# Patient Record
Sex: MALE | Race: ASIAN | NOT HISPANIC OR LATINO | URBAN - METROPOLITAN AREA
[De-identification: names, ages, dates, MRNs, and addresses within clinical notes are randomized per-mention and may not be internally consistent; named-entity substitution may affect disease eponyms.]

---

## 2017-10-20 ENCOUNTER — EMERGENCY (EMERGENCY)
Facility: HOSPITAL | Age: 62
LOS: 1 days | Discharge: HOME | End: 2017-10-20
Attending: EMERGENCY MEDICINE | Admitting: EMERGENCY MEDICINE
Payer: COMMERCIAL

## 2017-10-20 VITALS
TEMPERATURE: 98 F | SYSTOLIC BLOOD PRESSURE: 184 MMHG | HEART RATE: 83 BPM | OXYGEN SATURATION: 98 % | RESPIRATION RATE: 16 BRPM | DIASTOLIC BLOOD PRESSURE: 101 MMHG

## 2017-10-20 LAB
ALBUMIN SERPL ELPH-MCNC: 4 G/DL — SIGNIFICANT CHANGE UP (ref 3.3–5)
ALP SERPL-CCNC: 81 U/L — SIGNIFICANT CHANGE UP (ref 40–120)
ALT FLD-CCNC: 17 U/L — SIGNIFICANT CHANGE UP (ref 4–41)
AST SERPL-CCNC: 17 U/L — SIGNIFICANT CHANGE UP (ref 4–40)
BASOPHILS # BLD AUTO: 0.04 K/UL — SIGNIFICANT CHANGE UP (ref 0–0.2)
BASOPHILS NFR BLD AUTO: 0.4 % — SIGNIFICANT CHANGE UP (ref 0–2)
BILIRUB SERPL-MCNC: 0.3 MG/DL — SIGNIFICANT CHANGE UP (ref 0.2–1.2)
BUN SERPL-MCNC: 10 MG/DL — SIGNIFICANT CHANGE UP (ref 7–23)
CALCIUM SERPL-MCNC: 9.7 MG/DL — SIGNIFICANT CHANGE UP (ref 8.4–10.5)
CHLORIDE SERPL-SCNC: 100 MMOL/L — SIGNIFICANT CHANGE UP (ref 98–107)
CO2 SERPL-SCNC: 25 MMOL/L — SIGNIFICANT CHANGE UP (ref 22–31)
CREAT SERPL-MCNC: 0.82 MG/DL — SIGNIFICANT CHANGE UP (ref 0.5–1.3)
EOSINOPHIL # BLD AUTO: 0.45 K/UL — SIGNIFICANT CHANGE UP (ref 0–0.5)
EOSINOPHIL NFR BLD AUTO: 4.7 % — SIGNIFICANT CHANGE UP (ref 0–6)
GLUCOSE SERPL-MCNC: 251 MG/DL — HIGH (ref 70–99)
HCT VFR BLD CALC: 42.4 % — SIGNIFICANT CHANGE UP (ref 39–50)
HGB BLD-MCNC: 13.7 G/DL — SIGNIFICANT CHANGE UP (ref 13–17)
IMM GRANULOCYTES # BLD AUTO: 0.01 # — SIGNIFICANT CHANGE UP
IMM GRANULOCYTES NFR BLD AUTO: 0.1 % — SIGNIFICANT CHANGE UP (ref 0–1.5)
LYMPHOCYTES # BLD AUTO: 3.25 K/UL — SIGNIFICANT CHANGE UP (ref 1–3.3)
LYMPHOCYTES # BLD AUTO: 34.1 % — SIGNIFICANT CHANGE UP (ref 13–44)
MCHC RBC-ENTMCNC: 26.3 PG — LOW (ref 27–34)
MCHC RBC-ENTMCNC: 32.3 % — SIGNIFICANT CHANGE UP (ref 32–36)
MCV RBC AUTO: 81.4 FL — SIGNIFICANT CHANGE UP (ref 80–100)
MONOCYTES # BLD AUTO: 0.61 K/UL — SIGNIFICANT CHANGE UP (ref 0–0.9)
MONOCYTES NFR BLD AUTO: 6.4 % — SIGNIFICANT CHANGE UP (ref 2–14)
NEUTROPHILS # BLD AUTO: 5.17 K/UL — SIGNIFICANT CHANGE UP (ref 1.8–7.4)
NEUTROPHILS NFR BLD AUTO: 54.3 % — SIGNIFICANT CHANGE UP (ref 43–77)
NRBC # FLD: 0 — SIGNIFICANT CHANGE UP
OB PNL STL: NEGATIVE — SIGNIFICANT CHANGE UP
PLATELET # BLD AUTO: 251 K/UL — SIGNIFICANT CHANGE UP (ref 150–400)
PMV BLD: 11.3 FL — SIGNIFICANT CHANGE UP (ref 7–13)
POTASSIUM SERPL-MCNC: 4.7 MMOL/L — SIGNIFICANT CHANGE UP (ref 3.5–5.3)
POTASSIUM SERPL-SCNC: 4.7 MMOL/L — SIGNIFICANT CHANGE UP (ref 3.5–5.3)
PROT SERPL-MCNC: 7.4 G/DL — SIGNIFICANT CHANGE UP (ref 6–8.3)
RBC # BLD: 5.21 M/UL — SIGNIFICANT CHANGE UP (ref 4.2–5.8)
RBC # FLD: 13 % — SIGNIFICANT CHANGE UP (ref 10.3–14.5)
SODIUM SERPL-SCNC: 138 MMOL/L — SIGNIFICANT CHANGE UP (ref 135–145)
TSH SERPL-MCNC: 4.09 UIU/ML — SIGNIFICANT CHANGE UP (ref 0.27–4.2)
WBC # BLD: 9.53 K/UL — SIGNIFICANT CHANGE UP (ref 3.8–10.5)
WBC # FLD AUTO: 9.53 K/UL — SIGNIFICANT CHANGE UP (ref 3.8–10.5)

## 2017-10-20 PROCEDURE — 99284 EMERGENCY DEPT VISIT MOD MDM: CPT

## 2017-10-20 RX ORDER — DOCUSATE SODIUM 100 MG
1 CAPSULE ORAL
Qty: 14 | Refills: 0
Start: 2017-10-20 | End: 2017-10-27

## 2017-10-20 RX ORDER — POLYETHYLENE GLYCOL 3350 17 G/17G
17 POWDER, FOR SOLUTION ORAL
Qty: 80 | Refills: 0
Start: 2017-10-20 | End: 2017-10-25

## 2017-10-20 RX ORDER — POLYETHYLENE GLYCOL 3350 17 G/17G
17 POWDER, FOR SOLUTION ORAL ONCE
Qty: 0 | Refills: 0 | Status: DISCONTINUED | OUTPATIENT
Start: 2017-10-20 | End: 2017-10-24

## 2017-10-20 NOTE — ED PROVIDER NOTE - MEDICAL DECISION MAKING DETAILS
61 yo M c PMH of HTN, DM, HLD presenting with 1 month hx of diarrhea while in [place] that evolved to constipation on 10/7 when [place] to the [place], pt also reports associated rectal pain with BM. Pt states he passed flatus yesterday and had a BM today. Pt denies n/v and abdominal pain. On exam, pt is afebrile non-toxic appearing, and has no abdominal TTP. Pt has TTP rectally on rectal exam, guiac is negative. Will give pt maalox and d/c with f/u with PCP and GI.

## 2017-10-20 NOTE — ED PROVIDER NOTE - NONTENDER LOCATION
right upper quadrant/right lower quadrant/left lower quadrant/periumbilical/suprapubic/left upper quadrant

## 2017-10-20 NOTE — ED ADULT NURSE NOTE - OBJECTIVE STATEMENT
A&Ox3, respirations even and unlabored, skin warm and dry good for color, ambulatory. Patient reports LLQ cramping and diarrhea, rectal pain when BM and passing gas x 3-4 days, felt non-radiating chest pain yesterday for 2 hours, pain resolved. Recent travel from [place] 10/07/2017. Reports dark stool. Reports lower extremity swelling when arrived, swelling resolved. Denies SOB, LOC, nausea, vomiting. Hx HTN, DM high cholesterol. A&Ox3, respirations even and unlabored, skin warm and dry good for color, ambulatory. Patient reports LLQ cramping and diarrhea 2 weeks ago followed by constipation, rectal pain when BM and passing gas x 3-4 days, felt non-radiating chest pain yesterday for 2 hours, pain resolved. Recent travel from [place] 10/07/2017. Reports dark stool, denies blood in stool. Reports lower extremity swelling when arrived, swelling resolved. Denies SOB, LOC, nausea, vomiting. Hx HTN, DM high cholesterol.

## 2017-10-20 NOTE — ED PROVIDER NOTE - ATTENDING CONTRIBUTION TO CARE
I performed a face-to-face evaluation of the patient and performed a history and physical examination. I agree with the history and physical examination.    62 M, h/o diarrhea in [place] 1 month ago. P/w constipation (straining) and rectal pain w/ BMs. AFVSS. Appears well. NAD. Afebrile. Vital signs unremarkable. Stool guiac neg. Labs unremarkable. Plan: Miralax, Colace.

## 2017-10-20 NOTE — ED PROVIDER NOTE - PLAN OF CARE
1. You were seen for constipation and given miralax.  2.  a prescription for miralax and colace at your pharmacy and take as prescribed.  3. Follow up with your primary care doctor and a gastrointestinal doctor within 48 hours.  4. Return immediately to the emergency department if you have worsening or persistent abdominal pain, nausea, vomiting, fever, bloody stool, dizziness, pain, fainting, chest pain, or shortness of breath.

## 2017-10-20 NOTE — ED PROVIDER NOTE - OBJECTIVE STATEMENT
63 yo M c PMH of DM, HTN, and HLD presenting with hx of recent diarrhea, constipation, and rectal pain. Pt reports he was in [place] on 9/15/17 when he developed diarrhea for 3-4 days that resolved for one week after he took an unknown abx, then recurred for 2 days after eating spicy food and again resolved spontaneously. [place] and started to have constipation as well as rectal pain with bowel movements. 63 yo M c PMH of DM, HTN, and HLD presenting with hx of recent diarrhea, constipation, and rectal pain. Pt reports he was in [place] on 9/15/17 when he developed diarrhea for 3-4 days that resolved for one week after he took an unknown abx, then recurred for 2 days after eating spicy food and again resolved spontaneously. [place] and started to have constipation as well as rectal pain with bowel movements. Pt has had a normal endoscopy in the past. pt passed flatus yesterday and had a small BM this morning.

## 2017-10-20 NOTE — ED PROVIDER NOTE - CARE PLAN
Principal Discharge DX:	Constipation Principal Discharge DX:	Constipation  Instructions for follow-up, activity and diet:	1. You were seen for constipation and given miralax.  2.  a prescription for miralax and colace at your pharmacy and take as prescribed.  3. Follow up with your primary care doctor and a gastrointestinal doctor within 48 hours.  4. Return immediately to the emergency department if you have worsening or persistent abdominal pain, nausea, vomiting, fever, bloody stool, dizziness, pain, fainting, chest pain, or shortness of breath.

## 2017-10-20 NOTE — ED ADULT TRIAGE NOTE - CHIEF COMPLAINT QUOTE
pt amb to triage c/o diarrhea onset while in [place], returned 10/7 received medication (unknown) while in [place] w/ relief for a few days, states BM associated w/ LLQ pain described as cramping, denies recent ABX use

## 2017-10-20 NOTE — ED PROVIDER NOTE - PROGRESS NOTE DETAILS
Pt states he feels improved. Labs are normal, guiac negative. Will d/c pt to home with PCP and GI f/u.

## 2019-03-24 ENCOUNTER — EMERGENCY (EMERGENCY)
Facility: HOSPITAL | Age: 64
LOS: 1 days | Discharge: HOME | End: 2019-03-24
Admitting: EMERGENCY MEDICINE
Payer: COMMERCIAL

## 2019-03-24 VITALS
HEART RATE: 80 BPM | RESPIRATION RATE: 16 BRPM | SYSTOLIC BLOOD PRESSURE: 164 MMHG | TEMPERATURE: 98 F | DIASTOLIC BLOOD PRESSURE: 88 MMHG | OXYGEN SATURATION: 92 %

## 2019-03-24 PROCEDURE — 99283 EMERGENCY DEPT VISIT LOW MDM: CPT

## 2019-03-24 PROCEDURE — 73030 X-RAY EXAM OF SHOULDER: CPT | Mod: 26,LT

## 2019-03-24 RX ORDER — IBUPROFEN 200 MG
600 TABLET ORAL ONCE
Qty: 0 | Refills: 0 | Status: COMPLETED | OUTPATIENT
Start: 2019-03-24 | End: 2019-03-24

## 2019-03-24 RX ADMIN — Medication 600 MILLIGRAM(S): at 14:20

## 2019-03-24 NOTE — ED PROVIDER NOTE - CHPI ED SYMPTOMS NEG
no back pain no disorientation/no laceration/no bruising/no loss of consciousness/no back pain/no neck tenderness/no difficulty bearing weight
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
## 2019-03-24 NOTE — ED PROVIDER NOTE - CARDIAC, MLM
Normal rate, regular rhythm.  Heart sounds S1, S2.  No murmurs, rubs or gallops. Normal rate, regular rhythm.  Heart sounds S1, S2.  No murmurs, rubs or gallops. No chest wall tenderness

## 2019-03-24 NOTE — ED PROVIDER NOTE - NSFOLLOWUPINSTRUCTIONS_ED_ALL_ED_FT
Follow up with your PMD within 48-72 hrs. Take all of your medications as previously prescribed. Take Tylenol 650mg every 4-6 hours as needed for pain. Worsening, continued or ANY new concerning symptoms return to the emergency department.

## 2019-07-26 ENCOUNTER — EMERGENCY (EMERGENCY)
Facility: HOSPITAL | Age: 64
LOS: 1 days | Discharge: HOME | End: 2019-07-26
Attending: EMERGENCY MEDICINE | Admitting: EMERGENCY MEDICINE
Payer: COMMERCIAL

## 2019-07-26 VITALS
TEMPERATURE: 98 F | DIASTOLIC BLOOD PRESSURE: 93 MMHG | SYSTOLIC BLOOD PRESSURE: 147 MMHG | RESPIRATION RATE: 15 BRPM | OXYGEN SATURATION: 100 % | HEART RATE: 71 BPM

## 2019-07-26 VITALS
TEMPERATURE: 98 F | RESPIRATION RATE: 15 BRPM | OXYGEN SATURATION: 98 % | SYSTOLIC BLOOD PRESSURE: 140 MMHG | HEART RATE: 59 BPM | DIASTOLIC BLOOD PRESSURE: 70 MMHG

## 2019-07-26 LAB
ALBUMIN SERPL ELPH-MCNC: 4.2 G/DL — SIGNIFICANT CHANGE UP (ref 3.3–5)
ALP SERPL-CCNC: 74 U/L — SIGNIFICANT CHANGE UP (ref 40–120)
ALT FLD-CCNC: 23 U/L — SIGNIFICANT CHANGE UP (ref 4–41)
ANION GAP SERPL CALC-SCNC: 9 MMO/L — SIGNIFICANT CHANGE UP (ref 7–14)
AST SERPL-CCNC: 25 U/L — SIGNIFICANT CHANGE UP (ref 4–40)
BASOPHILS # BLD AUTO: 0.05 K/UL — SIGNIFICANT CHANGE UP (ref 0–0.2)
BASOPHILS NFR BLD AUTO: 0.6 % — SIGNIFICANT CHANGE UP (ref 0–2)
BILIRUB SERPL-MCNC: 0.4 MG/DL — SIGNIFICANT CHANGE UP (ref 0.2–1.2)
BUN SERPL-MCNC: 16 MG/DL — SIGNIFICANT CHANGE UP (ref 7–23)
CALCIUM SERPL-MCNC: 10 MG/DL — SIGNIFICANT CHANGE UP (ref 8.4–10.5)
CHLORIDE SERPL-SCNC: 100 MMOL/L — SIGNIFICANT CHANGE UP (ref 98–107)
CO2 SERPL-SCNC: 30 MMOL/L — SIGNIFICANT CHANGE UP (ref 22–31)
CREAT SERPL-MCNC: 0.78 MG/DL — SIGNIFICANT CHANGE UP (ref 0.5–1.3)
EOSINOPHIL # BLD AUTO: 0.51 K/UL — HIGH (ref 0–0.5)
EOSINOPHIL NFR BLD AUTO: 5.9 % — SIGNIFICANT CHANGE UP (ref 0–6)
GLUCOSE SERPL-MCNC: 159 MG/DL — HIGH (ref 70–99)
HBA1C BLD-MCNC: 11.7 % — HIGH (ref 4–5.6)
HCT VFR BLD CALC: 45.1 % — SIGNIFICANT CHANGE UP (ref 39–50)
HGB BLD-MCNC: 14.7 G/DL — SIGNIFICANT CHANGE UP (ref 13–17)
IMM GRANULOCYTES NFR BLD AUTO: 0.2 % — SIGNIFICANT CHANGE UP (ref 0–1.5)
LYMPHOCYTES # BLD AUTO: 3.87 K/UL — HIGH (ref 1–3.3)
LYMPHOCYTES # BLD AUTO: 44.7 % — HIGH (ref 13–44)
MCHC RBC-ENTMCNC: 26.4 PG — LOW (ref 27–34)
MCHC RBC-ENTMCNC: 32.6 % — SIGNIFICANT CHANGE UP (ref 32–36)
MCV RBC AUTO: 81.1 FL — SIGNIFICANT CHANGE UP (ref 80–100)
MONOCYTES # BLD AUTO: 0.69 K/UL — SIGNIFICANT CHANGE UP (ref 0–0.9)
MONOCYTES NFR BLD AUTO: 8 % — SIGNIFICANT CHANGE UP (ref 2–14)
NEUTROPHILS # BLD AUTO: 3.52 K/UL — SIGNIFICANT CHANGE UP (ref 1.8–7.4)
NEUTROPHILS NFR BLD AUTO: 40.6 % — LOW (ref 43–77)
NRBC # FLD: 0.02 K/UL — SIGNIFICANT CHANGE UP (ref 0–0)
PLATELET # BLD AUTO: 217 K/UL — SIGNIFICANT CHANGE UP (ref 150–400)
PMV BLD: 11.6 FL — SIGNIFICANT CHANGE UP (ref 7–13)
POTASSIUM SERPL-MCNC: 4.3 MMOL/L — SIGNIFICANT CHANGE UP (ref 3.5–5.3)
POTASSIUM SERPL-SCNC: 4.3 MMOL/L — SIGNIFICANT CHANGE UP (ref 3.5–5.3)
PROT SERPL-MCNC: 7.5 G/DL — SIGNIFICANT CHANGE UP (ref 6–8.3)
RBC # BLD: 5.56 M/UL — SIGNIFICANT CHANGE UP (ref 4.2–5.8)
RBC # FLD: 13.2 % — SIGNIFICANT CHANGE UP (ref 10.3–14.5)
SODIUM SERPL-SCNC: 139 MMOL/L — SIGNIFICANT CHANGE UP (ref 135–145)
WBC # BLD: 8.66 K/UL — SIGNIFICANT CHANGE UP (ref 3.8–10.5)
WBC # FLD AUTO: 8.66 K/UL — SIGNIFICANT CHANGE UP (ref 3.8–10.5)

## 2019-07-26 PROCEDURE — 99284 EMERGENCY DEPT VISIT MOD MDM: CPT

## 2019-07-26 PROCEDURE — 70450 CT HEAD/BRAIN W/O DYE: CPT | Mod: 26

## 2019-07-26 RX ORDER — SODIUM CHLORIDE 9 MG/ML
1000 INJECTION INTRAMUSCULAR; INTRAVENOUS; SUBCUTANEOUS ONCE
Refills: 0 | Status: COMPLETED | OUTPATIENT
Start: 2019-07-26 | End: 2019-07-26

## 2019-07-26 RX ORDER — ACETAMINOPHEN 500 MG
650 TABLET ORAL ONCE
Refills: 0 | Status: COMPLETED | OUTPATIENT
Start: 2019-07-26 | End: 2019-07-26

## 2019-07-26 RX ADMIN — Medication 650 MILLIGRAM(S): at 09:30

## 2019-07-26 RX ADMIN — SODIUM CHLORIDE 1000 MILLILITER(S): 9 INJECTION INTRAMUSCULAR; INTRAVENOUS; SUBCUTANEOUS at 09:29

## 2019-07-26 RX ADMIN — SODIUM CHLORIDE 1000 MILLILITER(S): 9 INJECTION INTRAMUSCULAR; INTRAVENOUS; SUBCUTANEOUS at 09:30

## 2019-07-26 NOTE — ED PROVIDER NOTE - PROGRESS NOTE DETAILS
Feels better, offered CDU for endocrinology but patient prefers to f/u in office.  Copies of results given to patient.

## 2019-07-26 NOTE — ED PROVIDER NOTE - NSFOLLOWUPINSTRUCTIONS_ED_ALL_ED_FT
Headache    A headache is pain or discomfort felt around the head or neck area. The specific cause of a headache may not be found as there are many types including tension headaches, migraine headaches, and cluster headaches. Watch your condition for any changes. Things you can do to manage your pain include taking over the counter and prescription medications as instructed by your health care provider, lying down in a dark quiet room, limiting stress, getting regular sleep, and refraining from alcohol and tobacco products.    SEEK IMMEDIATE MEDICAL CARE IF YOU HAVE ANY OF THE FOLLOWING SYMPTOMS: fever, vomiting, stiff neck, loss of vision, problems with speech, muscle weakness, loss of balance, trouble walking, passing out, or confusion.    -- Please use 650-1000mg Tylenol (also called acetaminophen) every 6 hours and/or 400-600mg Motrin (also called Advil or ibuprofen) every 6 hours as needed for pain/discomfort/swelling. You can get these without a prescription. Don't use more than 3000mg of Tylenol in any 24-hour period. Make sure your other prescription/over-the-counter medications don't contain any Tylenol so you don't take too much. If you have any stomach discomfort while taking Motrin, you can use TUMS or Pepcid or Zantac (these can also be bought without a prescription).    FOLLOW UP WITH AN ENDOCRINOLOGIST.

## 2019-07-26 NOTE — ED PROVIDER NOTE - OBJECTIVE STATEMENT
63 M with hx of DM on lantus only complaining of frontal HA for 1 week.  Patient denies fever/chills.  No abdominal pain, no nausea/vomiting.  No weakness, no other complaints.  Patient denies dizziness/lightheadness.

## 2019-07-26 NOTE — ED ADULT TRIAGE NOTE - CHIEF COMPLAINT QUOTE
pt comes to ED for HA x 1 week on and off. pt states he took advil yesterday. pt did not go to PCP. pt has hx of HTN and DM FS in triage 149 . pt VSS NAD

## 2019-07-26 NOTE — ED ADULT NURSE NOTE - NSIMPLEMENTINTERV_GEN_ALL_ED
Implemented All Universal Safety Interventions:  [place] to call system. Call bell, personal items and telephone within reach. Instruct patient to call for assistance. Room bathroom lighting operational. Non-slip footwear when patient is off stretcher. Physically safe environment: no spills, clutter or unnecessary equipment. Stretcher in lowest position, wheels locked, appropriate side rails in place.

## 2019-10-14 NOTE — ED ADULT TRIAGE NOTE - PAIN RATING/NUMBER SCALE (0-10): ACTIVITY
Price (Use Numbers Only, No Special Characters Or $): 815 Price (Use Numbers Only, No Special Characters Or $): 936 3

## 2019-10-24 ENCOUNTER — EMERGENCY (EMERGENCY)
Facility: HOSPITAL | Age: 64
LOS: 1 days | Discharge: HOME | End: 2019-10-24
Attending: STUDENT IN AN ORGANIZED HEALTH CARE EDUCATION/TRAINING PROGRAM | Admitting: EMERGENCY MEDICINE
Payer: COMMERCIAL

## 2019-10-24 VITALS
HEART RATE: 94 BPM | OXYGEN SATURATION: 100 % | SYSTOLIC BLOOD PRESSURE: 185 MMHG | TEMPERATURE: 98 F | RESPIRATION RATE: 16 BRPM | DIASTOLIC BLOOD PRESSURE: 82 MMHG

## 2019-10-24 DIAGNOSIS — E78.3 HYPERCHYLOMICRONEMIA: ICD-10-CM

## 2019-10-24 DIAGNOSIS — I10 ESSENTIAL (PRIMARY) HYPERTENSION: ICD-10-CM

## 2019-10-24 DIAGNOSIS — E11.65 TYPE 2 DIABETES MELLITUS WITH HYPERGLYCEMIA: ICD-10-CM

## 2019-10-24 LAB
ALBUMIN SERPL ELPH-MCNC: 4.3 G/DL — SIGNIFICANT CHANGE UP (ref 3.3–5)
ALP SERPL-CCNC: 71 U/L — SIGNIFICANT CHANGE UP (ref 40–120)
ALT FLD-CCNC: 20 U/L — SIGNIFICANT CHANGE UP (ref 4–41)
ANION GAP SERPL CALC-SCNC: 11 MMO/L — SIGNIFICANT CHANGE UP (ref 7–14)
APPEARANCE UR: CLEAR — SIGNIFICANT CHANGE UP
AST SERPL-CCNC: 37 U/L — SIGNIFICANT CHANGE UP (ref 4–40)
BASE EXCESS BLDV CALC-SCNC: 2 MMOL/L — SIGNIFICANT CHANGE UP
BASOPHILS # BLD AUTO: 0.04 K/UL — SIGNIFICANT CHANGE UP (ref 0–0.2)
BASOPHILS NFR BLD AUTO: 0.4 % — SIGNIFICANT CHANGE UP (ref 0–2)
BILIRUB SERPL-MCNC: 0.5 MG/DL — SIGNIFICANT CHANGE UP (ref 0.2–1.2)
BILIRUB UR-MCNC: NEGATIVE — SIGNIFICANT CHANGE UP
BLOOD GAS VENOUS - CREATININE: 0.69 MG/DL — SIGNIFICANT CHANGE UP (ref 0.5–1.3)
BLOOD GAS VENOUS - FIO2: 21 — SIGNIFICANT CHANGE UP
BLOOD UR QL VISUAL: NEGATIVE — SIGNIFICANT CHANGE UP
BUN SERPL-MCNC: 15 MG/DL — SIGNIFICANT CHANGE UP (ref 7–23)
CALCIUM SERPL-MCNC: 10.2 MG/DL — SIGNIFICANT CHANGE UP (ref 8.4–10.5)
CHLORIDE BLDV-SCNC: 104 MMOL/L — SIGNIFICANT CHANGE UP (ref 96–108)
CHLORIDE SERPL-SCNC: 101 MMOL/L — SIGNIFICANT CHANGE UP (ref 98–107)
CO2 SERPL-SCNC: 25 MMOL/L — SIGNIFICANT CHANGE UP (ref 22–31)
COLOR SPEC: COLORLESS — SIGNIFICANT CHANGE UP
CREAT SERPL-MCNC: 0.76 MG/DL — SIGNIFICANT CHANGE UP (ref 0.5–1.3)
EOSINOPHIL # BLD AUTO: 0.21 K/UL — SIGNIFICANT CHANGE UP (ref 0–0.5)
EOSINOPHIL NFR BLD AUTO: 2.2 % — SIGNIFICANT CHANGE UP (ref 0–6)
GAS PNL BLDV: 139 MMOL/L — SIGNIFICANT CHANGE UP (ref 136–146)
GLUCOSE BLDV-MCNC: 209 MG/DL — HIGH (ref 70–99)
GLUCOSE SERPL-MCNC: 208 MG/DL — HIGH (ref 70–99)
GLUCOSE UR-MCNC: NEGATIVE — SIGNIFICANT CHANGE UP
HCO3 BLDV-SCNC: 25 MMOL/L — SIGNIFICANT CHANGE UP (ref 20–27)
HCT VFR BLD CALC: 44.2 % — SIGNIFICANT CHANGE UP (ref 39–50)
HCT VFR BLDV CALC: 46.1 % — SIGNIFICANT CHANGE UP (ref 39–51)
HGB BLD-MCNC: 14.4 G/DL — SIGNIFICANT CHANGE UP (ref 13–17)
HGB BLDV-MCNC: 15 G/DL — SIGNIFICANT CHANGE UP (ref 13–17)
IMM GRANULOCYTES NFR BLD AUTO: 0.4 % — SIGNIFICANT CHANGE UP (ref 0–1.5)
KETONES UR-MCNC: NEGATIVE — SIGNIFICANT CHANGE UP
LACTATE BLDV-MCNC: 2 MMOL/L — SIGNIFICANT CHANGE UP (ref 0.5–2)
LEUKOCYTE ESTERASE UR-ACNC: NEGATIVE — SIGNIFICANT CHANGE UP
LYMPHOCYTES # BLD AUTO: 3.34 K/UL — HIGH (ref 1–3.3)
LYMPHOCYTES # BLD AUTO: 35.4 % — SIGNIFICANT CHANGE UP (ref 13–44)
MCHC RBC-ENTMCNC: 26.5 PG — LOW (ref 27–34)
MCHC RBC-ENTMCNC: 32.6 % — SIGNIFICANT CHANGE UP (ref 32–36)
MCV RBC AUTO: 81.3 FL — SIGNIFICANT CHANGE UP (ref 80–100)
MONOCYTES # BLD AUTO: 0.64 K/UL — SIGNIFICANT CHANGE UP (ref 0–0.9)
MONOCYTES NFR BLD AUTO: 6.8 % — SIGNIFICANT CHANGE UP (ref 2–14)
NEUTROPHILS # BLD AUTO: 5.17 K/UL — SIGNIFICANT CHANGE UP (ref 1.8–7.4)
NEUTROPHILS NFR BLD AUTO: 54.8 % — SIGNIFICANT CHANGE UP (ref 43–77)
NITRITE UR-MCNC: NEGATIVE — SIGNIFICANT CHANGE UP
NRBC # FLD: 0 K/UL — SIGNIFICANT CHANGE UP (ref 0–0)
PCO2 BLDV: 51 MMHG — SIGNIFICANT CHANGE UP (ref 41–51)
PH BLDV: 7.35 PH — SIGNIFICANT CHANGE UP (ref 7.32–7.43)
PH UR: 6.5 — SIGNIFICANT CHANGE UP (ref 5–8)
PLATELET # BLD AUTO: 230 K/UL — SIGNIFICANT CHANGE UP (ref 150–400)
PMV BLD: 11.4 FL — SIGNIFICANT CHANGE UP (ref 7–13)
PO2 BLDV: 38 MMHG — SIGNIFICANT CHANGE UP (ref 35–40)
POTASSIUM BLDV-SCNC: 4.2 MMOL/L — SIGNIFICANT CHANGE UP (ref 3.4–4.5)
POTASSIUM SERPL-MCNC: 5.2 MMOL/L — SIGNIFICANT CHANGE UP (ref 3.5–5.3)
POTASSIUM SERPL-SCNC: 5.2 MMOL/L — SIGNIFICANT CHANGE UP (ref 3.5–5.3)
PROT SERPL-MCNC: 8 G/DL — SIGNIFICANT CHANGE UP (ref 6–8.3)
PROT UR-MCNC: NEGATIVE — SIGNIFICANT CHANGE UP
RBC # BLD: 5.44 M/UL — SIGNIFICANT CHANGE UP (ref 4.2–5.8)
RBC # FLD: 13.6 % — SIGNIFICANT CHANGE UP (ref 10.3–14.5)
SAO2 % BLDV: 64.1 % — SIGNIFICANT CHANGE UP (ref 60–85)
SODIUM SERPL-SCNC: 137 MMOL/L — SIGNIFICANT CHANGE UP (ref 135–145)
SP GR SPEC: 1 — SIGNIFICANT CHANGE UP (ref 1–1.04)
TROPONIN T, HIGH SENSITIVITY: 27 NG/L — SIGNIFICANT CHANGE UP (ref ?–14)
UROBILINOGEN FLD QL: NORMAL — SIGNIFICANT CHANGE UP
WBC # BLD: 9.44 K/UL — SIGNIFICANT CHANGE UP (ref 3.8–10.5)
WBC # FLD AUTO: 9.44 K/UL — SIGNIFICANT CHANGE UP (ref 3.8–10.5)

## 2019-10-24 PROCEDURE — 99220: CPT

## 2019-10-24 PROCEDURE — 99245 OFF/OP CONSLTJ NEW/EST HI 55: CPT

## 2019-10-24 PROCEDURE — 71046 X-RAY EXAM CHEST 2 VIEWS: CPT | Mod: 26

## 2019-10-24 RX ORDER — DEXTROSE 50 % IN WATER 50 %
12.5 SYRINGE (ML) INTRAVENOUS ONCE
Refills: 0 | Status: DISCONTINUED | OUTPATIENT
Start: 2019-10-24 | End: 2019-10-28

## 2019-10-24 RX ORDER — INSULIN LISPRO 100/ML
5 VIAL (ML) SUBCUTANEOUS
Refills: 0 | Status: DISCONTINUED | OUTPATIENT
Start: 2019-10-24 | End: 2019-10-24

## 2019-10-24 RX ORDER — INSULIN GLARGINE 100 [IU]/ML
50 INJECTION, SOLUTION SUBCUTANEOUS AT BEDTIME
Refills: 0 | Status: DISCONTINUED | OUTPATIENT
Start: 2019-10-24 | End: 2019-10-28

## 2019-10-24 RX ORDER — GLUCAGON INJECTION, SOLUTION 0.5 MG/.1ML
1 INJECTION, SOLUTION SUBCUTANEOUS ONCE
Refills: 0 | Status: DISCONTINUED | OUTPATIENT
Start: 2019-10-24 | End: 2019-10-28

## 2019-10-24 RX ORDER — DEXTROSE 50 % IN WATER 50 %
25 SYRINGE (ML) INTRAVENOUS ONCE
Refills: 0 | Status: DISCONTINUED | OUTPATIENT
Start: 2019-10-24 | End: 2019-10-28

## 2019-10-24 RX ORDER — SODIUM CHLORIDE 9 MG/ML
1000 INJECTION, SOLUTION INTRAVENOUS
Refills: 0 | Status: DISCONTINUED | OUTPATIENT
Start: 2019-10-24 | End: 2019-10-28

## 2019-10-24 RX ORDER — INSULIN LISPRO 100/ML
VIAL (ML) SUBCUTANEOUS AT BEDTIME
Refills: 0 | Status: DISCONTINUED | OUTPATIENT
Start: 2019-10-24 | End: 2019-10-24

## 2019-10-24 RX ORDER — DEXTROSE 50 % IN WATER 50 %
15 SYRINGE (ML) INTRAVENOUS ONCE
Refills: 0 | Status: DISCONTINUED | OUTPATIENT
Start: 2019-10-24 | End: 2019-10-28

## 2019-10-24 RX ORDER — ASPIRIN/CALCIUM CARB/MAGNESIUM 324 MG
81 TABLET ORAL DAILY
Refills: 0 | Status: DISCONTINUED | OUTPATIENT
Start: 2019-10-24 | End: 2019-10-28

## 2019-10-24 RX ORDER — AMLODIPINE BESYLATE 2.5 MG/1
5 TABLET ORAL DAILY
Refills: 0 | Status: DISCONTINUED | OUTPATIENT
Start: 2019-10-24 | End: 2019-10-24

## 2019-10-24 RX ORDER — AMLODIPINE BESYLATE 2.5 MG/1
10 TABLET ORAL DAILY
Refills: 0 | Status: DISCONTINUED | OUTPATIENT
Start: 2019-10-24 | End: 2019-10-28

## 2019-10-24 RX ORDER — INSULIN LISPRO 100/ML
10 VIAL (ML) SUBCUTANEOUS
Refills: 0 | Status: DISCONTINUED | OUTPATIENT
Start: 2019-10-24 | End: 2019-10-28

## 2019-10-24 RX ORDER — INSULIN LISPRO 100/ML
VIAL (ML) SUBCUTANEOUS
Refills: 0 | Status: DISCONTINUED | OUTPATIENT
Start: 2019-10-24 | End: 2019-10-28

## 2019-10-24 RX ORDER — INSULIN LISPRO 100/ML
VIAL (ML) SUBCUTANEOUS AT BEDTIME
Refills: 0 | Status: DISCONTINUED | OUTPATIENT
Start: 2019-10-24 | End: 2019-10-28

## 2019-10-24 RX ADMIN — Medication 5 UNIT(S): at 13:17

## 2019-10-24 RX ADMIN — Medication 6: at 13:17

## 2019-10-24 RX ADMIN — INSULIN GLARGINE 50 UNIT(S): 100 INJECTION, SOLUTION SUBCUTANEOUS at 22:28

## 2019-10-24 RX ADMIN — Medication 81 MILLIGRAM(S): at 11:48

## 2019-10-24 RX ADMIN — Medication 2: at 18:04

## 2019-10-24 RX ADMIN — AMLODIPINE BESYLATE 10 MILLIGRAM(S): 2.5 TABLET ORAL at 11:46

## 2019-10-24 RX ADMIN — Medication 5 UNIT(S): at 18:04

## 2019-10-24 NOTE — ED PROVIDER NOTE - CLINICAL SUMMARY MEDICAL DECISION MAKING FREE TEXT BOX
Jim, PGY1 - 64M PMH IDDM, HTN, HLD p/w hyperglycemia x 10 days, and 2 episodes of CP. VSS and well-appearing on exam.  - Hyperglycemia, r/o DKA. No n/v, abdominal pain, polyuria, polydipsia. . Will obtain CBC, CMP, VBG, UA. Likely dc home with PCP and endocrine f/u.  - Pt has no h/o ACS, PE or DVT, no FH of early ACS. Pulses equal in all extremities, no LE swelling, no calf tenderness. Will r/o ACS with CXR, EKG, labs with troponin. Low suspicion for PE or aortic dissection.
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
## 2019-10-24 NOTE — ED PROVIDER NOTE - NSFOLLOWUPINSTRUCTIONS_ED_ALL_ED_FT
You were seen in the ER for hyperglycemia (high blood sugars) and chest pain.    1. Follow up with your primary care doctor and endocrinologist (diabetes doctor) within 48 hours.    2. Return to the ER for any new or worsening symptoms or concerns, such as fever, chills, nausea, vomiting, chest pain with shortness of breath, etc.    3. Further information on hyperglycemia and chest pain is included below.      Hyperglycemia  Hyperglycemia is when the sugar (glucose) level in your blood is too high. It may not cause symptoms. If you do have symptoms, they may include warning signs, such as:  Feeling more thirsty than normal.Hunger.Feeling tired.Needing to pee (urinate) more than normal.Blurry eyesight (vision).You may get other symptoms as it gets worse, such as:  Dry mouth.Not being hungry (loss of appetite).Fruity-smelling breath.Weakness.Weight gain or loss that is not planned. Weight loss may be fast.A tingling or numb feeling in your hands or feet.Headache.Skin that does not bounce back quickly when it is lightly pinched and released (poor skin turgor).Pain in your belly (abdomen).Cuts or bruises that heal slowly.High blood sugar can happen to people who do or do not have diabetes. High blood sugar can happen slowly or quickly, and it can be an emergency.  Follow these instructions at home:  General instructions     Take over-the-counter and prescription medicines only as told by your doctor.Do not use products that contain nicotine or tobacco, such as cigarettes and e-cigarettes. If you need help quitting, ask your doctor.Limit alcohol intake to no more than 1 drink per day for nonpregnant women and 2 drinks per day for men. One drink equals 12 oz of beer, 5 oz of wine, or 1½ oz of hard liquor.Manage stress. If you need help with this, ask your doctor.Keep all follow-up visits as told by your doctor. This is important.Eating and drinking     Image   Stay at a healthy weight.Exercise regularly, as told by your doctor.Drink enough fluid, especially when you:  Exercise.Get sick.Are in hot temperatures.Eat healthy foods, such as:  Low-fat (lean) proteins.Complex carbs (complex carbohydrates), such as whole wheat bread or brown rice.Fresh fruits and vegetables.Low-fat dairy products.Healthy fats.Drink enough fluid to keep your pee (urine) clear or pale yellow.If you have diabetes:     Image   Make sure you know the symptoms of hyperglycemia.Follow your diabetes management plan, as told by your doctor. Make sure you:  Take insulin and medicines as told.Follow your exercise plan.Follow your meal plan. Eat on time. Do not skip meals.Check your blood sugar as often as told. Make sure to check before and after exercise. If you exercise longer or in a different way than you normally do, check your blood sugar more often.Follow your sick day plan whenever you cannot eat or drink normally. Make this plan ahead of time with your doctor.Share your diabetes management plan with people in your workplace, school, and household.Check your urine for ketones when you are ill and as told by your doctor.Carry a card or wear jewelry that says that you have diabetes.Contact a doctor if:  Your blood sugar level is higher than 240 mg/dL (13.3 mmol/L) for 2 days in a row.You have problems keeping your blood sugar in your target range.High blood sugar happens often for you.Get help right away if:  You have trouble breathing.You have a change in how you think, feel, or act (mental status).You feel sick to your stomach (nauseous), and that feeling does not go away.You cannot stop throwing up (vomiting).These symptoms may be an emergency. Do not wait to see if the symptoms will go away. Get medical help right away. Call your local emergency services (911 in the [place] Do not drive yourself to the hospital.   Summary  Hyperglycemia is when the sugar (glucose) level in your blood is too high.High blood sugar can happen to people who do or do not have diabetes.Make sure you drink enough fluids, eat healthy foods, and exercise regularly.Contact your doctor if you have problems keeping your blood sugar in your target range.      Chest Pain    WHAT YOU NEED TO KNOW:    Chest pain can be caused by a range of conditions, from not serious to life-threatening. Chest pain can be a symptom of a digestive problem, such as acid reflux or a stomach ulcer. An anxiety attack or a strong emotion, such as anger, can also cause chest pain. Infection, inflammation, or a fracture in the bones or cartilage in your chest can cause pain or discomfort. Sometimes chest pain or pressure is caused by poor blood flow to your heart (angina). Chest pain may also be caused by life-threatening conditions such as a heart attack or blood clot in your lungs.     DISCHARGE INSTRUCTIONS:    Call 911 if:     You have any of the following signs of a heart attack:   Squeezing, pressure, or pain in your chest      You may also have any of the following:   Discomfort or pain in your back, neck, jaw, stomach, or arm      Shortness of breath      Nausea or vomiting      Lightheadedness or a sudden cold sweat        Return to the emergency department if:     You have chest discomfort that gets worse, even with medicine.      You cough or vomit blood.       Your bowel movements are black or bloody.       You cannot stop vomiting, or it hurts to swallow.     Contact your healthcare provider if:     You have questions or concerns about your condition or care.        Medicines:     Medicines may be given to treat the cause of your chest pain. Examples include pain medicine, anxiety medicine, or medicines to increase blood flow to your heart.       Do not take certain medicines without asking your healthcare provider first. These include NSAIDs, herbal or vitamin supplements, or hormones (estrogen or progestin).       Take your medicine as directed. Contact your healthcare provider if you think your medicine is not helping or if you have side effects. Tell him or her if you are allergic to any medicine. Keep a list of the medicines, vitamins, and herbs you take. Include the amounts, and when and why you take them. Bring the list or the pill bottles to follow-up visits. Carry your medicine list with you in case of an emergency.    Follow up with your healthcare provider within 72 hours, or as directed: You may need to return for more tests to find the cause of your chest pain. You may be referred to a specialist, such as a cardiologist or gastroenterologist. Write down your questions so you remember to ask them during your visits.     Healthy living tips: The following are general healthy guidelines. If your chest pain is caused by a heart problem, your healthcare provider will give you specific guidelines to follow.    Do not smoke. Nicotine and other chemicals in cigarettes and cigars can cause lung and heart damage. Ask your healthcare provider for information if you currently smoke and need help to quit. E-cigarettes or smokeless tobacco still contain nicotine. Talk to your healthcare provider before you use these products.       Eat a variety of healthy, low-fat, low-salt foods. Healthy foods include fruits, vegetables, whole-grain breads, low-fat dairy products, beans, lean meats, and fish. Ask for more information about a heart healthy diet.      Drink plenty of water every day. Your body is made of mostly water. Water helps your body to control your temperature and blood pressure. Ask your healthcare provider how much water you should drink every day.      Ask about activity. Your healthcare provider will tell you which activities to limit or avoid. Ask when you can drive, return to work, and have sex. Ask about the best exercise plan for you.      Maintain a healthy weight. Ask your healthcare provider how much you should weigh. Ask him or her to help you create a weight loss plan if you are overweight.       Get the flu and pneumonia vaccines. All adults should get the influenza (flu) vaccine. Get it every year as soon as it becomes available. The pneumococcal vaccine is given to adults aged 65 years or older. The vaccine is given every 5 years to prevent pneumococcal disease, such as pneumonia.
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
## 2019-10-24 NOTE — ED PROVIDER NOTE - OBJECTIVE STATEMENT
64M PMH IDDM, HTN, HLD p/w high blood sugars and chest pain. Pt reports hyperglycemia to the 400s x 10 days. No recent change in diet or insulin regimen - 50u Lantus nightly, and 10u or 20u short-acting insulin during the day. Last saw endocrinologist 2 months ago. Associated with mild head "heaviness." No n/v, abdominal pain, SOB, polyuria, polydipsia. Also reports 2 episodes of left-sided CP in past 3 days. Last night, CP was associated with sweats and BL shoulder "heaviness." Pain improved after sitting down & taking aspirin. Not exertional. No associated SOB, N/V. No h/o previous CP or SOB with exertion. No FH of MIs at 40s, 50s. Never had a stress test. No ripping or tearing sensation. Not pleuritic. No unilateral leg swelling, hemoptysis, recent surgery/immobilization in past 4 weeks, history of DVT/PE. 64M PMH IDDM, HTN, HLD p/w high blood sugars and chest pain. Pt reports hyperglycemia to the 400s x 10 days. No recent change in diet or insulin regimen - 50u Lantus nightly, and 10u or 20u short-acting insulin during the day. Last saw PCP 2 months ago. Associated with mild head "heaviness." No n/v, abdominal pain, SOB, polyuria, polydipsia. Also reports 2 episodes of left-sided CP in past 3 days. Last night, CP was associated with sweats and BL shoulder "heaviness." Pain improved after sitting down & taking aspirin. Not exertional. No associated SOB, N/V. No h/o previous CP or SOB with exertion. No FH of MIs at 40s, 50s. Never had a stress test. No ripping or tearing sensation. Not pleuritic. No unilateral leg swelling, hemoptysis, recent surgery/immobilization in past 4 weeks, history of DVT/PE.

## 2019-10-24 NOTE — ED CDU PROVIDER INITIAL DAY NOTE - MEDICAL DECISION MAKING DETAILS
65 yo male hx IDDM, HTN, HLD presented to ED c/o elevated sugars for the past 10 days as well as episode of chest pain yesterday.  Pt's HbA1C 10.7.  Endocrine consult, tele monitor, stress test

## 2019-10-24 NOTE — CONSULT NOTE ADULT - ASSESSMENT
KAILASH LAI is a 64y old male with uncontrolled Type 2 DM, A1C 10.2% during this ED visit, HTN, HLD, presenting with chest pain,  Endocrine consulted for uncontrolled Type 2 DM.      If patient wishes to follow up with [facility] Endocrinology     [facility]  [address] [place] [address], [facility] [address], [place], [place] [id number] [phone]   Please call to schedule appointment with CDE/Nutritionist and MD appointment next available   --------------------------------------------------------------------------------------  [facility]  [address] [place] [address] [phone]  -------------------------------------------------------------------------------------  [facility] ([facility] [place])  [address] [facility]  [phone]

## 2019-10-24 NOTE — ED PROVIDER NOTE - PHYSICAL EXAMINATION
I have reviewed the triage vital signs.  Const: AAOx3, laying comfortably in NAD  Eyes: PERRL, no conjunctival injection  HENT: NCAT, Neck supple without meningismus, oral mucosa moist  CV: RRR, no murmurs, rubs, or gallops  Resp: CTAB, no respiratory distress, no wheezes, rales, or rhonchi  GI: Abdomen soft, NTND, no guarding, no CVA tenderness  Extremities: No peripheral edema,  2+ radial and DP pulses  Skin: Warm, well perfused, no rash  MSK: No gross deformities appreciated  Neuro: No focal sensory or motor deficits, CN 2-12 grossly intact  Psych: Appropriate mood and affect

## 2019-10-24 NOTE — ED CDU PROVIDER INITIAL DAY NOTE - OBJECTIVE STATEMENT
64M PMH IDDM, HTN, HLD p/w high blood sugars and chest pain. Pt reports hyperglycemia to the 400s x 10 days. No recent change in diet or insulin regimen - 50u Lantus nightly, and 10u or 20u short-acting insulin during the day. Last saw PCP 2 months ago. Associated with mild head "heaviness." No n/v, abdominal pain, SOB, polyuria, polydipsia. Also reports 2 episodes of left-sided CP in past 3 days. Last night, CP was associated with sweats and BL shoulder "heaviness." Pain improved after sitting down & taking aspirin. Not exertional. No associated SOB, N/V. No h/o previous CP or SOB with exertion. No FH of MIs at 40s, 50s. Never had a stress test. No ripping or tearing sensation. Not pleuritic. No unilateral leg swelling, hemoptysis, recent surgery/immobilization in past 4 weeks, history of DVT/PE.    CDU PA: Agree with above. Pt is a 65 yo male hx IDDM, HTN, HLD presented to ED c/o elevated sugars for the past 10 days.  Pt endorses feeling generalized weakness and noted had an episode of substernal chest pain yesterday that was associated with diaphoresis.  Pt does not see an endocrinologist and states takes lantus 50 units at bedtime and 10 units of humalog once a day at 11-12pm.  Pt currently has no chest pain.  Sent to CDU for tele monitoring, stress test and endcorine consult.

## 2019-10-24 NOTE — ED PROVIDER NOTE - NS ED ROS FT
General: Denies fevers or chills  Eyes: Denies vision changes  ENMT: Denies trouble swallowing or speaking, or sore throat  CV: +Chest pain  Resp: Denies cough or SOB  GI: Denies abdominal pain, nausea, vomiting, diarrhea, or constipation   : +Dysuria  Skin: Denies new rashes  Neuro: +Head "heaviness." Denies headache, numbness, or weakness  MSK: +Shoulder "heaviness." Denies recent trauma  Endocrine: Denies unexpected weight loss  Heme: Denies bleeding disorders

## 2019-10-24 NOTE — CONSULT NOTE ADULT - PROBLEM SELECTOR RECOMMENDATION 9
Patient was prescribed basal bolus regimen but poor adherence to dietary recommendations as well as only using meal time insulin intermittently.  Educated patient on the pharmacology of basal and bolus insulin regimen and that his overall poor uncontrol likely due to post-prandial hyperglycemia.   He is a , I do worry about hypoglycemia episode while he is at work, discussed hypoglycemia protocol with patient in detail and discussed the importance of always having simple carb (such as juice, glucose tab in his car should he experience symptoms of hypoglycemia).   He needs nutritional consultation as he has poor understanding of Carb consistent diet.   Recommend to continue with Lantus 50 units at bedtime  Recommend to start with Humalog 10 units tid with meals plus MDSS qac/hs to further titrate his home insulin regimen.   Discussed with patient the importance of using meal time insulin every time he eats a regular meal.  There is NO renal contraindication for patient to re-start Metformin 500mg BID and up-titrate to 1000mg BID outpatient.   Check FSG qac/hs, FSG goal 100-180mg/dl while in patient

## 2019-10-24 NOTE — ED CDU PROVIDER INITIAL DAY NOTE - ATTENDING CONTRIBUTION TO CARE
I performed a history and physical exam of the patient and discussed their management with the PA. I reviewed the PA's note and agree with the documented findings and plan of care except as noted below. My medical decision making and observations are as follows:     64M PMH IDDM, HTN, HLD p/w high blood sugars and chest pain. Pt reports 2 episodes of left-sided CP in past 3 days. Last night, CP was associated with sweats and BL shoulder "heaviness." Pain improved after sitting down & taking aspirin. Non exertional. No associated SOB, N/V.  Pt has had hyperglycemia to the 400s x 10 days. No recent change in diet or insulin regimen - 50u Lantus nightly, and 10u or 20u short-acting insulin during the day. Last saw PCP 2 months ago. Associated with mild head "heaviness." No nausea/vomiting, abdominal pain, SOB, polyuria, polydipsia. Also I performed a history and physical exam of the patient and discussed their management with the PA. I reviewed the PA's note and agree with the documented findings and plan of care except as noted below. My medical decision making and observations are as follows:     64M PMH IDDM, HTN, HLD p/w high blood sugars and chest pain. Pt reports 2 episodes of left-sided CP in past 3 days. Last night, CP was associated with sweats and BL shoulder "heaviness." Pain improved after sitting down & taking aspirin. Non exertional. No associated SOB, N/V.  Pt has had hyperglycemia to the 400s x 10 days. No recent change in diet or insulin regimen - 50u Lantus nightly, and 10u or 20u short-acting insulin during the day. Last saw PCP 2 months ago. Associated with mild head "heaviness." No nausea/vomiting, abdominal pain, SOB, polyuria, polydipsia.  Pt has never seen an endocrinologist.    Pt in NAD, A&O x 3, heart rrr, lungs cta, abd soft ntnd, no peripheral edema.  Pt sent to CDU for endocrine consult due to uncontrolled DM, stress test, tele monitoring, frequent evals.

## 2019-10-24 NOTE — CONSULT NOTE ADULT - PROBLEM SELECTOR RECOMMENDATION 3
Patient is on welchol 625 mg tablet 2 tablets twice daily.  Unclear why patient is not taking a statin.    check fasting lipid panel  He will benefit from once daily statin therapy.

## 2019-10-24 NOTE — ED PROVIDER NOTE - ATTENDING CONTRIBUTION TO CARE
64M PMH IDDM, HTN, HLD p/w hyperglycemia x 10 days, and 2 episodes of CP. VSS and well-appearing on exam.  - Hyperglycemia, r/o DKA. No n/v, abdominal pain, polyuria, polydipsia. . Will obtain CBC, CMP, VBG, UA. Likely dc home with PCP and endocrine f/u.  - Pt has no h/o ACS, PE or DVT, no FH of early ACS. Pulses equal in all extremities, no LE swelling, no calf tenderness. Will r/o ACS with CXR, EKG, labs with troponin. Low suspicion for PE or aortic dissection.    MIRI Mueller: I have personally performed a face to face bedside history and physical examination of this patient. I have discussed the history, examination, review of systems, assessment and plan of management with the resident. I have reviewed the electronic medical record and amended it to reflect my history, review of systems, physical exam, assessment and plan.

## 2019-10-24 NOTE — ED ADULT NURSE NOTE - OBJECTIVE STATEMENT
Pt a&ox3, calm and cooperative. Pt c/o of hyperglycemia x 10 days, hx of insulin dependent (Lantus 50mg). HTN. Pt states to have elevated blood glucose over 400. Pt states o endorse chest tightness and took aspirin last night. Pt denies polyuria, polydipsias, headache, lightheadedness, abdominal discomfort, n/v/d. Respirations even and unlabored. NAD noted at this time. 20 g iv to left ac, labs drawn and sent. provider at bedside. will continue to monitor

## 2019-10-24 NOTE — ED PROVIDER NOTE - PROGRESS NOTE DETAILS
Jim, PGY1 - pH 7.35, . Initial Trop 26, last episode of CP last night, will draw 1hr repeat. Likely dc home with PMD, endo f/u. Jim, PGY1 - Trop 26->27. CXR clear. Discussed results with pt. Pt comfortable with CDU stay for stress test and endo consult.

## 2019-10-25 VITALS
SYSTOLIC BLOOD PRESSURE: 150 MMHG | TEMPERATURE: 97 F | OXYGEN SATURATION: 98 % | RESPIRATION RATE: 16 BRPM | DIASTOLIC BLOOD PRESSURE: 80 MMHG | HEART RATE: 77 BPM

## 2019-10-25 LAB
ANION GAP SERPL CALC-SCNC: 10 MMO/L — SIGNIFICANT CHANGE UP (ref 7–14)
BUN SERPL-MCNC: 13 MG/DL — SIGNIFICANT CHANGE UP (ref 7–23)
CALCIUM SERPL-MCNC: 9.3 MG/DL — SIGNIFICANT CHANGE UP (ref 8.4–10.5)
CHLORIDE SERPL-SCNC: 102 MMOL/L — SIGNIFICANT CHANGE UP (ref 98–107)
CHOLEST SERPL-MCNC: 192 MG/DL — SIGNIFICANT CHANGE UP (ref 120–199)
CO2 SERPL-SCNC: 26 MMOL/L — SIGNIFICANT CHANGE UP (ref 22–31)
CREAT SERPL-MCNC: 0.71 MG/DL — SIGNIFICANT CHANGE UP (ref 0.5–1.3)
GLUCOSE SERPL-MCNC: 155 MG/DL — HIGH (ref 70–99)
HDLC SERPL-MCNC: 40 MG/DL — SIGNIFICANT CHANGE UP (ref 35–55)
LIPID PNL WITH DIRECT LDL SERPL: 141 MG/DL — SIGNIFICANT CHANGE UP
POTASSIUM SERPL-MCNC: 4.4 MMOL/L — SIGNIFICANT CHANGE UP (ref 3.5–5.3)
POTASSIUM SERPL-SCNC: 4.4 MMOL/L — SIGNIFICANT CHANGE UP (ref 3.5–5.3)
SODIUM SERPL-SCNC: 138 MMOL/L — SIGNIFICANT CHANGE UP (ref 135–145)
TRIGL SERPL-MCNC: 131 MG/DL — SIGNIFICANT CHANGE UP (ref 10–149)

## 2019-10-25 PROCEDURE — 93018 CV STRESS TEST I&R ONLY: CPT | Mod: GC

## 2019-10-25 PROCEDURE — 93016 CV STRESS TEST SUPVJ ONLY: CPT | Mod: GC

## 2019-10-25 PROCEDURE — 99214 OFFICE O/P EST MOD 30 MIN: CPT

## 2019-10-25 PROCEDURE — 99217: CPT

## 2019-10-25 PROCEDURE — 78452 HT MUSCLE IMAGE SPECT MULT: CPT | Mod: 26

## 2019-10-25 PROCEDURE — 93306 TTE W/DOPPLER COMPLETE: CPT | Mod: 26

## 2019-10-25 RX ORDER — ASPIRIN/CALCIUM CARB/MAGNESIUM 324 MG
1 TABLET ORAL
Qty: 30 | Refills: 0
Start: 2019-10-25 | End: 2019-11-23

## 2019-10-25 RX ORDER — COLESEVELAM HYDROCHLORIDE 625 MG/1
2 TABLET, FILM COATED ORAL
Qty: 120 | Refills: 0
Start: 2019-10-25 | End: 2019-11-23

## 2019-10-25 RX ORDER — AMLODIPINE BESYLATE 2.5 MG/1
1 TABLET ORAL
Qty: 30 | Refills: 0
Start: 2019-10-25 | End: 2019-11-23

## 2019-10-25 RX ORDER — METFORMIN HYDROCHLORIDE 850 MG/1
500 TABLET ORAL
Refills: 0 | Status: DISCONTINUED | OUTPATIENT
Start: 2019-10-25 | End: 2019-10-28

## 2019-10-25 RX ORDER — METFORMIN HYDROCHLORIDE 850 MG/1
1 TABLET ORAL
Qty: 60 | Refills: 1
Start: 2019-10-25 | End: 2019-12-23

## 2019-10-25 RX ADMIN — Medication 81 MILLIGRAM(S): at 13:49

## 2019-10-25 RX ADMIN — AMLODIPINE BESYLATE 10 MILLIGRAM(S): 2.5 TABLET ORAL at 06:10

## 2019-10-25 NOTE — PROGRESS NOTE ADULT - ATTENDING COMMENTS
Jennifer Greenberg MD   Pager # [phone]  On evenings and weekends, please call the office at [phone] or page endocrine fellow on call. Please note that this patient may be followed by different provider tomorrow. If no answer, contact the office.

## 2019-10-25 NOTE — ED CDU PROVIDER DISPOSITION NOTE - CLINICAL COURSE
65 y/o male h/o dm, htn, high chol initially arrived to ED with c/o elevated FS and intermittent cp.  Sent to cdu for endo eval, stress and echo.  Neg stress/echo.  Pt's dm regiment optimized by endo.  Discharged with o/p specialty f/u.

## 2019-10-25 NOTE — ED CDU PROVIDER DISPOSITION NOTE - CARE PROVIDER_API CALL
Dewayne Hand (DO)  Cardiovascular Disease; Internal Medicine; Nuclear Cardiology  [address] [place] [address], [place] [address]  Phone: [phone]  Fax: [phone]  Follow Up Time: 1-3 Days

## 2019-10-25 NOTE — ED CDU PROVIDER SUBSEQUENT DAY NOTE - MUSCULOSKELETAL, MLM
Dr. Carly Benavides see message below. Patient last seen 6/19/17. Please advise.
Ok for 20 of the 4mg ODT
Would like rx for zofran-going on a cruise & would like this for motion sickness/nausea-[address]
Zofran refill has been sent to pharmacy   Patient is aware   Task is done.
Range of motion is not limited, gait stable.

## 2019-10-25 NOTE — PROGRESS NOTE ADULT - SUBJECTIVE AND OBJECTIVE BOX
Patient is a 64y old  Male who presents with a chief complaint of Chest pain (24 Oct 2019 22:15)      INTERVAL HISTORY: feels ok       MEDICATIONS:  amLODIPine   Tablet 10 milliGRAM(s) Oral daily        PHYSICAL EXAM:  T(C): 36.2 (10-25-19 @ 05:30), Max: 36.8 (10-24-19 @ 21:50)  HR: 77 (10-25-19 @ 05:30) (70 - 77)  BP: 150/80 (10-25-19 @ 05:30) (128/68 - 150/80)  RR: 16 (10-25-19 @ 05:30) (16 - 17)  SpO2: 98% (10-25-19 @ 05:30) (98% - 98%)  Wt(kg): --  I&O's Summary        Appearance: In no distress	  HEENT:    PERRL, EOMI	  Cardiovascular:  S1 S2, No JVD  Respiratory: Lungs clear to auscultation	  Gastrointestinal:  Soft, Non-tender, + BS	  Extremities:  No edema of LE                                14.4   9.44  )-----------( 230      ( 24 Oct 2019 07:31 )             44.2     10-25    138  |  102  |  13  ----------------------------<  155<H>  4.4   |  26  |  0.71    Ca    9.3      25 Oct 2019 06:50    TPro  8.0  /  Alb  4.3  /  TBili  0.5  /  DBili  x   /  AST  37  /  ALT  20  /  AlkPhos  71  10-24        Labs personally reviewed      Assessment /Plan:   64M PMH IDDM, HTN, HLD p/w high blood sugars and chest pain and hyperglycemia  1. Chest pain - given poorly controlled diabetic with family hx of preature CAD he is at high risk for CAD  - NM stress test with no ischemia  - TTE unremarkabl  - Endocrine eval noted for DM  - would initiate statin for primary prevention given DM  - ASA   - c/w Norvasc for HTN  - outpt follow up in 2 weeks       Dewayne Hand DO [facility]  Cardiovascular Medicine  [phone]

## 2019-10-25 NOTE — ED CDU PROVIDER DISPOSITION NOTE - PATIENT PORTAL LINK FT
You can access the FollowMyHealth Patient Portal offered by [facility] by registering at the following website: [email or web address]. By joining [facility]’s FollowMyHealth portal, you will also be able to view your health information using other applications (apps) compatible with our system.

## 2019-10-25 NOTE — ED CDU PROVIDER SUBSEQUENT DAY NOTE - ATTENDING CONTRIBUTION TO CARE
CDU MD CORONEL:  I performed a face to face bedside interview with patient regarding history of present illness, review of symptoms and past medical history. I completed an independent physical exam.  I have discussed patient's plan of care with PA.   I agree with note as stated above, having amended the EMR as needed to reflect my findings. I have discussed the assessment and plan of care.  This includes during the time I functioned as the attending physician for this patient.

## 2019-10-25 NOTE — ED CDU PROVIDER SUBSEQUENT DAY NOTE - PROGRESS NOTE DETAILS
Patient signed out to me from ALIN Kuhn to follow up. Normal stress test, no acute finding on echo. Spoke with Dr. Hand regarding results, advised to f/u outpatient, continue Norvasc for HTN from note. Pt seen by Alberto, recommends Lantus 50U daily, Humalog 10U TID, Metformin 500mg BID x1 wk, if tolerate med, increase Metformin to 1000 mg BID and f/u with Endocrine clinic. Discussed results with patient, pt states he has insulin (Lantus and Humalog at home), requests refill for insulin needles, cholesterol, aspirin, and blood pressure med. Spoke with [facility] pharmacist, pt takes Welchol for cholesterol 625mg 2 tabs BID. Pt is medically stable for discharge and follow up with PMD and cardiology. The patient was given verbal and written discharge instructions. Specifically, instructions when to return to the ED and when to seek follow-up from their pcp was discussed. Any specialty follow-up was discussed, including how to make an appointment.  Instructions were discussed in simple, plain language and was understood by the patient. The patient understands that should their symptoms worsen or any new symptoms arise, they should return to the ED immediately for further evaluation. All pt's questions were answered. Patient verbalizes understanding. Pt has no complaints.  Denies any cp, sob.  CTABIL, s1s2 rrr no m/r/g.  Pending endo final recs, stress, echo.

## 2019-10-25 NOTE — ED CDU PROVIDER SUBSEQUENT DAY NOTE - HISTORY
65 yo male, PMH DM, HTN, hyperlipidemia, presented to the ED for hyperglycemia and chest pain.  In the ED, HbA1c 10.7; no signs of DKA; Endocrine consulted and team following with medication recs provided; Endocrine team following. Trop x 2:  26 ---> 27; EKG with no acute/ischemic findings; Cardiology consulted and advised stress test and echo (scheduled for 10/25 daytime); Cardiology following.  In the interim, pt objectively noted to be resting comfortably; no issues or c/o thus far.  AM BMP and fasting lipid profile ordered.

## 2019-10-25 NOTE — PROGRESS NOTE ADULT - SUBJECTIVE AND OBJECTIVE BOX
Chief Complaint: f/u DM2    History:  Patient seen earlier today. Went for stress test, which did not reveal ischemia. States that he has been taking Lantus 50 units daily at home. Did not know the name of his premeal insulin, but had the pen at the bedside, which he showed me - has humalog pen. Has been taking 10 units before one meal a day. States however that he eats 3-4 meals a day. Reports eating a high carb diet. He was concerned that taking basal bolus insulin + Metformin was too much medication for him. HbA1c here 10.2%.    MEDICATIONS  (STANDING):  amLODIPine   Tablet 10 milliGRAM(s) Oral daily  aspirin enteric coated 81 milliGRAM(s) Oral daily  dextrose 5%. 1000 milliLiter(s) (50 mL/Hr) IV Continuous <Continuous>  dextrose 50% Injectable 12.5 Gram(s) IV Push once  dextrose 50% Injectable 25 Gram(s) IV Push once  dextrose 50% Injectable 25 Gram(s) IV Push once  insulin glargine Injectable (LANTUS) 50 Unit(s) SubCutaneous at bedtime  insulin lispro (HumaLOG) corrective regimen sliding scale   SubCutaneous three times a day before meals  insulin lispro (HumaLOG) corrective regimen sliding scale   SubCutaneous at bedtime  insulin lispro Injectable (HumaLOG) 10 Unit(s) SubCutaneous three times a day before meals  metFORMIN 500 milliGRAM(s) Oral two times a day with meals    MEDICATIONS  (PRN):  dextrose 40% Gel 15 Gram(s) Oral once PRN Blood Glucose LESS THAN 70 milliGRAM(s)/deciliter  glucagon  Injectable 1 milliGRAM(s) IntraMuscular once PRN Glucose LESS THAN 70 milligrams/deciliter      Allergies  No Known Allergies    PHYSICAL EXAM:  VITALS: T(C): 36.2 (10-25-19 @ 05:30)  T(F): 97.1 (10-25-19 @ 05:30), Max: 98.2 (10-24-19 @ 21:50)  HR: 77 (10-25-19 @ 05:30) (70 - 77)  BP: 150/80 (10-25-19 @ 05:30) (128/68 - 150/80)  RR:  (16 - 17)  SpO2:  (98% - 98%)  Wt(kg): --  GENERAL: NAD, well-developed  RESPIRATORY: Clear to auscultation bilaterally; No rales, rhonchi, wheezing, or rubs  CARDIOVASCULAR: Regular rate and rhythm; No murmurs  GI: Soft, nontender, non distended  PSYCH: Alert and oriented x 3, reactive affect    POCT Blood Glucose.: 191 mg/dL (10-24-19 @ 22:21)  POCT Blood Glucose.: 154 mg/dL (10-24-19 @ 17:46)  POCT Blood Glucose.: 260 mg/dL (10-24-19 @ 12:55)  POCT Blood Glucose.: 173 mg/dL (10-24-19 @ 08:47)  POCT Blood Glucose.: 179 mg/dL (10-24-19 @ 06:53)      10-25    138  |  102  |  13  ----------------------------<  155<H>  4.4   |  26  |  0.71    EGFR if : 117  EGFR if non : 101    Ca    9.3      10-25    TPro  8.0  /  Alb  4.3  /  TBili  0.5  /  DBili  x   /  AST  37  /  ALT  20  /  AlkPhos  71  10-24        Hemoglobin A1C, Whole Blood: 10.2 % <H> [4.0 - 5.6] (10-24-19 @ 07:31)

## 2019-10-25 NOTE — ED CDU PROVIDER DISPOSITION NOTE - NSFOLLOWUPINSTRUCTIONS_ED_ALL_ED_FT
Rest, drink plenty of fluids.  Advance activity as tolerated.  Take Lantus 50 Units daily at bedtime, Take Humalog 10 units three times a day with meals. Take Metformin 500mg  twice a day for 1 week; if tolerate medication, go up to 1000 mg two times a day. Follow up with your primary care physician and endocrine, and cardiology in 48-72 hours- bring copies of your results.  Return to the ER for worsening or persistent symptoms, and/or ANY NEW OR CONCERNING SYMPTOMS. If you have issues obtaining follow up, please call: [phone] to obtain a doctor or specialist who takes your insurance in your area.     Endocrine clinic: call [phone] for appointment Rest, drink plenty of fluids.  Advance activity as tolerated.  Take Lantus 50 Units daily at bedtime, Take Humalog 10 units three times a day with meals. Take Metformin 500mg  twice a day for 1 week; if tolerate medication, go up to 1000 mg two times a day. Follow up with your primary care physician and endocrine, and cardiology Dr. Hand in 48-72 hours- bring copies of your results.  Return to the ER for worsening or persistent symptoms, and/or ANY NEW OR CONCERNING SYMPTOMS. If you have issues obtaining follow up, please call: [phone] to obtain a doctor or specialist who takes your insurance in your area.     Endocrine clinic: call [phone] for appointment Rest, drink plenty of fluids.  Advance activity as tolerated.  Take Lantus 50 Units daily at bedtime, Take Humalog 10 units three times a day with meals. Take Metformin 500mg  twice a day for 1 week; if tolerate medication, go up to 1000 mg two times a day. Follow up with your primary care physician and endocrine, and cardiology Dr. Hand in 48-72 hours- bring copies of your results.  Return to the ER for worsening or persistent symptoms, and/or ANY NEW OR CONCERNING SYMPTOMS. If you have issues obtaining follow up, please call: [phone] to obtain a doctor or specialist who takes your insurance in your area.     [facility]  [address] [place] [address], [address], [place] [address]  call [phone] to make an appointment. Rest, drink plenty of fluids.  Advance activity as tolerated.  Take Lantus 50 Units daily at bedtime, Take Humalog 10 units three times a day with meals. Take Metformin 500mg  twice a day for 1 week; if tolerate medication, go up to 1000 mg two times a day. Take baby aspirin 81mg daily. Continue Norvasc for high blood pressure.  Follow up with your primary care physician and endocrine, and cardiology Dr. Hand in 48-72 hours- bring copies of your results.  Return to the ER for worsening or persistent symptoms, and/or ANY NEW OR CONCERNING SYMPTOMS. If you have issues obtaining follow up, please call: [phone] to obtain a doctor or specialist who takes your insurance in your area.     [facility]  [address] [place] [address], [address], [place] [address]  call [phone] to make an appointment.

## 2019-10-25 NOTE — PROGRESS NOTE ADULT - PROBLEM SELECTOR PLAN 1
- uncontrolled, HbA1c 10.2%  - if patient remains inpatient, would monitor on Lantus 50 units qhs and Humalog 10 units TID (however, he has not received a dose of pre-meal Humalog as of yet) and moderate correction scale qac and qhs  - consistent carb diet  - check FS qac and qhs  - Discussed with patient importance of adherence to medications for improved glycemic control. He is worried about taking too much medications, but we discussed that DM is a chronic disease and requires long term treatment with medications. We discussed that with his elevated HbA1c, he is at high risk of CVA, MI, nephropathy, retinopathy etc and requires improved glycemic control  - If discharged from CDU today, would discharge on Lantus 50 units qhs and Humalog 10 units TID with Metformin 500 mg BID to be uptitrated to 1000 mg BID within one week if tolerating. He likely has high insulin requirements as outpatient due to eating a high carb diet.   - he can follow up in the office if he wishes - [phone] [facility] [phone]

## 2019-10-25 NOTE — ED CDU PROVIDER SUBSEQUENT DAY NOTE - MEDICAL DECISION MAKING DETAILS
1. Uncontrolled DM:  Hyperglycemia management (Endocrine consulting and provided medication recs), Diabetic education per usual process, AM BMP / lipid panel; Endocrine team following pt.  2. Chest pain:  Tele monitoring, stress test, echo; Cardiology team following pt.  General observation care / monitoring.

## 2019-10-25 NOTE — PROGRESS NOTE ADULT - ASSESSMENT
KAILASH LAI is a 64y old male with uncontrolled Type 2 DM, A1C 10.2% during this ED visit, HTN, HLD, presenting with chest pain.  Endocrine consulted for uncontrolled Type 2 DM.

## 2020-04-01 ENCOUNTER — TRANSCRIPTION ENCOUNTER (OUTPATIENT)
Age: 65
End: 2020-04-01

## 2020-07-21 NOTE — ED ADULT NURSE NOTE - CHIEF COMPLAINT QUOTE
Pt says his BP is blood sugar was more than 400 Yesterday. c/o head pressure for 10 days. since Yesterday he has chest pressure and left arm pain. PMh of HTN, DM, fs 179 in triage SBIRT referral

## 2021-06-08 NOTE — ED CDU PROVIDER INITIAL DAY NOTE - CARDIAC, MLM
EP catheter inserted at the coronary sinus.  Normal rate, regular rhythm.  Heart sounds S1, S2.  No murmurs, rubs or gallops.

## 2021-09-27 NOTE — ED CDU PROVIDER DISPOSITION NOTE - CONDITION AT DISCHARGE:
Improved Quality 111:Pneumonia Vaccination Status For Older Adults: Pneumococcal Vaccination Previously Received Quality 130: Documentation Of Current Medications In The Medical Record: Current Medications Documented Detail Level: Detailed Quality 110: Preventive Care And Screening: Influenza Immunization: Influenza Immunization Administered during Influenza season

## 2022-05-27 ENCOUNTER — EMERGENCY (EMERGENCY)
Facility: HOSPITAL | Age: 67
LOS: 1 days | Discharge: HOME | End: 2022-05-27
Admitting: EMERGENCY MEDICINE
Payer: COMMERCIAL

## 2022-05-27 VITALS
DIASTOLIC BLOOD PRESSURE: 65 MMHG | RESPIRATION RATE: 18 BRPM | SYSTOLIC BLOOD PRESSURE: 165 MMHG | HEIGHT: 70 IN | TEMPERATURE: 99 F | OXYGEN SATURATION: 96 % | HEART RATE: 63 BPM

## 2022-05-27 PROCEDURE — 99053 MED SERV 10PM-8AM 24 HR FAC: CPT

## 2022-05-27 PROCEDURE — 99284 EMERGENCY DEPT VISIT MOD MDM: CPT

## 2022-05-27 RX ORDER — IBUPROFEN 200 MG
600 TABLET ORAL ONCE
Refills: 0 | Status: COMPLETED | OUTPATIENT
Start: 2022-05-27 | End: 2022-05-27

## 2022-05-27 RX ADMIN — Medication 600 MILLIGRAM(S): at 06:34
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
## 2022-05-27 NOTE — ED PROVIDER NOTE - PATIENT PORTAL LINK FT
You can access the FollowMyHealth Patient Portal offered by [facility] by registering at the following website: [email or web address]. By joining [employer]’s FollowMyHealth portal, you will also be able to view your health information using other applications (apps) compatible with our system.

## 2022-05-27 NOTE — ED ADULT TRIAGE NOTE - NS ED NURSE BANDS TYPE
Name band; Implemented All Fall with Harm Risk Interventions:  [place] to call system. Call bell, personal items and telephone within reach. Instruct patient to call for assistance. Room bathroom lighting operational. Non-slip footwear when patient is off stretcher. Physically safe environment: no spills, clutter or unnecessary equipment. Stretcher in lowest position, wheels locked, appropriate side rails in place. Provide visual cue, wrist band, yellow gown, etc. Monitor gait and stability. Monitor for mental status changes and reorient to person, place, and time. Review medications for side effects contributing to fall risk. Reinforce activity limits and safety measures with patient and family. Provide visual clues: red socks.
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
## 2023-07-02 ENCOUNTER — EMERGENCY (EMERGENCY)
Facility: HOSPITAL | Age: 68
LOS: 1 days | Discharge: HOME | End: 2023-07-02
Attending: STUDENT IN AN ORGANIZED HEALTH CARE EDUCATION/TRAINING PROGRAM | Admitting: STUDENT IN AN ORGANIZED HEALTH CARE EDUCATION/TRAINING PROGRAM
Payer: MEDICARE

## 2023-07-02 VITALS
SYSTOLIC BLOOD PRESSURE: 152 MMHG | DIASTOLIC BLOOD PRESSURE: 76 MMHG | TEMPERATURE: 98 F | HEART RATE: 58 BPM | RESPIRATION RATE: 16 BRPM | OXYGEN SATURATION: 100 %

## 2023-07-02 VITALS
TEMPERATURE: 98 F | HEART RATE: 66 BPM | OXYGEN SATURATION: 100 % | SYSTOLIC BLOOD PRESSURE: 173 MMHG | RESPIRATION RATE: 16 BRPM | DIASTOLIC BLOOD PRESSURE: 82 MMHG

## 2023-07-02 LAB
A1C WITH ESTIMATED AVERAGE GLUCOSE RESULT: 9.6 % — HIGH (ref 4–5.6)
ALBUMIN SERPL ELPH-MCNC: 4 G/DL — SIGNIFICANT CHANGE UP (ref 3.3–5)
ALP SERPL-CCNC: 62 U/L — SIGNIFICANT CHANGE UP (ref 40–120)
ALT FLD-CCNC: 17 U/L — SIGNIFICANT CHANGE UP (ref 4–41)
ANION GAP SERPL CALC-SCNC: 13 MMOL/L — SIGNIFICANT CHANGE UP (ref 7–14)
AST SERPL-CCNC: 27 U/L — SIGNIFICANT CHANGE UP (ref 4–40)
BASE EXCESS BLDV CALC-SCNC: -0.3 MMOL/L — SIGNIFICANT CHANGE UP (ref -2–3)
BASOPHILS # BLD AUTO: 0.04 K/UL — SIGNIFICANT CHANGE UP (ref 0–0.2)
BASOPHILS NFR BLD AUTO: 0.5 % — SIGNIFICANT CHANGE UP (ref 0–2)
BILIRUB SERPL-MCNC: <0.2 MG/DL — SIGNIFICANT CHANGE UP (ref 0.2–1.2)
BLOOD GAS VENOUS COMPREHENSIVE RESULT: SIGNIFICANT CHANGE UP
BUN SERPL-MCNC: 19 MG/DL — SIGNIFICANT CHANGE UP (ref 7–23)
CALCIUM SERPL-MCNC: 9.5 MG/DL — SIGNIFICANT CHANGE UP (ref 8.4–10.5)
CHLORIDE BLDV-SCNC: 98 MMOL/L — SIGNIFICANT CHANGE UP (ref 96–108)
CHLORIDE SERPL-SCNC: 97 MMOL/L — LOW (ref 98–107)
CO2 BLDV-SCNC: 27.4 MMOL/L — HIGH (ref 22–26)
CO2 SERPL-SCNC: 22 MMOL/L — SIGNIFICANT CHANGE UP (ref 22–31)
CREAT SERPL-MCNC: 0.9 MG/DL — SIGNIFICANT CHANGE UP (ref 0.5–1.3)
EGFR: 95 ML/MIN/1.73M2 — SIGNIFICANT CHANGE UP
EOSINOPHIL # BLD AUTO: 0.4 K/UL — SIGNIFICANT CHANGE UP (ref 0–0.5)
EOSINOPHIL NFR BLD AUTO: 5.2 % — SIGNIFICANT CHANGE UP (ref 0–6)
ESTIMATED AVERAGE GLUCOSE: 229 — SIGNIFICANT CHANGE UP
GAS PNL BLDV: 129 MMOL/L — LOW (ref 136–145)
GAS PNL BLDV: SIGNIFICANT CHANGE UP
GLUCOSE BLDV-MCNC: 139 MG/DL — HIGH (ref 70–99)
GLUCOSE SERPL-MCNC: 143 MG/DL — HIGH (ref 70–99)
HCO3 BLDV-SCNC: 26 MMOL/L — SIGNIFICANT CHANGE UP (ref 22–29)
HCT VFR BLD CALC: 37.3 % — LOW (ref 39–50)
HCT VFR BLDA CALC: 36 % — LOW (ref 39–51)
HGB BLD CALC-MCNC: 12.1 G/DL — LOW (ref 12.6–17.4)
HGB BLD-MCNC: 12.4 G/DL — LOW (ref 13–17)
IANC: 3.61 K/UL — SIGNIFICANT CHANGE UP (ref 1.8–7.4)
IMM GRANULOCYTES NFR BLD AUTO: 0.3 % — SIGNIFICANT CHANGE UP (ref 0–0.9)
LACTATE BLDV-MCNC: 0.8 MMOL/L — SIGNIFICANT CHANGE UP (ref 0.5–2)
LYMPHOCYTES # BLD AUTO: 3.03 K/UL — SIGNIFICANT CHANGE UP (ref 1–3.3)
LYMPHOCYTES # BLD AUTO: 39.4 % — SIGNIFICANT CHANGE UP (ref 13–44)
MCHC RBC-ENTMCNC: 27.4 PG — SIGNIFICANT CHANGE UP (ref 27–34)
MCHC RBC-ENTMCNC: 33.2 GM/DL — SIGNIFICANT CHANGE UP (ref 32–36)
MCV RBC AUTO: 82.5 FL — SIGNIFICANT CHANGE UP (ref 80–100)
MONOCYTES # BLD AUTO: 0.6 K/UL — SIGNIFICANT CHANGE UP (ref 0–0.9)
MONOCYTES NFR BLD AUTO: 7.8 % — SIGNIFICANT CHANGE UP (ref 2–14)
NEUTROPHILS # BLD AUTO: 3.61 K/UL — SIGNIFICANT CHANGE UP (ref 1.8–7.4)
NEUTROPHILS NFR BLD AUTO: 46.8 % — SIGNIFICANT CHANGE UP (ref 43–77)
NRBC # BLD: 0 /100 WBCS — SIGNIFICANT CHANGE UP (ref 0–0)
NRBC # FLD: 0 K/UL — SIGNIFICANT CHANGE UP (ref 0–0)
PCO2 BLDV: 48 MMHG — SIGNIFICANT CHANGE UP (ref 42–55)
PH BLDV: 7.34 — SIGNIFICANT CHANGE UP (ref 7.32–7.43)
PLATELET # BLD AUTO: 225 K/UL — SIGNIFICANT CHANGE UP (ref 150–400)
PO2 BLDV: 37 MMHG — SIGNIFICANT CHANGE UP (ref 25–45)
POTASSIUM BLDV-SCNC: 4.1 MMOL/L — SIGNIFICANT CHANGE UP (ref 3.5–5.1)
POTASSIUM SERPL-MCNC: 4.6 MMOL/L — SIGNIFICANT CHANGE UP (ref 3.5–5.3)
POTASSIUM SERPL-SCNC: 4.6 MMOL/L — SIGNIFICANT CHANGE UP (ref 3.5–5.3)
PROT SERPL-MCNC: 6.9 G/DL — SIGNIFICANT CHANGE UP (ref 6–8.3)
RBC # BLD: 4.52 M/UL — SIGNIFICANT CHANGE UP (ref 4.2–5.8)
RBC # FLD: 12.9 % — SIGNIFICANT CHANGE UP (ref 10.3–14.5)
SAO2 % BLDV: 61.9 % — LOW (ref 67–88)
SODIUM SERPL-SCNC: 132 MMOL/L — LOW (ref 135–145)
TROPONIN T, HIGH SENSITIVITY RESULT: 26 NG/L — SIGNIFICANT CHANGE UP
TROPONIN T, HIGH SENSITIVITY RESULT: 28 NG/L — SIGNIFICANT CHANGE UP
WBC # BLD: 7.7 K/UL — SIGNIFICANT CHANGE UP (ref 3.8–10.5)
WBC # FLD AUTO: 7.7 K/UL — SIGNIFICANT CHANGE UP (ref 3.8–10.5)

## 2023-07-02 PROCEDURE — 99285 EMERGENCY DEPT VISIT HI MDM: CPT

## 2023-07-02 PROCEDURE — 93010 ELECTROCARDIOGRAM REPORT: CPT

## 2023-07-02 PROCEDURE — 71046 X-RAY EXAM CHEST 2 VIEWS: CPT | Mod: 26

## 2023-07-02 NOTE — ED PROVIDER NOTE - CLINICAL SUMMARY MEDICAL DECISION MAKING FREE TEXT BOX
67yoM PMH of HTN, HLD, IDDM on lantus 50 units nightly, and novolog 10 units TID, presenting w/ hyperglycemia x 2-3 weeks as well as chest pain.   L sided, burning chest pain, radiates to L shoulder  normal cardiac w/u 10/2019  FS 160s, do not suspect DKA, will r/o euglycemic DKA    pt VSS  do not suspect PE, no PE risk factors, atypical CP  will check labs, vbg, trop, cxr, reassess

## 2023-07-02 NOTE — ED PROVIDER NOTE - OBJECTIVE STATEMENT
67yoM PMH of HTN, HLD, IDDM on lantus 50 units nightly, and novolog 10 units TID, presenting w/ hyperglycemia x 2-3 weeks as well as chest pain. Pt states his sugars have been 200s-300s over the past few weeks. states he has been taking his novolog 15 units BID instead. Pt also admits to left sided chest burning, intermittently radiating to L shoulder over past 4 days, nonexertional, nonpleuritic. Pt also admits to transient intermittent lightheadedness as well. no associated SOB, nausea, diaphoresis. pt had cardiac workup including NST/ECHO 10/2019 at [facility] which was unremarkable. underwent cath 2010 with no intervention or significant disease. Pt also admits to cough over past 2 weeks, no fevers, sore throat, sick contacts, n/v/d/c, abd pain.

## 2023-07-02 NOTE — ED ADULT NURSE NOTE - OBJECTIVE STATEMENT
pt received spot 20. pt A+Ox3, c/o intermittent chest pain x 2 weeks, worse over past 2 days. also reports high BGL despite compliance with medications. respirations even and unlabored. vs as noted. pt placed on cardiac monitor. NAD noted. labs sent. IVSL in place. call bell within reach. will monitor.

## 2023-07-02 NOTE — ED PROVIDER NOTE - NSFOLLOWUPCLINICS_GEN_ALL_ED_FT
Cardiology at [facility]  Cardiology  [address] [place] [address]  Phone: [phone]  Fax:     [facility]  Endocrinology  [address] [place] [address]  Phone: [phone]  Fax:

## 2023-07-02 NOTE — ED PROVIDER NOTE - PATIENT PORTAL LINK FT
You can access the FollowMyHealth Patient Portal offered by [facility] by registering at the following website: [email or web address]. By joining [employer]’s FollowMyHealth portal, you will also be able to view your health information using other applications (apps) compatible with our system.

## 2023-07-02 NOTE — ED ADULT TRIAGE NOTE - CHIEF COMPLAINT QUOTE
Pt concerned about elevated blood sugar after eating a meal (200-300s) x2 wks and left-sided chest discomfort with dizziness x4-5 days. No complaints of chest pain, headache, nausea, dizziness, vomiting  SOB, fever, or chills at this time. PMH HTN, DM on insulin

## 2023-07-02 NOTE — ED PROVIDER NOTE - ATTENDING APP SHARED VISIT CONTRIBUTION OF CARE
I have personally performed a face to face medical and diagnostic evaluation of the patient. I have discussed with and reviewed the SONIYA's note and agree with the History, ROS, Physical Exam and MDM unless otherwise indicated. A brief summary of my personal evaluation and impression can be found below.    Kayley NAVA: 67-year-old male history of diabetes hypertension hyperlipidemia, presents with a chief complaint of elevated blood sugars at home ranging in the 200s to 300s, as well as having diffuse left-sided burning discomfort intermittent radiation to the left shoulder for the last 4 days nonexertional nonpleuritic, patient reports as a  and started while he was at work, no nausea no vomiting no dyspnea on exertion, does note he is some mild shortness of breath, he recent reports he had a sore throat and subjective fevers and chills as well as a cough that was nonproductive, no new lower extremity swelling or edema no abdominal pain no urinary or bowel complaints.    All other ROS negative, except as above and as per HPI and ROS section.    VITALS: Initial triage and subsequent vitals have been reviewed by me.  GEN APPEARANCE: Alert, non-toxic, well-appearing, NAD.  HEAD: Atraumatic.  EYES: PERRLa, EOMI, vision grossly intact.   NECK: Supple  CV: RRR, S1S2, no c/r/m/g. Cap refill <2 seconds. No bruits.   LUNGS: CTAB. No abnormal breath sounds.  ABDOMEN: Soft, NTND. No guarding or rebound.   MSK/EXT: No spinal or extremity point tenderness. No CVA ttp. Pelvis stable. No peripheral edema.  NEURO: Alert, follows commands. Weight bearing normal. Speech normal. Sensation and motor normal x4 extremities.   SKIN: Warm, dry and intact. No rash.  PSYCH: Appropriate    Plan/MDM: Exam vital signs stable nontoxic-appearing physical exam as above, DDx concern for possible hypoglycemia in setting of DKA, patient reports he notes his blood sugar goes high after he eats, may just be secondary to p.o. intake, he reports he is compliant with his insulin medicine, is very well-appearing low concern for DKA initial screening blood sugar is 166, reports taking NovoLog and Lantus, low concern for ACS, chest pain does not appear anginal in nature, presentation is not consistent with PE, low concern for aortic catastrophe as patient is very comfortable appearing, plan to check basic labs EKG cardiac enzymes we will get A1c reassess, however heart score is low EKG without acute ischemia, anticipate discharge with close PMD follow-up and cardiac referral outpatient.

## 2023-07-07 ENCOUNTER — NON-APPOINTMENT (OUTPATIENT)
Age: 68
End: 2023-07-07

## 2023-07-07 ENCOUNTER — APPOINTMENT (OUTPATIENT)
Age: 68
End: 2023-07-07
Payer: MEDICARE

## 2023-07-07 VITALS
HEIGHT: 70 IN | TEMPERATURE: 98.6 F | OXYGEN SATURATION: 97 % | BODY MASS INDEX: 25.34 KG/M2 | WEIGHT: 177 LBS | SYSTOLIC BLOOD PRESSURE: 165 MMHG | DIASTOLIC BLOOD PRESSURE: 78 MMHG | HEART RATE: 81 BPM

## 2023-07-07 VITALS — DIASTOLIC BLOOD PRESSURE: 70 MMHG | SYSTOLIC BLOOD PRESSURE: 122 MMHG

## 2023-07-07 DIAGNOSIS — R06.09 OTHER FORMS OF DYSPNEA: ICD-10-CM

## 2023-07-07 DIAGNOSIS — E78.5 HYPERLIPIDEMIA, UNSPECIFIED: ICD-10-CM

## 2023-07-07 DIAGNOSIS — E11.9 TYPE 2 DIABETES MELLITUS W/OUT COMPLICATIONS: ICD-10-CM

## 2023-07-07 DIAGNOSIS — I10 ESSENTIAL (PRIMARY) HYPERTENSION: ICD-10-CM

## 2023-07-07 DIAGNOSIS — R07.89 OTHER CHEST PAIN: ICD-10-CM

## 2023-07-07 PROCEDURE — 93000 ELECTROCARDIOGRAM COMPLETE: CPT

## 2023-07-07 PROCEDURE — 99204 OFFICE O/P NEW MOD 45 MIN: CPT | Mod: 25

## 2023-07-07 NOTE — HISTORY OF PRESENT ILLNESS
[FreeTextEntry1] : PCP: Dr. Zuleyma Patel\par [facility]\par \par 67M HTN, HLD, DM who presents for post-ED evaluation of chest discomofrt.\par \par [facility] ED 6/2/23 for hyperglycemia\par noted to L-sided chest burning radiating to L shoulder \par transient, not associated with SOB or palpitations\par trop T neg x 2\par \par some THAKKAR over the past few weeks\par no chest pain with exertion\par \par Fam hx:\par Father - fatal MI age 75\par \par \par \par

## 2023-07-07 NOTE — DISCUSSION/SUMMARY
[FreeTextEntry1] : atypical chest symptoms and Keita in a patient with cardiac risk factors\par white coat effect though BP otherwise wnl\par \par check TTE to evaluate for structural heart disease\par check nusclear stress test to eval for ischemia\par cont BP meds (pt cannot recall name currently) [EKG obtained to assist in diagnosis and management of assessed problem(s)] : EKG obtained to assist in diagnosis and management of assessed problem(s)

## 2023-07-07 NOTE — REVIEW OF SYSTEMS
[Chest Discomfort] : chest discomfort [Negative] : Heme/Lymph [Dyspnea on exertion] : dyspnea during exertion

## 2023-07-07 NOTE — PHYSICAL EXAM
[Well Developed] : well developed [Well Nourished] : well nourished [No Acute Distress] : no acute distress [Normal Conjunctiva] : normal conjunctiva [Normal Venous Pressure] : normal venous pressure [No Carotid Bruit] : no carotid bruit [Normal S1, S2] : normal S1, S2 [No Rub] : no rub [No Gallop] : no gallop [Clear Lung Fields] : clear lung fields [Good Air Entry] : good air entry [No Respiratory Distress] : no respiratory distress  [Soft] : abdomen soft [Non Tender] : non-tender [Normal Gait] : normal gait [No Edema] : no edema [No Cyanosis] : no cyanosis [No Rash] : no rash [No Skin Lesions] : no skin lesions [Moves all extremities] : moves all extremities [No Focal Deficits] : no focal deficits [Normal Speech] : normal speech [Alert and Oriented] : alert and oriented [Normal memory] : normal memory [Murmur] : murmur [de-identified] : 1/6 systolic RUSB

## 2023-07-07 NOTE — CARDIOLOGY SUMMARY
[de-identified] : 7/7/23:  [de-identified] : [facility] 6/2010 - mild luminal irregularities

## 2023-07-13 ENCOUNTER — APPOINTMENT (OUTPATIENT)
Dept: CARDIOLOGY | Facility: CLINIC | Age: 68
End: 2023-07-13
Payer: MEDICARE

## 2023-07-13 ENCOUNTER — MESSAGE (OUTPATIENT)
Age: 68
End: 2023-07-13

## 2023-07-13 PROCEDURE — 93922 UPR/L XTREMITY ART 2 LEVELS: CPT

## 2023-07-13 PROCEDURE — 93306 TTE W/DOPPLER COMPLETE: CPT

## 2023-08-11 ENCOUNTER — EMERGENCY (EMERGENCY)
Facility: HOSPITAL | Age: 68
LOS: 1 days | Discharge: HOME | End: 2023-08-11
Attending: EMERGENCY MEDICINE | Admitting: EMERGENCY MEDICINE
Payer: MEDICARE

## 2023-08-11 VITALS
OXYGEN SATURATION: 100 % | RESPIRATION RATE: 18 BRPM | TEMPERATURE: 98 F | SYSTOLIC BLOOD PRESSURE: 178 MMHG | HEART RATE: 72 BPM | DIASTOLIC BLOOD PRESSURE: 78 MMHG

## 2023-08-11 PROCEDURE — 99284 EMERGENCY DEPT VISIT MOD MDM: CPT

## 2023-08-11 PROCEDURE — 99053 MED SERV 10PM-8AM 24 HR FAC: CPT

## 2023-08-11 RX ORDER — IBUPROFEN 200 MG
600 TABLET ORAL ONCE
Refills: 0 | Status: COMPLETED | OUTPATIENT
Start: 2023-08-11 | End: 2023-08-11

## 2023-08-11 RX ORDER — ACETAMINOPHEN 500 MG
650 TABLET ORAL ONCE
Refills: 0 | Status: COMPLETED | OUTPATIENT
Start: 2023-08-11 | End: 2023-08-11

## 2023-08-11 RX ORDER — METOCLOPRAMIDE HCL 10 MG
10 TABLET ORAL ONCE
Refills: 0 | Status: COMPLETED | OUTPATIENT
Start: 2023-08-11 | End: 2023-08-11

## 2023-08-11 RX ADMIN — Medication 600 MILLIGRAM(S): at 05:59

## 2023-08-11 RX ADMIN — Medication 10 MILLIGRAM(S): at 07:23

## 2023-08-11 RX ADMIN — Medication 600 MILLIGRAM(S): at 06:51

## 2023-08-11 RX ADMIN — Medication 650 MILLIGRAM(S): at 06:51

## 2023-08-11 RX ADMIN — Medication 650 MILLIGRAM(S): at 05:59

## 2023-08-11 NOTE — ED ADULT TRIAGE NOTE - NS ED TRIAGE AVPU SCALE
Return call to patient. All questions answered.    Alert-The patient is alert, awake and responds to voice. The patient is oriented to time, place, and person. The triage nurse is able to obtain subjective information.

## 2023-08-11 NOTE — ED PROVIDER NOTE - NSICDXPASTMEDICALHX_GEN_ALL_CORE_FT
----- Message from Lamin Gao LPN sent at 3/16/2023  3:37 PM CDT -----  Contact: Isaac  We fax it because they requested something else and asked for the office notes. I have already sent it to them.  ----- Message -----  From: Suzi Sanders RN  Sent: 3/16/2023   3:09 PM CDT  To: Lamin Gao LPN    Is this something y'all send or should I direct them to medical records?  ----- Message -----  From: Leeann Jaime  Sent: 3/16/2023   2:39 PM CDT  To: Mckayla Avila from [facility] is requesting a callback from the nurse in regards to needing his office visit infoJersey Avila can be reached at [phone]     thanks         PAST MEDICAL HISTORY:  DM (Diabetes Mellitus)     High Cholesterol     HTN - Hypertension

## 2023-08-11 NOTE — ED PROVIDER NOTE - PHYSICAL EXAMINATION
NEURO:  Awake, alert and cooperative. Gait steady without assistance. No focal weakness or paralysis.  HEAD:  Atraumatic. No scalp hematomas. Negative Gagnon's sign.  PSYCH: Mood appropriate to subject. Thought processes intact.   EYES:  PERRL; EOM intact; no entrapment or nystagmus.  ENMT:  Nose midline, non-tender, no rhinorrhea or epistaxis. Lips, mouth, and pharynx appear without obvious trauma.   NECK:  Non-tender to bony palpation. FROM.   CARD: Regular rate and rhythm.  CHEST/RESP:  No accessory muscle use; breath sounds clear and equal bilaterally. No pain with chest palpation.  ABD:  Soft; non-distended; non-tender. No guarding or rebound.   MUSCULOSKELETAL/EXTREMITIES: FROM in all four extremities; non-tender to palpation; radial pulses are 2+; no extremity edema.  BACK: No bony point-tenderness. No ecchymosis. ROM intact.   SKIN:  Warm; dry; no apparent lesions or exudate. No rashes, no abrasions or contusions.  Well hydrated.
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
## 2023-08-11 NOTE — ED ADULT NURSE NOTE - NSFALLUNIVINTERV_ED_ALL_ED
Bed/Stretcher in lowest position, wheels locked, appropriate side rails in place/Call bell, personal items and telephone in reach/Instruct patient to call for assistance before getting out of bed/chair/stretcher/Non-slip footwear applied when patient is off stretcher/[place] to call system/Physically safe environment - no spills, clutter or unnecessary equipment/Purposeful proactive rounding/Room/bathroom lighting operational, light cord in reach

## 2023-08-11 NOTE — ED PROVIDER NOTE - PATIENT PORTAL LINK FT
You can access the FollowMyHealth Patient Portal offered by [facility] by registering at the following website: [email or web address]. By joining [employer]’s FollowMyHealth portal, you will also be able to view your health information using other applications (apps) compatible with our system.
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
## 2024-04-27 ENCOUNTER — INPATIENT (INPATIENT)
Facility: HOSPITAL | Age: 69
LOS: 1 days | Discharge: OTHER INSTITUTION | End: 2024-04-29
Attending: INTERNAL MEDICINE | Admitting: INTERNAL MEDICINE
Payer: MEDICARE

## 2024-04-27 VITALS
TEMPERATURE: 98 F | HEART RATE: 71 BPM | OXYGEN SATURATION: 100 % | SYSTOLIC BLOOD PRESSURE: 190 MMHG | RESPIRATION RATE: 16 BRPM | DIASTOLIC BLOOD PRESSURE: 81 MMHG

## 2024-04-27 LAB
ADD ON TEST-SPECIMEN IN LAB: SIGNIFICANT CHANGE UP
ALBUMIN SERPL ELPH-MCNC: 3.9 G/DL — SIGNIFICANT CHANGE UP (ref 3.3–5)
ALP SERPL-CCNC: 68 U/L — SIGNIFICANT CHANGE UP (ref 40–120)
ALT FLD-CCNC: 20 U/L — SIGNIFICANT CHANGE UP (ref 4–41)
ANION GAP SERPL CALC-SCNC: 10 MMOL/L — SIGNIFICANT CHANGE UP (ref 7–14)
APPEARANCE UR: CLEAR — SIGNIFICANT CHANGE UP
APTT BLD: 26.7 SEC — SIGNIFICANT CHANGE UP (ref 24.5–35.6)
AST SERPL-CCNC: 24 U/L — SIGNIFICANT CHANGE UP (ref 4–40)
BACTERIA # UR AUTO: NEGATIVE /HPF — SIGNIFICANT CHANGE UP
BASOPHILS # BLD AUTO: 0.04 K/UL — SIGNIFICANT CHANGE UP (ref 0–0.2)
BASOPHILS NFR BLD AUTO: 0.5 % — SIGNIFICANT CHANGE UP (ref 0–2)
BILIRUB SERPL-MCNC: <0.2 MG/DL — SIGNIFICANT CHANGE UP (ref 0.2–1.2)
BILIRUB UR-MCNC: NEGATIVE — SIGNIFICANT CHANGE UP
BUN SERPL-MCNC: 19 MG/DL — SIGNIFICANT CHANGE UP (ref 7–23)
CALCIUM SERPL-MCNC: 9.1 MG/DL — SIGNIFICANT CHANGE UP (ref 8.4–10.5)
CAST: 0 /LPF — SIGNIFICANT CHANGE UP (ref 0–4)
CHLORIDE SERPL-SCNC: 104 MMOL/L — SIGNIFICANT CHANGE UP (ref 98–107)
CO2 SERPL-SCNC: 24 MMOL/L — SIGNIFICANT CHANGE UP (ref 22–31)
COLOR SPEC: YELLOW — SIGNIFICANT CHANGE UP
CREAT SERPL-MCNC: 0.86 MG/DL — SIGNIFICANT CHANGE UP (ref 0.5–1.3)
DIFF PNL FLD: NEGATIVE — SIGNIFICANT CHANGE UP
EGFR: 96 ML/MIN/1.73M2 — SIGNIFICANT CHANGE UP
EOSINOPHIL # BLD AUTO: 0.34 K/UL — SIGNIFICANT CHANGE UP (ref 0–0.5)
EOSINOPHIL NFR BLD AUTO: 4.2 % — SIGNIFICANT CHANGE UP (ref 0–6)
FLUAV AG NPH QL: SIGNIFICANT CHANGE UP
FLUBV AG NPH QL: SIGNIFICANT CHANGE UP
GLUCOSE SERPL-MCNC: 128 MG/DL — HIGH (ref 70–99)
GLUCOSE UR QL: NEGATIVE MG/DL — SIGNIFICANT CHANGE UP
HCT VFR BLD CALC: 34.7 % — LOW (ref 39–50)
HGB BLD-MCNC: 11.8 G/DL — LOW (ref 13–17)
IANC: 3.96 K/UL — SIGNIFICANT CHANGE UP (ref 1.8–7.4)
IMM GRANULOCYTES NFR BLD AUTO: 0.2 % — SIGNIFICANT CHANGE UP (ref 0–0.9)
INR BLD: <0.9 RATIO — SIGNIFICANT CHANGE UP (ref 0.85–1.18)
KETONES UR-MCNC: NEGATIVE MG/DL — SIGNIFICANT CHANGE UP
LEUKOCYTE ESTERASE UR-ACNC: NEGATIVE — SIGNIFICANT CHANGE UP
LIDOCAIN IGE QN: 56 U/L — SIGNIFICANT CHANGE UP (ref 7–60)
LYMPHOCYTES # BLD AUTO: 3.03 K/UL — SIGNIFICANT CHANGE UP (ref 1–3.3)
LYMPHOCYTES # BLD AUTO: 37.5 % — SIGNIFICANT CHANGE UP (ref 13–44)
MAGNESIUM SERPL-MCNC: 1.8 MG/DL — SIGNIFICANT CHANGE UP (ref 1.6–2.6)
MCHC RBC-ENTMCNC: 27.6 PG — SIGNIFICANT CHANGE UP (ref 27–34)
MCHC RBC-ENTMCNC: 34 GM/DL — SIGNIFICANT CHANGE UP (ref 32–36)
MCV RBC AUTO: 81.3 FL — SIGNIFICANT CHANGE UP (ref 80–100)
MONOCYTES # BLD AUTO: 0.69 K/UL — SIGNIFICANT CHANGE UP (ref 0–0.9)
MONOCYTES NFR BLD AUTO: 8.5 % — SIGNIFICANT CHANGE UP (ref 2–14)
NEUTROPHILS # BLD AUTO: 3.96 K/UL — SIGNIFICANT CHANGE UP (ref 1.8–7.4)
NEUTROPHILS NFR BLD AUTO: 49.1 % — SIGNIFICANT CHANGE UP (ref 43–77)
NITRITE UR-MCNC: NEGATIVE — SIGNIFICANT CHANGE UP
NRBC # BLD: 0 /100 WBCS — SIGNIFICANT CHANGE UP (ref 0–0)
NRBC # FLD: 0 K/UL — SIGNIFICANT CHANGE UP (ref 0–0)
NT-PROBNP SERPL-SCNC: 163 PG/ML — SIGNIFICANT CHANGE UP
PH UR: 5.5 — SIGNIFICANT CHANGE UP (ref 5–8)
PLATELET # BLD AUTO: 226 K/UL — SIGNIFICANT CHANGE UP (ref 150–400)
POTASSIUM SERPL-MCNC: 4.4 MMOL/L — SIGNIFICANT CHANGE UP (ref 3.5–5.3)
POTASSIUM SERPL-SCNC: 4.4 MMOL/L — SIGNIFICANT CHANGE UP (ref 3.5–5.3)
PROT SERPL-MCNC: 6.9 G/DL — SIGNIFICANT CHANGE UP (ref 6–8.3)
PROT UR-MCNC: NEGATIVE MG/DL — SIGNIFICANT CHANGE UP
PROTHROM AB SERPL-ACNC: 10 SEC — SIGNIFICANT CHANGE UP (ref 9.5–13)
RBC # BLD: 4.27 M/UL — SIGNIFICANT CHANGE UP (ref 4.2–5.8)
RBC # FLD: 13.1 % — SIGNIFICANT CHANGE UP (ref 10.3–14.5)
RBC CASTS # UR COMP ASSIST: 0 /HPF — SIGNIFICANT CHANGE UP (ref 0–4)
RSV RNA NPH QL NAA+NON-PROBE: SIGNIFICANT CHANGE UP
SARS-COV-2 RNA SPEC QL NAA+PROBE: SIGNIFICANT CHANGE UP
SODIUM SERPL-SCNC: 138 MMOL/L — SIGNIFICANT CHANGE UP (ref 135–145)
SP GR SPEC: 1.01 — SIGNIFICANT CHANGE UP (ref 1–1.03)
SQUAMOUS # UR AUTO: 0 /HPF — SIGNIFICANT CHANGE UP (ref 0–5)
TROPONIN T, HIGH SENSITIVITY RESULT: 33 NG/L — SIGNIFICANT CHANGE UP
TROPONIN T, HIGH SENSITIVITY RESULT: 41 NG/L — SIGNIFICANT CHANGE UP
UROBILINOGEN FLD QL: 0.2 MG/DL — SIGNIFICANT CHANGE UP (ref 0.2–1)
WBC # BLD: 8.08 K/UL — SIGNIFICANT CHANGE UP (ref 3.8–10.5)
WBC # FLD AUTO: 8.08 K/UL — SIGNIFICANT CHANGE UP (ref 3.8–10.5)
WBC UR QL: 0 /HPF — SIGNIFICANT CHANGE UP (ref 0–5)

## 2024-04-27 PROCEDURE — 71046 X-RAY EXAM CHEST 2 VIEWS: CPT | Mod: 26

## 2024-04-27 PROCEDURE — 99223 1ST HOSP IP/OBS HIGH 75: CPT

## 2024-04-27 RX ORDER — ATORVASTATIN CALCIUM 80 MG/1
40 TABLET, FILM COATED ORAL AT BEDTIME
Refills: 0 | Status: DISCONTINUED | OUTPATIENT
Start: 2024-04-27 | End: 2024-04-29

## 2024-04-27 RX ORDER — METOCLOPRAMIDE HCL 10 MG
10 TABLET ORAL ONCE
Refills: 0 | Status: COMPLETED | OUTPATIENT
Start: 2024-04-27 | End: 2024-04-27

## 2024-04-27 RX ORDER — INSULIN LISPRO 100/ML
VIAL (ML) SUBCUTANEOUS AT BEDTIME
Refills: 0 | Status: DISCONTINUED | OUTPATIENT
Start: 2024-04-27 | End: 2024-04-29

## 2024-04-27 RX ORDER — DEXTROSE 50 % IN WATER 50 %
15 SYRINGE (ML) INTRAVENOUS ONCE
Refills: 0 | Status: DISCONTINUED | OUTPATIENT
Start: 2024-04-27 | End: 2024-04-29

## 2024-04-27 RX ORDER — SODIUM CHLORIDE 9 MG/ML
1000 INJECTION, SOLUTION INTRAVENOUS
Refills: 0 | Status: DISCONTINUED | OUTPATIENT
Start: 2024-04-27 | End: 2024-04-29

## 2024-04-27 RX ORDER — DEXTROSE 50 % IN WATER 50 %
25 SYRINGE (ML) INTRAVENOUS ONCE
Refills: 0 | Status: DISCONTINUED | OUTPATIENT
Start: 2024-04-27 | End: 2024-04-29

## 2024-04-27 RX ORDER — LISINOPRIL 2.5 MG/1
20 TABLET ORAL DAILY
Refills: 0 | Status: DISCONTINUED | OUTPATIENT
Start: 2024-04-27 | End: 2024-04-29

## 2024-04-27 RX ORDER — INSULIN LISPRO 100/ML
VIAL (ML) SUBCUTANEOUS
Refills: 0 | Status: DISCONTINUED | OUTPATIENT
Start: 2024-04-27 | End: 2024-04-29

## 2024-04-27 RX ORDER — DEXTROSE 10 % IN WATER 10 %
125 INTRAVENOUS SOLUTION INTRAVENOUS ONCE
Refills: 0 | Status: DISCONTINUED | OUTPATIENT
Start: 2024-04-27 | End: 2024-04-29

## 2024-04-27 RX ORDER — ACETAMINOPHEN 500 MG
1000 TABLET ORAL ONCE
Refills: 0 | Status: COMPLETED | OUTPATIENT
Start: 2024-04-27 | End: 2024-04-27

## 2024-04-27 RX ORDER — DEXTROSE 50 % IN WATER 50 %
12.5 SYRINGE (ML) INTRAVENOUS ONCE
Refills: 0 | Status: DISCONTINUED | OUTPATIENT
Start: 2024-04-27 | End: 2024-04-29

## 2024-04-27 RX ORDER — INSULIN GLARGINE 100 [IU]/ML
50 INJECTION, SOLUTION SUBCUTANEOUS AT BEDTIME
Refills: 0 | Status: DISCONTINUED | OUTPATIENT
Start: 2024-04-27 | End: 2024-04-29

## 2024-04-27 RX ORDER — INSULIN LISPRO 100/ML
15 VIAL (ML) SUBCUTANEOUS
Refills: 0 | Status: DISCONTINUED | OUTPATIENT
Start: 2024-04-27 | End: 2024-04-29

## 2024-04-27 RX ORDER — GLUCAGON INJECTION, SOLUTION 0.5 MG/.1ML
1 INJECTION, SOLUTION SUBCUTANEOUS ONCE
Refills: 0 | Status: DISCONTINUED | OUTPATIENT
Start: 2024-04-27 | End: 2024-04-29

## 2024-04-27 RX ADMIN — Medication 1000 MILLIGRAM(S): at 08:00

## 2024-04-27 RX ADMIN — Medication 400 MILLIGRAM(S): at 04:51

## 2024-04-27 RX ADMIN — ATORVASTATIN CALCIUM 40 MILLIGRAM(S): 80 TABLET, FILM COATED ORAL at 23:43

## 2024-04-27 RX ADMIN — Medication 10 MILLIGRAM(S): at 04:51

## 2024-04-27 NOTE — ED ADULT TRIAGE NOTE - CHIEF COMPLAINT QUOTE
Pt c/o generalized weakness, SOB, headache x 10 days. pt states he had blood work done 4/22 and was told his potassium was 6.0. Pt had repeat blood work on 4/25 but hasn't got results yet. No complaints of chest pain, nausea, dizziness, vomiting  SOB, fever, chills verbalized. Pt c/o generalized weakness, SOB, headache x 10 days. pt states he had blood work done 4/22 and was told his potassium was 6.0. Pt had repeat blood work on 4/25 but hasn't got results yet. No complaints of chest pain, nausea, dizziness, vomiting , fever, chills verbalized.

## 2024-04-27 NOTE — ED PROVIDER NOTE - INTERNATIONAL TRAVEL
Billing Type: Third-Party Bill Destruction After The Procedure: No Electrodesiccation Text: The wound bed was treated with electrodesiccation after the biopsy was performed. Anesthesia Type: 1% lidocaine with epinephrine Biopsy Method: Dermablade Electrodesiccation And Curettage Text: The wound bed was treated with electrodesiccation and curettage after the biopsy was performed. Silver Nitrate Text: The wound bed was treated with silver nitrate after the biopsy was performed. Post-Care Instructions: I reviewed with the patient in detail post-care instructions. Patient is to keep the biopsy site dry overnight, and then apply bacitracin twice daily until healed. Patient may apply hydrogen peroxide soaks to remove any crusting. Biopsy Type: H and E Curettage Text: The wound bed was treated with curettage after the biopsy was performed. Cryotherapy Text: The wound bed was treated with cryotherapy after the biopsy was performed. Wound Care: No ointment Anesthesia Volume In Cc (Will Not Render If 0): 0.5 Detail Level: Detailed X Size Of Lesion In Cm: 0 Dressing: pressure dressing Type Of Destruction Used: Curettage Consent: Written consent was obtained and risks were reviewed including but not limited to scarring, infection, bleeding, scabbing, incomplete removal, nerve damage and allergy to anesthesia. No Notification Instructions: Patient will be notified of biopsy results. However, patient instructed to call the office if not contacted within 2 weeks. Hemostasis: Drysol

## 2024-04-27 NOTE — ED PROVIDER NOTE - CLINICAL SUMMARY MEDICAL DECISION MAKING FREE TEXT BOX
68-year-old male pmhx of hypertension, hyperlipidemia, diabetes, recent hyperkalemia comes to ED w/ generalized weakness, body aches, shortness of breath, left-sided chest pain. Patient presents symptoms started 10 days ago, patient visited their primary care doctor 3 days prior and received labs, patient was informed that their potassium levels were 6 and received a repeat study a day after.  Patient reports that they were not told the results of their repeat.  Due to the ongoing symptoms as well as his concern with his potassium levels he decided to come to emergency department today.  Their pain/symptom is moderate, constant, non mediating with rest. Reports additional symptoms of dysuria, headache, denies trauma, falls, fever, LOC, Head Trauma, AMS, dizziness, syncope, cough, rhinorrhea, congestion, palpitations, abdominal pain, nausea, vomiting, diarrhea, constipation, melena, hematochezia, hematuria, urinary frequency, flank pain, skin changes, p.o. issues, issues urinating, issues stooling, issues with ambulation.    General: non-toxic, NAD   HEENT: NCAT, PERRL   Cardiac: RRR, no murmurs, 2+ peripheral pulses   Chest: CTA   Abdomen: soft, non-distended, bowel sounds present, no ttp, no rebound or guarding   Extremities: no peripheral edema, calf tenderness, or leg size discrepancies   Skin: no rashes   Neuro: AAOx4, 5+motor, sensory grossly intact   Psych: mood and affect appropriate    Impression: 68-year-old male pmhx of hypertension, hyperlipidemia, diabetes, recent hyperkalemia comes to ED w/ generalized weakness, body aches, shortness of breath, left-sided chest pain. Their symptoms and exam findings are concerning for metabolic abnormality, ongoing hyperkalemia, viral illness, UTI.    Ordered labs, imaging, medications for diagnosis, management, and treatment. 68-year-old male pmhx of hypertension, hyperlipidemia, diabetes, recent hyperkalemia on outpt labs comes to ED w/ generalized weakness, body aches, shortness of breath, left-sided chest pain. Patient presents symptoms started 10 days ago, patient visited their primary care doctor 3 days prior and received labs, patient was informed that their potassium levels were 6 and received a repeat study a day after.  Patient reports that they were not told the results of their repeat.  Due to the ongoing symptoms as well as his concern with his potassium levels he decided to come to emergency department today.  Their pain/symptom is moderate, constant, non mediating with rest. Reports additional symptoms of dysuria, headache, denies trauma, falls, fever, LOC, Head Trauma, AMS, dizziness, syncope, cough, rhinorrhea, congestion, palpitations, abdominal pain, nausea, vomiting, diarrhea, constipation, melena, hematochezia, hematuria, urinary frequency, flank pain, skin changes, p.o. issues, issues urinating, issues stooling, issues with ambulation.    General: non-toxic, NAD   HEENT: NCAT, PERRL   Cardiac: RRR, no murmurs, 2+ peripheral pulses   Chest: CTA   Abdomen: soft, non-distended, bowel sounds present, no ttp, no rebound or guarding   Extremities: no peripheral edema, calf tenderness, or leg size discrepancies   Skin: no rashes   Neuro: AAOx4, 5+motor, sensory grossly intact   Psych: mood and affect appropriate    Impression: 68-year-old male pmhx of hypertension, hyperlipidemia, diabetes, recent hyperkalemia comes to ED w/ generalized weakness, body aches, shortness of breath, left-sided chest pain. Their symptoms and exam findings are concerning for metabolic abnormality, ongoing hyperkalemia, viral illness, UTI.    Ordered labs, imaging, medications for diagnosis, management, and treatment.

## 2024-04-27 NOTE — ED ADULT NURSE NOTE - CHIEF COMPLAINT QUOTE
Pt c/o generalized weakness, SOB, headache x 10 days. pt states he had blood work done 4/22 and was told his potassium was 6.0. Pt had repeat blood work on 4/25 but hasn't got results yet. No complaints of chest pain, nausea, dizziness, vomiting , fever, chills verbalized.

## 2024-04-27 NOTE — ED ADULT NURSE REASSESSMENT NOTE - NS ED NURSE REASSESS COMMENT FT1
pt. remains A&Ox4, awake and resting. pt. offers no new complaints at this time. no acute distress noted. respirations even and unlabored. VS as noted. pt. given lunch at this time, awaiting report to CDU RN.

## 2024-04-27 NOTE — ED CDU PROVIDER INITIAL DAY NOTE - OBJECTIVE STATEMENT
Initial ED provider note: "68-year-old male pmhx of hypertension, hyperlipidemia, diabetes, recent hyperkalemia on outpt labs comes to ED w/ generalized weakness, body aches, shortness of breath, left-sided chest pain. Patient presents symptoms started 10 days ago, patient visited their primary care doctor 3 days prior and received labs, patient was informed that their potassium levels were 6 and received a repeat study a day after.  Patient reports that they were not told the results of their repeat.  Due to the ongoing symptoms as well as his concern with his potassium levels he decided to come to emergency department today.  Their pain/symptom is moderate, constant, non mediating with rest. Reports additional symptoms of dysuria, headache, denies trauma, falls, fever, LOC, Head Trauma, AMS, dizziness, syncope, cough, rhinorrhea, congestion, palpitations, abdominal pain, nausea, vomiting, diarrhea, constipation, melena, hematochezia, hematuria, urinary frequency, flank pain, skin changes, p.o. issues, issues urinating, issues stooling, issues with ambulation."    CDU note: Agree with above. Labs reviewed, trop 33 and 41. EKG non-ischemic. CDU plan for cardiac work up with NST and POPPY, Cardiac monitor and TDD.

## 2024-04-27 NOTE — ED ADULT NURSE REASSESSMENT NOTE - NS ED NURSE REASSESS COMMENT FT1
pt resting in stretcher in spot 22. ate breakfast, VSS. FS done, pt admitted to CDU for cardiac work up. pt updated to POC, on CM SR/ SB noted.

## 2024-04-27 NOTE — ED PROVIDER NOTE - EKG #1 DATE/TIME
Per IC, pt does not require a covid test prior to procedure due to asymptomatic, not immunocompromised, fully vaccinated and 2 weeks post last dose of vaccine.     Verified with patient that procedure will be performed at [facility] and not at 27-Apr-2024 06:43

## 2024-04-27 NOTE — ED PROVIDER NOTE - ATTENDING CONTRIBUTION TO CARE
68-year-old male pmhx of hypertension, hyperlipidemia, diabetes, recent hyperkalemia comes to ED w/ generalized weakness, body aches, shortness of breath and cp. EKG NSR w/o ischemic changes, pt NAD, no resp distress, no active cp, no LE edema, normal vital signs without tachypnea or hypoxia. PLan for labs with cmp, troponin, cbc r/o anemia, cxr, reassess.

## 2024-04-27 NOTE — ED PROVIDER NOTE - PROGRESS NOTE DETAILS
MD Comfort (PGY-2) Received sign out on patient. In brief, 69 yo M with PMhx HTN, HLD, DM, presenting to ED with concern for hyperkalemia and SOB. Patient labs and imaging reviewed. Patient initial trop at 41. Patient pending repeat trop. Dispo pending rpt trop. MD Comfort (PGY-2) Trop change from 41 -> 33. Delta 8. Discussed with patient CDU for stress/echo. Patient agreeable to staying. MD Comfort (PGY-2) Trop change from 41 -> 33. Delta 8. Patient with mild CP.  Discussed with patient CDU for stress/echo. Patient agreeable to staying.

## 2024-04-28 ENCOUNTER — RESULT REVIEW (OUTPATIENT)
Age: 69
End: 2024-04-28

## 2024-04-28 DIAGNOSIS — R06.00 DYSPNEA, UNSPECIFIED: ICD-10-CM

## 2024-04-28 LAB
CULTURE RESULTS: NO GROWTH — SIGNIFICANT CHANGE UP
GLUCOSE BLDC GLUCOMTR-MCNC: 185 MG/DL — HIGH (ref 70–99)
GLUCOSE BLDC GLUCOMTR-MCNC: 278 MG/DL — HIGH (ref 70–99)
SPECIMEN SOURCE: SIGNIFICANT CHANGE UP

## 2024-04-28 PROCEDURE — 99233 SBSQ HOSP IP/OBS HIGH 50: CPT

## 2024-04-28 PROCEDURE — 99223 1ST HOSP IP/OBS HIGH 75: CPT

## 2024-04-28 RX ADMIN — Medication 2: at 09:33

## 2024-04-28 RX ADMIN — Medication 15 UNIT(S): at 16:50

## 2024-04-28 RX ADMIN — Medication 3: at 16:50

## 2024-04-28 RX ADMIN — ATORVASTATIN CALCIUM 40 MILLIGRAM(S): 80 TABLET, FILM COATED ORAL at 22:30

## 2024-04-28 RX ADMIN — Medication 3: at 11:28

## 2024-04-28 RX ADMIN — Medication 15 UNIT(S): at 09:34

## 2024-04-28 RX ADMIN — LISINOPRIL 20 MILLIGRAM(S): 2.5 TABLET ORAL at 05:11

## 2024-04-28 RX ADMIN — INSULIN GLARGINE 50 UNIT(S): 100 INJECTION, SOLUTION SUBCUTANEOUS at 22:30

## 2024-04-28 NOTE — H&P ADULT - NSICDXFAMILYHX_GEN_ALL_CORE_FT
FAMILY HISTORY:  Father  Still living? Unknown  Family history of acute myocardial infarction, Age at diagnosis: Age Unknown     FAMILY HISTORY:  Father  Still living? Unknown  Family history of acute myocardial infarction, Age at diagnosis: Age Unknown    Mother  Still living? Unknown  Family history of diet-controlled diabetes, Age at diagnosis: Age Unknown

## 2024-04-28 NOTE — ED CDU PROVIDER DISPOSITION NOTE - CLINICAL COURSE
69 y/o male with pmhx of DM, HTN, HLD present sto ED c/o generalized weakness, body aches, chest pain and SOB x 10 days. Had abnormal stress test 2 years ago followed by normal CT coronary. Sent to CDU for stress test, echocardiogram, tele doc consult. Pt asymptomatic in cdu and feeling well. No recent travel, surgeries, hospitalizations, le edema, calf pain, hx of dvt/pe.  Stress and Echo abnormal - admitted to Dr. Brewer for angiogram tomorrow.

## 2024-04-28 NOTE — ED CDU PROVIDER SUBSEQUENT DAY NOTE - HISTORY
69 y/o male with pmhx of DM, HTN, HLD present sto ED c/o generalized weakness, body aches, chest pain and SOB x 10 days. Had abnormal stress test 2 years ago followed by normal CT coronary. Sent to CDU for stress test, echocardiogram, tele doc consult. Pt asymptomatic in cdu and feeling well. No recent travel, surgeries, hospitalizations, le edema, calf pain, hx of dvt/pe.

## 2024-04-28 NOTE — H&P ADULT - PROBLEM SELECTOR PLAN 4
- report of ongoing painful micturition for some time  - UA negative for infection  - Urology referral

## 2024-04-28 NOTE — ED CDU PROVIDER SUBSEQUENT DAY NOTE - CLINICAL SUMMARY MEDICAL DECISION MAKING FREE TEXT BOX
67 y/o male with pmhx of DM, HTN, HLD present sto ED c/o generalized weakness, body aches, chest pain and SOB x 10 days. Had abnormal stress test 2 years ago followed by normal CT coronary. Sent to CDU for stress test, echocardiogram, tele doc consult.

## 2024-04-28 NOTE — ED CDU PROVIDER SUBSEQUENT DAY NOTE - ATTENDING APP SHARED VISIT CONTRIBUTION OF CARE
Patient is a 68-year-old male with a history of type 2 diabetes mellitus, on insulin and metformin, hypertension, hyperlipidemia who presented to the emergency department with complaint of weakness, body aches, chest pain and shortness of breath x 10 days.  Patient states he feels a pressure with exertion.  He reportedly had an abnormal stress about 2 years ago when it normal CT coronary.  In the ED, patient had blood work that showed troponin of 41 and repeat at 33.  Patient was placed in CDU for telemonitoring, stress, echo, telemetry doc evaluation.  Patient was seen and examined this morning.  He denies any active chest pain.  He denies any leg swelling.  No fevers or chills.  No nausea or vomiting.    VS noted  Gen. no acute distress, Non toxic   HEENT: EOMI, mmm  Lungs: CTAB/L no C/ W /R   CVS: RRR   Abd; Soft non tender, non distended   Ext: no edema, no calf tenderness  Skin: no rash  Neuro AAOx3 non focal clear speech  a/p:  chest pain, shortness of breath x 10 days– patient has multiple risk factors for ACS.  He is a non-smoker.  He is in CDU for telemonitoring, stress test, echo, telemetry doc eval.  Patient had his stress/echo and is pending resting images, final report, TeleDoc evaluation.  At this time, he has no complaints states he feels well.  - Bernice NAVA

## 2024-04-28 NOTE — H&P ADULT - NSHPLABSRESULTS_GEN_ALL_CORE
11.8   8.08  )-----------( 226      ( 2024 05:41 )             34.7     138  |  104  |  19  ----------------------------<  128<H>       4.4   |  24  |  0.86    Ca    9.1      2024 05:41  Mg     1.80         TPro  6.9  /  Alb  3.9  /  TBili  <0.2  /  DBili  x   /  AST  24  /  ALT  20  /  AlkPhos  68            Urinalysis Basic - ( 2024 05:41 )  Color: Yellow / Appearance: Clear / S.008 / pH: 5.5  Gluc: Negative mg/dL / Ketone: Negative mg/dL  / Bili: Negative / Urobili: 0.2 mg/dL   Blood: Negative / Protein: Negative mg/dL / Nitrite: Negative   Leuk Esterase: Negative / RBC: 0 /HPF / WBC 0 /HPF   Sq Epi: x / Non Sq Epi: x / Bacteria: Negative /HPF        Culture - Urine (collected 2024 05:00)  Source: Clean Catch Clean Catch (Midstream)  Final Report (2024 11:13):    No growth    ======================================================================        ======================================================================  .

## 2024-04-28 NOTE — H&P ADULT - PROBLEM SELECTOR PLAN 2
- glucose = 128 on CMP.  A1c = 8.9.  FS up to 278 since admission  - on Lantus 50 units HS, Novolog 15 units before breakfast and dinner; continued  - ISS, per FS  - consistent carb diet
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
## 2024-04-28 NOTE — CONSULT NOTE ADULT - ASSESSMENT
A/P    67 y/o male with pmhx of DM, HTN, HLD present sto ED c/o generalized weakness, body aches, chest pain and SOB x 10 days. Had abnormal stress test 2 years ago followed by normal CT coronary. Sent to CDU for stress test, echocardiogram, tele doc consult. Pt asymptomatic in cdu and feeling well. No recent travel, surgeries, hospitalizations, le edema, calf pain, hx of dvt/pe.      #Dyspnea  -no ACS  -no clinical HF  -f/u echo, stress testing   -dispo pending testing       d/w cdu team     60 minutes spent on total encounter; more than 50% of the visit was spent counseling and/or coordinating care by the attending physician.

## 2024-04-28 NOTE — ED CDU PROVIDER DISPOSITION NOTE - ATTENDING APP SHARED VISIT CONTRIBUTION OF CARE
Patient is a 68-year-old male with a history of type 2 diabetes mellitus, on insulin and metformin, hypertension, hyperlipidemia who presented to the emergency department with complaint of weakness, body aches, chest pain and shortness of breath x 10 days.  Patient states he feels a pressure with exertion.  He reportedly had an abnormal stress about 2 years ago when it normal CT coronary.  In the ED, patient had blood work that showed troponin of 41 and repeat at 33.  Patient was placed in CDU for telemonitoring, stress, echo, telemetry doc evaluation.  Patient was seen and examined this morning.  He denies any active chest pain.  He denies any leg swelling.  No fevers or chills.  No nausea or vomiting.    VS noted  Gen. no acute distress, Non toxic   HEENT: EOMI, mmm  Lungs: CTAB/L no C/ W /R   CVS: RRR   Abd; Soft non tender, non distended   Ext: no edema, no calf tenderness  Skin: no rash  Neuro AAOx3 non focal clear speech    Nuclear stress shows:     Conclusions:  1. Qualitative Perfusion:  - large-sized, moderate to severe defect(s) in the inferolateral wall that is partially reversible suggestive   of an infarction with moderate tree-infarct ischemia.  2. The left ventricle is normal in function and normal in size. The time activity curve suggests diastolic   dysfunction.. The post stress left ventricular EF is 61 %. The stress end diastolic volume is 97 ml and   systolic volume is 38 ml.  3. Hypokinesis of the inferolateral wall.    Echo shows:     CONCLUSIONS:  1. Left ventricular cavity is normal in size. Left ventricular wall thickness is normal. Left ventricular   systolic function is normal with an ejection fraction of 77 % by Castillo's method of disks. There are   no regional wall motion abnormalities seen.  2. Left ventricular global longitudinal strain is 16.0 % is abnormal (> -16%). Images were acquired on a   [employer] ultrasound system and processed on the ultrasound machine with a heart rate of 65 bpm and   a blood pressure of 170/67 mmHg. Reduced strain with localized wall stress at basal septum   suggestive of hypertensive heart disease, clinical correlation indicated.  3. Normal right ventricular cavity size, with normal wall thickness, and normal systolic function. Tricuspid   annular plane systolic excursion (TAPSE) is 2.6 cm (normal >=1.7 cm).  4. No pericardial effusion seen.  5. No prior echocardiogram is available for comparison.      a/p:  chest pain, shortness of breath x 10 days– patient has multiple risk factors for ACS.  He is a non-smoker.      Patient has an abnormal stress test concerning for inferior lateral wall hypokinesis and ischemia/ infarct. results printed and discussed with him in detail.  Patient seen by telemetry doc who recommends admission for likely cath tomorrow.  Patient updated on plan and agrees with plan for admission.    - Bernice NAVA.

## 2024-04-28 NOTE — H&P ADULT - PROBLEM SELECTOR PLAN 3
- BP initially 190/81 in the ED  - on lisinopril 20 mg PTA; continued  - in the setting of  cardiac disease, starting low dose metoprolol - 25 mg Q12H   - modify therapy, as needed

## 2024-04-28 NOTE — H&P ADULT - PROBLEM SELECTOR PLAN 1
- left sided, described as heaviness, shortness of breath with exertion, generalized weakness/fatigue  - troponin = 41 --> 33, pro-BNP = 163  - ECG = NSR at 62 p, QTc = 399, small TWI in III  - TTE w/ finding of "Reduced strain w/ localized wall stress at basal septum suggestive of hypertensive heart disease"  - NST = " large-sized, moderate to severe defect(s) in the inferolateral wall that is partially reversible suggestive of an infarction w/ moderate tree-infarct ischemia.  ...The time activity curve suggests diastolic dysfunction.. The post stress L-ventricular EF is 61 %.  ...Hypokinesis of the inferolateral wall. "  - admitted from CDU for cardiac catheterization  - continuing w/ PTA statin, ACEi  - starting aspirin and low dose beta-blocker  - f/u AM lab-work  - continues on Telemetry  - Cardiologist resuming care in the AM - left sided, described as heaviness, shortness of breath with exertion, generalized weakness/fatigue  - troponin = 41 --> 33, pro-BNP = 163  - ECG = NSR at 62 p, QTc = 399, small TWI in III  - TTE w/ finding of "Reduced strain w/ localized wall stress at basal septum suggestive of hypertensive heart disease"  - NST = " large-sized, moderate to severe defect(s) in the inferolateral wall that is partially reversible suggestive of an infarction w/ moderate tree-infarct ischemia.  ...The time activity curve suggests diastolic dysfunction.. The post stress L-ventricular EF is 61 %.  ...Hypokinesis of the inferolateral wall. "  - admitted from CDU for cardiac catheterization  - continuing w/ PTA statin, ACEi  - starting aspirin (per chart review was on aspirin in the past) and low dose beta-blocker  - f/u AM lab-work  - continues on Telemetry  - NPO - pending cardiac catheterization  - Cardiologist resuming care in the AM

## 2024-04-28 NOTE — H&P ADULT - HISTORY OF PRESENT ILLNESS
68 year-old male, with past history significant for Type-2 DM, HLD, HTN and Hyperkalemia, presented to the ED secondary to L-sided chest pain, shortness of breath, generalized weakness, body aches and hyperkalemia.  Seen and evaluated at bedside;    Vital signs upon ED presentation as follows: BP = 190/81, HR = 71, RR = 16, T = 36.8 C (98.3 F), O2 Sat = 100% on RA.  Diagnosed with Dyspnea in the ED, and admitted to the CDU; underwent stress test TTE and stress test since admission. 68 year-old male, with past history significant for Type-2 DM, HLD, HTN and Hyperkalemia, presented to the ED secondary to L-sided chest pain, shortness of breath, generalized weakness, body aches and hyperkalemia.  Seen and evaluated at bedside; NAD.  Patient reports L-sided chest pain, sensation of heaviness, and associated with mild shortness of breath during exertion.  Also reported generalized ill feeling and weakness, as well as dizziness  On 04/22/2024, patient did lab-work and was called on 04/24/2024 with report of elevated potassium (K = 6), and advised to have lab-work repeated - which was done on 04/25/2024; result unknown.  However, patient discussed his situation with one of his son who is a medical doctor in [place]; son advised patient to go the ED, but patient hesitated for some time, then eventually decided to come to the ED.  Patient recalls epigastric area burning pain about 2 years ago and underwent evaluation for same, with no untoward findings noted.  Currently, no palpitations, nausea, vomiting or any other related signs/symptoms.    Vital signs upon ED presentation as follows: BP = 190/81, HR = 71, RR = 16, T = 36.8 C (98.3 F), O2 Sat = 100% on RA.  Diagnosed with Dyspnea in the ED, and admitted to the CDU; underwent stress test TTE and stress test since admission.

## 2024-04-28 NOTE — H&P ADULT - NSHPPHYSICALEXAM_GEN_ALL_CORE
Vital Signs Last 24 Hrs  T(C): 37 (28 Apr 2024 20:08), Max: 37 (28 Apr 2024 20:08)  T(F): 98.6 (28 Apr 2024 20:08), Max: 98.6 (28 Apr 2024 20:08)  HR: 74 (28 Apr 2024 20:08) (58 - 80)  BP: 176/65 (28 Apr 2024 20:08) (151/66 - 198/75)  BP(mean): --  RR: 18 (28 Apr 2024 20:08) (15 - 18)  SpO2: 99% (28 Apr 2024 20:08) (98% - 100%)    Parameters below as of 28 Apr 2024 20:08  Patient On (Oxygen Delivery Method): room air  ================================================= Vital Signs Last 24 Hrs  T(C): 37 (28 Apr 2024 20:08), Max: 37 (28 Apr 2024 20:08)  T(F): 98.6 (28 Apr 2024 20:08), Max: 98.6 (28 Apr 2024 20:08)  HR: 74 (28 Apr 2024 20:08) (58 - 80)  BP: 176/65 (28 Apr 2024 20:08) (151/66 - 198/75)  BP(mean): --  RR: 18 (28 Apr 2024 20:08) (15 - 18)  SpO2: 99% (28 Apr 2024 20:08) (98% - 100%)    Parameters below as of 28 Apr 2024 20:08  Patient On (Oxygen Delivery Method): room air  =================================================  PHYSICAL EXAMINATION:    APPEARANCE: Adequately groomed, adequately nourished male, lying propped up in bed in NAD  HEENT: Moist oral mucosa.  Pupils reactive to light.  EOMI  LYMPHATIC: No lymphadenopathy appreciated  CARDIOVASCULAR: (+) S1 S2.  No JVD.  No murmurs.  No edema  RESPIRATORY: No wheezing, rhonchi, crackles appreciated  GASTROINTESTINAL: Soft.  Non-tender.  (+) BS  GENITOURINARY: No suprapubic tenderness.  No CVA tenderness B/L  EXTREMITIES: Normal range of motion.  No clubbing, cyanosis or edema  MUSCULOSKELETAL: No atrophy.  No asymmetry.  Good ROM  SKIN: No rashes. No ecchymoses.  No cyanosis  PSYCHIATRIC: A&O x 3.  Mood & affect appropriate to situation  NEUROLOGICAL: Non-focal, APONTE x 4 against gravity  VASCULAR: Peripheral pulses palpable

## 2024-04-28 NOTE — H&P ADULT - NSHPREVIEWOFSYSTEMS_GEN_ALL_CORE
REVIEW OF SYSTEMS:    CONSTITUTIONAL: Dizziness.  Generalized weakness.  No fever, chills or sweating  EYES/ENT: No visual changes.  No dysphagia  NECK: No pain or stiffness  RESPIRATORY: No cough or hemoptysis.  No shortness of breath  CARDIOVASCULAR: L-chest pain; heaviness.  Mild shortness of breath with walking fast.  No palpitations.  No lower extremity edema  GASTROINTESTINAL: No epigastric or abdominal pain. No nausea, vomiting or hematemesis.  No diarrhea or constipation. No melena or hematochezia.  GENITOURINARY: Pain on urination for some time.  No frequency or hematuria  MUSCULOSKELETAL: No joint pain, swelling, decreased ROM, erythema, warmth  NEUROLOGICAL: No numbness or weakness  PSYCHIATRY: No anxiety, or depression.  SKIN: No itching, burning, rashes, or lesions   All other review of systems is negative unless indicated above.

## 2024-04-28 NOTE — H&P ADULT - ASSESSMENT
[ x ]  Lab studies personally reviewed  [ x ]  Radiology personally reviewed  [ x ]  Old records personally reviewed    68 year-old male, with past history significant for Type-2 DM, HLD, HTN and Hyperkalemia, presented to the ED secondary to L-sided chest pain, shortness of breath, generalized weakness, body aches and hyperkalemia.  Diagnosed with Dyspnea in the ED, and admitted to the CDU; underwent stress test TTE and stress test since admission.

## 2024-04-29 ENCOUNTER — TRANSCRIPTION ENCOUNTER (OUTPATIENT)
Age: 69
End: 2024-04-29

## 2024-04-29 ENCOUNTER — INPATIENT (INPATIENT)
Facility: HOSPITAL | Age: 69
LOS: 10 days | Discharge: HOME HEALTH | DRG: 303 | End: 2024-05-10
Attending: THORACIC SURGERY (CARDIOTHORACIC VASCULAR SURGERY) | Admitting: THORACIC SURGERY (CARDIOTHORACIC VASCULAR SURGERY)
Payer: MEDICARE

## 2024-04-29 VITALS
OXYGEN SATURATION: 98 % | DIASTOLIC BLOOD PRESSURE: 56 MMHG | SYSTOLIC BLOOD PRESSURE: 127 MMHG | HEART RATE: 58 BPM | RESPIRATION RATE: 17 BRPM

## 2024-04-29 VITALS
TEMPERATURE: 98 F | OXYGEN SATURATION: 98 % | RESPIRATION RATE: 18 BRPM | SYSTOLIC BLOOD PRESSURE: 180 MMHG | WEIGHT: 177.69 LBS | HEART RATE: 64 BPM | DIASTOLIC BLOOD PRESSURE: 79 MMHG

## 2024-04-29 DIAGNOSIS — I25.10 ATHEROSCLEROTIC HEART DISEASE OF NATIVE CORONARY ARTERY WITHOUT ANGINA PECTORIS: ICD-10-CM

## 2024-04-29 DIAGNOSIS — R07.9 CHEST PAIN, UNSPECIFIED: ICD-10-CM

## 2024-04-29 DIAGNOSIS — Z29.9 ENCOUNTER FOR PROPHYLACTIC MEASURES, UNSPECIFIED: ICD-10-CM

## 2024-04-29 DIAGNOSIS — R30.0 DYSURIA: ICD-10-CM

## 2024-04-29 DIAGNOSIS — E11.9 TYPE 2 DIABETES MELLITUS WITHOUT COMPLICATIONS: ICD-10-CM

## 2024-04-29 DIAGNOSIS — E11.65 TYPE 2 DIABETES MELLITUS WITH HYPERGLYCEMIA: ICD-10-CM

## 2024-04-29 DIAGNOSIS — I10 ESSENTIAL (PRIMARY) HYPERTENSION: ICD-10-CM

## 2024-04-29 LAB
24R-OH-CALCIDIOL SERPL-MCNC: 16 NG/ML — LOW (ref 30–80)
ADD ON TEST-SPECIMEN IN LAB: SIGNIFICANT CHANGE UP
ALBUMIN SERPL ELPH-MCNC: 4 G/DL — SIGNIFICANT CHANGE UP (ref 3.3–5)
ALP SERPL-CCNC: 67 U/L — SIGNIFICANT CHANGE UP (ref 40–120)
ALT FLD-CCNC: 19 U/L — SIGNIFICANT CHANGE UP (ref 10–45)
ANION GAP SERPL CALC-SCNC: 11 MMOL/L — SIGNIFICANT CHANGE UP (ref 7–14)
ANION GAP SERPL CALC-SCNC: 12 MMOL/L — SIGNIFICANT CHANGE UP (ref 5–17)
AST SERPL-CCNC: 21 U/L — SIGNIFICANT CHANGE UP (ref 10–40)
BASOPHILS # BLD AUTO: 0.04 K/UL — SIGNIFICANT CHANGE UP (ref 0–0.2)
BASOPHILS NFR BLD AUTO: 0.5 % — SIGNIFICANT CHANGE UP (ref 0–2)
BILIRUB SERPL-MCNC: 0.3 MG/DL — SIGNIFICANT CHANGE UP (ref 0.2–1.2)
BLD GP AB SCN SERPL QL: NEGATIVE — SIGNIFICANT CHANGE UP
BUN SERPL-MCNC: 15 MG/DL — SIGNIFICANT CHANGE UP (ref 7–23)
BUN SERPL-MCNC: 19 MG/DL — SIGNIFICANT CHANGE UP (ref 7–23)
CALCIUM SERPL-MCNC: 8.9 MG/DL — SIGNIFICANT CHANGE UP (ref 8.4–10.5)
CALCIUM SERPL-MCNC: 9.1 MG/DL — SIGNIFICANT CHANGE UP (ref 8.4–10.5)
CHLORIDE SERPL-SCNC: 103 MMOL/L — SIGNIFICANT CHANGE UP (ref 96–108)
CHLORIDE SERPL-SCNC: 107 MMOL/L — SIGNIFICANT CHANGE UP (ref 98–107)
CHOLEST SERPL-MCNC: 162 MG/DL — SIGNIFICANT CHANGE UP
CK MB BLD-MCNC: 3 % — HIGH (ref 0–2.5)
CK MB CFR SERPL CALC: 5.1 NG/ML — SIGNIFICANT CHANGE UP
CK SERPL-CCNC: 169 U/L — SIGNIFICANT CHANGE UP (ref 30–200)
CO2 SERPL-SCNC: 22 MMOL/L — SIGNIFICANT CHANGE UP (ref 22–31)
CO2 SERPL-SCNC: 24 MMOL/L — SIGNIFICANT CHANGE UP (ref 22–31)
CREAT SERPL-MCNC: 0.8 MG/DL — SIGNIFICANT CHANGE UP (ref 0.5–1.3)
CREAT SERPL-MCNC: 0.98 MG/DL — SIGNIFICANT CHANGE UP (ref 0.5–1.3)
EGFR: 85 ML/MIN/1.73M2 — SIGNIFICANT CHANGE UP
EGFR: 98 ML/MIN/1.73M2 — SIGNIFICANT CHANGE UP
EOSINOPHIL # BLD AUTO: 0.26 K/UL — SIGNIFICANT CHANGE UP (ref 0–0.5)
EOSINOPHIL NFR BLD AUTO: 3.5 % — SIGNIFICANT CHANGE UP (ref 0–6)
GLUCOSE BLDC GLUCOMTR-MCNC: 128 MG/DL — HIGH (ref 70–99)
GLUCOSE BLDC GLUCOMTR-MCNC: 154 MG/DL — HIGH (ref 70–99)
GLUCOSE BLDC GLUCOMTR-MCNC: 156 MG/DL — HIGH (ref 70–99)
GLUCOSE BLDC GLUCOMTR-MCNC: 191 MG/DL — HIGH (ref 70–99)
GLUCOSE BLDC GLUCOMTR-MCNC: 285 MG/DL — HIGH (ref 70–99)
GLUCOSE SERPL-MCNC: 131 MG/DL — HIGH (ref 70–99)
GLUCOSE SERPL-MCNC: 262 MG/DL — HIGH (ref 70–99)
HCT VFR BLD CALC: 36.3 % — LOW (ref 39–50)
HCT VFR BLD CALC: 38.9 % — LOW (ref 39–50)
HDLC SERPL-MCNC: 34 MG/DL — LOW
HGB BLD-MCNC: 12.1 G/DL — LOW (ref 13–17)
HGB BLD-MCNC: 12.4 G/DL — LOW (ref 13–17)
IMM GRANULOCYTES NFR BLD AUTO: 0.3 % — SIGNIFICANT CHANGE UP (ref 0–0.9)
LIPID PNL WITH DIRECT LDL SERPL: 105 MG/DL — HIGH
LYMPHOCYTES # BLD AUTO: 2.53 K/UL — SIGNIFICANT CHANGE UP (ref 1–3.3)
LYMPHOCYTES # BLD AUTO: 34.2 % — SIGNIFICANT CHANGE UP (ref 13–44)
MAGNESIUM SERPL-MCNC: 1.9 MG/DL — SIGNIFICANT CHANGE UP (ref 1.6–2.6)
MCHC RBC-ENTMCNC: 26.3 PG — LOW (ref 27–34)
MCHC RBC-ENTMCNC: 27.1 PG — SIGNIFICANT CHANGE UP (ref 27–34)
MCHC RBC-ENTMCNC: 31.9 GM/DL — LOW (ref 32–36)
MCHC RBC-ENTMCNC: 33.3 GM/DL — SIGNIFICANT CHANGE UP (ref 32–36)
MCV RBC AUTO: 81.2 FL — SIGNIFICANT CHANGE UP (ref 80–100)
MCV RBC AUTO: 82.4 FL — SIGNIFICANT CHANGE UP (ref 80–100)
MONOCYTES # BLD AUTO: 0.48 K/UL — SIGNIFICANT CHANGE UP (ref 0–0.9)
MONOCYTES NFR BLD AUTO: 6.5 % — SIGNIFICANT CHANGE UP (ref 2–14)
NEUTROPHILS # BLD AUTO: 4.07 K/UL — SIGNIFICANT CHANGE UP (ref 1.8–7.4)
NEUTROPHILS NFR BLD AUTO: 55 % — SIGNIFICANT CHANGE UP (ref 43–77)
NON HDL CHOLESTEROL: 128 MG/DL — SIGNIFICANT CHANGE UP
NRBC # BLD: 0 /100 WBCS — SIGNIFICANT CHANGE UP (ref 0–0)
NRBC # BLD: 0 /100 WBCS — SIGNIFICANT CHANGE UP (ref 0–0)
NRBC # FLD: 0 K/UL — SIGNIFICANT CHANGE UP (ref 0–0)
NT-PROBNP SERPL-SCNC: 137 PG/ML — SIGNIFICANT CHANGE UP (ref 0–300)
PHOSPHATE SERPL-MCNC: 3.1 MG/DL — SIGNIFICANT CHANGE UP (ref 2.5–4.5)
PLATELET # BLD AUTO: 201 K/UL — SIGNIFICANT CHANGE UP (ref 150–400)
PLATELET # BLD AUTO: 206 K/UL — SIGNIFICANT CHANGE UP (ref 150–400)
POTASSIUM SERPL-MCNC: 4 MMOL/L — SIGNIFICANT CHANGE UP (ref 3.5–5.3)
POTASSIUM SERPL-MCNC: 4.2 MMOL/L — SIGNIFICANT CHANGE UP (ref 3.5–5.3)
POTASSIUM SERPL-SCNC: 4 MMOL/L — SIGNIFICANT CHANGE UP (ref 3.5–5.3)
POTASSIUM SERPL-SCNC: 4.2 MMOL/L — SIGNIFICANT CHANGE UP (ref 3.5–5.3)
PROT SERPL-MCNC: 6.9 G/DL — SIGNIFICANT CHANGE UP (ref 6–8.3)
RBC # BLD: 4.47 M/UL — SIGNIFICANT CHANGE UP (ref 4.2–5.8)
RBC # BLD: 4.72 M/UL — SIGNIFICANT CHANGE UP (ref 4.2–5.8)
RBC # FLD: 13 % — SIGNIFICANT CHANGE UP (ref 10.3–14.5)
RBC # FLD: 13.2 % — SIGNIFICANT CHANGE UP (ref 10.3–14.5)
RH IG SCN BLD-IMP: POSITIVE — SIGNIFICANT CHANGE UP
SODIUM SERPL-SCNC: 137 MMOL/L — SIGNIFICANT CHANGE UP (ref 135–145)
SODIUM SERPL-SCNC: 142 MMOL/L — SIGNIFICANT CHANGE UP (ref 135–145)
TRIGL SERPL-MCNC: 117 MG/DL — SIGNIFICANT CHANGE UP
TROPONIN T, HIGH SENSITIVITY RESULT: 58 NG/L — CRITICAL HIGH
TSH SERPL-MCNC: 3.46 UIU/ML — SIGNIFICANT CHANGE UP (ref 0.27–4.2)
WBC # BLD: 7.35 K/UL — SIGNIFICANT CHANGE UP (ref 3.8–10.5)
WBC # BLD: 7.4 K/UL — SIGNIFICANT CHANGE UP (ref 3.8–10.5)
WBC # FLD AUTO: 7.35 K/UL — SIGNIFICANT CHANGE UP (ref 3.8–10.5)
WBC # FLD AUTO: 7.4 K/UL — SIGNIFICANT CHANGE UP (ref 3.8–10.5)

## 2024-04-29 PROCEDURE — 99223 1ST HOSP IP/OBS HIGH 75: CPT

## 2024-04-29 RX ORDER — DEXTROSE 10 % IN WATER 10 %
125 INTRAVENOUS SOLUTION INTRAVENOUS ONCE
Refills: 0 | Status: DISCONTINUED | OUTPATIENT
Start: 2024-04-29 | End: 2024-05-03

## 2024-04-29 RX ORDER — ROSUVASTATIN CALCIUM 5 MG/1
1 TABLET ORAL
Refills: 0 | DISCHARGE

## 2024-04-29 RX ORDER — SODIUM CHLORIDE 9 MG/ML
3 INJECTION INTRAMUSCULAR; INTRAVENOUS; SUBCUTANEOUS EVERY 8 HOURS
Refills: 0 | Status: DISCONTINUED | OUTPATIENT
Start: 2024-04-29 | End: 2024-05-03

## 2024-04-29 RX ORDER — METOPROLOL TARTRATE 50 MG
25 TABLET ORAL EVERY 12 HOURS
Refills: 0 | Status: DISCONTINUED | OUTPATIENT
Start: 2024-04-29 | End: 2024-05-03

## 2024-04-29 RX ORDER — ATORVASTATIN CALCIUM 80 MG/1
1 TABLET, FILM COATED ORAL
Qty: 0 | Refills: 0 | DISCHARGE
Start: 2024-04-29

## 2024-04-29 RX ORDER — INSULIN LISPRO 100/ML
VIAL (ML) SUBCUTANEOUS AT BEDTIME
Refills: 0 | Status: DISCONTINUED | OUTPATIENT
Start: 2024-04-29 | End: 2024-05-03

## 2024-04-29 RX ORDER — INSULIN ASPART 100 [IU]/ML
15 INJECTION, SOLUTION SUBCUTANEOUS
Refills: 0 | DISCHARGE

## 2024-04-29 RX ORDER — DEXTROSE 50 % IN WATER 50 %
12.5 SYRINGE (ML) INTRAVENOUS ONCE
Refills: 0 | Status: DISCONTINUED | OUTPATIENT
Start: 2024-04-29 | End: 2024-05-03

## 2024-04-29 RX ORDER — HEPARIN SODIUM 5000 [USP'U]/ML
1200 INJECTION INTRAVENOUS; SUBCUTANEOUS
Qty: 25000 | Refills: 0 | Status: DISCONTINUED | OUTPATIENT
Start: 2024-04-29 | End: 2024-04-30

## 2024-04-29 RX ORDER — ASPIRIN/CALCIUM CARB/MAGNESIUM 324 MG
1 TABLET ORAL
Qty: 0 | Refills: 0 | DISCHARGE
Start: 2024-04-29

## 2024-04-29 RX ORDER — INSULIN LISPRO 100/ML
6 VIAL (ML) SUBCUTANEOUS
Qty: 0 | Refills: 0 | DISCHARGE
Start: 2024-04-29

## 2024-04-29 RX ORDER — DEXTROSE 50 % IN WATER 50 %
15 SYRINGE (ML) INTRAVENOUS ONCE
Refills: 0 | Status: DISCONTINUED | OUTPATIENT
Start: 2024-04-29 | End: 2024-05-03

## 2024-04-29 RX ORDER — INSULIN LISPRO 100/ML
15 VIAL (ML) SUBCUTANEOUS
Qty: 0 | Refills: 0 | DISCHARGE
Start: 2024-04-29

## 2024-04-29 RX ORDER — GLUCAGON INJECTION, SOLUTION 0.5 MG/.1ML
1 INJECTION, SOLUTION SUBCUTANEOUS ONCE
Refills: 0 | Status: DISCONTINUED | OUTPATIENT
Start: 2024-04-29 | End: 2024-05-03

## 2024-04-29 RX ORDER — METFORMIN HYDROCHLORIDE 850 MG/1
1 TABLET ORAL
Refills: 0 | DISCHARGE

## 2024-04-29 RX ORDER — INSULIN LISPRO 100/ML
15 VIAL (ML) SUBCUTANEOUS
Refills: 0 | Status: DISCONTINUED | OUTPATIENT
Start: 2024-04-29 | End: 2024-04-30

## 2024-04-29 RX ORDER — DEXTROSE 50 % IN WATER 50 %
25 SYRINGE (ML) INTRAVENOUS ONCE
Refills: 0 | Status: DISCONTINUED | OUTPATIENT
Start: 2024-04-29 | End: 2024-05-03

## 2024-04-29 RX ORDER — ATORVASTATIN CALCIUM 80 MG/1
40 TABLET, FILM COATED ORAL AT BEDTIME
Refills: 0 | Status: DISCONTINUED | OUTPATIENT
Start: 2024-04-29 | End: 2024-05-03

## 2024-04-29 RX ORDER — INSULIN GLARGINE 100 [IU]/ML
50 INJECTION, SOLUTION SUBCUTANEOUS AT BEDTIME
Refills: 0 | Status: DISCONTINUED | OUTPATIENT
Start: 2024-04-30 | End: 2024-04-30

## 2024-04-29 RX ORDER — INSULIN LISPRO 100/ML
VIAL (ML) SUBCUTANEOUS
Refills: 0 | Status: DISCONTINUED | OUTPATIENT
Start: 2024-04-29 | End: 2024-05-03

## 2024-04-29 RX ORDER — METOPROLOL TARTRATE 50 MG
25 TABLET ORAL EVERY 12 HOURS
Refills: 0 | Status: DISCONTINUED | OUTPATIENT
Start: 2024-04-29 | End: 2024-04-29

## 2024-04-29 RX ORDER — PANTOPRAZOLE SODIUM 20 MG/1
40 TABLET, DELAYED RELEASE ORAL
Refills: 0 | Status: DISCONTINUED | OUTPATIENT
Start: 2024-04-29 | End: 2024-05-03

## 2024-04-29 RX ORDER — INSULIN LISPRO 100/ML
VIAL (ML) SUBCUTANEOUS EVERY 6 HOURS
Refills: 0 | Status: DISCONTINUED | OUTPATIENT
Start: 2024-04-29 | End: 2024-04-29

## 2024-04-29 RX ORDER — ASPIRIN/CALCIUM CARB/MAGNESIUM 324 MG
81 TABLET ORAL DAILY
Refills: 0 | Status: DISCONTINUED | OUTPATIENT
Start: 2024-04-29 | End: 2024-04-29

## 2024-04-29 RX ORDER — INSULIN GLARGINE 100 [IU]/ML
18 INJECTION, SOLUTION SUBCUTANEOUS
Qty: 0 | Refills: 0 | DISCHARGE
Start: 2024-04-29

## 2024-04-29 RX ORDER — INSULIN GLARGINE 100 [IU]/ML
50 INJECTION, SOLUTION SUBCUTANEOUS ONCE
Refills: 0 | Status: COMPLETED | OUTPATIENT
Start: 2024-04-29 | End: 2024-04-29

## 2024-04-29 RX ORDER — SODIUM CHLORIDE 9 MG/ML
1000 INJECTION, SOLUTION INTRAVENOUS
Refills: 0 | Status: DISCONTINUED | OUTPATIENT
Start: 2024-04-29 | End: 2024-04-29

## 2024-04-29 RX ORDER — INSULIN GLARGINE 100 [IU]/ML
50 INJECTION, SOLUTION SUBCUTANEOUS
Refills: 0 | DISCHARGE

## 2024-04-29 RX ORDER — METOPROLOL TARTRATE 50 MG
1 TABLET ORAL
Qty: 0 | Refills: 0 | DISCHARGE
Start: 2024-04-29

## 2024-04-29 RX ORDER — ENOXAPARIN SODIUM 100 MG/ML
40 INJECTION SUBCUTANEOUS EVERY 24 HOURS
Refills: 0 | Status: DISCONTINUED | OUTPATIENT
Start: 2024-04-29 | End: 2024-04-29

## 2024-04-29 RX ORDER — ASPIRIN/CALCIUM CARB/MAGNESIUM 324 MG
81 TABLET ORAL DAILY
Refills: 0 | Status: DISCONTINUED | OUTPATIENT
Start: 2024-04-29 | End: 2024-05-03

## 2024-04-29 RX ORDER — INSULIN LISPRO 100/ML
1 VIAL (ML) SUBCUTANEOUS
Qty: 0 | Refills: 0 | DISCHARGE
Start: 2024-04-29

## 2024-04-29 RX ORDER — SODIUM CHLORIDE 9 MG/ML
1000 INJECTION, SOLUTION INTRAVENOUS
Refills: 0 | Status: DISCONTINUED | OUTPATIENT
Start: 2024-04-29 | End: 2024-05-03

## 2024-04-29 RX ADMIN — ATORVASTATIN CALCIUM 40 MILLIGRAM(S): 80 TABLET, FILM COATED ORAL at 22:24

## 2024-04-29 RX ADMIN — INSULIN GLARGINE 50 UNIT(S): 100 INJECTION, SOLUTION SUBCUTANEOUS at 23:49

## 2024-04-29 RX ADMIN — Medication 1: at 12:23

## 2024-04-29 RX ADMIN — LISINOPRIL 20 MILLIGRAM(S): 2.5 TABLET ORAL at 05:27

## 2024-04-29 RX ADMIN — Medication 1: at 18:27

## 2024-04-29 RX ADMIN — Medication 81 MILLIGRAM(S): at 09:09

## 2024-04-29 RX ADMIN — Medication 25 MILLIGRAM(S): at 05:27

## 2024-04-29 RX ADMIN — Medication 1: at 23:50

## 2024-04-29 RX ADMIN — SODIUM CHLORIDE 100 MILLILITER(S): 9 INJECTION, SOLUTION INTRAVENOUS at 03:25

## 2024-04-29 RX ADMIN — Medication 25 MILLIGRAM(S): at 22:25

## 2024-04-29 NOTE — PHARMACOTHERAPY INTERVENTION NOTE - COMMENTS
Medication list updated in Outpatient Medication Record (OMR). Source: [facility]

## 2024-04-29 NOTE — DISCHARGE NOTE PROVIDER - CARE PROVIDER_API CALL
Zuleyam Patel  Internal Medicine  [address] [place] [address]  Phone: [phone]  Fax: [phone]  Established Patient  Follow Up Time: Routine

## 2024-04-29 NOTE — H&P ADULT - HISTORY OF PRESENT ILLNESS
68 year-old male, with past history significant for Type-2 DM, HLD, HTN and Hyperkalemia, presented to the ED secondary to L-sided chest pain, shortness of breath.  Patient was admitted to telemetry service for further evaluation.  Patient had positive stress and subsequently underwent coronary angiography on 4/29/2024  via RRA--->M 70%, pLAD 70%, mLCx 70%, mRCA 90%, LVEDP 17, RRA accessed.  Patient transferred to [facility] for CABG eval d/t triple vessel disease.    On 4/29/2024 patient medically cleared for transfer to [facility] by Dr. Brewer.   68 year-old male, with past history significant for Type-2 DM, HLD, HTN and Hyperkalemia, presented to the ED secondary to L-sided chest pain, shortness of breath.  Patient was admitted to telemetry service for further evaluation.  Patient had positive stress and subsequently underwent coronary angiography on 4/29/2024  via RRA--->M 70%, pLAD 70%, mLCx 70%, mRCA 90%, LVEDP 17, RRA accessed.  Patient transferred to [facility] for CABG eval d/t triple vessel disease.

## 2024-04-29 NOTE — DISCHARGE NOTE PROVIDER - NSDCFUADDINST_GEN_ALL_CORE_FT
No heavy lifting greater than 5-10 pounds with right wrist x one week. No strenuous activity x 3 weeks. Monitor site of procedure and notify your doctor for any redness, swelling, discharge

## 2024-04-29 NOTE — H&P ADULT - NSICDXFAMILYHX_GEN_ALL_CORE_FT
FAMILY HISTORY:  Father  Still living? Unknown  Family history of acute myocardial infarction, Age at diagnosis: Age Unknown    Mother  Still living? Unknown  Family history of diet-controlled diabetes, Age at diagnosis: Age Unknown

## 2024-04-29 NOTE — H&P ADULT - NSHPLABSRESULTS_GEN_ALL_CORE
12.1   7.35  )-----------( 206      ( 29 Apr 2024 05:30 )             36.3       04-29    142  |  107  |  15  ----------------------------<  131<H>  4.2   |  24  |  0.80    Ca    8.9      29 Apr 2024 05:30  Phos  3.1     04-29  Mg     1.90     04-29 12.1   7.35  )-----------( 206      ( 29 Apr 2024 05:30 )             36.3       04-29    142  |  107  |  15  ----------------------------<  131<H>  4.2   |  24  |  0.80    Ca    8.9      29 Apr 2024 05:30  Phos  3.1     04-29  Mg     1.90     04-29    TTE   1. Left ventricular cavity is normal in size. Left ventricular wall thickness is normal. Left ventricular systolic function is normal with an ejection fraction of 77 % by Castillo's method of disks. There are no regional wall motion abnormalities seen.   2. Left ventricular global longitudinal strain is 16.0 % is abnormal (> -16%). Images were acquired on a [employer] ultrasound system and processed on the ultrasound machine with a heart rate of 65 bpm and a blood pressure of 170/67 mmHg. Reduced strain with localized wall stress at basal septum suggestive of hypertensive heart disease, clinical correlation indicated.   3. Normal right ventricular cavity size, with normal wall thickness, and normal systolic function. Tricuspid annular plane systolic excursion (TAPSE) is 2.6 cm (normal >=1.7 cm).   4. No pericardial effusion seen.   5. No prior echocardiogram is available for comparison.

## 2024-04-29 NOTE — PROVIDER CONTACT NOTE (CRITICAL VALUE NOTIFICATION) - ACTION/TREATMENT ORDERED:
As per ACP continue monitor for symptoms of cardiac distress and monitor on tele.Will also add on CK[facility] on Am labs

## 2024-04-29 NOTE — DISCHARGE NOTE PROVIDER - NSDCCPCAREPLAN_GEN_ALL_CORE_FT
PRINCIPAL DISCHARGE DIAGNOSIS  Diagnosis: Dyspnea  Assessment and Plan of Treatment: You had an angiogram which showed triple vessel disease and was transferred to [facility] for further surgical evaluation.      SECONDARY DISCHARGE DIAGNOSES  Diagnosis: Essential hypertension  Assessment and Plan of Treatment: Continue current blood pressure medication regimen as directed. Monitor for any visual changes, headaches or dizziness.  Monitor blood pressure regularly.  Follow up with your PCP for further management for high blood pressure, please call to make appointment within 1 week of discharge    Diagnosis: Uncontrolled type 2 diabetes mellitus with hyperglycemia, with long-term current use of insulin  Assessment and Plan of Treatment: Continue consistent carbohydrate diet.  Monitor blood glucose levels throughout the day before meals and at bedtime.  Record blood sugars and bring to outpatient providers appointment in order to be reviewed by your doctor for management modifications.  Be aware of diabetes management symptoms including feeling cool and clammy may be related to low glucose levels.  Feeling hot and dry may indicate high glucose levels.  If  you feel these symptoms, check your blood sugar.  Make regular podiatry appointments in order to have feet checked for wounds and toe nails cut by a doctor to prevent infections.      Diagnosis: S/P coronary angiogram  Assessment and Plan of Treatment: No heavy lifting greater than 5-10 pounds with right wrist x one week. No strenuous activity x 3 weeks. Monitor site of procedure and notify your doctor for any redness, swelling, discharge!

## 2024-04-29 NOTE — CHART NOTE - NSCHARTNOTEFT_GEN_A_CORE
ACP NIGHT MEDICINE COVERAGE    Pt is s/p cath w/ right radial access.   Patient denies pain, numbness, tingling, chest pain, or shortness of breath.     Vital Signs Last 24 Hrs  T(C): 36.7 (29 Apr 2024 05:25), Max: 37 (28 Apr 2024 20:08)  T(F): 98 (29 Apr 2024 05:25), Max: 98.6 (28 Apr 2024 20:08)  HR: 58 (29 Apr 2024 09:10) (58 - 74)  BP: 127/56 (29 Apr 2024 09:10) (127/56 - 176/65)  RR: 17 (29 Apr 2024 09:10) (17 - 19)  SpO2: 98% (29 Apr 2024 09:10) (98% - 99%)  Parameters below as of 29 Apr 2024 09:10  Patient On (Oxygen Delivery Method): room air  VSS.     Dressing is clear/dry/intact.   Site is without hematoma or bleeding.  Pulses palpable, cap refill <3 sec.  Strength, sensation intact in UE b/l.  Will continue to monitor.    Pt pending transfer to [facility].    Cornelius Pete PA-C  Department of Hospital Medicine - Night ACP  In-House Pager: #[phone]

## 2024-04-29 NOTE — DISCHARGE NOTE PROVIDER - HOSPITAL COURSE
68 year-old male, with past history significant for Type-2 DM, HLD, HTN and Hyperkalemia, presented to the ED secondary to L-sided chest pain, shortness of breath.  Patient was admitted to telemetry service for further evaluation.  Patient had positive stress and subsequently underwent coronary angiography on 4/29/2024  via RRA--->M 70%, pLAD 70%, mLCx 70%, mRCA 90%, LVEDP 17, RRA accessed.  Patient transferred to [facility] for CABG eval d/t triple vessel disease.    On 4/29/2024 patient medically cleared for transfer to [facility] by Dr. Brewer.

## 2024-04-29 NOTE — H&P ADULT - ASSESSMENT
68 year-old male, with past history significant for Type-2 DM, HLD, HTN and Hyperkalemia, presented to the ED secondary to L-sided chest pain, shortness of breath.  Patient was admitted to telemetry service for further evaluation.  Patient had positive stress and subsequently underwent coronary angiography on 4/29/2024  via RRA--->M 70%, pLAD 70%, mLCx 70%, mRCA 90%, LVEDP 17, RRA accessed.  Patient transferred to [facility] for CABG eval d/t triple vessel disease.    On 4/29/2024 patient medically cleared for transfer to [facility] by Dr. Brewer.   68 year-old male, with past history significant for Type-2 DM, HLD, HTN and Hyperkalemia, presented to the ED secondary to L-sided chest pain, shortness of breath.  Patient was admitted to telemetry service for further evaluation.  Patient had positive stress and subsequently underwent coronary angiography on 4/29/2024  via RRA--->M 70%, pLAD 70%, mLCx 70%, mRCA 90%, LVEDP 17, RRA accessed.  Patient transferred to [facility] for CABG eval d/t triple vessel disease.

## 2024-04-29 NOTE — DISCHARGE NOTE PROVIDER - NSDCMRMEDTOKEN_GEN_ALL_CORE_FT
aspirin 81 mg oral delayed release tablet: 1 tab(s) orally once a day  atorvastatin 40 mg oral tablet: 1 tab(s) orally once a day (at bedtime)  insulin glargine 100 units/mL subcutaneous solution: 50 unit(s) subcutaneous once a day (at bedtime)  insulin lispro 100 units/mL injectable solution: 15 unit(s) injectable once a day (in the morning) BEFORE BREAKFAST  insulin lispro 100 units/mL injectable solution: 1 unit(s) injectable 3 times a day (before meals) 1 Unit(s) if Glucose 151 - 200  2 Unit(s) if Glucose 201 - 250  3 Unit(s) if Glucose 251 - 300  4 Unit(s) if Glucose 301 - 350  5 Unit(s) if Glucose 351 - 400  6 Unit(s) if Glucose Greater Than 400  insulin lispro 100 units/mL injectable solution: 15 unit(s) injectable once a day before dinner  lisinopril 20 mg oral tablet: 1 tab(s) orally once a day  metoprolol tartrate 25 mg oral tablet: 1 tab(s) orally every 12 hours

## 2024-04-29 NOTE — DISCHARGE NOTE PROVIDER - NSDCFUSCHEDAPPT_GEN_ALL_CORE_FT
Velma Jimenez  [facility]  CTS[facility]  Scheduled Appointment: 05/01/2024

## 2024-04-29 NOTE — PATIENT PROFILE ADULT - FALL HARM RISK - UNIVERSAL INTERVENTIONS
Bed in lowest position, wheels locked, appropriate side rails in place/Call bell, personal items and telephone in reach/Instruct patient to call for assistance before getting out of bed or chair/Non-slip footwear when patient is out of bed/[place] to call system/Physically safe environment - no spills, clutter or unnecessary equipment/Purposeful Proactive Rounding/Room/bathroom lighting operational, light cord in reach

## 2024-04-29 NOTE — H&P ADULT - NSHPPHYSICALEXAM_GEN_ALL_CORE
General: WN/WD NAD  Neurology: A&Ox3, nonfocal, APONTE x 4  Head:  Normocephalic, atraumatic  ENT:  Mucosa moist, no ulcerations  Neck:  Supple, no sinuses or palpable masses  Lymphatic:  No palpable cervical, supraclavicular, axillary or inguinal adenopathy  Respiratory: CTA B/L  CV: RRR, S1S2, no murmur  Abdominal: Soft, NT, ND no palpable mass  MSK: No edema, + peripheral pulses, FROM all 4 extremity  Skin: RRA CDI    Vasc access peripheral IV

## 2024-04-30 DIAGNOSIS — I10 ESSENTIAL (PRIMARY) HYPERTENSION: ICD-10-CM

## 2024-04-30 LAB
ANION GAP SERPL CALC-SCNC: 9 MMOL/L — SIGNIFICANT CHANGE UP (ref 5–17)
APPEARANCE UR: CLEAR — SIGNIFICANT CHANGE UP
APTT BLD: 24.2 SEC — LOW (ref 24.5–35.6)
APTT BLD: 55.3 SEC — HIGH (ref 24.5–35.6)
APTT BLD: 92 SEC — HIGH (ref 24.5–35.6)
BILIRUB UR-MCNC: NEGATIVE — SIGNIFICANT CHANGE UP
BLD GP AB SCN SERPL QL: NEGATIVE — SIGNIFICANT CHANGE UP
BUN SERPL-MCNC: 17 MG/DL — SIGNIFICANT CHANGE UP (ref 7–23)
CALCIUM SERPL-MCNC: 9.8 MG/DL — SIGNIFICANT CHANGE UP (ref 8.4–10.5)
CHLORIDE SERPL-SCNC: 107 MMOL/L — SIGNIFICANT CHANGE UP (ref 96–108)
CO2 SERPL-SCNC: 26 MMOL/L — SIGNIFICANT CHANGE UP (ref 22–31)
COLOR SPEC: YELLOW — SIGNIFICANT CHANGE UP
CREAT SERPL-MCNC: 0.87 MG/DL — SIGNIFICANT CHANGE UP (ref 0.5–1.3)
DIFF PNL FLD: NEGATIVE — SIGNIFICANT CHANGE UP
EGFR: 95 ML/MIN/1.73M2 — SIGNIFICANT CHANGE UP
FIBRINOGEN PPP-MCNC: 470 MG/DL — HIGH (ref 200–445)
GLUCOSE BLDC GLUCOMTR-MCNC: 107 MG/DL — HIGH (ref 70–99)
GLUCOSE BLDC GLUCOMTR-MCNC: 134 MG/DL — HIGH (ref 70–99)
GLUCOSE BLDC GLUCOMTR-MCNC: 156 MG/DL — HIGH (ref 70–99)
GLUCOSE BLDC GLUCOMTR-MCNC: 73 MG/DL — SIGNIFICANT CHANGE UP (ref 70–99)
GLUCOSE SERPL-MCNC: 108 MG/DL — HIGH (ref 70–99)
GLUCOSE UR QL: 250 MG/DL
HCT VFR BLD CALC: 38.1 % — LOW (ref 39–50)
HGB BLD-MCNC: 12.1 G/DL — LOW (ref 13–17)
INR BLD: 0.94 RATIO — SIGNIFICANT CHANGE UP (ref 0.85–1.18)
KETONES UR-MCNC: NEGATIVE MG/DL — SIGNIFICANT CHANGE UP
LEUKOCYTE ESTERASE UR-ACNC: NEGATIVE — SIGNIFICANT CHANGE UP
MCHC RBC-ENTMCNC: 26.4 PG — LOW (ref 27–34)
MCHC RBC-ENTMCNC: 31.8 GM/DL — LOW (ref 32–36)
MCV RBC AUTO: 83 FL — SIGNIFICANT CHANGE UP (ref 80–100)
MRSA PCR RESULT.: SIGNIFICANT CHANGE UP
NITRITE UR-MCNC: NEGATIVE — SIGNIFICANT CHANGE UP
NRBC # BLD: 0 /100 WBCS — SIGNIFICANT CHANGE UP (ref 0–0)
PA ADP PRP-ACNC: 175 PRU — LOW (ref 194–417)
PH UR: 5.5 — SIGNIFICANT CHANGE UP (ref 5–8)
PLATELET # BLD AUTO: 212 K/UL — SIGNIFICANT CHANGE UP (ref 150–400)
POTASSIUM SERPL-MCNC: 3.6 MMOL/L — SIGNIFICANT CHANGE UP (ref 3.5–5.3)
POTASSIUM SERPL-SCNC: 3.6 MMOL/L — SIGNIFICANT CHANGE UP (ref 3.5–5.3)
PREALB SERPL-MCNC: 24 MG/DL — SIGNIFICANT CHANGE UP (ref 20–40)
PROT UR-MCNC: NEGATIVE MG/DL — SIGNIFICANT CHANGE UP
PROTHROM AB SERPL-ACNC: 10.4 SEC — SIGNIFICANT CHANGE UP (ref 9.5–13)
RBC # BLD: 4.59 M/UL — SIGNIFICANT CHANGE UP (ref 4.2–5.8)
RBC # FLD: 13.1 % — SIGNIFICANT CHANGE UP (ref 10.3–14.5)
RH IG SCN BLD-IMP: POSITIVE — SIGNIFICANT CHANGE UP
S AUREUS DNA NOSE QL NAA+PROBE: SIGNIFICANT CHANGE UP
SODIUM SERPL-SCNC: 142 MMOL/L — SIGNIFICANT CHANGE UP (ref 135–145)
SP GR SPEC: 1.02 — SIGNIFICANT CHANGE UP (ref 1–1.03)
UROBILINOGEN FLD QL: 1 MG/DL — SIGNIFICANT CHANGE UP (ref 0.2–1)
WBC # BLD: 7.74 K/UL — SIGNIFICANT CHANGE UP (ref 3.8–10.5)
WBC # FLD AUTO: 7.74 K/UL — SIGNIFICANT CHANGE UP (ref 3.8–10.5)

## 2024-04-30 PROCEDURE — 71045 X-RAY EXAM CHEST 1 VIEW: CPT | Mod: 26

## 2024-04-30 PROCEDURE — 93010 ELECTROCARDIOGRAM REPORT: CPT

## 2024-04-30 PROCEDURE — 94010 BREATHING CAPACITY TEST: CPT | Mod: 26

## 2024-04-30 PROCEDURE — 93880 EXTRACRANIAL BILAT STUDY: CPT | Mod: 26

## 2024-04-30 RX ORDER — HEPARIN SODIUM 5000 [USP'U]/ML
1300 INJECTION INTRAVENOUS; SUBCUTANEOUS
Qty: 25000 | Refills: 0 | Status: DISCONTINUED | OUTPATIENT
Start: 2024-04-30 | End: 2024-05-03

## 2024-04-30 RX ORDER — ACETAMINOPHEN 500 MG
1000 TABLET ORAL ONCE
Refills: 0 | Status: COMPLETED | OUTPATIENT
Start: 2024-05-01 | End: 2024-05-03

## 2024-04-30 RX ORDER — CEFUROXIME AXETIL 250 MG
1500 TABLET ORAL ONCE
Refills: 0 | Status: DISCONTINUED | OUTPATIENT
Start: 2024-05-01 | End: 2024-05-03

## 2024-04-30 RX ORDER — CHLORHEXIDINE GLUCONATE 213 G/1000ML
5 SOLUTION TOPICAL ONCE
Refills: 0 | Status: COMPLETED | OUTPATIENT
Start: 2024-04-30 | End: 2024-05-03

## 2024-04-30 RX ORDER — INSULIN GLARGINE 100 [IU]/ML
30 INJECTION, SOLUTION SUBCUTANEOUS AT BEDTIME
Refills: 0 | Status: DISCONTINUED | OUTPATIENT
Start: 2024-04-30 | End: 2024-05-03

## 2024-04-30 RX ORDER — AMLODIPINE BESYLATE 2.5 MG/1
5 TABLET ORAL DAILY
Refills: 0 | Status: DISCONTINUED | OUTPATIENT
Start: 2024-04-30 | End: 2024-05-02

## 2024-04-30 RX ORDER — INSULIN GLARGINE 100 [IU]/ML
40 INJECTION, SOLUTION SUBCUTANEOUS AT BEDTIME
Refills: 0 | Status: DISCONTINUED | OUTPATIENT
Start: 2024-04-30 | End: 2024-04-30

## 2024-04-30 RX ORDER — CHLORHEXIDINE GLUCONATE 213 G/1000ML
1 SOLUTION TOPICAL ONCE
Refills: 0 | Status: COMPLETED | OUTPATIENT
Start: 2024-04-30 | End: 2024-04-30

## 2024-04-30 RX ORDER — GABAPENTIN 400 MG/1
300 CAPSULE ORAL ONCE
Refills: 0 | Status: COMPLETED | OUTPATIENT
Start: 2024-05-01 | End: 2024-05-03

## 2024-04-30 RX ORDER — ASCORBIC ACID 60 MG
2000 TABLET,CHEWABLE ORAL AT BEDTIME
Refills: 0 | Status: DISCONTINUED | OUTPATIENT
Start: 2024-04-30 | End: 2024-05-03

## 2024-04-30 RX ORDER — INSULIN LISPRO 100/ML
12 VIAL (ML) SUBCUTANEOUS
Refills: 0 | Status: DISCONTINUED | OUTPATIENT
Start: 2024-04-30 | End: 2024-05-01

## 2024-04-30 RX ADMIN — ATORVASTATIN CALCIUM 40 MILLIGRAM(S): 80 TABLET, FILM COATED ORAL at 22:58

## 2024-04-30 RX ADMIN — Medication 15 UNIT(S): at 12:04

## 2024-04-30 RX ADMIN — Medication 25 MILLIGRAM(S): at 05:06

## 2024-04-30 RX ADMIN — Medication 1: at 08:21

## 2024-04-30 RX ADMIN — HEPARIN SODIUM 13 UNIT(S)/HR: 5000 INJECTION INTRAVENOUS; SUBCUTANEOUS at 09:39

## 2024-04-30 RX ADMIN — INSULIN GLARGINE 30 UNIT(S): 100 INJECTION, SOLUTION SUBCUTANEOUS at 22:57

## 2024-04-30 RX ADMIN — Medication 25 MILLIGRAM(S): at 17:38

## 2024-04-30 RX ADMIN — SODIUM CHLORIDE 3 MILLILITER(S): 9 INJECTION INTRAMUSCULAR; INTRAVENOUS; SUBCUTANEOUS at 20:26

## 2024-04-30 RX ADMIN — Medication 12 UNIT(S): at 16:56

## 2024-04-30 RX ADMIN — PANTOPRAZOLE SODIUM 40 MILLIGRAM(S): 20 TABLET, DELAYED RELEASE ORAL at 05:06

## 2024-04-30 RX ADMIN — Medication 81 MILLIGRAM(S): at 12:27

## 2024-04-30 RX ADMIN — SODIUM CHLORIDE 3 MILLILITER(S): 9 INJECTION INTRAMUSCULAR; INTRAVENOUS; SUBCUTANEOUS at 14:48

## 2024-04-30 RX ADMIN — AMLODIPINE BESYLATE 5 MILLIGRAM(S): 2.5 TABLET ORAL at 05:06

## 2024-04-30 RX ADMIN — CHLORHEXIDINE GLUCONATE 1 APPLICATION(S): 213 SOLUTION TOPICAL at 21:00

## 2024-04-30 RX ADMIN — SODIUM CHLORIDE 3 MILLILITER(S): 9 INJECTION INTRAMUSCULAR; INTRAVENOUS; SUBCUTANEOUS at 05:04

## 2024-04-30 RX ADMIN — Medication 15 UNIT(S): at 08:21

## 2024-04-30 RX ADMIN — HEPARIN SODIUM 13 UNIT(S)/HR: 5000 INJECTION INTRAVENOUS; SUBCUTANEOUS at 17:41

## 2024-04-30 NOTE — CONSULT NOTE ADULT - TIME BILLING
pt seen and examined  pt s/p cath revealing 3V CAD + LM  transferred to [place] for CABG  preop workup per CTS

## 2024-04-30 NOTE — CONSULT NOTE ADULT - PROBLEM SELECTOR RECOMMENDATION 9
Will decrease Lantus to 40 units at bed time.  Will decrease Admelog to 12 units before each meal in addition to Admelog correction scale coverage.  Will continue monitoring FS, log, and glucose trends, will Follow up.  Patient counseled for compliance with consistent low carb diet and exercise as tolerated outpatient.

## 2024-04-30 NOTE — PROGRESS NOTE ADULT - SUBJECTIVE AND OBJECTIVE BOX
VITAL SIGNS    Telemetry:    sr 80    Vital Signs Last 24 Hrs  T(C): 36.9 (24 @ 04:46), Max: 36.9 (24 @ 04:46)  T(F): 98.4 (24 @ 04:46), Max: 98.4 (24 @ 04:46)  HR: 56 (24 @ 04:46) (56 - 64)  BP: 144/71 (24 @ 04:46) (127/56 - 180/79)  RR: 18 (24 @ 04:46) (17 - 18)  SpO2: 98% (24 @ 04:46) (98% - 98%)                   Daily     Daily Weight in k.6 (2024 06:32)      Bilirubin Total: 0.3 mg/dL ( @ 22:52)    CAPILLARY BLOOD GLUCOSE      POCT Blood Glucose.: 156 mg/dL (2024 07:34)  POCT Blood Glucose.: 285 mg/dL (2024 23:21)  POCT Blood Glucose.: 154 mg/dL (2024 18:22)  POCT Blood Glucose.: 191 mg/dL (2024 12:21)  POCT Blood Glucose.: 156 mg/dL (2024 09:40)                          PHYSICAL EXAM    Neurology: alert and oriented x 3, moves all extremities with no defecits  CV :  RRR  Lungs:   CTA B/L  Abdomen: soft, nontender, nondistended, positive bowel sounds, last bowel movement   Extremities:
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
## 2024-04-30 NOTE — PHYSICAL THERAPY INITIAL EVALUATION ADULT - PERTINENT HX OF CURRENT PROBLEM, REHAB EVAL
65 y/o M w/ the below PMH 4/27/24 for complaints of leg swelling, inability to lie flat and shortness of breath. Assessment at [facility] found patient to decompensated systolic CHF and resulting PAWAN with hyperkalemia, hepatic congestion, and found to have mitral and aortic valulopathy. Patient stabilized and transferred to [facility] for continued management. Abdominal U/S:  Cirrhosis with moderate ascites suggesting portal hypertension.

## 2024-04-30 NOTE — PROGRESS NOTE ADULT - ASSESSMENT
68 year-old male, with past history significant for Type-2 DM, HLD, HTN and Hyperkalemia, presented to the ED secondary to L-sided chest pain, shortness of breath.  Patient was admitted to telemetry service for further evaluation.  Patient had positive stress and subsequently underwent coronary angiography on 4/29/2024  via RRA--->M 70%, pLAD 70%, mLCx 70%, mRCA 90%, LVEDP 17, RRA accessed.  Patient transferred to [facility] for CABG eval d/t triple vessel disease.
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
## 2024-04-30 NOTE — PROGRESS NOTE ADULT - ASSESSMENT
Assessment and recommendation :   chest pain coronary artery Disease   S/P cardiac catheterization triple vessel disease   DM continue glycemic control   HTN continue BP control   no evidence of thromboembolic disease   to be transfer to  hospital for CABG   DVT prophylaxis   time spend > 40 minutes more than 50% counselling the patient and coordinating care with other consultant
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
## 2024-04-30 NOTE — CONSULT NOTE ADULT - ASSESSMENT
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    #CAD  -Sp cath with significant left main plus TVD   -Pre-op w/u for CABG as per ctsx  -Plan for CABG tentatively tomorrow w/ Dr. Jimenez  -Cont asa, statin, bb    #HTN  -Continue amlodipine, bb      dvt ppx    d/w pt and wife at bedside 
Assessment and recommendation :   chest pain coronary artery Disease   S/P cardiac catheterization triple vessel disease   DM continue glycemic control   HTN continue BP control   no evidence of thromboembolic disease   to be transfer to  hospital for CABG   DVT prophylaxis   time spend > 70 minutes more than 50% counselling the patient and coordinating care with other consultant   
Patient is a 68y old  Male transferred to [facility] for CABG eval    Triple Vessel Disease:    For CABG  Cardio/CTS eval appreciated  IV Heparin    DM II:    FSSS  Cw Lantus    HTN:    Cw Amlodipine/Metoprolol    
68 year-old male, with past history significant for Type-2 DM, HLD, HTN and Hyperkalemia, presented to the ED secondary to L-sided chest pain, shortness of breath.  Transferred to [facility] for CABG eval   Assessment  DMT2: 68y Male with DM T2 with hyperglycemia, A1C 8.9% , was on insulin at home, now on basal bolus insulin with coverage, blood sugars running high. Preop for CTS  CAD: on medications, stable, monitored. preop CABG  HTN: on antihypertensive medications, monitored, asymptomatic.      Discussed plan and management wit Dr Gaston Feldman MD  Cell: [phone]  Office: [phone]

## 2024-05-01 LAB
APTT BLD: 179.9 SEC — CRITICAL HIGH (ref 24.5–35.6)
APTT BLD: >200 SEC — CRITICAL HIGH (ref 24.5–35.6)
GLUCOSE BLDC GLUCOMTR-MCNC: 118 MG/DL — HIGH (ref 70–99)
GLUCOSE BLDC GLUCOMTR-MCNC: 146 MG/DL — HIGH (ref 70–99)
GLUCOSE BLDC GLUCOMTR-MCNC: 153 MG/DL — HIGH (ref 70–99)
GLUCOSE BLDC GLUCOMTR-MCNC: 184 MG/DL — HIGH (ref 70–99)
HCT VFR BLD CALC: 37.4 % — LOW (ref 39–50)
HGB BLD-MCNC: 12.1 G/DL — LOW (ref 13–17)
MCHC RBC-ENTMCNC: 26.3 PG — LOW (ref 27–34)
MCHC RBC-ENTMCNC: 32.4 GM/DL — SIGNIFICANT CHANGE UP (ref 32–36)
MCV RBC AUTO: 81.3 FL — SIGNIFICANT CHANGE UP (ref 80–100)
NRBC # BLD: 0 /100 WBCS — SIGNIFICANT CHANGE UP (ref 0–0)
PLATELET # BLD AUTO: 240 K/UL — SIGNIFICANT CHANGE UP (ref 150–400)
RBC # BLD: 4.6 M/UL — SIGNIFICANT CHANGE UP (ref 4.2–5.8)
RBC # FLD: 13.2 % — SIGNIFICANT CHANGE UP (ref 10.3–14.5)
WBC # BLD: 9.37 K/UL — SIGNIFICANT CHANGE UP (ref 3.8–10.5)
WBC # FLD AUTO: 9.37 K/UL — SIGNIFICANT CHANGE UP (ref 3.8–10.5)

## 2024-05-01 PROCEDURE — 99231 SBSQ HOSP IP/OBS SF/LOW 25: CPT

## 2024-05-01 RX ORDER — INSULIN LISPRO 100/ML
10 VIAL (ML) SUBCUTANEOUS
Refills: 0 | Status: DISCONTINUED | OUTPATIENT
Start: 2024-05-01 | End: 2024-05-03

## 2024-05-01 RX ADMIN — Medication 1: at 11:34

## 2024-05-01 RX ADMIN — SODIUM CHLORIDE 3 MILLILITER(S): 9 INJECTION INTRAMUSCULAR; INTRAVENOUS; SUBCUTANEOUS at 06:15

## 2024-05-01 RX ADMIN — Medication 10 UNIT(S): at 16:47

## 2024-05-01 RX ADMIN — Medication 12 UNIT(S): at 07:39

## 2024-05-01 RX ADMIN — PANTOPRAZOLE SODIUM 40 MILLIGRAM(S): 20 TABLET, DELAYED RELEASE ORAL at 06:00

## 2024-05-01 RX ADMIN — INSULIN GLARGINE 30 UNIT(S): 100 INJECTION, SOLUTION SUBCUTANEOUS at 22:24

## 2024-05-01 RX ADMIN — Medication 25 MILLIGRAM(S): at 06:00

## 2024-05-01 RX ADMIN — HEPARIN SODIUM 13 UNIT(S)/HR: 5000 INJECTION INTRAVENOUS; SUBCUTANEOUS at 09:59

## 2024-05-01 RX ADMIN — SODIUM CHLORIDE 3 MILLILITER(S): 9 INJECTION INTRAMUSCULAR; INTRAVENOUS; SUBCUTANEOUS at 13:25

## 2024-05-01 RX ADMIN — Medication 10 UNIT(S): at 11:34

## 2024-05-01 RX ADMIN — Medication 25 MILLIGRAM(S): at 17:44

## 2024-05-01 RX ADMIN — ATORVASTATIN CALCIUM 40 MILLIGRAM(S): 80 TABLET, FILM COATED ORAL at 21:41

## 2024-05-01 RX ADMIN — Medication 81 MILLIGRAM(S): at 11:26

## 2024-05-01 RX ADMIN — HEPARIN SODIUM 8 UNIT(S)/HR: 5000 INJECTION INTRAVENOUS; SUBCUTANEOUS at 18:04

## 2024-05-01 RX ADMIN — AMLODIPINE BESYLATE 5 MILLIGRAM(S): 2.5 TABLET ORAL at 06:00

## 2024-05-01 RX ADMIN — SODIUM CHLORIDE 3 MILLILITER(S): 9 INJECTION INTRAMUSCULAR; INTRAVENOUS; SUBCUTANEOUS at 22:00
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
## 2024-05-01 NOTE — PROGRESS NOTE ADULT - SUBJECTIVE AND OBJECTIVE BOX
Patient is a 68y old  Male who presents with a chief complaint of cardiac surgery (01 May 2024 14:20)      SUBJECTIVE / OVERNIGHT EVENTS:    Events noted.  CONSTITUTIONAL: No fever,  or fatigue  RESPIRATORY: No cough, wheezing,  No shortness of breath  CARDIOVASCULAR: No chest pain, palpitations, dizziness, or leg swelling  GASTROINTESTINAL: No abdominal or epigastric pain. No nausea, vomiting.  NEUROLOGICAL: No headache    MEDICATIONS  (STANDING):  acetaminophen     Tablet .. 1000 milliGRAM(s) Oral once  amLODIPine   Tablet 5 milliGRAM(s) Oral daily  ascorbic acid 2000 milliGRAM(s) Oral at bedtime  aspirin enteric coated 81 milliGRAM(s) Oral daily  atorvastatin 40 milliGRAM(s) Oral at bedtime  cefuroxime  IVPB 1500 milliGRAM(s) IV Intermittent once  chlorhexidine 0.12% Liquid 5 milliLiter(s) Swish and Spit once  dextrose 10% Bolus 125 milliLiter(s) IV Bolus once  dextrose 5%. 1000 milliLiter(s) (100 mL/Hr) IV Continuous <Continuous>  dextrose 5%. 1000 milliLiter(s) (50 mL/Hr) IV Continuous <Continuous>  dextrose 50% Injectable 12.5 Gram(s) IV Push once  dextrose 50% Injectable 25 Gram(s) IV Push once  gabapentin 300 milliGRAM(s) Oral once  glucagon  Injectable 1 milliGRAM(s) IntraMuscular once  heparin  Infusion 1300 Unit(s)/Hr (8 mL/Hr) IV Continuous <Continuous>  insulin glargine Injectable (LANTUS) 30 Unit(s) SubCutaneous at bedtime  insulin lispro (ADMELOG) corrective regimen sliding scale   SubCutaneous at bedtime  insulin lispro (ADMELOG) corrective regimen sliding scale   SubCutaneous at bedtime  insulin lispro (ADMELOG) corrective regimen sliding scale   SubCutaneous three times a day before meals  insulin lispro Injectable (ADMELOG) 10 Unit(s) SubCutaneous three times a day before meals  metoprolol tartrate 25 milliGRAM(s) Oral every 12 hours  pantoprazole    Tablet 40 milliGRAM(s) Oral before breakfast  sodium chloride 0.9% lock flush 3 milliLiter(s) IV Push every 8 hours    MEDICATIONS  (PRN):  dextrose Oral Gel 15 Gram(s) Oral once PRN Blood Glucose LESS THAN 70 milliGRAM(s)/deciliter        CAPILLARY BLOOD GLUCOSE      POCT Blood Glucose.: 184 mg/dL (01 May 2024 21:47)  POCT Blood Glucose.: 118 mg/dL (01 May 2024 16:40)  POCT Blood Glucose.: 153 mg/dL (01 May 2024 11:30)  POCT Blood Glucose.: 146 mg/dL (01 May 2024 07:24)    I&O's Summary    30 Apr 2024 07:01  -  01 May 2024 07:00  --------------------------------------------------------  IN: 1364 mL / OUT: 3600 mL / NET: -2236 mL    01 May 2024 07:01  -  01 May 2024 22:51  --------------------------------------------------------  IN: 576 mL / OUT: 0 mL / NET: 576 mL        T(C): 37.3 (05-01-24 @ 20:27), Max: 37.3 (05-01-24 @ 20:27)  HR: 57 (05-01-24 @ 20:27) (57 - 99)  BP: 163/72 (05-01-24 @ 20:27) (159/74 - 167/83)  RR: 18 (05-01-24 @ 20:27) (18 - 18)  SpO2: 100% (05-01-24 @ 20:27) (97% - 100%)    PHYSICAL EXAM:  GENERAL: NAD  NECK: Supple, No JVD  CHEST/LUNG: Clear to auscultation bilaterally; No wheezing.  HEART: Regular rate and rhythm; No murmurs, rubs, or gallops  ABDOMEN: Soft, Nontender, Nondistended; Bowel sounds present  EXTREMITIES:   No edema  NEUROLOGY: AAO X 3      LABS:                        12.1   9.37  )-----------( 240      ( 01 May 2024 07:02 )             37.4     04-30    142  |  107  |  17  ----------------------------<  108<H>  3.6   |  26  |  0.87    Ca    9.8      30 Apr 2024 10:25      PTT - ( 01 May 2024 15:47 )  PTT:179.9 sec      Urinalysis Basic - ( 30 Apr 2024 10:25 )    Color: x / Appearance: x / SG: x / pH: x  Gluc: 108 mg/dL / Ketone: x  / Bili: x / Urobili: x   Blood: x / Protein: x / Nitrite: x   Leuk Esterase: x / RBC: x / WBC x   Sq Epi: x / Non Sq Epi: x / Bacteria: x      CAPILLARY BLOOD GLUCOSE      POCT Blood Glucose.: 184 mg/dL (01 May 2024 21:47)  POCT Blood Glucose.: 118 mg/dL (01 May 2024 16:40)  POCT Blood Glucose.: 153 mg/dL (01 May 2024 11:30)  POCT Blood Glucose.: 146 mg/dL (01 May 2024 07:24)        RADIOLOGY & ADDITIONAL TESTS:    Imaging Personally Reviewed:    Consultant(s) Notes Reviewed:      Care Discussed with Consultants/Other Providers:    Rj Robison MD, CMD, FACP    [address] [place] [address]  Office Tel: [phone]  Cell: [phone]

## 2024-05-01 NOTE — PROGRESS NOTE ADULT - SUBJECTIVE AND OBJECTIVE BOX
CARDIOLOGY FOLLOW UP - Dr. Brewer  DATE OF SERVICE: 5/1/24    CC  No cv complaints     REVIEW OF SYSTEMS:  CONSTITUTIONAL: No fever, weight loss, or fatigue  RESPIRATORY: No cough, wheezing, chills or hemoptysis; No Shortness of Breath  CARDIOVASCULAR: No chest pain, palpitations, passing out, dizziness, or leg swelling  GASTROINTESTINAL: No abdominal or epigastric pain. No nausea, vomiting, or hematemesis; No diarrhea or constipation. No melena or hematochezia.  VASCULAR: No edema     PHYSICAL EXAM:  T(C): 37 (05-01-24 @ 05:40), Max: 37 (05-01-24 @ 05:40)  HR: 57 (05-01-24 @ 07:50) (57 - 73)  BP: 159/74 (05-01-24 @ 05:40) (146/62 - 191/79)  RR: 18 (05-01-24 @ 05:40) (18 - 18)  SpO2: 97% (05-01-24 @ 05:40) (97% - 98%)  Wt(kg): --  I&O's Summary    30 Apr 2024 07:01  -  01 May 2024 07:00  --------------------------------------------------------  IN: 1364 mL / OUT: 3600 mL / NET: -2236 mL        Appearance: Normal	  Cardiovascular: Normal S1 S2,RRR, No JVD, No murmurs  Respiratory: Lungs clear to auscultation b/l   Gastrointestinal:  Soft, Non-tender, + BS	  Extremities: Normal range of motion, No clubbing, cyanosis or edema      Home Medications:  aspirin 81 mg oral delayed release tablet: 1 tab(s) orally once a day (29 Apr 2024 18:21)  atorvastatin 40 mg oral tablet: 1 tab(s) orally once a day (at bedtime) (29 Apr 2024 18:21)  insulin glargine 100 units/mL subcutaneous solution: 50 unit(s) subcutaneous once a day (at bedtime) (29 Apr 2024 18:21)  insulin lispro 100 units/mL injectable solution: 15 unit(s) injectable once a day (in the morning) BEFORE BREAKFAST (29 Apr 2024 18:21)  insulin lispro 100 units/mL injectable solution: 1 unit(s) injectable 3 times a day (before meals) 1 Unit(s) if Glucose 151 - 200  2 Unit(s) if Glucose 201 - 250  3 Unit(s) if Glucose 251 - 300  4 Unit(s) if Glucose 301 - 350  5 Unit(s) if Glucose 351 - 400  6 Unit(s) if Glucose Greater Than 400 (29 Apr 2024 18:21)  insulin lispro 100 units/mL injectable solution: 15 unit(s) injectable once a day before dinner (29 Apr 2024 18:21)  lisinopril 20 mg oral tablet: 1 tab(s) orally once a day (29 Apr 2024 02:23)  metoprolol tartrate 25 mg oral tablet: 1 tab(s) orally every 12 hours (29 Apr 2024 18:21)      MEDICATIONS  (STANDING):  acetaminophen     Tablet .. 1000 milliGRAM(s) Oral once  amLODIPine   Tablet 5 milliGRAM(s) Oral daily  ascorbic acid 2000 milliGRAM(s) Oral at bedtime  aspirin enteric coated 81 milliGRAM(s) Oral daily  atorvastatin 40 milliGRAM(s) Oral at bedtime  cefuroxime  IVPB 1500 milliGRAM(s) IV Intermittent once  chlorhexidine 0.12% Liquid 5 milliLiter(s) Swish and Spit once  dextrose 10% Bolus 125 milliLiter(s) IV Bolus once  dextrose 5%. 1000 milliLiter(s) (50 mL/Hr) IV Continuous <Continuous>  dextrose 5%. 1000 milliLiter(s) (100 mL/Hr) IV Continuous <Continuous>  dextrose 50% Injectable 12.5 Gram(s) IV Push once  dextrose 50% Injectable 25 Gram(s) IV Push once  gabapentin 300 milliGRAM(s) Oral once  glucagon  Injectable 1 milliGRAM(s) IntraMuscular once  heparin  Infusion 1300 Unit(s)/Hr (13 mL/Hr) IV Continuous <Continuous>  insulin glargine Injectable (LANTUS) 30 Unit(s) SubCutaneous at bedtime  insulin lispro (ADMELOG) corrective regimen sliding scale   SubCutaneous at bedtime  insulin lispro (ADMELOG) corrective regimen sliding scale   SubCutaneous at bedtime  insulin lispro (ADMELOG) corrective regimen sliding scale   SubCutaneous three times a day before meals  insulin lispro Injectable (ADMELOG) 10 Unit(s) SubCutaneous three times a day before meals  metoprolol tartrate 25 milliGRAM(s) Oral every 12 hours  pantoprazole    Tablet 40 milliGRAM(s) Oral before breakfast  sodium chloride 0.9% lock flush 3 milliLiter(s) IV Push every 8 hours      TELEMETRY: SR	    ECG:  	  RADIOLOGY:   DIAGNOSTIC TESTING:  [ ] Echocardiogram:  [ ]  Catheterization:  [ ] Stress Test:    OTHER: 	    LABS:	 	    Troponin T, High Sensitivity Result: 58 ng/L (04-29 @ 05:30)  Creatine Kinase, Serum: 169 U/L [30 - 200] (04-29 @ 05:30)  CKMB Units: 5.1 ng/mL (04-29 @ 05:30)  Troponin T, High Sensitivity Result: 33 ng/L (04-27 @ 07:14)  Troponin T, High Sensitivity Result: 41 ng/L (04-27 @ 05:41)                          12.1   9.37  )-----------( 240      ( 01 May 2024 07:02 )             37.4     04-30    142  |  107  |  17  ----------------------------<  108<H>  3.6   |  26  |  0.87    Ca    9.8      30 Apr 2024 10:25    TPro  6.9  /  Alb  4.0  /  TBili  0.3  /  DBili  x   /  AST  21  /  ALT  19  /  AlkPhos  67  04-29    PT/INR - ( 29 Apr 2024 22:52 )   PT: 10.4 sec;   INR: 0.94 ratio         PTT - ( 30 Apr 2024 15:30 )  PTT:92.0 sec

## 2024-05-01 NOTE — PROGRESS NOTE ADULT - ASSESSMENT
68 year-old male, with past history significant for Type-2 DM, HLD, HTN and Hyperkalemia, presented to the ED secondary to L-sided chest pain, shortness of breath.  Transferred to [facility] for CABG eval   Assessment  DMT2: 68y Male with DM T2 with hyperglycemia, A1C 8.9% , was on insulin at home, now on basal bolus insulin with coverage, blood sugars running high. Preop for CTS  CAD: on medications, stable, monitored. preop CABG  HTN: on antihypertensive medications, monitored, asymptomatic.      Discussed plan and management wit Dr Gaston Feldman MD  Cell: [phone]  Office: [phone]             68 year-old male, with past history significant for Type-2 DM, HLD, HTN and Hyperkalemia, presented to the ED secondary to L-sided chest pain, shortness of breath.  Transferred to [facility] for CABG eval   Assessment  DMT2: 68y Male with DM T2 with hyperglycemia, A1C 8.9% , was on insulin at home, now on basal bolus insulin with coverage, blood sugars  running high. Preop for CTS  CAD: on medications, stable, monitored. preop CABG  HTN: on antihypertensive medications, monitored, asymptomatic.      Discussed plan and management wit Dr Gaston Feldman MD  Cell: [phone]  Office: [phone]

## 2024-05-01 NOTE — PROGRESS NOTE ADULT - ASSESSMENT
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    #CAD  -Sp cath with significant left main plus TVD   -Pre-op w/u for CABG as per ctsx  -Plan for CABG today w/ Dr. Jimenez  -Cont asa, statin, bb    #HTN  -Continue amlodipine, bb      dvt ppx       A/p  68 year-old male, with past history significant for Type-2 DM, HLD, HTN and Hyperkalemia, presented to the ED secondary to L-sided chest pain, shortness of breath, sp cath found to have TVD, now pending CABG.    #CAD  -Sp cath with significant left main plus TVD   -Pre-op w/u for CABG as per ctsx  -Plan for tentative CABG this week  -Cont asa, statin, bb    #HTN  -Continue amlodipine, bb      dvt ppx

## 2024-05-01 NOTE — PROGRESS NOTE ADULT - SUBJECTIVE AND OBJECTIVE BOX
Chief complaint  Patient is a 68y old  Male who presents with a chief complaint of cardiac surgery (01 May 2024 12:39)         Labs and Fingersticks  CAPILLARY BLOOD GLUCOSE      POCT Blood Glucose.: 153 mg/dL (01 May 2024 11:30)  POCT Blood Glucose.: 146 mg/dL (01 May 2024 07:24)  POCT Blood Glucose.: 134 mg/dL (30 Apr 2024 21:43)  POCT Blood Glucose.: 107 mg/dL (30 Apr 2024 16:42)      Anion Gap: 9 (04-30 @ 10:25)  Anion Gap: 12 (04-29 @ 22:52)      Calcium: 9.8 (04-30 @ 10:25)  Calcium: 9.1 (04-29 @ 22:52)  Albumin: 4.0 (04-29 @ 22:52)    Alanine Aminotransferase (ALT/SGPT): 19 (04-29 @ 22:52)  Alkaline Phosphatase: 67 (04-29 @ 22:52)  Aspartate Aminotransferase (AST/SGOT): 21 (04-29 @ 22:52)        04-30    142  |  107  |  17  ----------------------------<  108<H>  3.6   |  26  |  0.87    Ca    9.8      30 Apr 2024 10:25    TPro  6.9  /  Alb  4.0  /  TBili  0.3  /  DBili  x   /  AST  21  /  ALT  19  /  AlkPhos  67  04-29                        12.1   9.37  )-----------( 240      ( 01 May 2024 07:02 )             37.4     Medications  MEDICATIONS  (STANDING):  acetaminophen     Tablet .. 1000 milliGRAM(s) Oral once  amLODIPine   Tablet 5 milliGRAM(s) Oral daily  ascorbic acid 2000 milliGRAM(s) Oral at bedtime  aspirin enteric coated 81 milliGRAM(s) Oral daily  atorvastatin 40 milliGRAM(s) Oral at bedtime  cefuroxime  IVPB 1500 milliGRAM(s) IV Intermittent once  chlorhexidine 0.12% Liquid 5 milliLiter(s) Swish and Spit once  dextrose 10% Bolus 125 milliLiter(s) IV Bolus once  dextrose 5%. 1000 milliLiter(s) (100 mL/Hr) IV Continuous <Continuous>  dextrose 5%. 1000 milliLiter(s) (50 mL/Hr) IV Continuous <Continuous>  dextrose 50% Injectable 12.5 Gram(s) IV Push once  dextrose 50% Injectable 25 Gram(s) IV Push once  gabapentin 300 milliGRAM(s) Oral once  glucagon  Injectable 1 milliGRAM(s) IntraMuscular once  heparin  Infusion 1300 Unit(s)/Hr (11 mL/Hr) IV Continuous <Continuous>  insulin glargine Injectable (LANTUS) 30 Unit(s) SubCutaneous at bedtime  insulin lispro (ADMELOG) corrective regimen sliding scale   SubCutaneous three times a day before meals  insulin lispro (ADMELOG) corrective regimen sliding scale   SubCutaneous at bedtime  insulin lispro (ADMELOG) corrective regimen sliding scale   SubCutaneous at bedtime  insulin lispro Injectable (ADMELOG) 10 Unit(s) SubCutaneous three times a day before meals  metoprolol tartrate 25 milliGRAM(s) Oral every 12 hours  pantoprazole    Tablet 40 milliGRAM(s) Oral before breakfast  sodium chloride 0.9% lock flush 3 milliLiter(s) IV Push every 8 hours      Physical Exam  General: Patient comfortable in bed   Vital Signs Last 12 Hrs  T(F): 97.2 (05-01-24 @ 11:57), Max: 98.6 (05-01-24 @ 05:40)  HR: 99 (05-01-24 @ 11:57) (57 - 99)  BP: 167/83 (05-01-24 @ 11:57) (159/74 - 167/83)  BP(mean): 102 (05-01-24 @ 05:40) (102 - 102)  RR: 18 (05-01-24 @ 11:57) (18 - 18)  SpO2: 99% (05-01-24 @ 11:57) (97% - 99%)    CVS: S1S2   Respiratory: No wheezing, no crepitations  GI: Abdomen soft, bowel sounds positive  Musculoskeletal:  moves all extremities  : Voiding         Chief complaint  Patient is a 68y old  Male who presents with a chief complaint of cardiac surgery (01 May 2024 12:39)     Labs and Fingersticks  CAPILLARY BLOOD GLUCOSE      POCT Blood Glucose.: 153 mg/dL (01 May 2024 11:30)  POCT Blood Glucose.: 146 mg/dL (01 May 2024 07:24)  POCT Blood Glucose.: 134 mg/dL (30 Apr 2024 21:43)  POCT Blood Glucose.: 107 mg/dL (30 Apr 2024 16:42)      Anion Gap: 9 (04-30 @ 10:25)  Anion Gap: 12 (04-29 @ 22:52)      Calcium: 9.8 (04-30 @ 10:25)  Calcium: 9.1 (04-29 @ 22:52)  Albumin: 4.0 (04-29 @ 22:52)    Alanine Aminotransferase (ALT/SGPT): 19 (04-29 @ 22:52)  Alkaline Phosphatase: 67 (04-29 @ 22:52)  Aspartate Aminotransferase (AST/SGOT): 21 (04-29 @ 22:52)        04-30    142  |  107  |  17  ----------------------------<  108<H>  3.6   |  26  |  0.87    Ca    9.8      30 Apr 2024 10:25    TPro  6.9  /  Alb  4.0  /  TBili  0.3  /  DBili  x   /  AST  21  /  ALT  19  /  AlkPhos  67  04-29                        12.1   9.37  )-----------( 240      ( 01 May 2024 07:02 )             37.4     Medications  MEDICATIONS  (STANDING):  acetaminophen     Tablet .. 1000 milliGRAM(s) Oral once  amLODIPine   Tablet 5 milliGRAM(s) Oral daily  ascorbic acid 2000 milliGRAM(s) Oral at bedtime  aspirin enteric coated 81 milliGRAM(s) Oral daily  atorvastatin 40 milliGRAM(s) Oral at bedtime  cefuroxime  IVPB 1500 milliGRAM(s) IV Intermittent once  chlorhexidine 0.12% Liquid 5 milliLiter(s) Swish and Spit once  dextrose 10% Bolus 125 milliLiter(s) IV Bolus once  dextrose 5%. 1000 milliLiter(s) (100 mL/Hr) IV Continuous <Continuous>  dextrose 5%. 1000 milliLiter(s) (50 mL/Hr) IV Continuous <Continuous>  dextrose 50% Injectable 12.5 Gram(s) IV Push once  dextrose 50% Injectable 25 Gram(s) IV Push once  gabapentin 300 milliGRAM(s) Oral once  glucagon  Injectable 1 milliGRAM(s) IntraMuscular once  heparin  Infusion 1300 Unit(s)/Hr (11 mL/Hr) IV Continuous <Continuous>  insulin glargine Injectable (LANTUS) 30 Unit(s) SubCutaneous at bedtime  insulin lispro (ADMELOG) corrective regimen sliding scale   SubCutaneous three times a day before meals  insulin lispro (ADMELOG) corrective regimen sliding scale   SubCutaneous at bedtime  insulin lispro (ADMELOG) corrective regimen sliding scale   SubCutaneous at bedtime  insulin lispro Injectable (ADMELOG) 10 Unit(s) SubCutaneous three times a day before meals  metoprolol tartrate 25 milliGRAM(s) Oral every 12 hours  pantoprazole    Tablet 40 milliGRAM(s) Oral before breakfast  sodium chloride 0.9% lock flush 3 milliLiter(s) IV Push every 8 hours      Physical Exam  General: Patient comfortable in bed   Vital Signs Last 12 Hrs  T(F): 97.2 (05-01-24 @ 11:57), Max: 98.6 (05-01-24 @ 05:40)  HR: 99 (05-01-24 @ 11:57) (57 - 99)  BP: 167/83 (05-01-24 @ 11:57) (159/74 - 167/83)  BP(mean): 102 (05-01-24 @ 05:40) (102 - 102)  RR: 18 (05-01-24 @ 11:57) (18 - 18)  SpO2: 99% (05-01-24 @ 11:57) (97% - 99%)    CVS: S1S2   Respiratory: No wheezing, no crepitations  GI: Abdomen soft, bowel sounds positive  Musculoskeletal:  moves all extremities  : Voiding

## 2024-05-01 NOTE — PROGRESS NOTE ADULT - ASSESSMENT
Assessment and recommendation :   chest pain coronary artery Disease   S/P cardiac catheterization triple vessel disease   DM continue glycemic control   HTN continue BP control   no evidence of thromboembolic disease   to be transfer to  hospital for CABG   DVT prophylaxis   time spend > 40 minutes more than 50% counselling the patient and coordinating care with other consultant    Assessment and recommendation :   chest pain coronary artery Disease   S/P cardiac catheterization triple vessel disease   ischemic cardiomyopathy   DM continue glycemic control   HTN continue BP control   no evidence of thromboembolic disease   to be transfer to  hospital for CABG   DVT prophylaxis   time spend > 40 minutes more than 50% counselling the patient and coordinating care with other consultant

## 2024-05-01 NOTE — PROGRESS NOTE ADULT - ASSESSMENT
68 year-old male, with past history significant for Type-2 DM, HLD, HTN and Hyperkalemia, presented to the ED secondary to L-sided chest pain, shortness of breath.  Patient was admitted to telemetry service for further evaluation.  Patient had positive stress and subsequently underwent coronary angiography on 4/29/2024  via RRA--->M 70%, pLAD 70%, mLCx 70%, mRCA 90%, LVEDP 17, RRA accessed.  Patient transferred to [facility] for CABG eval d/t triple vessel disease.    5/1  VSS on heparin gtt for CAD/L MAIN- pt was scheduled for CABG today -but pt's wife would like to look over pt's cardiac films & ensure that pt.'s PCP is in agreement with diagnosis & need for CABG. Cardiology contacted to review films & blockages with pt..  pt's wife instructed to contact her PCP - pt has no complaints of CP/SOB at this time.  will monitor closely.

## 2024-05-01 NOTE — PROGRESS NOTE ADULT - SUBJECTIVE AND OBJECTIVE BOX
VITAL SIGNS-Telemetry:  SR 55-80  Vital Signs Last 24 Hrs  T(C): 36.2 (24 @ 11:57), Max: 37 (24 @ 05:40)  T(F): 97.2 (24 @ 11:57), Max: 98.6 (24 @ 05:40)  HR: 99 (24 @ 11:57) (57 - 99)  BP: 167/83 (24 @ 11:57) (146/62 - 191/79)  RR: 18 (24 @ 11:57) (18 - 18)  SpO2: 99% (24 @ 11:57) (97% - 99%)          @ 07:01  -   @ 07:00  --------------------------------------------------------  IN: 1364 mL / OUT: 3600 mL / NET: -2236 mL     @ 07:01  -   @ 12:40  --------------------------------------------------------  IN: 360 mL / OUT: 0 mL / NET: 360 mL    Daily     Daily Weight in k.9 (01 May 2024 10:33)    CAPILLARY BLOOD GLUCOSE  POCT Blood Glucose.: 153 mg/dL (01 May 2024 11:30)  POCT Blood Glucose.: 146 mg/dL (01 May 2024 07:24)  POCT Blood Glucose.: 134 mg/dL (2024 21:43)  POCT Blood Glucose.: 107 mg/dL (2024 16:42)             PHYSICAL EXAM:  Neurology: alert and oriented x 3, nonfocal, no gross deficits  CV : S1S2  Lungs: cta  Abdomen: soft, nontender, nondistended, positive bowel sounds, last bowel movement         Extremities:     no edema  no calf tenderness    acetaminophen     Tablet .. 1000 milliGRAM(s) Oral once  amLODIPine   Tablet 5 milliGRAM(s) Oral daily  ascorbic acid 2000 milliGRAM(s) Oral at bedtime  aspirin enteric coated 81 milliGRAM(s) Oral daily  atorvastatin 40 milliGRAM(s) Oral at bedtime  cefuroxime  IVPB 1500 milliGRAM(s) IV Intermittent once  chlorhexidine 0.12% Liquid 5 milliLiter(s) Swish and Spit once  dextrose 10% Bolus 125 milliLiter(s) IV Bolus once  dextrose 5%. 1000 milliLiter(s) IV Continuous <Continuous>  dextrose 5%. 1000 milliLiter(s) IV Continuous <Continuous>  dextrose 50% Injectable 12.5 Gram(s) IV Push once  dextrose 50% Injectable 25 Gram(s) IV Push once  dextrose Oral Gel 15 Gram(s) Oral once PRN  gabapentin 300 milliGRAM(s) Oral once  glucagon  Injectable 1 milliGRAM(s) IntraMuscular once  heparin  Infusion 1300 Unit(s)/Hr IV Continuous <Continuous>  insulin glargine Injectable (LANTUS) 30 Unit(s) SubCutaneous at bedtime  insulin lispro (ADMELOG) corrective regimen sliding scale   SubCutaneous three times a day before meals  insulin lispro (ADMELOG) corrective regimen sliding scale   SubCutaneous at bedtime  insulin lispro (ADMELOG) corrective regimen sliding scale   SubCutaneous at bedtime  insulin lispro Injectable (ADMELOG) 10 Unit(s) SubCutaneous three times a day before meals  metoprolol tartrate 25 milliGRAM(s) Oral every 12 hours  pantoprazole    Tablet 40 milliGRAM(s) Oral before breakfast  sodium chloride 0.9% lock flush 3 milliLiter(s) IV Push every 8 hours    Physical Therapy Rec:   Home  [ x ]   Home w/ PT  [  ]  Rehab  [  ]  Discussed with Cardiothoracic Team at AM rounds.

## 2024-05-01 NOTE — PROGRESS NOTE ADULT - ASSESSMENT
Patient is a 68y old  Male transferred to [facility] for CABG eval    Triple Vessel Disease:    For CABG  Cardio/CTS f/up appreciated  IV Heparin    DM II:    FSSS  Cw Lantus    HTN:    Cw Amlodipine/Metoprolol

## 2024-05-02 ENCOUNTER — TRANSCRIPTION ENCOUNTER (OUTPATIENT)
Age: 69
End: 2024-05-02

## 2024-05-02 LAB
APTT BLD: 118 SEC — HIGH (ref 24.5–35.6)
APTT BLD: 52.7 SEC — HIGH (ref 24.5–35.6)
APTT BLD: 60 SEC — HIGH (ref 24.5–35.6)
BLD GP AB SCN SERPL QL: NEGATIVE — SIGNIFICANT CHANGE UP
GLUCOSE BLDC GLUCOMTR-MCNC: 150 MG/DL — HIGH (ref 70–99)
GLUCOSE BLDC GLUCOMTR-MCNC: 170 MG/DL — HIGH (ref 70–99)
GLUCOSE BLDC GLUCOMTR-MCNC: 240 MG/DL — HIGH (ref 70–99)
GLUCOSE BLDC GLUCOMTR-MCNC: 98 MG/DL — SIGNIFICANT CHANGE UP (ref 70–99)
HCT VFR BLD CALC: 36.1 % — LOW (ref 39–50)
HGB BLD-MCNC: 12.1 G/DL — LOW (ref 13–17)
MCHC RBC-ENTMCNC: 27.2 PG — SIGNIFICANT CHANGE UP (ref 27–34)
MCHC RBC-ENTMCNC: 33.5 GM/DL — SIGNIFICANT CHANGE UP (ref 32–36)
MCV RBC AUTO: 81.1 FL — SIGNIFICANT CHANGE UP (ref 80–100)
NRBC # BLD: 0 /100 WBCS — SIGNIFICANT CHANGE UP (ref 0–0)
PLATELET # BLD AUTO: 213 K/UL — SIGNIFICANT CHANGE UP (ref 150–400)
RBC # BLD: 4.45 M/UL — SIGNIFICANT CHANGE UP (ref 4.2–5.8)
RBC # FLD: 13.2 % — SIGNIFICANT CHANGE UP (ref 10.3–14.5)
RH IG SCN BLD-IMP: POSITIVE — SIGNIFICANT CHANGE UP
WBC # BLD: 10.53 K/UL — HIGH (ref 3.8–10.5)
WBC # FLD AUTO: 10.53 K/UL — HIGH (ref 3.8–10.5)

## 2024-05-02 RX ORDER — IRON SUCROSE 20 MG/ML
100 INJECTION, SOLUTION INTRAVENOUS EVERY 24 HOURS
Refills: 0 | Status: DISCONTINUED | OUTPATIENT
Start: 2024-05-02 | End: 2024-05-02

## 2024-05-02 RX ORDER — AMLODIPINE BESYLATE 2.5 MG/1
5 TABLET ORAL ONCE
Refills: 0 | Status: COMPLETED | OUTPATIENT
Start: 2024-05-02 | End: 2024-05-02

## 2024-05-02 RX ORDER — SORBITOL SOLUTION 70 %
15 SOLUTION, ORAL MISCELLANEOUS ONCE
Refills: 0 | Status: COMPLETED | OUTPATIENT
Start: 2024-05-02 | End: 2024-05-02

## 2024-05-02 RX ORDER — SORBITOL SOLUTION 70 %
30 SOLUTION, ORAL MISCELLANEOUS ONCE
Refills: 0 | Status: COMPLETED | OUTPATIENT
Start: 2024-05-02 | End: 2024-05-02

## 2024-05-02 RX ORDER — AMLODIPINE BESYLATE 2.5 MG/1
10 TABLET ORAL DAILY
Refills: 0 | Status: DISCONTINUED | OUTPATIENT
Start: 2024-05-03 | End: 2024-05-03

## 2024-05-02 RX ORDER — IRON SUCROSE 20 MG/ML
100 INJECTION, SOLUTION INTRAVENOUS EVERY 24 HOURS
Refills: 0 | Status: DISCONTINUED | OUTPATIENT
Start: 2024-05-02 | End: 2024-05-03

## 2024-05-02 RX ADMIN — SODIUM CHLORIDE 3 MILLILITER(S): 9 INJECTION INTRAMUSCULAR; INTRAVENOUS; SUBCUTANEOUS at 21:50

## 2024-05-02 RX ADMIN — Medication 10 UNIT(S): at 08:35

## 2024-05-02 RX ADMIN — Medication 81 MILLIGRAM(S): at 11:32

## 2024-05-02 RX ADMIN — Medication 10 UNIT(S): at 17:29

## 2024-05-02 RX ADMIN — HEPARIN SODIUM 6 UNIT(S)/HR: 5000 INJECTION INTRAVENOUS; SUBCUTANEOUS at 03:00

## 2024-05-02 RX ADMIN — Medication 10 MILLIGRAM(S): at 18:18

## 2024-05-02 RX ADMIN — Medication 30 MILLILITER(S): at 21:56

## 2024-05-02 RX ADMIN — HEPARIN SODIUM 6 UNIT(S)/HR: 5000 INJECTION INTRAVENOUS; SUBCUTANEOUS at 09:54

## 2024-05-02 RX ADMIN — PANTOPRAZOLE SODIUM 40 MILLIGRAM(S): 20 TABLET, DELAYED RELEASE ORAL at 05:33

## 2024-05-02 RX ADMIN — HEPARIN SODIUM 6 UNIT(S)/HR: 5000 INJECTION INTRAVENOUS; SUBCUTANEOUS at 17:29

## 2024-05-02 RX ADMIN — IRON SUCROSE 210 MILLIGRAM(S): 20 INJECTION, SOLUTION INTRAVENOUS at 08:36

## 2024-05-02 RX ADMIN — Medication 2000 MILLIGRAM(S): at 21:56

## 2024-05-02 RX ADMIN — ATORVASTATIN CALCIUM 40 MILLIGRAM(S): 80 TABLET, FILM COATED ORAL at 21:56

## 2024-05-02 RX ADMIN — Medication 25 MILLIGRAM(S): at 05:33

## 2024-05-02 RX ADMIN — AMLODIPINE BESYLATE 5 MILLIGRAM(S): 2.5 TABLET ORAL at 05:33

## 2024-05-02 RX ADMIN — HEPARIN SODIUM 6.5 UNIT(S)/HR: 5000 INJECTION INTRAVENOUS; SUBCUTANEOUS at 18:17

## 2024-05-02 RX ADMIN — AMLODIPINE BESYLATE 5 MILLIGRAM(S): 2.5 TABLET ORAL at 06:19

## 2024-05-02 RX ADMIN — Medication 25 MILLIGRAM(S): at 17:30

## 2024-05-02 RX ADMIN — INSULIN GLARGINE 30 UNIT(S): 100 INJECTION, SOLUTION SUBCUTANEOUS at 21:48

## 2024-05-02 RX ADMIN — SODIUM CHLORIDE 3 MILLILITER(S): 9 INJECTION INTRAMUSCULAR; INTRAVENOUS; SUBCUTANEOUS at 05:37

## 2024-05-02 RX ADMIN — Medication 15 MILLILITER(S): at 11:33
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
## 2024-05-02 NOTE — PROGRESS NOTE ADULT - ASSESSMENT
Assessment and recommendation :   chest pain coronary artery Disease   S/P cardiac catheterization triple vessel disease   ischemic cardiomyopathy   DM continue glycemic control   HTN continue BP control   no evidence of thromboembolic disease   to be transfer to  hospital for CABG   DVT prophylaxis   time spend > 40 minutes more than 50% counselling the patient and coordinating care with other consultant

## 2024-05-02 NOTE — PROGRESS NOTE ADULT - ASSESSMENT
Patient is a 68y old  Male transferred to [facility] for CABG eval    Triple Vessel Disease:    For CABG  Cardio/CTS f/up appreciated  IV Heparin    DM II:    FSSS  Cw Lantus    HTN:    Cw Amlodipine/Metoprolol

## 2024-05-02 NOTE — PROGRESS NOTE ADULT - SUBJECTIVE AND OBJECTIVE BOX
Chief complaint  Patient is a 68y old  Male who presents with a chief complaint of cardiac surgery (02 May 2024 12:03)         Labs and Fingersticks  CAPILLARY BLOOD GLUCOSE      POCT Blood Glucose.: 170 mg/dL (02 May 2024 11:40)  POCT Blood Glucose.: 98 mg/dL (02 May 2024 07:45)  POCT Blood Glucose.: 184 mg/dL (01 May 2024 21:47)  POCT Blood Glucose.: 118 mg/dL (01 May 2024 16:40)                                            12.1   10.53 )-----------( 213      ( 02 May 2024 08:38 )             36.1     Medications  MEDICATIONS  (STANDING):  acetaminophen     Tablet .. 1000 milliGRAM(s) Oral once  ascorbic acid 2000 milliGRAM(s) Oral at bedtime  aspirin enteric coated 81 milliGRAM(s) Oral daily  atorvastatin 40 milliGRAM(s) Oral at bedtime  cefuroxime  IVPB 1500 milliGRAM(s) IV Intermittent once  chlorhexidine 0.12% Liquid 5 milliLiter(s) Swish and Spit once  dextrose 10% Bolus 125 milliLiter(s) IV Bolus once  dextrose 5%. 1000 milliLiter(s) (100 mL/Hr) IV Continuous <Continuous>  dextrose 5%. 1000 milliLiter(s) (50 mL/Hr) IV Continuous <Continuous>  dextrose 50% Injectable 25 Gram(s) IV Push once  dextrose 50% Injectable 12.5 Gram(s) IV Push once  gabapentin 300 milliGRAM(s) Oral once  glucagon  Injectable 1 milliGRAM(s) IntraMuscular once  heparin  Infusion 1300 Unit(s)/Hr (6 mL/Hr) IV Continuous <Continuous>  insulin glargine Injectable (LANTUS) 30 Unit(s) SubCutaneous at bedtime  insulin lispro (ADMELOG) corrective regimen sliding scale   SubCutaneous three times a day before meals  insulin lispro (ADMELOG) corrective regimen sliding scale   SubCutaneous at bedtime  insulin lispro (ADMELOG) corrective regimen sliding scale   SubCutaneous at bedtime  insulin lispro Injectable (ADMELOG) 10 Unit(s) SubCutaneous three times a day before meals  iron sucrose IVPB 100 milliGRAM(s) IV Intermittent every 24 hours  metoprolol tartrate 25 milliGRAM(s) Oral every 12 hours  pantoprazole    Tablet 40 milliGRAM(s) Oral before breakfast  sodium chloride 0.9% lock flush 3 milliLiter(s) IV Push every 8 hours      Physical Exam  General: Patient comfortable in bed   Vital Signs Last 12 Hrs  T(F): 98.9 (05-02-24 @ 04:46), Max: 98.9 (05-02-24 @ 04:46)  HR: 62 (05-02-24 @ 04:46) (62 - 62)  BP: 154/78 (05-02-24 @ 04:46) (154/78 - 154/78)  BP(mean): 103 (05-02-24 @ 04:46) (103 - 103)  RR: 18 (05-02-24 @ 04:46) (18 - 18)  SpO2: 96% (05-02-24 @ 04:46) (96% - 96%)    CVS: S1S2   Respiratory: No wheezing, no crepitations  GI: Abdomen soft, bowel sounds positive  Musculoskeletal:  moves all extremities         Chief complaint  Patient is a 68y old  Male who presents with a chief complaint of cardiac surgery  (02 May 2024 12:03)         Labs and Fingersticks  CAPILLARY BLOOD GLUCOSE      POCT Blood Glucose.: 170 mg/dL (02 May 2024 11:40)  POCT Blood Glucose.: 98 mg/dL (02 May 2024 07:45)  POCT Blood Glucose.: 184 mg/dL (01 May 2024 21:47)  POCT Blood Glucose.: 118 mg/dL (01 May 2024 16:40)                                            12.1   10.53 )-----------( 213      ( 02 May 2024 08:38 )             36.1     Medications  MEDICATIONS  (STANDING):  acetaminophen     Tablet .. 1000 milliGRAM(s) Oral once  ascorbic acid 2000 milliGRAM(s) Oral at bedtime  aspirin enteric coated 81 milliGRAM(s) Oral daily  atorvastatin 40 milliGRAM(s) Oral at bedtime  cefuroxime  IVPB 1500 milliGRAM(s) IV Intermittent once  chlorhexidine 0.12% Liquid 5 milliLiter(s) Swish and Spit once  dextrose 10% Bolus 125 milliLiter(s) IV Bolus once  dextrose 5%. 1000 milliLiter(s) (100 mL/Hr) IV Continuous <Continuous>  dextrose 5%. 1000 milliLiter(s) (50 mL/Hr) IV Continuous <Continuous>  dextrose 50% Injectable 25 Gram(s) IV Push once  dextrose 50% Injectable 12.5 Gram(s) IV Push once  gabapentin 300 milliGRAM(s) Oral once  glucagon  Injectable 1 milliGRAM(s) IntraMuscular once  heparin  Infusion 1300 Unit(s)/Hr (6 mL/Hr) IV Continuous <Continuous>  insulin glargine Injectable (LANTUS) 30 Unit(s) SubCutaneous at bedtime  insulin lispro (ADMELOG) corrective regimen sliding scale   SubCutaneous three times a day before meals  insulin lispro (ADMELOG) corrective regimen sliding scale   SubCutaneous at bedtime  insulin lispro (ADMELOG) corrective regimen sliding scale   SubCutaneous at bedtime  insulin lispro Injectable (ADMELOG) 10 Unit(s) SubCutaneous three times a day before meals  iron sucrose IVPB 100 milliGRAM(s) IV Intermittent every 24 hours  metoprolol tartrate 25 milliGRAM(s) Oral every 12 hours  pantoprazole    Tablet 40 milliGRAM(s) Oral before breakfast  sodium chloride 0.9% lock flush 3 milliLiter(s) IV Push every 8 hours      Physical Exam  General: Patient comfortable in bed   Vital Signs Last 12 Hrs  T(F): 98.9 (05-02-24 @ 04:46), Max: 98.9 (05-02-24 @ 04:46)  HR: 62 (05-02-24 @ 04:46) (62 - 62)  BP: 154/78 (05-02-24 @ 04:46) (154/78 - 154/78)  BP(mean): 103 (05-02-24 @ 04:46) (103 - 103)  RR: 18 (05-02-24 @ 04:46) (18 - 18)  SpO2: 96% (05-02-24 @ 04:46) (96% - 96%)    CVS: S1S2   Respiratory: No wheezing, no crepitations  GI: Abdomen soft, bowel sounds positive  Musculoskeletal:  moves all extremities

## 2024-05-02 NOTE — PROGRESS NOTE ADULT - SUBJECTIVE AND OBJECTIVE BOX
Patient is a 68y old  Male who presents with a chief complaint of cardiac surgery (02 May 2024 13:32)      SUBJECTIVE / OVERNIGHT EVENTS:    Events noted.  CONSTITUTIONAL: No fever,  or fatigue  RESPIRATORY: No cough, wheezing,  No shortness of breath  CARDIOVASCULAR: No chest pain, palpitations, dizziness, or leg swelling  GASTROINTESTINAL: No abdominal or epigastric pain.       MEDICATIONS  (STANDING):  acetaminophen     Tablet .. 1000 milliGRAM(s) Oral once  ascorbic acid 2000 milliGRAM(s) Oral at bedtime  aspirin enteric coated 81 milliGRAM(s) Oral daily  atorvastatin 40 milliGRAM(s) Oral at bedtime  cefuroxime  IVPB 1500 milliGRAM(s) IV Intermittent once  chlorhexidine 0.12% Liquid 5 milliLiter(s) Swish and Spit once  dextrose 10% Bolus 125 milliLiter(s) IV Bolus once  dextrose 5%. 1000 milliLiter(s) (50 mL/Hr) IV Continuous <Continuous>  dextrose 5%. 1000 milliLiter(s) (100 mL/Hr) IV Continuous <Continuous>  dextrose 50% Injectable 25 Gram(s) IV Push once  dextrose 50% Injectable 12.5 Gram(s) IV Push once  gabapentin 300 milliGRAM(s) Oral once  glucagon  Injectable 1 milliGRAM(s) IntraMuscular once  heparin  Infusion 1300 Unit(s)/Hr (6.5 mL/Hr) IV Continuous <Continuous>  insulin glargine Injectable (LANTUS) 30 Unit(s) SubCutaneous at bedtime  insulin lispro (ADMELOG) corrective regimen sliding scale   SubCutaneous at bedtime  insulin lispro (ADMELOG) corrective regimen sliding scale   SubCutaneous three times a day before meals  insulin lispro (ADMELOG) corrective regimen sliding scale   SubCutaneous at bedtime  insulin lispro Injectable (ADMELOG) 10 Unit(s) SubCutaneous three times a day before meals  iron sucrose IVPB 100 milliGRAM(s) IV Intermittent every 24 hours  metoprolol tartrate 25 milliGRAM(s) Oral every 12 hours  pantoprazole    Tablet 40 milliGRAM(s) Oral before breakfast  sodium chloride 0.9% lock flush 3 milliLiter(s) IV Push every 8 hours    MEDICATIONS  (PRN):  dextrose Oral Gel 15 Gram(s) Oral once PRN Blood Glucose LESS THAN 70 milliGRAM(s)/deciliter        CAPILLARY BLOOD GLUCOSE      POCT Blood Glucose.: 240 mg/dL (02 May 2024 21:36)  POCT Blood Glucose.: 150 mg/dL (02 May 2024 16:34)  POCT Blood Glucose.: 170 mg/dL (02 May 2024 11:40)  POCT Blood Glucose.: 98 mg/dL (02 May 2024 07:45)    I&O's Summary    01 May 2024 07:01  -  02 May 2024 07:00  --------------------------------------------------------  IN: 918 mL / OUT: 950 mL / NET: -32 mL    02 May 2024 07:01  -  03 May 2024 00:03  --------------------------------------------------------  IN: 1032 mL / OUT: 0 mL / NET: 1032 mL        T(C): 36.5 (05-02-24 @ 19:51), Max: 37.2 (05-02-24 @ 04:46)  HR: 57 (05-02-24 @ 19:51) (57 - 65)  BP: 157/85 (05-02-24 @ 19:51) (127/79 - 157/85)  RR: 18 (05-02-24 @ 19:51) (18 - 18)  SpO2: 98% (05-02-24 @ 19:51) (96% - 98%)    PHYSICAL EXAM:    NECK: Supple, No JVD  CHEST/LUNG: Clear to auscultation bilaterally; No wheezing.  HEART: Regular rate and rhythm; No murmurs, rubs, or gallops  ABDOMEN: Soft, Nontender, Nondistended; Bowel sounds present  EXTREMITIES:   No edema  NEUROLOGY: AAO X 3      LABS:                        12.1   10.53 )-----------( 213      ( 02 May 2024 08:38 )             36.1           PTT - ( 02 May 2024 16:38 )  PTT:52.7 sec        CAPILLARY BLOOD GLUCOSE      POCT Blood Glucose.: 240 mg/dL (02 May 2024 21:36)  POCT Blood Glucose.: 150 mg/dL (02 May 2024 16:34)  POCT Blood Glucose.: 170 mg/dL (02 May 2024 11:40)  POCT Blood Glucose.: 98 mg/dL (02 May 2024 07:45)        RADIOLOGY & ADDITIONAL TESTS:    Imaging Personally Reviewed:    Consultant(s) Notes Reviewed:      Care Discussed with Consultants/Other Providers:    Rj Robison MD, CMD, FACP    [address] [place] [address]  Office Tel: [phone]  Cell: [phone]

## 2024-05-02 NOTE — PROGRESS NOTE ADULT - ASSESSMENT
68 year-old male, with past history significant for Type-2 DM, HLD, HTN and Hyperkalemia, presented to the ED secondary to L-sided chest pain, shortness of breath.  Transferred to [facility] for CABG eval   Assessment  DMT2: 68y Male with DM T2 with hyperglycemia, A1C 8.9% , was on insulin at home, now on basal bolus insulin with coverage, blood sugars stable, CTS eval  CAD: on medications, stable, monitored. preop CABG  HTN: on antihypertensive medications, monitored, asymptomatic.      Discussed plan and management wit Dr Gaston Feldman MD  Cell: [phone]  Office: [phone]             68 year-old male, with past history significant for Type-2 DM, HLD, HTN and Hyperkalemia, presented to the ED secondary to L-sided chest pain, shortness of breath.  Transferred to [facility] for CABG eval   Assessment  DMT2: 68y Male with DM T2 with hyperglycemia, A1C 8.9% , was on insulin at home, now on  basal bolus insulin with coverage, blood sugars stable, CTS eval  CAD: on medications, stable, monitored. preop CABG  HTN: on antihypertensive medications, monitored, asymptomatic.      Discussed plan and management wit Dr Gaston Feldman MD  Cell: [phone]  Office: [phone]

## 2024-05-02 NOTE — PROGRESS NOTE ADULT - SUBJECTIVE AND OBJECTIVE BOX
CARDIOLOGY FOLLOW UP - Dr. Brewer  DATE OF SERVICE: 5/2/24    CC  No cv complaints     REVIEW OF SYSTEMS:  CONSTITUTIONAL: No fever, weight loss, or fatigue  RESPIRATORY: No cough, wheezing, chills or hemoptysis; No Shortness of Breath  CARDIOVASCULAR: No chest pain, palpitations, passing out, dizziness, or leg swelling  GASTROINTESTINAL: No abdominal or epigastric pain. No nausea, vomiting, or hematemesis; No diarrhea or constipation. No melena or hematochezia.  VASCULAR: No edema     PHYSICAL EXAM:  T(C): 37.2 (05-02-24 @ 04:46), Max: 37.3 (05-01-24 @ 20:27)  HR: 62 (05-02-24 @ 04:46) (57 - 99)  BP: 154/78 (05-02-24 @ 04:46) (154/78 - 167/83)  RR: 18 (05-02-24 @ 04:46) (18 - 18)  SpO2: 96% (05-02-24 @ 04:46) (96% - 100%)  Wt(kg): --  I&O's Summary    01 May 2024 07:01  -  02 May 2024 07:00  --------------------------------------------------------  IN: 918 mL / OUT: 950 mL / NET: -32 mL        Appearance: Normal	  Cardiovascular: Normal S1 S2,RRR, No JVD, No murmurs  Respiratory: Lungs clear to auscultation b/l   Gastrointestinal:  Soft, Non-tender, + BS	  Extremities: Normal range of motion, No clubbing, cyanosis or edema      Home Medications:  aspirin 81 mg oral delayed release tablet: 1 tab(s) orally once a day (29 Apr 2024 18:21)  atorvastatin 40 mg oral tablet: 1 tab(s) orally once a day (at bedtime) (29 Apr 2024 18:21)  insulin glargine 100 units/mL subcutaneous solution: 50 unit(s) subcutaneous once a day (at bedtime) (29 Apr 2024 18:21)  insulin lispro 100 units/mL injectable solution: 15 unit(s) injectable once a day (in the morning) BEFORE BREAKFAST (29 Apr 2024 18:21)  insulin lispro 100 units/mL injectable solution: 1 unit(s) injectable 3 times a day (before meals) 1 Unit(s) if Glucose 151 - 200  2 Unit(s) if Glucose 201 - 250  3 Unit(s) if Glucose 251 - 300  4 Unit(s) if Glucose 301 - 350  5 Unit(s) if Glucose 351 - 400  6 Unit(s) if Glucose Greater Than 400 (29 Apr 2024 18:21)  insulin lispro 100 units/mL injectable solution: 15 unit(s) injectable once a day before dinner (29 Apr 2024 18:21)  lisinopril 20 mg oral tablet: 1 tab(s) orally once a day (29 Apr 2024 02:23)  metoprolol tartrate 25 mg oral tablet: 1 tab(s) orally every 12 hours (29 Apr 2024 18:21)      MEDICATIONS  (STANDING):  acetaminophen     Tablet .. 1000 milliGRAM(s) Oral once  ascorbic acid 2000 milliGRAM(s) Oral at bedtime  aspirin enteric coated 81 milliGRAM(s) Oral daily  atorvastatin 40 milliGRAM(s) Oral at bedtime  cefuroxime  IVPB 1500 milliGRAM(s) IV Intermittent once  chlorhexidine 0.12% Liquid 5 milliLiter(s) Swish and Spit once  dextrose 10% Bolus 125 milliLiter(s) IV Bolus once  dextrose 5%. 1000 milliLiter(s) (50 mL/Hr) IV Continuous <Continuous>  dextrose 5%. 1000 milliLiter(s) (100 mL/Hr) IV Continuous <Continuous>  dextrose 50% Injectable 12.5 Gram(s) IV Push once  dextrose 50% Injectable 25 Gram(s) IV Push once  gabapentin 300 milliGRAM(s) Oral once  glucagon  Injectable 1 milliGRAM(s) IntraMuscular once  heparin  Infusion 1300 Unit(s)/Hr (6 mL/Hr) IV Continuous <Continuous>  insulin glargine Injectable (LANTUS) 30 Unit(s) SubCutaneous at bedtime  insulin lispro (ADMELOG) corrective regimen sliding scale   SubCutaneous at bedtime  insulin lispro (ADMELOG) corrective regimen sliding scale   SubCutaneous at bedtime  insulin lispro (ADMELOG) corrective regimen sliding scale   SubCutaneous three times a day before meals  insulin lispro Injectable (ADMELOG) 10 Unit(s) SubCutaneous three times a day before meals  iron sucrose IVPB 100 milliGRAM(s) IV Intermittent every 24 hours  metoprolol tartrate 25 milliGRAM(s) Oral every 12 hours  pantoprazole    Tablet 40 milliGRAM(s) Oral before breakfast  sodium chloride 0.9% lock flush 3 milliLiter(s) IV Push every 8 hours      TELEMETRY: Sinus annika/ SR	    ECG:  	  RADIOLOGY:   DIAGNOSTIC TESTING:  [ ] Echocardiogram:  [ ]  Catheterization:  [ ] Stress Test:    OTHER: 	    LABS:	 	    Troponin T, High Sensitivity Result: 58 ng/L (04-29 @ 05:30)  Creatine Kinase, Serum: 169 U/L [30 - 200] (04-29 @ 05:30)  CKMB Units: 5.1 ng/mL (04-29 @ 05:30)  Troponin T, High Sensitivity Result: 33 ng/L (04-27 @ 07:14)  Troponin T, High Sensitivity Result: 41 ng/L (04-27 @ 05:41)                          12.1   9.37  )-----------( 240      ( 01 May 2024 07:02 )             37.4     04-30    142  |  107  |  17  ----------------------------<  108<H>  3.6   |  26  |  0.87    Ca    9.8      30 Apr 2024 10:25      PTT - ( 02 May 2024 00:40 )  PTT:118.0 sec

## 2024-05-02 NOTE — PROGRESS NOTE ADULT - ASSESSMENT
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    #CAD  -Sp cath with significant left main plus TVD   -Pre-op w/u for CABG as per ctsx  -Plan for CABG as per Ctsx  -Cont asa, statin, bb    #HTN  -Continue amlodipine, bb      dvt ppx

## 2024-05-03 ENCOUNTER — APPOINTMENT (OUTPATIENT)
Dept: CARDIOTHORACIC SURGERY | Facility: HOSPITAL | Age: 69
End: 2024-05-03

## 2024-05-03 ENCOUNTER — RESULT REVIEW (OUTPATIENT)
Age: 69
End: 2024-05-03

## 2024-05-03 LAB
ALBUMIN SERPL ELPH-MCNC: 3.5 G/DL — SIGNIFICANT CHANGE UP (ref 3.3–5)
ALP SERPL-CCNC: 38 U/L — LOW (ref 40–120)
ALT FLD-CCNC: 18 U/L — SIGNIFICANT CHANGE UP (ref 10–45)
ANION GAP SERPL CALC-SCNC: 12 MMOL/L — SIGNIFICANT CHANGE UP (ref 5–17)
APTT BLD: 28.4 SEC — SIGNIFICANT CHANGE UP (ref 24.5–35.6)
AST SERPL-CCNC: 52 U/L — HIGH (ref 10–40)
BASE EXCESS BLDV CALC-SCNC: -0.5 MMOL/L — SIGNIFICANT CHANGE UP (ref -2–3)
BASE EXCESS BLDV CALC-SCNC: -0.7 MMOL/L — SIGNIFICANT CHANGE UP (ref -2–3)
BASE EXCESS BLDV CALC-SCNC: -2.1 MMOL/L — LOW (ref -2–3)
BASE EXCESS BLDV CALC-SCNC: -2.5 MMOL/L — LOW (ref -2–3)
BASE EXCESS BLDV CALC-SCNC: -3.2 MMOL/L — LOW (ref -2–3)
BASE EXCESS BLDV CALC-SCNC: -3.4 MMOL/L — LOW (ref -2–3)
BASE EXCESS BLDV CALC-SCNC: 1.3 MMOL/L — SIGNIFICANT CHANGE UP (ref -2–3)
BASE EXCESS BLDV CALC-SCNC: 1.4 MMOL/L — SIGNIFICANT CHANGE UP (ref -2–3)
BASE EXCESS BLDV CALC-SCNC: 1.6 MMOL/L — SIGNIFICANT CHANGE UP (ref -2–3)
BASE EXCESS BLDV CALC-SCNC: 1.8 MMOL/L — SIGNIFICANT CHANGE UP (ref -2–3)
BASOPHILS # BLD AUTO: 0.02 K/UL — SIGNIFICANT CHANGE UP (ref 0–0.2)
BASOPHILS NFR BLD AUTO: 0.2 % — SIGNIFICANT CHANGE UP (ref 0–2)
BILIRUB SERPL-MCNC: 0.8 MG/DL — SIGNIFICANT CHANGE UP (ref 0.2–1.2)
BUN SERPL-MCNC: 16 MG/DL — SIGNIFICANT CHANGE UP (ref 7–23)
CA-I SERPL-SCNC: 1.09 MMOL/L — LOW (ref 1.15–1.33)
CA-I SERPL-SCNC: 1.09 MMOL/L — LOW (ref 1.15–1.33)
CA-I SERPL-SCNC: 1.11 MMOL/L — LOW (ref 1.15–1.33)
CA-I SERPL-SCNC: 1.11 MMOL/L — LOW (ref 1.15–1.33)
CA-I SERPL-SCNC: 1.12 MMOL/L — LOW (ref 1.15–1.33)
CA-I SERPL-SCNC: 1.15 MMOL/L — SIGNIFICANT CHANGE UP (ref 1.15–1.33)
CA-I SERPL-SCNC: 1.28 MMOL/L — SIGNIFICANT CHANGE UP (ref 1.15–1.33)
CA-I SERPL-SCNC: 1.33 MMOL/L — SIGNIFICANT CHANGE UP (ref 1.15–1.33)
CA-I SERPL-SCNC: 1.34 MMOL/L — HIGH (ref 1.15–1.33)
CA-I SERPL-SCNC: 1.48 MMOL/L — HIGH (ref 1.15–1.33)
CALCIUM SERPL-MCNC: 9.1 MG/DL — SIGNIFICANT CHANGE UP (ref 8.4–10.5)
CHLORIDE BLDV-SCNC: 103 MMOL/L — SIGNIFICANT CHANGE UP (ref 96–108)
CHLORIDE BLDV-SCNC: 106 MMOL/L — SIGNIFICANT CHANGE UP (ref 96–108)
CHLORIDE BLDV-SCNC: 107 MMOL/L — SIGNIFICANT CHANGE UP (ref 96–108)
CHLORIDE BLDV-SCNC: 108 MMOL/L — SIGNIFICANT CHANGE UP (ref 96–108)
CHLORIDE SERPL-SCNC: 110 MMOL/L — HIGH (ref 96–108)
CK MB BLD-MCNC: 6 % — HIGH (ref 0–3.5)
CK MB CFR SERPL CALC: 36.4 NG/ML — HIGH (ref 0–6.7)
CK SERPL-CCNC: 607 U/L — HIGH (ref 30–200)
CO2 BLDV-SCNC: 24 MMOL/L — SIGNIFICANT CHANGE UP (ref 22–26)
CO2 BLDV-SCNC: 25 MMOL/L — SIGNIFICANT CHANGE UP (ref 22–26)
CO2 BLDV-SCNC: 26 MMOL/L — SIGNIFICANT CHANGE UP (ref 22–26)
CO2 BLDV-SCNC: 26 MMOL/L — SIGNIFICANT CHANGE UP (ref 22–26)
CO2 BLDV-SCNC: 27 MMOL/L — HIGH (ref 22–26)
CO2 BLDV-SCNC: 27 MMOL/L — HIGH (ref 22–26)
CO2 BLDV-SCNC: 28 MMOL/L — HIGH (ref 22–26)
CO2 BLDV-SCNC: 28 MMOL/L — HIGH (ref 22–26)
CO2 BLDV-SCNC: 29 MMOL/L — HIGH (ref 22–26)
CO2 BLDV-SCNC: 29 MMOL/L — HIGH (ref 22–26)
CO2 SERPL-SCNC: 22 MMOL/L — SIGNIFICANT CHANGE UP (ref 22–31)
CREAT SERPL-MCNC: 0.9 MG/DL — SIGNIFICANT CHANGE UP (ref 0.5–1.3)
EGFR: 94 ML/MIN/1.73M2 — SIGNIFICANT CHANGE UP
EOSINOPHIL # BLD AUTO: 0.02 K/UL — SIGNIFICANT CHANGE UP (ref 0–0.5)
EOSINOPHIL NFR BLD AUTO: 0.2 % — SIGNIFICANT CHANGE UP (ref 0–6)
FIBRINOGEN PPP-MCNC: 248 MG/DL — SIGNIFICANT CHANGE UP (ref 200–445)
GAS PNL BLDA: SIGNIFICANT CHANGE UP
GAS PNL BLDV: 137 MMOL/L — SIGNIFICANT CHANGE UP (ref 136–145)
GAS PNL BLDV: 138 MMOL/L — SIGNIFICANT CHANGE UP (ref 136–145)
GAS PNL BLDV: 138 MMOL/L — SIGNIFICANT CHANGE UP (ref 136–145)
GAS PNL BLDV: 139 MMOL/L — SIGNIFICANT CHANGE UP (ref 136–145)
GAS PNL BLDV: 140 MMOL/L — SIGNIFICANT CHANGE UP (ref 136–145)
GAS PNL BLDV: SIGNIFICANT CHANGE UP
GLUCOSE BLDC GLUCOMTR-MCNC: 113 MG/DL — HIGH (ref 70–99)
GLUCOSE BLDC GLUCOMTR-MCNC: 118 MG/DL — HIGH (ref 70–99)
GLUCOSE BLDC GLUCOMTR-MCNC: 129 MG/DL — HIGH (ref 70–99)
GLUCOSE BLDC GLUCOMTR-MCNC: 135 MG/DL — HIGH (ref 70–99)
GLUCOSE BLDC GLUCOMTR-MCNC: 157 MG/DL — HIGH (ref 70–99)
GLUCOSE BLDC GLUCOMTR-MCNC: 165 MG/DL — HIGH (ref 70–99)
GLUCOSE BLDC GLUCOMTR-MCNC: 176 MG/DL — HIGH (ref 70–99)
GLUCOSE BLDV-MCNC: 136 MG/DL — HIGH (ref 70–99)
GLUCOSE BLDV-MCNC: 146 MG/DL — HIGH (ref 70–99)
GLUCOSE BLDV-MCNC: 147 MG/DL — HIGH (ref 70–99)
GLUCOSE BLDV-MCNC: 150 MG/DL — HIGH (ref 70–99)
GLUCOSE BLDV-MCNC: 155 MG/DL — HIGH (ref 70–99)
GLUCOSE BLDV-MCNC: 155 MG/DL — HIGH (ref 70–99)
GLUCOSE BLDV-MCNC: 159 MG/DL — HIGH (ref 70–99)
GLUCOSE BLDV-MCNC: 167 MG/DL — HIGH (ref 70–99)
GLUCOSE BLDV-MCNC: 176 MG/DL — HIGH (ref 70–99)
GLUCOSE BLDV-MCNC: 178 MG/DL — HIGH (ref 70–99)
GLUCOSE SERPL-MCNC: 157 MG/DL — HIGH (ref 70–99)
HCO3 BLDV-SCNC: 23 MMOL/L — SIGNIFICANT CHANGE UP (ref 22–29)
HCO3 BLDV-SCNC: 24 MMOL/L — SIGNIFICANT CHANGE UP (ref 22–29)
HCO3 BLDV-SCNC: 25 MMOL/L — SIGNIFICANT CHANGE UP (ref 22–29)
HCO3 BLDV-SCNC: 26 MMOL/L — SIGNIFICANT CHANGE UP (ref 22–29)
HCO3 BLDV-SCNC: 27 MMOL/L — SIGNIFICANT CHANGE UP (ref 22–29)
HCT VFR BLD CALC: 22 % — LOW (ref 39–50)
HCT VFR BLDA CALC: 23 % — LOW (ref 39–51)
HCT VFR BLDA CALC: 24 % — LOW (ref 39–51)
HCT VFR BLDA CALC: 25 % — LOW (ref 39–51)
HCT VFR BLDA CALC: 26 % — LOW (ref 39–51)
HCT VFR BLDA CALC: 26 % — LOW (ref 39–51)
HCT VFR BLDA CALC: 31 % — LOW (ref 39–51)
HEPARINASE TEG R TIME: >17 MIN — HIGH (ref 4.3–8.3)
HGB BLD CALC-MCNC: 10.2 G/DL — LOW (ref 12.6–17.4)
HGB BLD CALC-MCNC: 7.5 G/DL — LOW (ref 12.6–17.4)
HGB BLD CALC-MCNC: 7.7 G/DL — LOW (ref 12.6–17.4)
HGB BLD CALC-MCNC: 7.8 G/DL — LOW (ref 12.6–17.4)
HGB BLD CALC-MCNC: 8 G/DL — LOW (ref 12.6–17.4)
HGB BLD CALC-MCNC: 8.3 G/DL — LOW (ref 12.6–17.4)
HGB BLD CALC-MCNC: 8.3 G/DL — LOW (ref 12.6–17.4)
HGB BLD CALC-MCNC: 8.4 G/DL — LOW (ref 12.6–17.4)
HGB BLD CALC-MCNC: 8.6 G/DL — LOW (ref 12.6–17.4)
HGB BLD CALC-MCNC: 8.7 G/DL — LOW (ref 12.6–17.4)
HGB BLD-MCNC: 7.4 G/DL — LOW (ref 13–17)
HOROWITZ INDEX BLDV+IHG-RTO: 100 — SIGNIFICANT CHANGE UP
HOROWITZ INDEX BLDV+IHG-RTO: 40 — SIGNIFICANT CHANGE UP
HOROWITZ INDEX BLDV+IHG-RTO: 40 — SIGNIFICANT CHANGE UP
IMM GRANULOCYTES NFR BLD AUTO: 0.9 % — SIGNIFICANT CHANGE UP (ref 0–0.9)
INR BLD: 1.31 RATIO — HIGH (ref 0.85–1.18)
LACTATE BLDV-MCNC: 0.6 MMOL/L — SIGNIFICANT CHANGE UP (ref 0.5–2)
LACTATE BLDV-MCNC: 1 MMOL/L — SIGNIFICANT CHANGE UP (ref 0.5–2)
LACTATE BLDV-MCNC: 1.4 MMOL/L — SIGNIFICANT CHANGE UP (ref 0.5–2)
LACTATE BLDV-MCNC: 1.4 MMOL/L — SIGNIFICANT CHANGE UP (ref 0.5–2)
LACTATE BLDV-MCNC: 1.7 MMOL/L — SIGNIFICANT CHANGE UP (ref 0.5–2)
LACTATE BLDV-MCNC: 2.2 MMOL/L — HIGH (ref 0.5–2)
LACTATE BLDV-MCNC: 2.2 MMOL/L — HIGH (ref 0.5–2)
LYMPHOCYTES # BLD AUTO: 2.38 K/UL — SIGNIFICANT CHANGE UP (ref 1–3.3)
LYMPHOCYTES # BLD AUTO: 20.4 % — SIGNIFICANT CHANGE UP (ref 13–44)
MCHC RBC-ENTMCNC: 26.9 PG — LOW (ref 27–34)
MCHC RBC-ENTMCNC: 33.6 GM/DL — SIGNIFICANT CHANGE UP (ref 32–36)
MCV RBC AUTO: 80 FL — SIGNIFICANT CHANGE UP (ref 80–100)
MONOCYTES # BLD AUTO: 1.3 K/UL — HIGH (ref 0–0.9)
MONOCYTES NFR BLD AUTO: 11.2 % — SIGNIFICANT CHANGE UP (ref 2–14)
NEUTROPHILS # BLD AUTO: 7.81 K/UL — HIGH (ref 1.8–7.4)
NEUTROPHILS NFR BLD AUTO: 67.1 % — SIGNIFICANT CHANGE UP (ref 43–77)
NRBC # BLD: 0 /100 WBCS — SIGNIFICANT CHANGE UP (ref 0–0)
PCO2 BLDV: 43 MMHG — SIGNIFICANT CHANGE UP (ref 42–55)
PCO2 BLDV: 44 MMHG — SIGNIFICANT CHANGE UP (ref 42–55)
PCO2 BLDV: 46 MMHG — SIGNIFICANT CHANGE UP (ref 42–55)
PCO2 BLDV: 47 MMHG — SIGNIFICANT CHANGE UP (ref 42–55)
PCO2 BLDV: 48 MMHG — SIGNIFICANT CHANGE UP (ref 42–55)
PCO2 BLDV: 48 MMHG — SIGNIFICANT CHANGE UP (ref 42–55)
PCO2 BLDV: 50 MMHG — SIGNIFICANT CHANGE UP (ref 42–55)
PCO2 BLDV: 51 MMHG — SIGNIFICANT CHANGE UP (ref 42–55)
PH BLDV: 7.28 — LOW (ref 7.32–7.43)
PH BLDV: 7.29 — LOW (ref 7.32–7.43)
PH BLDV: 7.29 — LOW (ref 7.32–7.43)
PH BLDV: 7.3 — LOW (ref 7.32–7.43)
PH BLDV: 7.32 — SIGNIFICANT CHANGE UP (ref 7.32–7.43)
PH BLDV: 7.33 — SIGNIFICANT CHANGE UP (ref 7.32–7.43)
PH BLDV: 7.36 — SIGNIFICANT CHANGE UP (ref 7.32–7.43)
PH BLDV: 7.38 — SIGNIFICANT CHANGE UP (ref 7.32–7.43)
PH BLDV: 7.39 — SIGNIFICANT CHANGE UP (ref 7.32–7.43)
PH BLDV: 7.4 — SIGNIFICANT CHANGE UP (ref 7.32–7.43)
PLATELET # BLD AUTO: 147 K/UL — LOW (ref 150–400)
PO2 BLDV: 37 MMHG — SIGNIFICANT CHANGE UP (ref 25–45)
PO2 BLDV: 39 MMHG — SIGNIFICANT CHANGE UP (ref 25–45)
PO2 BLDV: 44 MMHG — SIGNIFICANT CHANGE UP (ref 25–45)
PO2 BLDV: 49 MMHG — HIGH (ref 25–45)
PO2 BLDV: 52 MMHG — HIGH (ref 25–45)
PO2 BLDV: 55 MMHG — HIGH (ref 25–45)
PO2 BLDV: 59 MMHG — HIGH (ref 25–45)
PO2 BLDV: 62 MMHG — HIGH (ref 25–45)
PO2 BLDV: 63 MMHG — HIGH (ref 25–45)
PO2 BLDV: 64 MMHG — HIGH (ref 25–45)
POTASSIUM BLDV-SCNC: 3.8 MMOL/L — SIGNIFICANT CHANGE UP (ref 3.5–5.1)
POTASSIUM BLDV-SCNC: 4 MMOL/L — SIGNIFICANT CHANGE UP (ref 3.5–5.1)
POTASSIUM BLDV-SCNC: 4.1 MMOL/L — SIGNIFICANT CHANGE UP (ref 3.5–5.1)
POTASSIUM BLDV-SCNC: 4.2 MMOL/L — SIGNIFICANT CHANGE UP (ref 3.5–5.1)
POTASSIUM BLDV-SCNC: 4.2 MMOL/L — SIGNIFICANT CHANGE UP (ref 3.5–5.1)
POTASSIUM BLDV-SCNC: 4.3 MMOL/L — SIGNIFICANT CHANGE UP (ref 3.5–5.1)
POTASSIUM BLDV-SCNC: 4.4 MMOL/L — SIGNIFICANT CHANGE UP (ref 3.5–5.1)
POTASSIUM BLDV-SCNC: 4.4 MMOL/L — SIGNIFICANT CHANGE UP (ref 3.5–5.1)
POTASSIUM BLDV-SCNC: 4.5 MMOL/L — SIGNIFICANT CHANGE UP (ref 3.5–5.1)
POTASSIUM BLDV-SCNC: 4.8 MMOL/L — SIGNIFICANT CHANGE UP (ref 3.5–5.1)
POTASSIUM SERPL-MCNC: 4.2 MMOL/L — SIGNIFICANT CHANGE UP (ref 3.5–5.3)
POTASSIUM SERPL-SCNC: 4.2 MMOL/L — SIGNIFICANT CHANGE UP (ref 3.5–5.3)
PROT SERPL-MCNC: 5.3 G/DL — LOW (ref 6–8.3)
PROTHROM AB SERPL-ACNC: 14.3 SEC — HIGH (ref 9.5–13)
RAPIDTEG MAXIMUM AMPLITUDE: 63.6 MM — SIGNIFICANT CHANGE UP (ref 52–70)
RBC # BLD: 2.75 M/UL — LOW (ref 4.2–5.8)
RBC # FLD: 13.5 % — SIGNIFICANT CHANGE UP (ref 10.3–14.5)
SAO2 % BLDV: 60 % — LOW (ref 67–88)
SAO2 % BLDV: 70.1 % — SIGNIFICANT CHANGE UP (ref 67–88)
SAO2 % BLDV: 76.9 % — SIGNIFICANT CHANGE UP (ref 67–88)
SAO2 % BLDV: 77.1 % — SIGNIFICANT CHANGE UP (ref 67–88)
SAO2 % BLDV: 82.6 % — SIGNIFICANT CHANGE UP (ref 67–88)
SAO2 % BLDV: 87.7 % — SIGNIFICANT CHANGE UP (ref 67–88)
SAO2 % BLDV: 90.3 % — HIGH (ref 67–88)
SAO2 % BLDV: 91.5 % — HIGH (ref 67–88)
SAO2 % BLDV: 91.8 % — HIGH (ref 67–88)
SAO2 % BLDV: 91.8 % — HIGH (ref 67–88)
SODIUM SERPL-SCNC: 144 MMOL/L — SIGNIFICANT CHANGE UP (ref 135–145)
TEG FUNCTIONAL FIBRINOGEN: 22.9 MM — SIGNIFICANT CHANGE UP (ref 15–32)
TEG MAXIMUM AMPLITUDE: 65.1 MM — SIGNIFICANT CHANGE UP (ref 52–69)
TEG REACTION TIME: 14.3 MIN — HIGH (ref 4.6–9.1)
TROPONIN T, HIGH SENSITIVITY RESULT: 759 NG/L — HIGH (ref 0–51)
WBC # BLD: 11.64 K/UL — HIGH (ref 3.8–10.5)
WBC # FLD AUTO: 11.64 K/UL — HIGH (ref 3.8–10.5)

## 2024-05-03 PROCEDURE — 33518 CABG ARTERY-VEIN TWO: CPT

## 2024-05-03 PROCEDURE — 33534 CABG ARTERIAL TWO: CPT

## 2024-05-03 PROCEDURE — 35600 OPEN HRV UXTR ART 1 SGM CAB: CPT | Mod: LT

## 2024-05-03 PROCEDURE — 33508 ENDOSCOPIC VEIN HARVEST: CPT | Mod: 59

## 2024-05-03 PROCEDURE — 99291 CRITICAL CARE FIRST HOUR: CPT

## 2024-05-03 PROCEDURE — 33268 EXCL LAA OPN OTH PX ANY METH: CPT

## 2024-05-03 DEVICE — LIGATING CLIPS WECK HORIZON LARGE (ORANGE) 6: Type: IMPLANTABLE DEVICE | Status: FUNCTIONAL

## 2024-05-03 DEVICE — LIGATING CLIPS WECK HORIZON MEDIUM (BLUE) 24: Type: IMPLANTABLE DEVICE | Status: FUNCTIONAL

## 2024-05-03 DEVICE — KIT A-LINE 1LUM 20G X 12CM SAFE KIT: Type: IMPLANTABLE DEVICE | Status: FUNCTIONAL

## 2024-05-03 DEVICE — KIT CVC 2LUM MAC 9FR CHG: Type: IMPLANTABLE DEVICE | Status: FUNCTIONAL

## 2024-05-03 DEVICE — MEDIASTINAL CATH DRAIN 9MM: Type: IMPLANTABLE DEVICE | Status: FUNCTIONAL

## 2024-05-03 DEVICE — CANNULA AORTIC ROOT WITH VENT LINE 9G X 13.3CM FLANGED PRESSURE MONITORING: Type: IMPLANTABLE DEVICE | Status: FUNCTIONAL

## 2024-05-03 DEVICE — SURGICEL FIBRILLAR 2 X 4": Type: IMPLANTABLE DEVICE | Status: FUNCTIONAL

## 2024-05-03 DEVICE — CLIP APPLIER COVIDIEN SURGICLIP III 9" SM: Type: IMPLANTABLE DEVICE | Status: FUNCTIONAL

## 2024-05-03 DEVICE — CANNULA VENOUS 2 STAGE THIN FLEX 36/46FR X 1/2" WITH RETURN DIAL: Type: IMPLANTABLE DEVICE | Status: FUNCTIONAL

## 2024-05-03 DEVICE — CATH VENT VENTRICULAR PVC 18FR X 4.25" TIP PERFORATION: Type: IMPLANTABLE DEVICE | Status: FUNCTIONAL

## 2024-05-03 DEVICE — CHEST DRAIN THORACIC ARGYLE PVC 32FR RIGHT ANGLE: Type: IMPLANTABLE DEVICE | Status: FUNCTIONAL

## 2024-05-03 DEVICE — CANNULA ARTERIOTOMY 2MM X 1.3": Type: IMPLANTABLE DEVICE | Status: FUNCTIONAL

## 2024-05-03 DEVICE — LIGATING CLIPS WECK HORIZON SMALL-WIDE (RED) 24: Type: IMPLANTABLE DEVICE | Status: FUNCTIONAL

## 2024-05-03 DEVICE — PACING WIRE WHITE M-24 BAREWIRE 37MM X 89MM: Type: IMPLANTABLE DEVICE | Status: FUNCTIONAL

## 2024-05-03 DEVICE — CANNULA AORTIC PERFUSION EZ GLIDE STRAIGHT 21FR X 35CM NON-VENTED: Type: IMPLANTABLE DEVICE | Status: FUNCTIONAL

## 2024-05-03 DEVICE — ATRICLIP LAA EXCLUSION DEVICE 40MM FLEX-V: Type: IMPLANTABLE DEVICE | Status: FUNCTIONAL

## 2024-05-03 DEVICE — SURGIFLO MATRIX WITH THROMBIN KIT: Type: IMPLANTABLE DEVICE | Status: FUNCTIONAL

## 2024-05-03 DEVICE — CANNULA RETROGRADE CARDIOPLEGIA SELF-INFLATING 14FR PRE-SHAPED STYLET/HANDLE: Type: IMPLANTABLE DEVICE | Status: FUNCTIONAL

## 2024-05-03 DEVICE — CANNULA IMA 1MM BLUNT TIP: Type: IMPLANTABLE DEVICE | Status: FUNCTIONAL

## 2024-05-03 DEVICE — HEMOSORB SMALL: Type: IMPLANTABLE DEVICE | Status: FUNCTIONAL

## 2024-05-03 RX ORDER — DOBUTAMINE HCL 250MG/20ML
1 VIAL (ML) INTRAVENOUS
Qty: 500 | Refills: 0 | Status: DISCONTINUED | OUTPATIENT
Start: 2024-05-03 | End: 2024-05-04

## 2024-05-03 RX ORDER — ACETAMINOPHEN 500 MG
650 TABLET ORAL EVERY 6 HOURS
Refills: 0 | Status: COMPLETED | OUTPATIENT
Start: 2024-05-03 | End: 2024-05-06

## 2024-05-03 RX ORDER — HYDROMORPHONE HYDROCHLORIDE 2 MG/ML
0.5 INJECTION INTRAMUSCULAR; INTRAVENOUS; SUBCUTANEOUS EVERY 6 HOURS
Refills: 0 | Status: DISCONTINUED | OUTPATIENT
Start: 2024-05-03 | End: 2024-05-04

## 2024-05-03 RX ORDER — NICARDIPINE HYDROCHLORIDE 30 MG/1
5 CAPSULE, EXTENDED RELEASE ORAL
Qty: 40 | Refills: 0 | Status: DISCONTINUED | OUTPATIENT
Start: 2024-05-03 | End: 2024-05-05

## 2024-05-03 RX ORDER — MAGNESIUM SULFATE 500 MG/ML
1 VIAL (ML) INJECTION ONCE
Refills: 0 | Status: COMPLETED | OUTPATIENT
Start: 2024-05-03 | End: 2024-05-03

## 2024-05-03 RX ORDER — ALBUMIN HUMAN 25 %
250 VIAL (ML) INTRAVENOUS ONCE
Refills: 0 | Status: COMPLETED | OUTPATIENT
Start: 2024-05-03 | End: 2024-05-03

## 2024-05-03 RX ORDER — HYDROMORPHONE HYDROCHLORIDE 2 MG/ML
0.5 INJECTION INTRAMUSCULAR; INTRAVENOUS; SUBCUTANEOUS ONCE
Refills: 0 | Status: DISCONTINUED | OUTPATIENT
Start: 2024-05-03 | End: 2024-05-03

## 2024-05-03 RX ORDER — CHLORHEXIDINE GLUCONATE 213 G/1000ML
15 SOLUTION TOPICAL EVERY 12 HOURS
Refills: 0 | Status: DISCONTINUED | OUTPATIENT
Start: 2024-05-03 | End: 2024-05-04

## 2024-05-03 RX ORDER — NOREPINEPHRINE BITARTRATE/D5W 8 MG/250ML
0.02 PLASTIC BAG, INJECTION (ML) INTRAVENOUS
Qty: 8 | Refills: 0 | Status: DISCONTINUED | OUTPATIENT
Start: 2024-05-03 | End: 2024-05-04

## 2024-05-03 RX ORDER — DEXTROSE 50 % IN WATER 50 %
50 SYRINGE (ML) INTRAVENOUS
Refills: 0 | Status: DISCONTINUED | OUTPATIENT
Start: 2024-05-03 | End: 2024-05-10

## 2024-05-03 RX ORDER — SODIUM CHLORIDE 9 MG/ML
1000 INJECTION INTRAMUSCULAR; INTRAVENOUS; SUBCUTANEOUS
Refills: 0 | Status: DISCONTINUED | OUTPATIENT
Start: 2024-05-03 | End: 2024-05-09

## 2024-05-03 RX ORDER — CEFUROXIME AXETIL 250 MG
1500 TABLET ORAL EVERY 8 HOURS
Refills: 0 | Status: DISCONTINUED | OUTPATIENT
Start: 2024-05-03 | End: 2024-05-05

## 2024-05-03 RX ORDER — POTASSIUM CHLORIDE 20 MEQ
10 PACKET (EA) ORAL
Refills: 0 | Status: DISCONTINUED | OUTPATIENT
Start: 2024-05-03 | End: 2024-05-10

## 2024-05-03 RX ORDER — METOCLOPRAMIDE HCL 10 MG
10 TABLET ORAL EVERY 8 HOURS
Refills: 0 | Status: COMPLETED | OUTPATIENT
Start: 2024-05-03 | End: 2024-05-05

## 2024-05-03 RX ORDER — SUGAMMADEX 100 MG/ML
200 INJECTION, SOLUTION INTRAVENOUS ONCE
Refills: 0 | Status: DISCONTINUED | OUTPATIENT
Start: 2024-05-03 | End: 2024-05-03

## 2024-05-03 RX ORDER — AMIODARONE HYDROCHLORIDE 400 MG/1
400 TABLET ORAL
Refills: 0 | Status: COMPLETED | OUTPATIENT
Start: 2024-05-03 | End: 2024-05-06

## 2024-05-03 RX ORDER — DEXMEDETOMIDINE HYDROCHLORIDE IN 0.9% SODIUM CHLORIDE 4 UG/ML
1.5 INJECTION INTRAVENOUS
Qty: 200 | Refills: 0 | Status: DISCONTINUED | OUTPATIENT
Start: 2024-05-03 | End: 2024-05-04

## 2024-05-03 RX ORDER — SENNA PLUS 8.6 MG/1
2 TABLET ORAL AT BEDTIME
Refills: 0 | Status: DISCONTINUED | OUTPATIENT
Start: 2024-05-04 | End: 2024-05-10

## 2024-05-03 RX ORDER — CHLORHEXIDINE GLUCONATE 213 G/1000ML
1 SOLUTION TOPICAL DAILY
Refills: 0 | Status: DISCONTINUED | OUTPATIENT
Start: 2024-05-03 | End: 2024-05-10

## 2024-05-03 RX ORDER — DEXTROSE 50 % IN WATER 50 %
25 SYRINGE (ML) INTRAVENOUS
Refills: 0 | Status: DISCONTINUED | OUTPATIENT
Start: 2024-05-03 | End: 2024-05-10

## 2024-05-03 RX ORDER — OXYCODONE HYDROCHLORIDE 5 MG/1
10 TABLET ORAL EVERY 4 HOURS
Refills: 0 | Status: DISCONTINUED | OUTPATIENT
Start: 2024-05-03 | End: 2024-05-04

## 2024-05-03 RX ORDER — INSULIN HUMAN 100 [IU]/ML
3 INJECTION, SOLUTION SUBCUTANEOUS
Qty: 100 | Refills: 0 | Status: DISCONTINUED | OUTPATIENT
Start: 2024-05-03 | End: 2024-05-07

## 2024-05-03 RX ORDER — ACETAMINOPHEN 500 MG
1000 TABLET ORAL ONCE
Refills: 0 | Status: COMPLETED | OUTPATIENT
Start: 2024-05-03 | End: 2024-05-03

## 2024-05-03 RX ORDER — FENTANYL CITRATE 50 UG/ML
25 INJECTION INTRAVENOUS ONCE
Refills: 0 | Status: DISCONTINUED | OUTPATIENT
Start: 2024-05-03 | End: 2024-05-03

## 2024-05-03 RX ORDER — ASCORBIC ACID 60 MG
500 TABLET,CHEWABLE ORAL
Refills: 0 | Status: COMPLETED | OUTPATIENT
Start: 2024-05-03 | End: 2024-05-08

## 2024-05-03 RX ORDER — PANTOPRAZOLE SODIUM 20 MG/1
40 TABLET, DELAYED RELEASE ORAL DAILY
Refills: 0 | Status: COMPLETED | OUTPATIENT
Start: 2024-05-03 | End: 2024-05-04

## 2024-05-03 RX ORDER — GABAPENTIN 400 MG/1
100 CAPSULE ORAL EVERY 8 HOURS
Refills: 0 | Status: DISCONTINUED | OUTPATIENT
Start: 2024-05-03 | End: 2024-05-05

## 2024-05-03 RX ORDER — ASPIRIN/CALCIUM CARB/MAGNESIUM 324 MG
300 TABLET ORAL ONCE
Refills: 0 | Status: DISCONTINUED | OUTPATIENT
Start: 2024-05-03 | End: 2024-05-04

## 2024-05-03 RX ORDER — SUGAMMADEX 100 MG/ML
200 INJECTION, SOLUTION INTRAVENOUS ONCE
Refills: 0 | Status: COMPLETED | OUTPATIENT
Start: 2024-05-03 | End: 2024-05-03

## 2024-05-03 RX ORDER — POLYETHYLENE GLYCOL 3350 17 G/17G
17 POWDER, FOR SOLUTION ORAL DAILY
Refills: 0 | Status: DISCONTINUED | OUTPATIENT
Start: 2024-05-04 | End: 2024-05-10

## 2024-05-03 RX ORDER — OXYCODONE HYDROCHLORIDE 5 MG/1
5 TABLET ORAL EVERY 4 HOURS
Refills: 0 | Status: DISCONTINUED | OUTPATIENT
Start: 2024-05-03 | End: 2024-05-04

## 2024-05-03 RX ORDER — ASPIRIN/CALCIUM CARB/MAGNESIUM 324 MG
81 TABLET ORAL DAILY
Refills: 0 | Status: DISCONTINUED | OUTPATIENT
Start: 2024-05-03 | End: 2024-05-10

## 2024-05-03 RX ADMIN — HYDROMORPHONE HYDROCHLORIDE 0.5 MILLIGRAM(S): 2 INJECTION INTRAMUSCULAR; INTRAVENOUS; SUBCUTANEOUS at 21:58

## 2024-05-03 RX ADMIN — HYDROMORPHONE HYDROCHLORIDE 0.5 MILLIGRAM(S): 2 INJECTION INTRAMUSCULAR; INTRAVENOUS; SUBCUTANEOUS at 20:03

## 2024-05-03 RX ADMIN — Medication 10 MILLIGRAM(S): at 21:58

## 2024-05-03 RX ADMIN — HYDROMORPHONE HYDROCHLORIDE 0.5 MILLIGRAM(S): 2 INJECTION INTRAMUSCULAR; INTRAVENOUS; SUBCUTANEOUS at 17:55

## 2024-05-03 RX ADMIN — CHLORHEXIDINE GLUCONATE 15 MILLILITER(S): 213 SOLUTION TOPICAL at 17:56

## 2024-05-03 RX ADMIN — Medication 1000 MILLIGRAM(S): at 23:51

## 2024-05-03 RX ADMIN — Medication 125 MILLILITER(S): at 16:49

## 2024-05-03 RX ADMIN — HYDROMORPHONE HYDROCHLORIDE 0.5 MILLIGRAM(S): 2 INJECTION INTRAMUSCULAR; INTRAVENOUS; SUBCUTANEOUS at 20:33

## 2024-05-03 RX ADMIN — INSULIN HUMAN 3 UNIT(S)/HR: 100 INJECTION, SOLUTION SUBCUTANEOUS at 19:18

## 2024-05-03 RX ADMIN — PANTOPRAZOLE SODIUM 40 MILLIGRAM(S): 20 TABLET, DELAYED RELEASE ORAL at 06:05

## 2024-05-03 RX ADMIN — Medication 125 MILLILITER(S): at 20:11

## 2024-05-03 RX ADMIN — Medication 7.43 MICROGRAM(S)/KG/MIN: at 19:18

## 2024-05-03 RX ADMIN — SUGAMMADEX 200 MILLIGRAM(S): 100 INJECTION, SOLUTION INTRAVENOUS at 17:30

## 2024-05-03 RX ADMIN — Medication 1000 MILLIGRAM(S): at 07:42

## 2024-05-03 RX ADMIN — HYDROMORPHONE HYDROCHLORIDE 0.5 MILLIGRAM(S): 2 INJECTION INTRAMUSCULAR; INTRAVENOUS; SUBCUTANEOUS at 22:28

## 2024-05-03 RX ADMIN — Medication 100 MILLIGRAM(S): at 20:11

## 2024-05-03 RX ADMIN — CHLORHEXIDINE GLUCONATE 5 MILLILITER(S): 213 SOLUTION TOPICAL at 07:12

## 2024-05-03 RX ADMIN — Medication 100 GRAM(S): at 16:45

## 2024-05-03 RX ADMIN — HYDROMORPHONE HYDROCHLORIDE 0.5 MILLIGRAM(S): 2 INJECTION INTRAMUSCULAR; INTRAVENOUS; SUBCUTANEOUS at 16:45

## 2024-05-03 RX ADMIN — AMLODIPINE BESYLATE 10 MILLIGRAM(S): 2.5 TABLET ORAL at 06:05

## 2024-05-03 RX ADMIN — NICARDIPINE HYDROCHLORIDE 25 MG/HR: 30 CAPSULE, EXTENDED RELEASE ORAL at 20:10

## 2024-05-03 RX ADMIN — SODIUM CHLORIDE 3 MILLILITER(S): 9 INJECTION INTRAMUSCULAR; INTRAVENOUS; SUBCUTANEOUS at 07:19

## 2024-05-03 RX ADMIN — FENTANYL CITRATE 25 MICROGRAM(S): 50 INJECTION INTRAVENOUS at 19:47

## 2024-05-03 RX ADMIN — GABAPENTIN 300 MILLIGRAM(S): 400 CAPSULE ORAL at 07:12

## 2024-05-03 RX ADMIN — HYDROMORPHONE HYDROCHLORIDE 0.5 MILLIGRAM(S): 2 INJECTION INTRAMUSCULAR; INTRAVENOUS; SUBCUTANEOUS at 18:10

## 2024-05-03 RX ADMIN — HYDROMORPHONE HYDROCHLORIDE 0.5 MILLIGRAM(S): 2 INJECTION INTRAMUSCULAR; INTRAVENOUS; SUBCUTANEOUS at 16:30

## 2024-05-03 RX ADMIN — FENTANYL CITRATE 25 MICROGRAM(S): 50 INJECTION INTRAVENOUS at 20:17

## 2024-05-03 RX ADMIN — Medication 1000 MILLIGRAM(S): at 07:12

## 2024-05-03 RX ADMIN — Medication 25 MILLIGRAM(S): at 06:04

## 2024-05-03 RX ADMIN — Medication 10 MILLIGRAM(S): at 17:57

## 2024-05-03 RX ADMIN — Medication 400 MILLIGRAM(S): at 23:21

## 2024-05-03 RX ADMIN — Medication 81 MILLIGRAM(S): at 21:52
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
## 2024-05-03 NOTE — BRIEF OPERATIVE NOTE - NSICDXBRIEFPROCEDURE_GEN_ALL_CORE_FT
PROCEDURES:  CABG, 2 arterial and 2 venous 03-May-2024 16:23:49  Clarence Armas  Ligation, left atrial appendage 03-May-2024 16:24:19  Clarence Armas

## 2024-05-03 NOTE — PROGRESS NOTE ADULT - SUBJECTIVE AND OBJECTIVE BOX
Patient seen and examined at the bedside.    Remained critically ill on continuous ICU monitoring.      Brief Summary:  68 year-old male, with past history significant for Type-2 DM, HLD, HTN and Hyperkalemia, presented to the ED secondary to L-sided chest pain, shortness of breath.      24 Hour events:        OBJECTIVE:  Vital Signs Last 24 Hrs  T(C): 36.4 (03 May 2024 16:00), Max: 36.7 (03 May 2024 04:20)  T(F): 97.6 (03 May 2024 16:00), Max: 98.1 (03 May 2024 04:20)  HR: 66 (03 May 2024 17:00) (57 - 71)  BP: 144/78 (03 May 2024 16:00) (127/79 - 157/85)  BP(mean): 105 (03 May 2024 16:00) (103 - 105)  RR: 16 (03 May 2024 17:00) (16 - 18)  SpO2: 100% (03 May 2024 17:00) (95% - 100%)    Parameters below as of 03 May 2024 16:00  Patient On (Oxygen Delivery Method): ventilator    O2 Concentration (%): 100      Physical Exam:   General: sedated  Neurology: intact  ENT: trachea midline  Respiratory: on Ventilatory   CV: S1S2, no murmurs  Abdominal: Soft, NT  : Trejo   Extremities: warm, well perfused, moving all extremities   Skin: No Rashes, Hematoma, Ecchymosis         -------------------------------------------------------------------------------------------------------------------------------    Labs:                        7.4    11.64 )-----------( 147      ( 03 May 2024 16:11 )             22.0     05-03    144  |  110<H>  |  16  ----------------------------<  157<H>  4.2   |  22  |  0.90    Ca    9.1      03 May 2024 16:13    TPro  5.3<L>  /  Alb  3.5  /  TBili  0.8  /  DBili  x   /  AST  52<H>  /  ALT  18  /  AlkPhos  38<L>  05-03    LIVER FUNCTIONS - ( 03 May 2024 16:13 )  Alb: 3.5 g/dL / Pro: 5.3 g/dL / ALK PHOS: 38 U/L / ALT: 18 U/L / AST: 52 U/L / GGT: x           PT/INR - ( 03 May 2024 16:11 )   PT: 14.3 sec;   INR: 1.31 ratio         PTT - ( 03 May 2024 16:11 )  PTT:28.4 sec  ABG - ( 03 May 2024 17:11 )  pH, Arterial: 7.38  pH, Blood: x     /  pCO2: 38    /  pO2: 216   / HCO3: 22    / Base Excess: -2.4  /  SaO2: 97.2    ------------------------------------------------------------------------------------------------------------------------------  Assessment:  68 year-old male, with past history significant for Type-2 DM, HLD, HTN and Hyperkalemia, presented to the ED secondary to L-sided chest pain, shortness of breath.  Patient was admitted to telemetry service for further evaluation.  Patient had positive stress and subsequently underwent coronary angiography on 4/29/2024  via RRA--->M 70%, pLAD 70%, mLCx 70%, mRCA 90%, LVEDP 17, RRA accessed.  Patient transferred to [facility] for CABG eval d/t triple vessel disease.      CAD s/p CABG, with POPPY, ANIYA Ligation 5/3/24   Post-op respiratory failure  Post-op blood loss anemia  Thrombocytopenia  Hypovolemia    Plan:   ***Neuro***  - Post operative neuro assessment when sedation has cleared.  - Sedation as needed while intubated.   - Postoperative acute pain control with Gabapentin, Oxycodone, and Dilaudid     ***Cardiovascular***  - Invasive hemodynamic monitoring, assess perfusion indices   - At risk for hemodynamic instability and cardiac arrhythmias.  - IVF for perioperative hypovolemia.  - Wean pressors for MAP > 65 mm Hg   - IV Dobutamine for inotropic support   - ASA  - PO Amiodarone for afib prophylaxis   - Monitor chest tube output.    ***Pulmonary***  - Postoperative acute respiratory insufficiency   - Wean ventilator as tolerated.  - Deep breathing and coughing exercises.    ***GI***  - NPO for now.   - Protonix for stress ulcer prophylaxis   - Bowel regimen.    ***Renal***  - Trejo catheter for strict I/O measurements.   - Replete electrolytes as indicated.    ***ID***  - Perioperative coverage with Cefuroxime   - Trend WBC, monitor for fever.    ***Endocrine***  - DM2   - Insulin as needed to maintain euglycemia.    ***Hematology***  - Acute blood loss anemia and thrombocytopenia   - Received 1 prbc and 1 plt in the OR   - At risk for Bleeding         Patient requires continuous monitoring with bedside rhythm monitoring, pulse oximetry monitoring, and continuous central venous and arterial pressure monitoring; and intermittent blood gas analysis. Care plan discussed with the ICU care team.   Patient remained critical, at risk for life threatening decompensation.    I have spent 40 minutes providing critical care management to this patient.    By signing my name below, I, Kristy Waggoner, attest that this documentation has been prepared under the direction and in the presence of Hussain King MD  Electronically signed: Kristy Waggoner, 05-03-24 @ 17:19    I, Hussain King MD, personally performed the services described in this documentation. all medical record entries made by the scribe were at my direction and in my presence. I have reviewed the chart and agree that the record reflects my personal performance and is accurate and complete  Electronically signed: Hussain King MD Patient seen and examined at the bedside.    Remained critically ill on continuous ICU monitoring.      Brief Summary:  68 year-old male, with past history significant for Type-2 DM, HLD, HTN and Hyperkalemia, presented to the ED secondary to L-sided chest pain, shortness of breath.      24 Hour events:  - Went into the OR for C4L  - Received 1 unit pack cell in CTU: 1 PRBC and 1 plt in OR   - Plan to extubate when able       OBJECTIVE:  Vital Signs Last 24 Hrs  T(C): 36.4 (03 May 2024 16:00), Max: 36.7 (03 May 2024 04:20)  T(F): 97.6 (03 May 2024 16:00), Max: 98.1 (03 May 2024 04:20)  HR: 66 (03 May 2024 17:00) (57 - 71)  BP: 144/78 (03 May 2024 16:00) (127/79 - 157/85)  BP(mean): 105 (03 May 2024 16:00) (103 - 105)  RR: 16 (03 May 2024 17:00) (16 - 18)  SpO2: 100% (03 May 2024 17:00) (95% - 100%)    Parameters below as of 03 May 2024 16:00  Patient On (Oxygen Delivery Method): ventilator    O2 Concentration (%): 100      Physical Exam:   General: sedated  Neurology: intact  ENT: trachea midline  Respiratory: on Ventilatory   CV: S1S2, no murmurs  Abdominal: Soft, NT  : Trejo   Extremities: warm, well perfused, moving all extremities   Skin: No Rashes, Hematoma, Ecchymosis         -------------------------------------------------------------------------------------------------------------------------------    Labs:                        7.4    11.64 )-----------( 147      ( 03 May 2024 16:11 )             22.0     05-03    144  |  110<H>  |  16  ----------------------------<  157<H>  4.2   |  22  |  0.90    Ca    9.1      03 May 2024 16:13    TPro  5.3<L>  /  Alb  3.5  /  TBili  0.8  /  DBili  x   /  AST  52<H>  /  ALT  18  /  AlkPhos  38<L>  05-03    LIVER FUNCTIONS - ( 03 May 2024 16:13 )  Alb: 3.5 g/dL / Pro: 5.3 g/dL / ALK PHOS: 38 U/L / ALT: 18 U/L / AST: 52 U/L / GGT: x           PT/INR - ( 03 May 2024 16:11 )   PT: 14.3 sec;   INR: 1.31 ratio         PTT - ( 03 May 2024 16:11 )  PTT:28.4 sec  ABG - ( 03 May 2024 17:11 )  pH, Arterial: 7.38  pH, Blood: x     /  pCO2: 38    /  pO2: 216   / HCO3: 22    / Base Excess: -2.4  /  SaO2: 97.2    ------------------------------------------------------------------------------------------------------------------------------  Assessment:  68 year-old male, with past history significant for Type-2 DM, HLD, HTN and Hyperkalemia, presented to the ED secondary to L-sided chest pain, shortness of breath.  Patient was admitted to telemetry service for further evaluation.  Patient had positive stress and subsequently underwent coronary angiography on 4/29/2024  via [facility]--->M 70%, pLAD 70%, mLCx 70%, mRCA 90%, LVEDP 17, RRA accessed.  Patient transferred to [facility] for CABG eval d/t triple vessel disease.      CAD s/p CABG, with POPPY, ANIYA Ligation 5/3/24   Post-op respiratory failure  Post-op blood loss anemia  Thrombocytopenia  Hypovolemia    Plan:   ***Neuro***  - Post operative neuro assessment when sedation has cleared.  - Sedation as needed while intubated.   - Postoperative acute pain control with Gabapentin, Oxycodone, and Dilaudid     ***Cardiovascular***  - Invasive hemodynamic monitoring, assess perfusion indices   - At risk for hemodynamic instability and cardiac arrhythmias.  - IVF for perioperative hypovolemia.  - Wean pressors for MAP > 65 mm Hg   - IV Dobutamine for inotropic support   - ASA  - PO Amiodarone for afib prophylaxis   - Monitor chest tube output.    ***Pulmonary***  - Postoperative acute respiratory insufficiency   - Wean ventilator as tolerated.  - Deep breathing and coughing exercises.    ***GI***  - NPO for now.   - Protonix for stress ulcer prophylaxis   - Bowel regimen.    ***Renal***  - Trejo catheter for strict I/O measurements.   - Replete electrolytes as indicated.    ***ID***  - Perioperative coverage with Cefuroxime   - Trend WBC, monitor for fever.    ***Endocrine***  - DM2   - Insulin as needed to maintain euglycemia.    ***Hematology***  - Acute blood loss anemia and thrombocytopenia   - Received 1 prbc and 1 plt in the OR, 1 additional PRBC in CTU   - At risk for Bleeding         Patient requires continuous monitoring with bedside rhythm monitoring, pulse oximetry monitoring, and continuous central venous and arterial pressure monitoring; and intermittent blood gas analysis. Care plan discussed with the ICU care team.   Patient remained critical, at risk for life threatening decompensation.    I have spent 40 minutes providing critical care management to this patient.    By signing my name below, I, Kristy Waggoner, attest that this documentation has been prepared under the direction and in the presence of Hussain King MD  Electronically signed: Kristy Waggoner, 05-03-24 @ 17:19    I, Hussain King MD, personally performed the services described in this documentation. all medical record entries made by the scribe were at my direction and in my presence. I have reviewed the chart and agree that the record reflects my personal performance and is accurate and complete  Electronically signed: Hussain King MD Patient seen and examined at the bedside.    Remained critically ill on continuous ICU monitoring.      Brief Summary:  68 year-old male, with past history significant for Type-2 DM, HLD, HTN and Hyperkalemia, presented to the ED secondary to L-sided chest pain, shortness of breath.      24 Hour events:  s/p CABG admitted to ICU  Ongoing resuscitation, received 2 units PRBC in ICU  Extubated      OBJECTIVE:  Vital Signs Last 24 Hrs  T(C): 36.4 (03 May 2024 16:00), Max: 36.7 (03 May 2024 04:20)  T(F): 97.6 (03 May 2024 16:00), Max: 98.1 (03 May 2024 04:20)  HR: 66 (03 May 2024 17:00) (57 - 71)  BP: 144/78 (03 May 2024 16:00) (127/79 - 157/85)  BP(mean): 105 (03 May 2024 16:00) (103 - 105)  RR: 16 (03 May 2024 17:00) (16 - 18)  SpO2: 100% (03 May 2024 17:00) (95% - 100%)    Parameters below as of 03 May 2024 16:00  Patient On (Oxygen Delivery Method): ventilator    O2 Concentration (%): 100      Physical Exam:   General: awake and alert, no acute distress  Neurology: intact, no focal deficits  ENT: trachea midline  Respiratory: nonlabored respirations, symmetric expansion, no wheezing  CV: S1S2, normal rate/rhythm, normal peripheral perfusion, no LE edema  Abdominal: Soft, NT  : Trejo   Extremities: warm, well perfused, moving all extremities   Skin: No Rashes, Hematoma, Ecchymosis         -------------------------------------------------------------------------------------------------------------------------------    Labs:                        7.4    11.64 )-----------( 147      ( 03 May 2024 16:11 )             22.0     05-03    144  |  110<H>  |  16  ----------------------------<  157<H>  4.2   |  22  |  0.90    Ca    9.1      03 May 2024 16:13    TPro  5.3<L>  /  Alb  3.5  /  TBili  0.8  /  DBili  x   /  AST  52<H>  /  ALT  18  /  AlkPhos  38<L>  05-03    LIVER FUNCTIONS - ( 03 May 2024 16:13 )  Alb: 3.5 g/dL / Pro: 5.3 g/dL / ALK PHOS: 38 U/L / ALT: 18 U/L / AST: 52 U/L / GGT: x           PT/INR - ( 03 May 2024 16:11 )   PT: 14.3 sec;   INR: 1.31 ratio         PTT - ( 03 May 2024 16:11 )  PTT:28.4 sec  ABG - ( 03 May 2024 17:11 )  pH, Arterial: 7.38  pH, Blood: x     /  pCO2: 38    /  pO2: 216   / HCO3: 22    / Base Excess: -2.4  /  SaO2: 97.2    ------------------------------------------------------------------------------------------------------------------------------  Assessment:  68 year-old male, with past history significant for Type-2 DM, HLD, HTN and Hyperkalemia, presented to the ED secondary to L-sided chest pain, shortness of breath.  Patient was admitted to telemetry service for further evaluation.  Patient had positive stress and subsequently underwent coronary angiography on 4/29/2024  via [facility]--->M 70%, pLAD 70%, mLCx 70%, mRCA 90%, LVEDP 17, RRA accessed.  Patient transferred to [facility] for CABG eval d/t triple vessel disease.      CAD s/p CABG, with POPPY, ANIYA Ligation 5/3/24   Post-op respiratory failure  Post-op blood loss anemia  Thrombocytopenia  Hypovolemia    Plan:   ***Neuro***  Postoperative analgesia as appropriate   Monitor mental status, optimize sleep/wake schedule     ***Cardiovascular***  Invasive hemodynamic monitoring, assess perfusion indices    At risk for hemodynamic instability and cardiac arrhythmias, monitor hemodynamics and telemetry   Ongoing resuscitation, volume/titration of vasopressors to maintain MAP > 65mmHg, trend lactate  On inotropes, ongoing titration to maintain central venous O2 saturation > 65   S/p CABG, aspirin, beta blocker, statin as appropriate   Monitor chest tube output and DC when appropriate   Restart home meds when able     ***Pulmonary***  Postoperative acute respiratory insufficiency   Wean supplemental O2 to maintain SpO2 > 92%   Encourage IS/deep breathing     ***GI***  NPO after extubation, advance diet as tolerated   Stress ulcer prophylaxis as appropriate   Bowel regimen   PRN antiemetics as needed     ***Renal***  Trejo catheter for strict I/O measurements, monitor urine output   Monitor and replete electrolytes as needed   Trend BUN/Cr   Diuresis as appropriate     ***ID***  Perioperative antibiotics   Monitor for fevers and leukocytosis     ***Endocrine***  Hx DM2 on insulin at home  Stress Hyperglycemia, insulin as needed to maintain euglycemia.   Monitor blood sugars     ***Hematology***  At risk for bleeding and coagulopathy s/p cardiac surgery   Normocytic anemia, likely due to acute blood loss, trend H&H and transfuse as needed   VTE prophylaxis as appropriate     ***Misc***   Encourage OOB when appropriate  PT/OT when appropriate    Patient requires continuous monitoring with bedside rhythm monitoring, pulse oximetry monitoring, and continuous central venous and arterial pressure monitoring; and intermittent blood gas analysis. Care plan discussed with the ICU care team.   Patient remained critical, at risk for life threatening decompensation.    I have spent 46 minutes providing critical care management to this patient.    By signing my name below, I, Kristy Waggoner, attest that this documentation has been prepared under the direction and in the presence of Hussain King MD  Electronically signed: Kristy Waggoner, 05-03-24 @ 17:19    Hussain BOURGEOIS MD, personally performed the services described in this documentation. all medical record entries made by the scribe were at my direction and in my presence. I have reviewed the chart and agree that the record reflects my personal performance and is accurate and complete  Electronically signed: Hussain King MD

## 2024-05-03 NOTE — PROGRESS NOTE ADULT - SUBJECTIVE AND OBJECTIVE BOX
CARDIOLOGY FOLLOW UP NOTE - DR. WISDOM    Patient Name: KAILASH LAI    Date of Service: 05-03-24 @ 16:25    Patient seen and examined  s/p cabg  x 4, atriclip    Subjective:    uto    PHYSICAL EXAM:  T(C): 36.4 (05-03-24 @ 16:00), Max: 36.7 (05-03-24 @ 04:20)  HR: 71 (05-03-24 @ 16:00) (57 - 71)  BP: 144/78 (05-03-24 @ 16:00) (127/79 - 157/85)  RR: 16 (05-03-24 @ 16:00) (16 - 18)  SpO2: 100% (05-03-24 @ 16:00) (95% - 100%)  Wt(kg): --  I&O's Summary    02 May 2024 07:01  -  03 May 2024 07:00  --------------------------------------------------------  IN: 1124 mL / OUT: 600 mL / NET: 524 mL    03 May 2024 07:01  -  03 May 2024 16:25  --------------------------------------------------------  IN: 25.5 mL / OUT: 75 mL / NET: -49.5 mL      Daily Height in cm: 177.8 (03 May 2024 05:22)    Daily     Appearance: Normal	  Cardiovascular: Normal S1 S2,RRR, No JVD, No murmurs  Respiratory: Lungs clear to auscultation	  Gastrointestinal:  Soft, Non-tender, + BS	  Extremities: Normal range of motion, No clubbing, cyanosis or edema      Home Medications:  aspirin 81 mg oral delayed release tablet: 1 tab(s) orally once a day (29 Apr 2024 18:21)  atorvastatin 40 mg oral tablet: 1 tab(s) orally once a day (at bedtime) (29 Apr 2024 18:21)  insulin glargine 100 units/mL subcutaneous solution: 50 unit(s) subcutaneous once a day (at bedtime) (29 Apr 2024 18:21)  insulin lispro 100 units/mL injectable solution: 15 unit(s) injectable once a day (in the morning) BEFORE BREAKFAST (29 Apr 2024 18:21)  insulin lispro 100 units/mL injectable solution: 1 unit(s) injectable 3 times a day (before meals) 1 Unit(s) if Glucose 151 - 200  2 Unit(s) if Glucose 201 - 250  3 Unit(s) if Glucose 251 - 300  4 Unit(s) if Glucose 301 - 350  5 Unit(s) if Glucose 351 - 400  6 Unit(s) if Glucose Greater Than 400 (29 Apr 2024 18:21)  insulin lispro 100 units/mL injectable solution: 15 unit(s) injectable once a day before dinner (29 Apr 2024 18:21)  lisinopril 20 mg oral tablet: 1 tab(s) orally once a day (29 Apr 2024 02:23)  metoprolol tartrate 25 mg oral tablet: 1 tab(s) orally every 12 hours (29 Apr 2024 18:21)      MEDICATIONS  (STANDING):  acetaminophen     Tablet .. 650 milliGRAM(s) Oral every 6 hours  albumin human  5% IVPB 250 milliLiter(s) IV Intermittent once  aMIOdarone    Tablet 400 milliGRAM(s) Oral two times a day  ascorbic acid 500 milliGRAM(s) Oral two times a day  aspirin enteric coated 81 milliGRAM(s) Oral daily  aspirin Suppository 300 milliGRAM(s) Rectal once  cefuroxime  IVPB 1500 milliGRAM(s) IV Intermittent every 8 hours  chlorhexidine 0.12% Liquid 15 milliLiter(s) Oral Mucosa every 12 hours  chlorhexidine 2% Cloths 1 Application(s) Topical daily  dexMEDEtomidine Infusion 1.5 MICROgram(s)/kG/Hr (31 mL/Hr) IV Continuous <Continuous>  dextrose 50% Injectable 50 milliLiter(s) IV Push every 15 minutes  dextrose 50% Injectable 25 milliLiter(s) IV Push every 15 minutes  DOBUTamine Infusion 3 MICROgram(s)/kG/Min (7.43 mL/Hr) IV Continuous <Continuous>  gabapentin 100 milliGRAM(s) Oral every 8 hours  HYDROmorphone  Injectable 0.5 milliGRAM(s) IV Push once  insulin regular Infusion 3 Unit(s)/Hr (3 mL/Hr) IV Continuous <Continuous>  metoclopramide Injectable 10 milliGRAM(s) IV Push every 8 hours  norepinephrine Infusion 0.02 MICROgram(s)/kG/Min (3.1 mL/Hr) IV Continuous <Continuous>  pantoprazole  Injectable 40 milliGRAM(s) IV Push daily  potassium chloride  10 mEq/50 mL IVPB 10 milliEquivalent(s) IV Intermittent every 1 hour  potassium chloride  10 mEq/50 mL IVPB 10 milliEquivalent(s) IV Intermittent every 1 hour  potassium chloride  10 mEq/50 mL IVPB 10 milliEquivalent(s) IV Intermittent every 1 hour  sodium chloride 0.9%. 1000 milliLiter(s) (10 mL/Hr) IV Continuous <Continuous>      TELEMETRY: 	    ECG:  	  RADIOLOGY:   DIAGNOSTIC TESTING:  [ ] Echocardiogram:  [ ] Catheterization:  [ ] Stress Test:    OTHER: 	    LABS:	 	    CARDIAC MARKERS:        Troponin T, High Sensitivity Result: 58 ng/L (04-29 @ 05:30)                                12.1   10.53 )-----------( 213      ( 02 May 2024 08:38 )             36.1           proBNP:   PTT - ( 02 May 2024 16:38 )  PTT:52.7 sec  Lipid Profile:   HgA1c:

## 2024-05-03 NOTE — PROGRESS NOTE ADULT - ASSESSMENT
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    #CAD  -Sp cath with significant left main plus TVD   -s/p cabg x 4, atriclip 5/3  -post op care per cts    dvt ppx      52 minutes spent on total encounter; more than 50% of the visit was spent counseling and/or coordinating care by the attending physician.

## 2024-05-03 NOTE — PROGRESS NOTE ADULT - SUBJECTIVE AND OBJECTIVE BOX
Chief complaint    Patient is a 68y old  Male who presents with a chief complaint of cardiac surgery (02 May 2024 15:01)   Review of systems  Patient appears comfortable.    Labs and Fingersticks  CAPILLARY BLOOD GLUCOSE      POCT Blood Glucose.: 240 mg/dL (02 May 2024 21:36)  POCT Blood Glucose.: 150 mg/dL (02 May 2024 16:34)                                            12.1   10.53 )-----------( 213      ( 02 May 2024 08:38 )             36.1     Medications  MEDICATIONS  (STANDING):      Physical Exam  General: Patient appears comfortable.  Vital Signs Last 12 Hrs  T(F): 98.1 (05-03-24 @ 04:30), Max: 98.1 (05-03-24 @ 04:20)  HR: 66 (05-03-24 @ 05:22) (66 - 66)  BP: 154/69 (05-03-24 @ 05:22) (154/69 - 154/69)  BP(mean): 103 (05-03-24 @ 05:22) (103 - 103)  RR: 18 (05-03-24 @ 05:22) (18 - 18)  SpO2: 95% (05-03-24 @ 05:22) (95% - 95%)  Neck: No palpable thyroid nodules.  CVS: S1S2, No murmurs  Respiratory: No wheezing, no crepitations  GI: Abdomen soft, non tender.    Diagnostics        Radiology:

## 2024-05-03 NOTE — PROGRESS NOTE ADULT - ASSESSMENT
68 year-old male, with past history significant for Type-2 DM, HLD, HTN and Hyperkalemia, presented to the ED secondary to L-sided chest pain, shortness of breath.  Transferred to [facility] for CABG eval   Assessment  DMT2: 68y Male with DM T2 with hyperglycemia, A1C 8.9% , was on insulin at home, on  basal bolus insulin with coverage, blood sugars improving, NPO in OR now.  CAD: on medications, stable, monitored. preop CABG  HTN: on antihypertensive medications, monitored, asymptomatic.    Leela Feldman MD  Cell: [phone]  Office: [phone]

## 2024-05-03 NOTE — PROGRESS NOTE ADULT - ASSESSMENT
Assessment and recommendation :   chest pain coronary artery Disease   S/P cardiac catheterization triple vessel disease   ischemic cardiomyopathy   S/P CABG X 3   Acute post operative respiratory Failure  acute blood loss  anemia   DM continue glycemic control   HTN continue BP control   no evidence of thromboembolic disease   DVT prophylaxis   GI prophylaxis   critical care  time spend > 40 minutes

## 2024-05-03 NOTE — BRIEF OPERATIVE NOTE - COMMENTS
Accurate blood loss cannot be estimated due to use of cardiotomy suction and cell saver   CXR reviewed by Dr Jimenez, no unanticipated foreign bodies in the chest

## 2024-05-04 LAB
ALBUMIN SERPL ELPH-MCNC: 4.1 G/DL — SIGNIFICANT CHANGE UP (ref 3.3–5)
ALP SERPL-CCNC: 33 U/L — LOW (ref 40–120)
ALT FLD-CCNC: 19 U/L — SIGNIFICANT CHANGE UP (ref 10–45)
ANION GAP SERPL CALC-SCNC: 10 MMOL/L — SIGNIFICANT CHANGE UP (ref 5–17)
APTT BLD: 45.2 SEC — HIGH (ref 24.5–35.6)
AST SERPL-CCNC: 60 U/L — HIGH (ref 10–40)
BASE EXCESS BLDV CALC-SCNC: -1.9 MMOL/L — SIGNIFICANT CHANGE UP (ref -2–3)
BASE EXCESS BLDV CALC-SCNC: -2.2 MMOL/L — LOW (ref -2–3)
BASE EXCESS BLDV CALC-SCNC: -2.4 MMOL/L — LOW (ref -2–3)
BASOPHILS # BLD AUTO: 0.01 K/UL — SIGNIFICANT CHANGE UP (ref 0–0.2)
BASOPHILS NFR BLD AUTO: 0.1 % — SIGNIFICANT CHANGE UP (ref 0–2)
BILIRUB SERPL-MCNC: 0.7 MG/DL — SIGNIFICANT CHANGE UP (ref 0.2–1.2)
BUN SERPL-MCNC: 16 MG/DL — SIGNIFICANT CHANGE UP (ref 7–23)
CALCIUM SERPL-MCNC: 8.9 MG/DL — SIGNIFICANT CHANGE UP (ref 8.4–10.5)
CHLORIDE SERPL-SCNC: 109 MMOL/L — HIGH (ref 96–108)
CO2 BLDV-SCNC: 25 MMOL/L — SIGNIFICANT CHANGE UP (ref 22–26)
CO2 BLDV-SCNC: 26 MMOL/L — SIGNIFICANT CHANGE UP (ref 22–26)
CO2 BLDV-SCNC: 26 MMOL/L — SIGNIFICANT CHANGE UP (ref 22–26)
CO2 SERPL-SCNC: 22 MMOL/L — SIGNIFICANT CHANGE UP (ref 22–31)
CREAT SERPL-MCNC: 0.91 MG/DL — SIGNIFICANT CHANGE UP (ref 0.5–1.3)
EGFR: 93 ML/MIN/1.73M2 — SIGNIFICANT CHANGE UP
EOSINOPHIL # BLD AUTO: 0 K/UL — SIGNIFICANT CHANGE UP (ref 0–0.5)
EOSINOPHIL NFR BLD AUTO: 0 % — SIGNIFICANT CHANGE UP (ref 0–6)
FIBRINOGEN PPP-MCNC: 259 MG/DL — SIGNIFICANT CHANGE UP (ref 200–445)
GAS PNL BLDA: SIGNIFICANT CHANGE UP
GAS PNL BLDV: SIGNIFICANT CHANGE UP
GLUCOSE BLDC GLUCOMTR-MCNC: 102 MG/DL — HIGH (ref 70–99)
GLUCOSE BLDC GLUCOMTR-MCNC: 106 MG/DL — HIGH (ref 70–99)
GLUCOSE BLDC GLUCOMTR-MCNC: 113 MG/DL — HIGH (ref 70–99)
GLUCOSE BLDC GLUCOMTR-MCNC: 118 MG/DL — HIGH (ref 70–99)
GLUCOSE BLDC GLUCOMTR-MCNC: 135 MG/DL — HIGH (ref 70–99)
GLUCOSE BLDC GLUCOMTR-MCNC: 142 MG/DL — HIGH (ref 70–99)
GLUCOSE BLDC GLUCOMTR-MCNC: 143 MG/DL — HIGH (ref 70–99)
GLUCOSE BLDC GLUCOMTR-MCNC: 159 MG/DL — HIGH (ref 70–99)
GLUCOSE BLDC GLUCOMTR-MCNC: 159 MG/DL — HIGH (ref 70–99)
GLUCOSE BLDC GLUCOMTR-MCNC: 166 MG/DL — HIGH (ref 70–99)
GLUCOSE BLDC GLUCOMTR-MCNC: 168 MG/DL — HIGH (ref 70–99)
GLUCOSE BLDC GLUCOMTR-MCNC: 175 MG/DL — HIGH (ref 70–99)
GLUCOSE BLDC GLUCOMTR-MCNC: 179 MG/DL — HIGH (ref 70–99)
GLUCOSE BLDC GLUCOMTR-MCNC: 184 MG/DL — HIGH (ref 70–99)
GLUCOSE BLDC GLUCOMTR-MCNC: 97 MG/DL — SIGNIFICANT CHANGE UP (ref 70–99)
GLUCOSE SERPL-MCNC: 116 MG/DL — HIGH (ref 70–99)
HCO3 BLDV-SCNC: 24 MMOL/L — SIGNIFICANT CHANGE UP (ref 22–29)
HCT VFR BLD CALC: 28.4 % — LOW (ref 39–50)
HGB BLD-MCNC: 9.9 G/DL — LOW (ref 13–17)
HOROWITZ INDEX BLDV+IHG-RTO: 44 — SIGNIFICANT CHANGE UP
IMM GRANULOCYTES NFR BLD AUTO: 0.5 % — SIGNIFICANT CHANGE UP (ref 0–0.9)
INR BLD: 1.22 RATIO — HIGH (ref 0.85–1.18)
LYMPHOCYTES # BLD AUTO: 0.64 K/UL — LOW (ref 1–3.3)
LYMPHOCYTES # BLD AUTO: 7.5 % — LOW (ref 13–44)
MAGNESIUM SERPL-MCNC: 2 MG/DL — SIGNIFICANT CHANGE UP (ref 1.6–2.6)
MCHC RBC-ENTMCNC: 27.9 PG — SIGNIFICANT CHANGE UP (ref 27–34)
MCHC RBC-ENTMCNC: 34.9 GM/DL — SIGNIFICANT CHANGE UP (ref 32–36)
MCV RBC AUTO: 80 FL — SIGNIFICANT CHANGE UP (ref 80–100)
MONOCYTES # BLD AUTO: 1.06 K/UL — HIGH (ref 0–0.9)
MONOCYTES NFR BLD AUTO: 12.4 % — SIGNIFICANT CHANGE UP (ref 2–14)
NEUTROPHILS # BLD AUTO: 6.83 K/UL — SIGNIFICANT CHANGE UP (ref 1.8–7.4)
NEUTROPHILS NFR BLD AUTO: 79.5 % — HIGH (ref 43–77)
NRBC # BLD: 0 /100 WBCS — SIGNIFICANT CHANGE UP (ref 0–0)
PCO2 BLDV: 45 MMHG — SIGNIFICANT CHANGE UP (ref 42–55)
PCO2 BLDV: 46 MMHG — SIGNIFICANT CHANGE UP (ref 42–55)
PCO2 BLDV: 47 MMHG — SIGNIFICANT CHANGE UP (ref 42–55)
PH BLDV: 7.32 — SIGNIFICANT CHANGE UP (ref 7.32–7.43)
PH BLDV: 7.33 — SIGNIFICANT CHANGE UP (ref 7.32–7.43)
PH BLDV: 7.33 — SIGNIFICANT CHANGE UP (ref 7.32–7.43)
PHOSPHATE SERPL-MCNC: 3 MG/DL — SIGNIFICANT CHANGE UP (ref 2.5–4.5)
PLATELET # BLD AUTO: 127 K/UL — LOW (ref 150–400)
PO2 BLDV: 42 MMHG — SIGNIFICANT CHANGE UP (ref 25–45)
PO2 BLDV: 44 MMHG — SIGNIFICANT CHANGE UP (ref 25–45)
PO2 BLDV: 46 MMHG — HIGH (ref 25–45)
POTASSIUM SERPL-MCNC: 4.1 MMOL/L — SIGNIFICANT CHANGE UP (ref 3.5–5.3)
POTASSIUM SERPL-SCNC: 4.1 MMOL/L — SIGNIFICANT CHANGE UP (ref 3.5–5.3)
PROT SERPL-MCNC: 5.7 G/DL — LOW (ref 6–8.3)
PROTHROM AB SERPL-ACNC: 13.3 SEC — HIGH (ref 9.5–13)
RBC # BLD: 3.55 M/UL — LOW (ref 4.2–5.8)
RBC # FLD: 13.7 % — SIGNIFICANT CHANGE UP (ref 10.3–14.5)
SAO2 % BLDV: 74.8 % — SIGNIFICANT CHANGE UP (ref 67–88)
SAO2 % BLDV: 76.3 % — SIGNIFICANT CHANGE UP (ref 67–88)
SAO2 % BLDV: 77.1 % — SIGNIFICANT CHANGE UP (ref 67–88)
SODIUM SERPL-SCNC: 141 MMOL/L — SIGNIFICANT CHANGE UP (ref 135–145)
WBC # BLD: 8.58 K/UL — SIGNIFICANT CHANGE UP (ref 3.8–10.5)
WBC # FLD AUTO: 8.58 K/UL — SIGNIFICANT CHANGE UP (ref 3.8–10.5)

## 2024-05-04 PROCEDURE — 71045 X-RAY EXAM CHEST 1 VIEW: CPT | Mod: 26

## 2024-05-04 PROCEDURE — 93010 ELECTROCARDIOGRAM REPORT: CPT

## 2024-05-04 PROCEDURE — 99291 CRITICAL CARE FIRST HOUR: CPT

## 2024-05-04 RX ORDER — ACETAMINOPHEN 500 MG
1000 TABLET ORAL ONCE
Refills: 0 | Status: COMPLETED | OUTPATIENT
Start: 2024-05-04 | End: 2024-05-04

## 2024-05-04 RX ORDER — AMLODIPINE BESYLATE 2.5 MG/1
5 TABLET ORAL DAILY
Refills: 0 | Status: DISCONTINUED | OUTPATIENT
Start: 2024-05-04 | End: 2024-05-06

## 2024-05-04 RX ORDER — NALOXONE HYDROCHLORIDE 4 MG/.1ML
0.1 SPRAY NASAL
Refills: 0 | Status: DISCONTINUED | OUTPATIENT
Start: 2024-05-04 | End: 2024-05-07

## 2024-05-04 RX ORDER — ATORVASTATIN CALCIUM 80 MG/1
80 TABLET, FILM COATED ORAL AT BEDTIME
Refills: 0 | Status: DISCONTINUED | OUTPATIENT
Start: 2024-05-04 | End: 2024-05-07

## 2024-05-04 RX ORDER — INSULIN LISPRO 100/ML
VIAL (ML) SUBCUTANEOUS
Refills: 0 | Status: DISCONTINUED | OUTPATIENT
Start: 2024-05-04 | End: 2024-05-10

## 2024-05-04 RX ORDER — ALBUTEROL 90 UG/1
2 AEROSOL, METERED ORAL EVERY 6 HOURS
Refills: 0 | Status: DISCONTINUED | OUTPATIENT
Start: 2024-05-04 | End: 2024-05-06

## 2024-05-04 RX ORDER — AMLODIPINE BESYLATE 2.5 MG/1
5 TABLET ORAL DAILY
Refills: 0 | Status: DISCONTINUED | OUTPATIENT
Start: 2024-05-04 | End: 2024-05-04

## 2024-05-04 RX ORDER — METOPROLOL TARTRATE 50 MG
12.5 TABLET ORAL EVERY 12 HOURS
Refills: 0 | Status: DISCONTINUED | OUTPATIENT
Start: 2024-05-04 | End: 2024-05-04

## 2024-05-04 RX ORDER — IPRATROPIUM/ALBUTEROL SULFATE 18-103MCG
3 AEROSOL WITH ADAPTER (GRAM) INHALATION EVERY 6 HOURS
Refills: 0 | Status: DISCONTINUED | OUTPATIENT
Start: 2024-05-04 | End: 2024-05-06

## 2024-05-04 RX ORDER — HYDROMORPHONE HYDROCHLORIDE 2 MG/ML
0.5 INJECTION INTRAMUSCULAR; INTRAVENOUS; SUBCUTANEOUS ONCE
Refills: 0 | Status: DISCONTINUED | OUTPATIENT
Start: 2024-05-04 | End: 2024-05-04

## 2024-05-04 RX ORDER — INSULIN GLARGINE 100 [IU]/ML
25 INJECTION, SOLUTION SUBCUTANEOUS EVERY MORNING
Refills: 0 | Status: DISCONTINUED | OUTPATIENT
Start: 2024-05-04 | End: 2024-05-05

## 2024-05-04 RX ORDER — FUROSEMIDE 40 MG
40 TABLET ORAL ONCE
Refills: 0 | Status: COMPLETED | OUTPATIENT
Start: 2024-05-04 | End: 2024-05-04

## 2024-05-04 RX ORDER — INSULIN LISPRO 100/ML
7 VIAL (ML) SUBCUTANEOUS
Refills: 0 | Status: DISCONTINUED | OUTPATIENT
Start: 2024-05-04 | End: 2024-05-05

## 2024-05-04 RX ORDER — MAGNESIUM SULFATE 500 MG/ML
2 VIAL (ML) INJECTION ONCE
Refills: 0 | Status: COMPLETED | OUTPATIENT
Start: 2024-05-04 | End: 2024-05-04

## 2024-05-04 RX ORDER — ENOXAPARIN SODIUM 100 MG/ML
40 INJECTION SUBCUTANEOUS EVERY 24 HOURS
Refills: 0 | Status: DISCONTINUED | OUTPATIENT
Start: 2024-05-04 | End: 2024-05-05

## 2024-05-04 RX ORDER — FUROSEMIDE 40 MG
20 TABLET ORAL ONCE
Refills: 0 | Status: COMPLETED | OUTPATIENT
Start: 2024-05-04 | End: 2024-05-04

## 2024-05-04 RX ORDER — METOPROLOL TARTRATE 50 MG
25 TABLET ORAL EVERY 8 HOURS
Refills: 0 | Status: DISCONTINUED | OUTPATIENT
Start: 2024-05-04 | End: 2024-05-05

## 2024-05-04 RX ORDER — PANTOPRAZOLE SODIUM 20 MG/1
40 TABLET, DELAYED RELEASE ORAL
Refills: 0 | Status: DISCONTINUED | OUTPATIENT
Start: 2024-05-05 | End: 2024-05-10

## 2024-05-04 RX ORDER — HYDROMORPHONE HYDROCHLORIDE 2 MG/ML
30 INJECTION INTRAMUSCULAR; INTRAVENOUS; SUBCUTANEOUS
Refills: 0 | Status: DISCONTINUED | OUTPATIENT
Start: 2024-05-04 | End: 2024-05-05

## 2024-05-04 RX ORDER — ONDANSETRON 8 MG/1
4 TABLET, FILM COATED ORAL EVERY 6 HOURS
Refills: 0 | Status: DISCONTINUED | OUTPATIENT
Start: 2024-05-04 | End: 2024-05-07

## 2024-05-04 RX ADMIN — AMLODIPINE BESYLATE 5 MILLIGRAM(S): 2.5 TABLET ORAL at 16:18

## 2024-05-04 RX ADMIN — HYDROMORPHONE HYDROCHLORIDE 0.5 MILLIGRAM(S): 2 INJECTION INTRAMUSCULAR; INTRAVENOUS; SUBCUTANEOUS at 03:11

## 2024-05-04 RX ADMIN — HYDROMORPHONE HYDROCHLORIDE 0.5 MILLIGRAM(S): 2 INJECTION INTRAMUSCULAR; INTRAVENOUS; SUBCUTANEOUS at 12:00

## 2024-05-04 RX ADMIN — Medication 1000 MILLIGRAM(S): at 05:55

## 2024-05-04 RX ADMIN — Medication 12.5 MILLIGRAM(S): at 08:57

## 2024-05-04 RX ADMIN — PANTOPRAZOLE SODIUM 40 MILLIGRAM(S): 20 TABLET, DELAYED RELEASE ORAL at 11:17

## 2024-05-04 RX ADMIN — HYDROMORPHONE HYDROCHLORIDE 0.5 MILLIGRAM(S): 2 INJECTION INTRAMUSCULAR; INTRAVENOUS; SUBCUTANEOUS at 03:41

## 2024-05-04 RX ADMIN — Medication 3 MILLILITER(S): at 23:18

## 2024-05-04 RX ADMIN — INSULIN GLARGINE 25 UNIT(S): 100 INJECTION, SOLUTION SUBCUTANEOUS at 13:03

## 2024-05-04 RX ADMIN — Medication 20 MILLIGRAM(S): at 04:49

## 2024-05-04 RX ADMIN — INSULIN HUMAN 3 UNIT(S)/HR: 100 INJECTION, SOLUTION SUBCUTANEOUS at 08:58

## 2024-05-04 RX ADMIN — Medication 650 MILLIGRAM(S): at 17:19

## 2024-05-04 RX ADMIN — AMIODARONE HYDROCHLORIDE 400 MILLIGRAM(S): 400 TABLET ORAL at 05:14

## 2024-05-04 RX ADMIN — HYDROMORPHONE HYDROCHLORIDE 0.5 MILLIGRAM(S): 2 INJECTION INTRAMUSCULAR; INTRAVENOUS; SUBCUTANEOUS at 11:25

## 2024-05-04 RX ADMIN — Medication 400 MILLIGRAM(S): at 05:25

## 2024-05-04 RX ADMIN — Medication 81 MILLIGRAM(S): at 11:17

## 2024-05-04 RX ADMIN — Medication 650 MILLIGRAM(S): at 18:00

## 2024-05-04 RX ADMIN — OXYCODONE HYDROCHLORIDE 5 MILLIGRAM(S): 5 TABLET ORAL at 14:56

## 2024-05-04 RX ADMIN — HYDROMORPHONE HYDROCHLORIDE 0.5 MILLIGRAM(S): 2 INJECTION INTRAMUSCULAR; INTRAVENOUS; SUBCUTANEOUS at 18:00

## 2024-05-04 RX ADMIN — Medication 3 MILLILITER(S): at 17:06

## 2024-05-04 RX ADMIN — OXYCODONE HYDROCHLORIDE 10 MILLIGRAM(S): 5 TABLET ORAL at 10:15

## 2024-05-04 RX ADMIN — SENNA PLUS 2 TABLET(S): 8.6 TABLET ORAL at 21:10

## 2024-05-04 RX ADMIN — Medication 40 MILLIGRAM(S): at 18:14

## 2024-05-04 RX ADMIN — HYDROMORPHONE HYDROCHLORIDE 30 MILLILITER(S): 2 INJECTION INTRAMUSCULAR; INTRAVENOUS; SUBCUTANEOUS at 19:39

## 2024-05-04 RX ADMIN — OXYCODONE HYDROCHLORIDE 10 MILLIGRAM(S): 5 TABLET ORAL at 09:27

## 2024-05-04 RX ADMIN — ATORVASTATIN CALCIUM 80 MILLIGRAM(S): 80 TABLET, FILM COATED ORAL at 21:24

## 2024-05-04 RX ADMIN — Medication 100 MILLIGRAM(S): at 04:14

## 2024-05-04 RX ADMIN — Medication 650 MILLIGRAM(S): at 11:17

## 2024-05-04 RX ADMIN — Medication 500 MILLIGRAM(S): at 05:14

## 2024-05-04 RX ADMIN — OXYCODONE HYDROCHLORIDE 5 MILLIGRAM(S): 5 TABLET ORAL at 16:00

## 2024-05-04 RX ADMIN — CHLORHEXIDINE GLUCONATE 1 APPLICATION(S): 213 SOLUTION TOPICAL at 21:56

## 2024-05-04 RX ADMIN — Medication 10 MILLIGRAM(S): at 13:05

## 2024-05-04 RX ADMIN — NICARDIPINE HYDROCHLORIDE 25 MG/HR: 30 CAPSULE, EXTENDED RELEASE ORAL at 08:58

## 2024-05-04 RX ADMIN — GABAPENTIN 100 MILLIGRAM(S): 400 CAPSULE ORAL at 05:14

## 2024-05-04 RX ADMIN — CHLORHEXIDINE GLUCONATE 15 MILLILITER(S): 213 SOLUTION TOPICAL at 05:14

## 2024-05-04 RX ADMIN — GABAPENTIN 100 MILLIGRAM(S): 400 CAPSULE ORAL at 21:10

## 2024-05-04 RX ADMIN — ENOXAPARIN SODIUM 40 MILLIGRAM(S): 100 INJECTION SUBCUTANEOUS at 18:16

## 2024-05-04 RX ADMIN — Medication 3 MILLILITER(S): at 11:37

## 2024-05-04 RX ADMIN — OXYCODONE HYDROCHLORIDE 10 MILLIGRAM(S): 5 TABLET ORAL at 05:55

## 2024-05-04 RX ADMIN — GABAPENTIN 100 MILLIGRAM(S): 400 CAPSULE ORAL at 13:04

## 2024-05-04 RX ADMIN — SODIUM CHLORIDE 10 MILLILITER(S): 9 INJECTION INTRAMUSCULAR; INTRAVENOUS; SUBCUTANEOUS at 21:10

## 2024-05-04 RX ADMIN — Medication 100 MILLIGRAM(S): at 11:46

## 2024-05-04 RX ADMIN — Medication 500 MILLIGRAM(S): at 17:19

## 2024-05-04 RX ADMIN — HYDROMORPHONE HYDROCHLORIDE 0.5 MILLIGRAM(S): 2 INJECTION INTRAMUSCULAR; INTRAVENOUS; SUBCUTANEOUS at 13:00

## 2024-05-04 RX ADMIN — INSULIN HUMAN 3 UNIT(S)/HR: 100 INJECTION, SOLUTION SUBCUTANEOUS at 09:29

## 2024-05-04 RX ADMIN — NICARDIPINE HYDROCHLORIDE 25 MG/HR: 30 CAPSULE, EXTENDED RELEASE ORAL at 09:30

## 2024-05-04 RX ADMIN — Medication 10 MILLIGRAM(S): at 21:11

## 2024-05-04 RX ADMIN — Medication 40 MILLIGRAM(S): at 14:19

## 2024-05-04 RX ADMIN — AMIODARONE HYDROCHLORIDE 400 MILLIGRAM(S): 400 TABLET ORAL at 17:19

## 2024-05-04 RX ADMIN — HYDROMORPHONE HYDROCHLORIDE 0.5 MILLIGRAM(S): 2 INJECTION INTRAMUSCULAR; INTRAVENOUS; SUBCUTANEOUS at 12:34

## 2024-05-04 RX ADMIN — Medication 25 MILLIGRAM(S): at 13:08

## 2024-05-04 RX ADMIN — OXYCODONE HYDROCHLORIDE 10 MILLIGRAM(S): 5 TABLET ORAL at 05:24

## 2024-05-04 RX ADMIN — HYDROMORPHONE HYDROCHLORIDE 0.5 MILLIGRAM(S): 2 INJECTION INTRAMUSCULAR; INTRAVENOUS; SUBCUTANEOUS at 17:19

## 2024-05-04 RX ADMIN — Medication 25 MILLIGRAM(S): at 21:10

## 2024-05-04 RX ADMIN — Medication 100 MILLIGRAM(S): at 21:10

## 2024-05-04 RX ADMIN — Medication 10 MILLIGRAM(S): at 05:14

## 2024-05-04 RX ADMIN — HYDROMORPHONE HYDROCHLORIDE 0.5 MILLIGRAM(S): 2 INJECTION INTRAMUSCULAR; INTRAVENOUS; SUBCUTANEOUS at 01:13

## 2024-05-04 RX ADMIN — Medication 25 GRAM(S): at 03:34

## 2024-05-04 RX ADMIN — HYDROMORPHONE HYDROCHLORIDE 0.5 MILLIGRAM(S): 2 INJECTION INTRAMUSCULAR; INTRAVENOUS; SUBCUTANEOUS at 16:18

## 2024-05-04 RX ADMIN — HYDROMORPHONE HYDROCHLORIDE 0.5 MILLIGRAM(S): 2 INJECTION INTRAMUSCULAR; INTRAVENOUS; SUBCUTANEOUS at 00:43

## 2024-05-04 RX ADMIN — Medication 20 MILLIGRAM(S): at 10:59

## 2024-05-04 RX ADMIN — Medication 650 MILLIGRAM(S): at 12:00

## 2024-05-04 RX ADMIN — POLYETHYLENE GLYCOL 3350 17 GRAM(S): 17 POWDER, FOR SOLUTION ORAL at 11:17

## 2024-05-04 NOTE — PHYSICAL THERAPY INITIAL EVALUATION ADULT - FUNCTIONAL LIMITATIONS, PT EVAL
self-care/home management/community/leisure Clear bilaterally, pupils equal, round and reactive to light.

## 2024-05-04 NOTE — PROGRESS NOTE ADULT - SUBJECTIVE AND OBJECTIVE BOX
Chief complaint    Patient is a 68y old  Male who presents with a chief complaint of cardiac surgery (04 May 2024 10:11)   Review of systems  Patient appears comfortable.    Labs and Fingersticks  CAPILLARY BLOOD GLUCOSE  158 (04 May 2024 11:00)  179 (04 May 2024 09:00)  168 (04 May 2024 08:00)  175 (04 May 2024 07:00)  184 (04 May 2024 06:00)  132 (04 May 2024 04:00)  113 (04 May 2024 03:00)  97 (04 May 2024 02:00)  106 (04 May 2024 01:00)  118 (04 May 2024 00:00)  113 (03 May 2024 23:00)  129 (03 May 2024 22:00)  135 (03 May 2024 21:00)  138 (03 May 2024 20:00)  165 (03 May 2024 19:00)  157 (03 May 2024 18:00)  176 (03 May 2024 17:00)      POCT Blood Glucose.: 159 mg/dL (04 May 2024 11:01)  POCT Blood Glucose.: 166 mg/dL (04 May 2024 10:13)  POCT Blood Glucose.: 179 mg/dL (04 May 2024 09:00)  POCT Blood Glucose.: 168 mg/dL (04 May 2024 08:03)  POCT Blood Glucose.: 175 mg/dL (04 May 2024 06:47)  POCT Blood Glucose.: 184 mg/dL (04 May 2024 05:58)  POCT Blood Glucose.: 113 mg/dL (04 May 2024 02:53)  POCT Blood Glucose.: 97 mg/dL (04 May 2024 01:58)  POCT Blood Glucose.: 106 mg/dL (04 May 2024 00:52)  POCT Blood Glucose.: 118 mg/dL (03 May 2024 23:50)  POCT Blood Glucose.: 113 mg/dL (03 May 2024 23:17)  POCT Blood Glucose.: 129 mg/dL (03 May 2024 22:05)  POCT Blood Glucose.: 135 mg/dL (03 May 2024 21:03)  POCT Blood Glucose.: 165 mg/dL (03 May 2024 18:49)  POCT Blood Glucose.: 157 mg/dL (03 May 2024 18:03)  POCT Blood Glucose.: 176 mg/dL (03 May 2024 17:03)      Anion Gap: 10 (05-04 @ 00:17)  Anion Gap: 12 (05-03 @ 16:13)      Calcium: 8.9 (05-04 @ 00:17)  Calcium: 9.1 (05-03 @ 16:13)  Albumin: 4.1 (05-04 @ 00:17)  Albumin: 3.5 (05-03 @ 16:13)    Alanine Aminotransferase (ALT/SGPT): 19 (05-04 @ 00:17)  Alanine Aminotransferase (ALT/SGPT): 18 (05-03 @ 16:13)  Alkaline Phosphatase: 33 *L* (05-04 @ 00:17)  Alkaline Phosphatase: 38 *L* (05-03 @ 16:13)  Aspartate Aminotransferase (AST/SGOT): 60 *H* (05-04 @ 00:17)  Aspartate Aminotransferase (AST/SGOT): 52 *H* (05-03 @ 16:13)        05-04    141  |  109<H>  |  16  ----------------------------<  116<H>  4.1   |  22  |  0.91    Ca    8.9      04 May 2024 00:17  Phos  3.0     05-04  Mg     2.0     05-04    TPro  5.7<L>  /  Alb  4.1  /  TBili  0.7  /  DBili  x   /  AST  60<H>  /  ALT  19  /  AlkPhos  33<L>  05-04                        9.9    8.58  )-----------( 127      ( 04 May 2024 00:17 )             28.4     Medications  MEDICATIONS  (STANDING):  acetaminophen     Tablet .. 650 milliGRAM(s) Oral every 6 hours  albuterol    90 MICROgram(s) HFA Inhaler 2 Puff(s) Inhalation every 6 hours  albuterol/ipratropium for Nebulization 3 milliLiter(s) Nebulizer every 6 hours  aMIOdarone    Tablet 400 milliGRAM(s) Oral two times a day  amLODIPine   Tablet 5 milliGRAM(s) Oral daily  ascorbic acid 500 milliGRAM(s) Oral two times a day  aspirin enteric coated 81 milliGRAM(s) Oral daily  atorvastatin 80 milliGRAM(s) Oral at bedtime  cefuroxime  IVPB 1500 milliGRAM(s) IV Intermittent every 8 hours  chlorhexidine 2% Cloths 1 Application(s) Topical daily  dexMEDEtomidine Infusion 1.5 MICROgram(s)/kG/Hr (31 mL/Hr) IV Continuous <Continuous>  dextrose 50% Injectable 50 milliLiter(s) IV Push every 15 minutes  dextrose 50% Injectable 25 milliLiter(s) IV Push every 15 minutes  furosemide   Injectable 20 milliGRAM(s) IV Push once  gabapentin 100 milliGRAM(s) Oral every 8 hours  insulin glargine Injectable (LANTUS) 25 Unit(s) SubCutaneous every morning  insulin lispro (ADMELOG) corrective regimen sliding scale   SubCutaneous three times a day before meals  insulin lispro Injectable (ADMELOG) 7 Unit(s) SubCutaneous three times a day before meals  insulin regular Infusion 3 Unit(s)/Hr (3 mL/Hr) IV Continuous <Continuous>  metoclopramide Injectable 10 milliGRAM(s) IV Push every 8 hours  metoprolol tartrate 25 milliGRAM(s) Oral every 8 hours  niCARdipine Infusion 5 mG/Hr (25 mL/Hr) IV Continuous <Continuous>  norepinephrine Infusion 0.02 MICROgram(s)/kG/Min (3.1 mL/Hr) IV Continuous <Continuous>  pantoprazole  Injectable 40 milliGRAM(s) IV Push daily  polyethylene glycol 3350 17 Gram(s) Oral daily  potassium chloride  10 mEq/50 mL IVPB 10 milliEquivalent(s) IV Intermittent every 1 hour  potassium chloride  10 mEq/50 mL IVPB 10 milliEquivalent(s) IV Intermittent every 1 hour  potassium chloride  10 mEq/50 mL IVPB 10 milliEquivalent(s) IV Intermittent every 1 hour  senna 2 Tablet(s) Oral at bedtime  sodium chloride 0.9%. 1000 milliLiter(s) (10 mL/Hr) IV Continuous <Continuous>      Physical Exam  General: Patient appears comfortable.  Vital Signs Last 12 Hrs  T(F): 98.7 (05-04-24 @ 08:00), Max: 99 (05-04-24 @ 04:00)  HR: 79 (05-04-24 @ 11:00) (73 - 88)  BP: 159/73 (05-04-24 @ 11:00) (119/62 - 160/72)  BP(mean): 105 (05-04-24 @ 11:00) (86 - 105)  RR: 28 (05-04-24 @ 11:00) (12 - 47)  SpO2: 94% (05-04-24 @ 11:00) (92% - 97%)  Neck: No palpable thyroid nodules.  CVS: S1S2, No murmurs  Respiratory: No wheezing, no crepitations  GI: Abdomen soft, non tender.    Diagnostics        Radiology:

## 2024-05-04 NOTE — PHYSICAL THERAPY INITIAL EVALUATION ADULT - ADDITIONAL COMMENTS
Pt lives in a pvt home has 5 steps at entry, +RHR. independent at baseline. Pt lives in a pvt home has 5 steps at entry, +RHR. independent at baseline. Prior to admission pt reports being independent of all ADL's & functional mobility without AD.

## 2024-05-04 NOTE — PHYSICAL THERAPY INITIAL EVALUATION ADULT - GENERAL OBSERVATIONS, REHAB EVAL
Pt was found in semi supine on room air, +IV-hep gtt, +spouse
Pt received seated in chair, A&Ox3, +4L O2 via NC, +external pacer, +venodynes, +3 chest tubes, +vasquez, +central line, +IVL, +A-line, +cardiac monitor, agreeable to physical therapy kamaljit saleh 45 min.

## 2024-05-04 NOTE — PHYSICAL THERAPY INITIAL EVALUATION ADULT - PERTINENT HX OF CURRENT PROBLEM, REHAB EVAL
68 year-old male, with past history significant for Type-2 DM, HLD, HTN and Hyperkalemia, presented to the ED secondary to L-sided chest pain, shortness of breath.  Patient was admitted to telemetry service for further evaluation.  Patient had positive stress and subsequently underwent coronary angiography on 4/29/2024  via RRA--->M 70%, pLAD 70%, mLCx 70%, mRCA 90%, LVEDP 17, RRA accessed.  Patient transferred to [facility] for CABG eval d/t triple vessel disease. Pt s/p CABG x 4 (LIMA - LAD, Radial - PDA, SVG - OM, SVG - Diag) with ANIYA clip and extubation on 5/3/24.

## 2024-05-04 NOTE — PHYSICAL THERAPY INITIAL EVALUATION ADULT - DIAGNOSIS, PT EVAL
Pt with decreased strength, endurance and balance leading to mild impairment in functional mobility.
impaired mobility

## 2024-05-04 NOTE — PROGRESS NOTE ADULT - SUBJECTIVE AND OBJECTIVE BOX
Patient seen and examined at the bedside.    Remained critically ill on continuous ICU monitoring.    OBJECTIVE:  Vital Signs Last 24 Hrs  T(C): 37.1 (04 May 2024 08:00), Max: 37.2 (04 May 2024 04:00)  T(F): 98.7 (04 May 2024 08:00), Max: 99 (04 May 2024 04:00)  HR: 80 (04 May 2024 10:00) (63 - 90)  BP: 149/67 (04 May 2024 10:00) (119/62 - 160/72)  BP(mean): 97 (04 May 2024 10:00) (85 - 105)  RR: 28 (04 May 2024 10:00) (12 - 47)  SpO2: 96% (04 May 2024 10:00) (92% - 100%)    Parameters below as of 04 May 2024 08:00  Patient On (Oxygen Delivery Method): nasal cannula    O2 Concentration (%): 4      Physical Exam:   General: NAD   Neurology: Sedated  Eyes: bilateral pupils equal and reactive   ENT/Neck: Neck supple, trachea midline, No JVD   Respiratory: Clear bilaterally   CV: S1S2, no murmurs        [x] Sternal dressing, [x] Mediastinal CTx2, [x] L. Pleural CT        [x] Sinus rhythm  Abdominal: Soft, NT, ND +BS   Extremities: 1-2+ pedal edema noted, + peripheral pulses   Skin: No Rashes, Hematoma, Ecchymosis                           Assessment:  68 year-old male, with past history significant for Type-2 DM, HLD, HTN and Hyperkalemia, presented to the ED secondary to L-sided chest pain, shortness of breath.  Patient was admitted to telemetry service for further evaluation.  Patient had positive stress and subsequently underwent coronary angiography on 4/29/2024  via [facility]--->M 70%, pLAD 70%, mLCx 70%, mRCA 90%, LVEDP 17, RRA accessed.  Patient transferred to [facility] for CABG eval d/t triple vessel disease.    CAD s/p CABG, with POPPY, ANIYA Ligation on 5/3/24   Post-op respiratory failure  Post-op blood loss anemia  Thrombocytopenia  Hypovolemia    Plan:   ***Neuro***  [x] Sedated with [x] Precedex   Post operative neuro assessment   Tylenol, Gabapentin, PRN Oxycodone, and PRN Dilaudid for pain management.     ***Cardiovascular***  Invasive hemodynamic monitoring, assess perfusion indices   SR / CVP 75 / MAP 11 / Hct 28.4% / Lactate 1.2   [x] Levophed - 0.02 mcg/kG/Min   Continuos reassessment of hemodynamics  Amiodarone for AFib prophylaxis   Norvasc and Cardene for afterload reduction  Continue BB  Monitor chest tube outputs   [x]  ASA [x] Statin   Serial EKG and cardiac enzymes     ***Pulmonary***  [x] NC 4L  Post op vent management   Titration of FiO2 and PEEP, follow SpO2, CXR, blood gasses     Mode: CPAP with PS  FiO2: 40  PEEP: 5  PS: 10  MAP: 7  PIP: 16              ***GI***  [x]  Diet:  Regular  [x] Protonix  Bowel regimen with Miralax and Senna.   Reglan for gut motility    ***Renal***  Continue to monitor I/Os, BUN/Creatinine.   Replete lytes PRN  Trejo present     ***ID***  Cefuroxime for perioperative antibiotic coverage    ***Endocrine***  DM2  : HbA1c 8.9%                - [x] Insulin gtt              - Need tight glycemic control to prevent wound infection.            Patient requires continuous monitoring with bedside rhythm monitoring, pulse oximetry monitoring, and continuous central venous and arterial pressure monitoring; and intermittent blood gas analysis. Care plan discussed with the ICU care team.   Patient remained critical, at risk for life threatening decompensation.    I have spent 30 minutes providing critical care management to this patient.    By signing my name below, I, Jessica Parrish, attest that this documentation has been prepared under the direction and in the presence of ALIN Rebolledo  Electronically signed: Lenore Stoddard, 05-04-24 @ 10:13    I, ALIN Rebolledo, personally performed the services described in this documentation. all medical record entries made by the lenore were at my direction and in my presence. I have reviewed the chart and agree that the record reflects my personal performance and is accurate and complete  Electronically signed: ALIN Rebolledo Patient seen and examined at the bedside.    Remained critically ill on continuous ICU monitoring.    OBJECTIVE:  Vital Signs Last 24 Hrs  T(C): 37.1 (04 May 2024 08:00), Max: 37.2 (04 May 2024 04:00)  T(F): 98.7 (04 May 2024 08:00), Max: 99 (04 May 2024 04:00)  HR: 80 (04 May 2024 10:00) (63 - 90)  BP: 149/67 (04 May 2024 10:00) (119/62 - 160/72)  BP(mean): 97 (04 May 2024 10:00) (85 - 105)  RR: 28 (04 May 2024 10:00) (12 - 47)  SpO2: 96% (04 May 2024 10:00) (92% - 100%)    Parameters below as of 04 May 2024 08:00  Patient On (Oxygen Delivery Method): nasal cannula    O2 Concentration (%): 4      Physical Exam:   General: NAD   Neurology: Sedated  Eyes: bilateral pupils equal and reactive   ENT/Neck: Neck supple, trachea midline, No JVD   Respiratory: Clear bilaterally   CV: S1S2, no murmurs        [x] Sternal dressing, [x] Mediastinal CTx2, [x] L. Pleural CT        [x] Sinus rhythm  Abdominal: Soft, NT, ND +BS   Extremities: 1-2+ pedal edema noted, + peripheral pulses   Skin: No Rashes, Hematoma, Ecchymosis                           Assessment:  68 year-old male, with past history significant for Type-2 DM, HLD, HTN and Hyperkalemia, presented to the ED secondary to L-sided chest pain, shortness of breath.  Patient was admitted to telemetry service for further evaluation.  Patient had positive stress and subsequently underwent coronary angiography on 4/29/2024  via R[facility]--->M 70%, pLAD 70%, mLCx 70%, mRCA 90%, LVEDP 17, RRA accessed.  Patient transferred to [facility] for CABG eval d/t triple vessel disease.    CAD s/p CABG, with POPPY, ANIYA Ligation on 5/3/24   Post-op respiratory failure  Post-op blood loss anemia  Thrombocytopenia  Hypovolemia    Plan:   ***Neuro***  [x] Sedated with [x] Precedex   Post operative neuro assessment   Tylenol, Gabapentin, PRN Oxycodone, and PRN Dilaudid for pain management.   OOBTC    ***Cardiovascular***  Invasive hemodynamic monitoring, assess perfusion indices   SR / CVP 75 / MAP 11 / Hct 28.4% / Lactate 1.2   [x] Levophed - 0.02 mcg/kG/Min   Continuos reassessment of hemodynamics  Amiodarone for AFib prophylaxis   Norvasc and Cardene for afterload reduction  Continue BB  Monitor chest tube outputs   [x]  ASA [x] Statin   Serial EKG and cardiac enzymes   Check CBC in afternoon, send ABGs and f/u on labs      ***Pulmonary***  [x] NC 4L  Post op vent management   Titration of FiO2 and PEEP, follow SpO2, CXR, blood gasses   Keep duonebs on around the clock    Mode: CPAP with PS  FiO2: 40  PEEP: 5  PS: 10  MAP: 7  PIP: 16              ***GI***  [x]  Diet:  Regular  [x] Protonix  Bowel regimen with Miralax and Senna.   Reglan for gut motility    ***Renal***  Continue to monitor I/Os, BUN/Creatinine.   Replete lytes PRN  Neil present   Diuresis with Lasix today    ***ID***  Cefuroxime for perioperative antibiotic coverage    ***Endocrine***  DM2  : HbA1c 8.9%                - [x] Insulin gtt   [x] Starting LANTUS today             - Need tight glycemic control to prevent wound infection.            Patient requires continuous monitoring with bedside rhythm monitoring, pulse oximetry monitoring, and continuous central venous and arterial pressure monitoring; and intermittent blood gas analysis. Care plan discussed with the ICU care team.   Patient remained critical, at risk for life threatening decompensation.    I have spent 30 minutes providing critical care management to this patient.    By signing my name below, I, Jessica Parrish, attest that this documentation has been prepared under the direction and in the presence of ALIN Rebolledo  Electronically signed: Kelly Stoddard, 05-04-24 @ 10:13    I, ALIN Rebolledo, personally performed the services described in this documentation. all medical record entries made by the luis angelibe were at my direction and in my presence. I have reviewed the chart and agree that the record reflects my personal performance and is accurate and complete  Electronically signed: ALIN Rebolledo Patient seen and examined at the bedside.    Remained critically ill on continuous ICU monitoring.    OBJECTIVE:  Vital Signs Last 24 Hrs  T(C): 37.1 (04 May 2024 08:00), Max: 37.2 (04 May 2024 04:00)  T(F): 98.7 (04 May 2024 08:00), Max: 99 (04 May 2024 04:00)  HR: 80 (04 May 2024 10:00) (63 - 90)  BP: 149/67 (04 May 2024 10:00) (119/62 - 160/72)  BP(mean): 97 (04 May 2024 10:00) (85 - 105)  RR: 28 (04 May 2024 10:00) (12 - 47)  SpO2: 96% (04 May 2024 10:00) (92% - 100%)    Parameters below as of 04 May 2024 08:00  Patient On (Oxygen Delivery Method): nasal cannula    O2 Concentration (%): 4      Physical Exam:   General: NAD, OOB in chair  Neurology: AA+Ox3, no focal deficits  Eyes: bilateral pupils equal and reactive   ENT/Neck: Neck supple, trachea midline, No JVD   Respiratory: Clear bilaterally   CV: S1S2, no murmurs        [x] Sternal dressing, [x] Mediastinal CTx2, [x] L. Pleural CT        [x] Sinus rhythm  Abdominal: Soft, NT, ND +BS   Extremities: 1+ pedal edema noted, + peripheral pulses   Skin: No Rashes, Hematoma, Ecchymosis                           Assessment:  68 year-old male, with past history significant for Type-2 DM, HLD, HTN and Hyperkalemia, presented to the ED secondary to L-sided chest pain, shortness of breath.  Patient was admitted to telemetry service for further evaluation.  Patient had positive stress and subsequently underwent coronary angiography on 4/29/2024  via [facility]--->M 70%, pLAD 70%, mLCx 70%, mRCA 90%, LVEDP 17, RRA accessed.  Patient transferred to [facility] for CABG eval d/t triple vessel disease.    CAD s/p CABG, with POPPY, ANIYA Ligation on 5/3/24   Post-op respiratory failure  Post-op blood loss anemia  Thrombocytopenia  Hypovolemia    Plan:   ***Neuro***  Post operative neuro assessment   Tylenol, Gabapentin, PRN Oxycodone, and PRN Dilaudid for pain management.   OOBTC    ***Cardiovascular***  Invasive hemodynamic monitoring, assess perfusion indices   SR / CVP 75 / MAP 11 / Hct 28.4% / Lactate 1.2   Cardene for BP control  Continuos reassessment of hemodynamics  Amiodarone for AFib prophylaxis   Norvasc and Cardene for afterload reduction  Continue BB  Monitor chest tube outputs   [x]  ASA [x] Statin   Serial EKG and cardiac enzymes   Check CBC in afternoon, send ABGs and f/u on labs      ***Pulmonary***  [x] NC 4L  Post op vent management   Titration of FiO2 and PEEP, follow SpO2, CXR, blood gasses   Keep duonebs on around the clock      ***GI***  [x]  Diet:  CC diet  [x] Protonix  Bowel regimen with Miralax and Senna.   Reglan for gut motility    ***Renal***  Continue to monitor I/Os, BUN/Creatinine.   Replete lytes PRN  Neil present   Diuresis with Lasix today    ***ID***  Cefuroxime for perioperative antibiotic coverage    ***Endocrine***  DM2  : HbA1c 8.9%                - [x] Insulin gtt   [x] Starting LANTUS today 25, lispro 7 premeal             - Need tight glycemic control to prevent wound infection.            Patient requires continuous monitoring with bedside rhythm monitoring, pulse oximetry monitoring, and continuous central venous and arterial pressure monitoring; and intermittent blood gas analysis. Care plan discussed with the ICU care team.   Patient remained critical, at risk for life threatening decompensation.    I have spent 30 minutes providing critical care management to this patient.    By signing my name below, I, Jessica Parrish, attest that this documentation has been prepared under the direction and in the presence of ALIN Rebolledo  Electronically signed: Lenore Stoddard, 05-04-24 @ 10:13    I, ALIN Rebolledo, personally performed the services described in this documentation. all medical record entries made by the lenore were at my direction and in my presence. I have reviewed the chart and agree that the record reflects my personal performance and is accurate and complete  Electronically signed: ALIN Rebolledo

## 2024-05-04 NOTE — PROGRESS NOTE ADULT - ASSESSMENT
Assessment and recommendation :   chest pain coronary artery Disease   S/P cardiac catheterization triple   vessel disease   ischemic cardiomyopathy   S/P CABG X 4  Acute post operative respiratory Failure  acute blood loss  anemia   DM continue glycemic control   HTN continue BP control   continue glycemic control   DVT prophylaxis   GI prophylaxis    time spend > 40 minutes discussed with cardiac surgeon

## 2024-05-04 NOTE — PROGRESS NOTE ADULT - SUBJECTIVE AND OBJECTIVE BOX
CARDIOLOGY FOLLOW UP - Dr. Brewer  Date of Service: 5/4/2024  CC: no events    Review of Systems:  Constitutional: No fever, weight loss, or fatigue  Respiratory: No cough, wheezing, or hemoptysis, no shortness of breath  Cardiovascular: No chest pain, palpitations, passing out, dizziness, or leg swelling  Gastrointestinal: No abd or epigastric pain. No nausea, vomiting, or hematemesis; no diarrhea or consiptaiton, no melena or hematochezia  Vascular: No edema     TELEMETRY:    PHYSICAL EXAM:  T(C): 37.2 (05-04-24 @ 04:00), Max: 37.2 (05-04-24 @ 04:00)  HR: 77 (05-04-24 @ 07:00) (63 - 90)  BP: 119/62 (05-04-24 @ 07:00) (119/62 - 157/72)  RR: 34 (05-04-24 @ 07:00) (12 - 47)  SpO2: 96% (05-04-24 @ 07:00) (93% - 100%)  Wt(kg): --  I&O's Summary    03 May 2024 07:01  -  04 May 2024 07:00  --------------------------------------------------------  IN: 3679.6 mL / OUT: 2230 mL / NET: 1449.6 mL        Appearance: Normal	  Cardiovascular: Normal S1 S2,RRR, No JVD, No murmurs  Respiratory: Lungs clear to auscultation	  Gastrointestinal:  Soft, Non-tender, + BS	  Extremities: Normal range of motion, No clubbing, cyanosis or edema  Vascular: Peripheral pulses palpable 2+ bilaterally       Home Medications:  aspirin 81 mg oral delayed release tablet: 1 tab(s) orally once a day (29 Apr 2024 18:21)  atorvastatin 40 mg oral tablet: 1 tab(s) orally once a day (at bedtime) (29 Apr 2024 18:21)  insulin glargine 100 units/mL subcutaneous solution: 50 unit(s) subcutaneous once a day (at bedtime) (29 Apr 2024 18:21)  insulin lispro 100 units/mL injectable solution: 15 unit(s) injectable once a day (in the morning) BEFORE BREAKFAST (29 Apr 2024 18:21)  insulin lispro 100 units/mL injectable solution: 1 unit(s) injectable 3 times a day (before meals) 1 Unit(s) if Glucose 151 - 200  2 Unit(s) if Glucose 201 - 250  3 Unit(s) if Glucose 251 - 300  4 Unit(s) if Glucose 301 - 350  5 Unit(s) if Glucose 351 - 400  6 Unit(s) if Glucose Greater Than 400 (29 Apr 2024 18:21)  insulin lispro 100 units/mL injectable solution: 15 unit(s) injectable once a day before dinner (29 Apr 2024 18:21)  lisinopril 20 mg oral tablet: 1 tab(s) orally once a day (29 Apr 2024 02:23)  metoprolol tartrate 25 mg oral tablet: 1 tab(s) orally every 12 hours (29 Apr 2024 18:21)        MEDICATIONS  (STANDING):  acetaminophen     Tablet .. 650 milliGRAM(s) Oral every 6 hours  aMIOdarone    Tablet 400 milliGRAM(s) Oral two times a day  amLODIPine   Tablet 5 milliGRAM(s) Oral daily  ascorbic acid 500 milliGRAM(s) Oral two times a day  aspirin enteric coated 81 milliGRAM(s) Oral daily  aspirin Suppository 300 milliGRAM(s) Rectal once  atorvastatin 80 milliGRAM(s) Oral at bedtime  cefuroxime  IVPB 1500 milliGRAM(s) IV Intermittent every 8 hours  chlorhexidine 2% Cloths 1 Application(s) Topical daily  dexMEDEtomidine Infusion 1.5 MICROgram(s)/kG/Hr (31 mL/Hr) IV Continuous <Continuous>  dextrose 50% Injectable 25 milliLiter(s) IV Push every 15 minutes  dextrose 50% Injectable 50 milliLiter(s) IV Push every 15 minutes  gabapentin 100 milliGRAM(s) Oral every 8 hours  insulin regular Infusion 3 Unit(s)/Hr (3 mL/Hr) IV Continuous <Continuous>  metoclopramide Injectable 10 milliGRAM(s) IV Push every 8 hours  metoprolol tartrate 12.5 milliGRAM(s) Oral every 12 hours  niCARdipine Infusion 5 mG/Hr (25 mL/Hr) IV Continuous <Continuous>  norepinephrine Infusion 0.02 MICROgram(s)/kG/Min (3.1 mL/Hr) IV Continuous <Continuous>  pantoprazole  Injectable 40 milliGRAM(s) IV Push daily  polyethylene glycol 3350 17 Gram(s) Oral daily  potassium chloride  10 mEq/50 mL IVPB 10 milliEquivalent(s) IV Intermittent every 1 hour  potassium chloride  10 mEq/50 mL IVPB 10 milliEquivalent(s) IV Intermittent every 1 hour  potassium chloride  10 mEq/50 mL IVPB 10 milliEquivalent(s) IV Intermittent every 1 hour  senna 2 Tablet(s) Oral at bedtime  sodium chloride 0.9%. 1000 milliLiter(s) (10 mL/Hr) IV Continuous <Continuous>        EKG:  RADIOLOGY:  DIAGNOSTIC TESTING:  [ ] Echocardiogram:  [ ] Catherterization:  [ ] Stress Test:  OTHER:     LABS:	 	                          9.9    8.58  )-----------( 127      ( 04 May 2024 00:17 )             28.4     05-04    141  |  109<H>  |  16  ----------------------------<  116<H>  4.1   |  22  |  0.91    Ca    8.9      04 May 2024 00:17  Phos  3.0     05-04  Mg     2.0     05-04    TPro  5.7<L>  /  Alb  4.1  /  TBili  0.7  /  DBili  x   /  AST  60<H>  /  ALT  19  /  AlkPhos  33<L>  05-04      PT/INR - ( 04 May 2024 00:17 )   PT: 13.3 sec;   INR: 1.22 ratio         PTT - ( 04 May 2024 00:17 )  PTT:45.2 sec    CARDIAC MARKERS:

## 2024-05-04 NOTE — PHYSICAL THERAPY INITIAL EVALUATION ADULT - DID THE PATIENT HAVE SURGERY?
Pt s/p CABG x 4 (LIMA - LAD, Radial - PDA, SVG - OM, SVG - Diag) with ANIYA clip and extubation on 5/3/24./yes
n/a
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
## 2024-05-04 NOTE — PROGRESS NOTE ADULT - ASSESSMENT
68 year-old male, with past history significant for Type-2 DM, HLD, HTN and Hyperkalemia, presented to the ED secondary to L-sided chest pain, shortness of breath.  Transferred to [facility] for CABG eval   Assessment  DMT2: 68y Male with DM T2 with hyperglycemia, A1C 8.9% , was on insulin at home, on  basal bolus insulin with coverage, now s/p surgery, on IV insulin, sugars stable, started eating meals.  CAD: on medications, stable, monitored. preop CABG  HTN: on antihypertensive medications, monitored, asymptomatic.    Leela Feldman MD  Cell: [phone]  Office: [phone]

## 2024-05-04 NOTE — PHYSICAL THERAPY INITIAL EVALUATION ADULT - IMPAIRMENTS FOUND, PT EVAL
neuromotor development and sensory integration
aerobic capacity/endurance/gait, locomotion, and balance/muscle strength

## 2024-05-05 ENCOUNTER — RESULT REVIEW (OUTPATIENT)
Age: 69
End: 2024-05-05

## 2024-05-05 LAB
ALBUMIN SERPL ELPH-MCNC: 3.7 G/DL — SIGNIFICANT CHANGE UP (ref 3.3–5)
ALP SERPL-CCNC: 36 U/L — LOW (ref 40–120)
ALT FLD-CCNC: 21 U/L — SIGNIFICANT CHANGE UP (ref 10–45)
ANION GAP SERPL CALC-SCNC: 13 MMOL/L — SIGNIFICANT CHANGE UP (ref 5–17)
ANION GAP SERPL CALC-SCNC: 14 MMOL/L — SIGNIFICANT CHANGE UP (ref 5–17)
APTT BLD: 30.5 SEC — SIGNIFICANT CHANGE UP (ref 24.5–35.6)
AST SERPL-CCNC: 53 U/L — HIGH (ref 10–40)
BASE EXCESS BLDV CALC-SCNC: -0.8 MMOL/L — SIGNIFICANT CHANGE UP (ref -2–3)
BASE EXCESS BLDV CALC-SCNC: -2.8 MMOL/L — LOW (ref -2–3)
BASE EXCESS BLDV CALC-SCNC: -3.4 MMOL/L — LOW (ref -2–3)
BASE EXCESS BLDV CALC-SCNC: -4.1 MMOL/L — LOW (ref -2–3)
BASE EXCESS BLDV CALC-SCNC: -4.1 MMOL/L — LOW (ref -2–3)
BASE EXCESS BLDV CALC-SCNC: -5.1 MMOL/L — LOW (ref -2–3)
BASE EXCESS BLDV CALC-SCNC: -5.1 MMOL/L — LOW (ref -2–3)
BASOPHILS # BLD AUTO: 0 K/UL — SIGNIFICANT CHANGE UP (ref 0–0.2)
BASOPHILS NFR BLD AUTO: 0 % — SIGNIFICANT CHANGE UP (ref 0–2)
BILIRUB SERPL-MCNC: 0.5 MG/DL — SIGNIFICANT CHANGE UP (ref 0.2–1.2)
BUN SERPL-MCNC: 28 MG/DL — HIGH (ref 7–23)
BUN SERPL-MCNC: 43 MG/DL — HIGH (ref 7–23)
CA-I SERPL-SCNC: 1.14 MMOL/L — LOW (ref 1.15–1.33)
CA-I SERPL-SCNC: 1.17 MMOL/L — SIGNIFICANT CHANGE UP (ref 1.15–1.33)
CALCIUM SERPL-MCNC: 8.1 MG/DL — LOW (ref 8.4–10.5)
CALCIUM SERPL-MCNC: 8.5 MG/DL — SIGNIFICANT CHANGE UP (ref 8.4–10.5)
CHLORIDE BLDV-SCNC: 104 MMOL/L — SIGNIFICANT CHANGE UP (ref 96–108)
CHLORIDE BLDV-SCNC: 99 MMOL/L — SIGNIFICANT CHANGE UP (ref 96–108)
CHLORIDE SERPL-SCNC: 103 MMOL/L — SIGNIFICANT CHANGE UP (ref 96–108)
CHLORIDE SERPL-SCNC: 103 MMOL/L — SIGNIFICANT CHANGE UP (ref 96–108)
CO2 BLDV-SCNC: 22 MMOL/L — SIGNIFICANT CHANGE UP (ref 22–26)
CO2 BLDV-SCNC: 23 MMOL/L — SIGNIFICANT CHANGE UP (ref 22–26)
CO2 BLDV-SCNC: 24 MMOL/L — SIGNIFICANT CHANGE UP (ref 22–26)
CO2 BLDV-SCNC: 24 MMOL/L — SIGNIFICANT CHANGE UP (ref 22–26)
CO2 BLDV-SCNC: 25 MMOL/L — SIGNIFICANT CHANGE UP (ref 22–26)
CO2 BLDV-SCNC: 25 MMOL/L — SIGNIFICANT CHANGE UP (ref 22–26)
CO2 BLDV-SCNC: 26 MMOL/L — SIGNIFICANT CHANGE UP (ref 22–26)
CO2 SERPL-SCNC: 20 MMOL/L — LOW (ref 22–31)
CO2 SERPL-SCNC: 20 MMOL/L — LOW (ref 22–31)
CREAT SERPL-MCNC: 1.31 MG/DL — HIGH (ref 0.5–1.3)
CREAT SERPL-MCNC: 1.77 MG/DL — HIGH (ref 0.5–1.3)
EGFR: 42 ML/MIN/1.73M2 — LOW
EGFR: 60 ML/MIN/1.73M2 — SIGNIFICANT CHANGE UP
EOSINOPHIL # BLD AUTO: 0 K/UL — SIGNIFICANT CHANGE UP (ref 0–0.5)
EOSINOPHIL NFR BLD AUTO: 0 % — SIGNIFICANT CHANGE UP (ref 0–6)
GAS PNL BLDA: SIGNIFICANT CHANGE UP
GAS PNL BLDV: 127 MMOL/L — LOW (ref 136–145)
GAS PNL BLDV: 133 MMOL/L — LOW (ref 136–145)
GAS PNL BLDV: SIGNIFICANT CHANGE UP
GLUCOSE BLDC GLUCOMTR-MCNC: 125 MG/DL — HIGH (ref 70–99)
GLUCOSE BLDC GLUCOMTR-MCNC: 132 MG/DL — HIGH (ref 70–99)
GLUCOSE BLDC GLUCOMTR-MCNC: 133 MG/DL — HIGH (ref 70–99)
GLUCOSE BLDC GLUCOMTR-MCNC: 135 MG/DL — HIGH (ref 70–99)
GLUCOSE BLDC GLUCOMTR-MCNC: 139 MG/DL — HIGH (ref 70–99)
GLUCOSE BLDC GLUCOMTR-MCNC: 140 MG/DL — HIGH (ref 70–99)
GLUCOSE BLDC GLUCOMTR-MCNC: 144 MG/DL — HIGH (ref 70–99)
GLUCOSE BLDC GLUCOMTR-MCNC: 159 MG/DL — HIGH (ref 70–99)
GLUCOSE BLDC GLUCOMTR-MCNC: 160 MG/DL — HIGH (ref 70–99)
GLUCOSE BLDC GLUCOMTR-MCNC: 170 MG/DL — HIGH (ref 70–99)
GLUCOSE BLDC GLUCOMTR-MCNC: 188 MG/DL — HIGH (ref 70–99)
GLUCOSE BLDC GLUCOMTR-MCNC: 199 MG/DL — HIGH (ref 70–99)
GLUCOSE BLDC GLUCOMTR-MCNC: 223 MG/DL — HIGH (ref 70–99)
GLUCOSE BLDC GLUCOMTR-MCNC: 252 MG/DL — HIGH (ref 70–99)
GLUCOSE BLDC GLUCOMTR-MCNC: 256 MG/DL — HIGH (ref 70–99)
GLUCOSE BLDC GLUCOMTR-MCNC: 264 MG/DL — HIGH (ref 70–99)
GLUCOSE BLDV-MCNC: 146 MG/DL — HIGH (ref 70–99)
GLUCOSE BLDV-MCNC: 237 MG/DL — HIGH (ref 70–99)
GLUCOSE SERPL-MCNC: 236 MG/DL — HIGH (ref 70–99)
GLUCOSE SERPL-MCNC: 241 MG/DL — HIGH (ref 70–99)
HCO3 BLDV-SCNC: 21 MMOL/L — LOW (ref 22–29)
HCO3 BLDV-SCNC: 22 MMOL/L — SIGNIFICANT CHANGE UP (ref 22–29)
HCO3 BLDV-SCNC: 23 MMOL/L — SIGNIFICANT CHANGE UP (ref 22–29)
HCO3 BLDV-SCNC: 24 MMOL/L — SIGNIFICANT CHANGE UP (ref 22–29)
HCO3 BLDV-SCNC: 24 MMOL/L — SIGNIFICANT CHANGE UP (ref 22–29)
HCT VFR BLD CALC: 28.6 % — LOW (ref 39–50)
HCT VFR BLDA CALC: 27 % — LOW (ref 39–51)
HCT VFR BLDA CALC: 45 % — SIGNIFICANT CHANGE UP (ref 39–51)
HGB BLD CALC-MCNC: 15.1 G/DL — SIGNIFICANT CHANGE UP (ref 12.6–17.4)
HGB BLD CALC-MCNC: 8.9 G/DL — LOW (ref 12.6–17.4)
HGB BLD-MCNC: 9.5 G/DL — LOW (ref 13–17)
HOROWITZ INDEX BLDV+IHG-RTO: 50 — SIGNIFICANT CHANGE UP
HOROWITZ INDEX BLDV+IHG-RTO: 50 — SIGNIFICANT CHANGE UP
LACTATE BLDV-MCNC: 1.7 MMOL/L — SIGNIFICANT CHANGE UP (ref 0.5–2)
LACTATE BLDV-MCNC: 1.9 MMOL/L — SIGNIFICANT CHANGE UP (ref 0.5–2)
LYMPHOCYTES # BLD AUTO: 1.48 K/UL — SIGNIFICANT CHANGE UP (ref 1–3.3)
LYMPHOCYTES # BLD AUTO: 11.4 % — LOW (ref 13–44)
MAGNESIUM SERPL-MCNC: 2.2 MG/DL — SIGNIFICANT CHANGE UP (ref 1.6–2.6)
MANUAL SMEAR VERIFICATION: SIGNIFICANT CHANGE UP
MCHC RBC-ENTMCNC: 27.2 PG — SIGNIFICANT CHANGE UP (ref 27–34)
MCHC RBC-ENTMCNC: 33.2 GM/DL — SIGNIFICANT CHANGE UP (ref 32–36)
MCV RBC AUTO: 81.9 FL — SIGNIFICANT CHANGE UP (ref 80–100)
MICROCYTES BLD QL: SLIGHT — SIGNIFICANT CHANGE UP
MONOCYTES # BLD AUTO: 1.02 K/UL — HIGH (ref 0–0.9)
MONOCYTES NFR BLD AUTO: 7.9 % — SIGNIFICANT CHANGE UP (ref 2–14)
NEUTROPHILS # BLD AUTO: 10.44 K/UL — HIGH (ref 1.8–7.4)
NEUTROPHILS NFR BLD AUTO: 80.7 % — HIGH (ref 43–77)
PCO2 BLDV: 41 MMHG — LOW (ref 42–55)
PCO2 BLDV: 43 MMHG — SIGNIFICANT CHANGE UP (ref 42–55)
PCO2 BLDV: 44 MMHG — SIGNIFICANT CHANGE UP (ref 42–55)
PCO2 BLDV: 47 MMHG — SIGNIFICANT CHANGE UP (ref 42–55)
PCO2 BLDV: 49 MMHG — SIGNIFICANT CHANGE UP (ref 42–55)
PH BLDV: 7.27 — LOW (ref 7.32–7.43)
PH BLDV: 7.28 — LOW (ref 7.32–7.43)
PH BLDV: 7.28 — LOW (ref 7.32–7.43)
PH BLDV: 7.29 — LOW (ref 7.32–7.43)
PH BLDV: 7.3 — LOW (ref 7.32–7.43)
PH BLDV: 7.33 — SIGNIFICANT CHANGE UP (ref 7.32–7.43)
PH BLDV: 7.38 — SIGNIFICANT CHANGE UP (ref 7.32–7.43)
PHOSPHATE SERPL-MCNC: 4.2 MG/DL — SIGNIFICANT CHANGE UP (ref 2.5–4.5)
PLAT MORPH BLD: NORMAL — SIGNIFICANT CHANGE UP
PLATELET # BLD AUTO: 142 K/UL — LOW (ref 150–400)
PO2 BLDV: 33 MMHG — SIGNIFICANT CHANGE UP (ref 25–45)
PO2 BLDV: 34 MMHG — SIGNIFICANT CHANGE UP (ref 25–45)
PO2 BLDV: 35 MMHG — SIGNIFICANT CHANGE UP (ref 25–45)
PO2 BLDV: 36 MMHG — SIGNIFICANT CHANGE UP (ref 25–45)
PO2 BLDV: 36 MMHG — SIGNIFICANT CHANGE UP (ref 25–45)
PO2 BLDV: 38 MMHG — SIGNIFICANT CHANGE UP (ref 25–45)
PO2 BLDV: 40 MMHG — SIGNIFICANT CHANGE UP (ref 25–45)
POTASSIUM BLDV-SCNC: 4.6 MMOL/L — SIGNIFICANT CHANGE UP (ref 3.5–5.1)
POTASSIUM BLDV-SCNC: 4.8 MMOL/L — SIGNIFICANT CHANGE UP (ref 3.5–5.1)
POTASSIUM SERPL-MCNC: 4.4 MMOL/L — SIGNIFICANT CHANGE UP (ref 3.5–5.3)
POTASSIUM SERPL-MCNC: 4.8 MMOL/L — SIGNIFICANT CHANGE UP (ref 3.5–5.3)
POTASSIUM SERPL-SCNC: 4.4 MMOL/L — SIGNIFICANT CHANGE UP (ref 3.5–5.3)
POTASSIUM SERPL-SCNC: 4.8 MMOL/L — SIGNIFICANT CHANGE UP (ref 3.5–5.3)
PROT SERPL-MCNC: 5.8 G/DL — LOW (ref 6–8.3)
RBC # BLD: 3.49 M/UL — LOW (ref 4.2–5.8)
RBC # FLD: 14.8 % — HIGH (ref 10.3–14.5)
RBC BLD AUTO: SIGNIFICANT CHANGE UP
SAO2 % BLDV: 52.2 % — LOW (ref 67–88)
SAO2 % BLDV: 53.7 % — LOW (ref 67–88)
SAO2 % BLDV: 55.8 % — LOW (ref 67–88)
SAO2 % BLDV: 56.2 % — LOW (ref 67–88)
SAO2 % BLDV: 58.2 % — LOW (ref 67–88)
SAO2 % BLDV: 61.3 % — LOW (ref 67–88)
SAO2 % BLDV: 62.8 % — LOW (ref 67–88)
SODIUM SERPL-SCNC: 136 MMOL/L — SIGNIFICANT CHANGE UP (ref 135–145)
SODIUM SERPL-SCNC: 137 MMOL/L — SIGNIFICANT CHANGE UP (ref 135–145)
WBC # BLD: 12.94 K/UL — HIGH (ref 3.8–10.5)
WBC # FLD AUTO: 12.94 K/UL — HIGH (ref 3.8–10.5)

## 2024-05-05 PROCEDURE — 71045 X-RAY EXAM CHEST 1 VIEW: CPT | Mod: 26

## 2024-05-05 PROCEDURE — 93306 TTE W/DOPPLER COMPLETE: CPT | Mod: 26

## 2024-05-05 PROCEDURE — 99291 CRITICAL CARE FIRST HOUR: CPT

## 2024-05-05 RX ORDER — ACETAMINOPHEN 500 MG
1000 TABLET ORAL ONCE
Refills: 0 | Status: COMPLETED | OUTPATIENT
Start: 2024-05-05 | End: 2024-05-05

## 2024-05-05 RX ORDER — GABAPENTIN 400 MG/1
100 CAPSULE ORAL EVERY 8 HOURS
Refills: 0 | Status: DISCONTINUED | OUTPATIENT
Start: 2024-05-05 | End: 2024-05-05

## 2024-05-05 RX ORDER — FUROSEMIDE 40 MG
40 TABLET ORAL ONCE
Refills: 0 | Status: COMPLETED | OUTPATIENT
Start: 2024-05-05 | End: 2024-05-05

## 2024-05-05 RX ORDER — BUMETANIDE 0.25 MG/ML
2 INJECTION INTRAMUSCULAR; INTRAVENOUS ONCE
Refills: 0 | Status: COMPLETED | OUTPATIENT
Start: 2024-05-05 | End: 2024-05-05

## 2024-05-05 RX ORDER — HEPARIN SODIUM 5000 [USP'U]/ML
5000 INJECTION INTRAVENOUS; SUBCUTANEOUS EVERY 8 HOURS
Refills: 0 | Status: DISCONTINUED | OUTPATIENT
Start: 2024-05-05 | End: 2024-05-10

## 2024-05-05 RX ORDER — INSULIN GLARGINE 100 [IU]/ML
35 INJECTION, SOLUTION SUBCUTANEOUS AT BEDTIME
Refills: 0 | Status: DISCONTINUED | OUTPATIENT
Start: 2024-05-05 | End: 2024-05-07

## 2024-05-05 RX ORDER — AMIODARONE HYDROCHLORIDE 400 MG/1
150 TABLET ORAL ONCE
Refills: 0 | Status: COMPLETED | OUTPATIENT
Start: 2024-05-05 | End: 2024-05-05

## 2024-05-05 RX ORDER — INSULIN LISPRO 100/ML
8 VIAL (ML) SUBCUTANEOUS
Refills: 0 | Status: DISCONTINUED | OUTPATIENT
Start: 2024-05-05 | End: 2024-05-07

## 2024-05-05 RX ORDER — OXYCODONE HYDROCHLORIDE 5 MG/1
10 TABLET ORAL EVERY 4 HOURS
Refills: 0 | Status: DISCONTINUED | OUTPATIENT
Start: 2024-05-05 | End: 2024-05-10

## 2024-05-05 RX ORDER — MAGNESIUM SULFATE 500 MG/ML
2 VIAL (ML) INJECTION ONCE
Refills: 0 | Status: COMPLETED | OUTPATIENT
Start: 2024-05-05 | End: 2024-05-05

## 2024-05-05 RX ORDER — METHYLNALTREXONE BROMIDE 12 MG/.6ML
12 INJECTION, SOLUTION SUBCUTANEOUS ONCE
Refills: 0 | Status: COMPLETED | OUTPATIENT
Start: 2024-05-05 | End: 2024-05-05

## 2024-05-05 RX ORDER — SODIUM CHLORIDE 5 G/100ML
150 INJECTION, SOLUTION INTRAVENOUS ONCE
Refills: 0 | Status: COMPLETED | OUTPATIENT
Start: 2024-05-05 | End: 2024-05-05

## 2024-05-05 RX ORDER — GABAPENTIN 400 MG/1
100 CAPSULE ORAL EVERY 8 HOURS
Refills: 0 | Status: DISCONTINUED | OUTPATIENT
Start: 2024-05-05 | End: 2024-05-10

## 2024-05-05 RX ORDER — HYDROMORPHONE HYDROCHLORIDE 2 MG/ML
30 INJECTION INTRAMUSCULAR; INTRAVENOUS; SUBCUTANEOUS
Refills: 0 | Status: DISCONTINUED | OUTPATIENT
Start: 2024-05-05 | End: 2024-05-05

## 2024-05-05 RX ORDER — CEFUROXIME AXETIL 250 MG
1500 TABLET ORAL EVERY 12 HOURS
Refills: 0 | Status: DISCONTINUED | OUTPATIENT
Start: 2024-05-05 | End: 2024-05-05

## 2024-05-05 RX ORDER — MILRINONE LACTATE 1 MG/ML
0.12 INJECTION, SOLUTION INTRAVENOUS
Qty: 20 | Refills: 0 | Status: DISCONTINUED | OUTPATIENT
Start: 2024-05-05 | End: 2024-05-05

## 2024-05-05 RX ORDER — METHOCARBAMOL 500 MG/1
500 TABLET, FILM COATED ORAL EVERY 8 HOURS
Refills: 0 | Status: DISCONTINUED | OUTPATIENT
Start: 2024-05-05 | End: 2024-05-10

## 2024-05-05 RX ORDER — SODIUM CHLORIDE 5 G/100ML
100 INJECTION, SOLUTION INTRAVENOUS ONCE
Refills: 0 | Status: COMPLETED | OUTPATIENT
Start: 2024-05-05 | End: 2024-05-05

## 2024-05-05 RX ORDER — AMIODARONE HYDROCHLORIDE 400 MG/1
1 TABLET ORAL
Qty: 450 | Refills: 0 | Status: DISCONTINUED | OUTPATIENT
Start: 2024-05-05 | End: 2024-05-06

## 2024-05-05 RX ORDER — CLOPIDOGREL BISULFATE 75 MG/1
75 TABLET, FILM COATED ORAL DAILY
Refills: 0 | Status: DISCONTINUED | OUTPATIENT
Start: 2024-05-05 | End: 2024-05-07

## 2024-05-05 RX ADMIN — Medication 3 MILLILITER(S): at 06:14

## 2024-05-05 RX ADMIN — Medication 3 MILLILITER(S): at 17:51

## 2024-05-05 RX ADMIN — Medication 40 MILLIGRAM(S): at 11:53

## 2024-05-05 RX ADMIN — POLYETHYLENE GLYCOL 3350 17 GRAM(S): 17 POWDER, FOR SOLUTION ORAL at 11:53

## 2024-05-05 RX ADMIN — Medication 650 MILLIGRAM(S): at 00:30

## 2024-05-05 RX ADMIN — HYDROMORPHONE HYDROCHLORIDE 30 MILLILITER(S): 2 INJECTION INTRAMUSCULAR; INTRAVENOUS; SUBCUTANEOUS at 07:25

## 2024-05-05 RX ADMIN — HEPARIN SODIUM 5000 UNIT(S): 5000 INJECTION INTRAVENOUS; SUBCUTANEOUS at 21:04

## 2024-05-05 RX ADMIN — AMIODARONE HYDROCHLORIDE 400 MILLIGRAM(S): 400 TABLET ORAL at 05:51

## 2024-05-05 RX ADMIN — OXYCODONE HYDROCHLORIDE 10 MILLIGRAM(S): 5 TABLET ORAL at 13:20

## 2024-05-05 RX ADMIN — BUMETANIDE 2 MILLIGRAM(S): 0.25 INJECTION INTRAMUSCULAR; INTRAVENOUS at 15:15

## 2024-05-05 RX ADMIN — SENNA PLUS 2 TABLET(S): 8.6 TABLET ORAL at 21:03

## 2024-05-05 RX ADMIN — ENOXAPARIN SODIUM 40 MILLIGRAM(S): 100 INJECTION SUBCUTANEOUS at 17:18

## 2024-05-05 RX ADMIN — HYDROMORPHONE HYDROCHLORIDE 30 MILLILITER(S): 2 INJECTION INTRAMUSCULAR; INTRAVENOUS; SUBCUTANEOUS at 02:41

## 2024-05-05 RX ADMIN — Medication 25 GRAM(S): at 05:51

## 2024-05-05 RX ADMIN — Medication 650 MILLIGRAM(S): at 00:26

## 2024-05-05 RX ADMIN — Medication 650 MILLIGRAM(S): at 06:35

## 2024-05-05 RX ADMIN — Medication 100 MILLIGRAM(S): at 05:51

## 2024-05-05 RX ADMIN — AMIODARONE HYDROCHLORIDE 400 MILLIGRAM(S): 400 TABLET ORAL at 17:17

## 2024-05-05 RX ADMIN — Medication 400 MILLIGRAM(S): at 09:52

## 2024-05-05 RX ADMIN — SODIUM CHLORIDE 10 MILLILITER(S): 9 INJECTION INTRAMUSCULAR; INTRAVENOUS; SUBCUTANEOUS at 21:03

## 2024-05-05 RX ADMIN — Medication 500 MILLIGRAM(S): at 05:52

## 2024-05-05 RX ADMIN — METHOCARBAMOL 500 MILLIGRAM(S): 500 TABLET, FILM COATED ORAL at 15:14

## 2024-05-05 RX ADMIN — Medication 3 MILLILITER(S): at 11:43

## 2024-05-05 RX ADMIN — CHLORHEXIDINE GLUCONATE 1 APPLICATION(S): 213 SOLUTION TOPICAL at 21:24

## 2024-05-05 RX ADMIN — SODIUM CHLORIDE 300 MILLILITER(S): 5 INJECTION, SOLUTION INTRAVENOUS at 05:51

## 2024-05-05 RX ADMIN — Medication 40 MILLIGRAM(S): at 03:15

## 2024-05-05 RX ADMIN — CLOPIDOGREL BISULFATE 75 MILLIGRAM(S): 75 TABLET, FILM COATED ORAL at 11:53

## 2024-05-05 RX ADMIN — AMLODIPINE BESYLATE 5 MILLIGRAM(S): 2.5 TABLET ORAL at 16:36

## 2024-05-05 RX ADMIN — INSULIN GLARGINE 35 UNIT(S): 100 INJECTION, SOLUTION SUBCUTANEOUS at 21:04

## 2024-05-05 RX ADMIN — SODIUM CHLORIDE 600 MILLILITER(S): 5 INJECTION, SOLUTION INTRAVENOUS at 15:52

## 2024-05-05 RX ADMIN — GABAPENTIN 100 MILLIGRAM(S): 400 CAPSULE ORAL at 05:51

## 2024-05-05 RX ADMIN — Medication 3 MILLILITER(S): at 23:34

## 2024-05-05 RX ADMIN — INSULIN HUMAN 3 UNIT(S)/HR: 100 INJECTION, SOLUTION SUBCUTANEOUS at 21:02

## 2024-05-05 RX ADMIN — INSULIN GLARGINE 25 UNIT(S): 100 INJECTION, SOLUTION SUBCUTANEOUS at 08:32

## 2024-05-05 RX ADMIN — AMIODARONE HYDROCHLORIDE 600 MILLIGRAM(S): 400 TABLET ORAL at 21:00

## 2024-05-05 RX ADMIN — Medication 81 MILLIGRAM(S): at 11:53

## 2024-05-05 RX ADMIN — Medication 650 MILLIGRAM(S): at 05:52

## 2024-05-05 RX ADMIN — AMIODARONE HYDROCHLORIDE 600 MILLIGRAM(S): 400 TABLET ORAL at 22:00

## 2024-05-05 RX ADMIN — Medication 25 GRAM(S): at 21:15

## 2024-05-05 RX ADMIN — GABAPENTIN 100 MILLIGRAM(S): 400 CAPSULE ORAL at 12:20

## 2024-05-05 RX ADMIN — PANTOPRAZOLE SODIUM 40 MILLIGRAM(S): 20 TABLET, DELAYED RELEASE ORAL at 06:36

## 2024-05-05 RX ADMIN — INSULIN HUMAN 3 UNIT(S)/HR: 100 INJECTION, SOLUTION SUBCUTANEOUS at 00:43

## 2024-05-05 RX ADMIN — OXYCODONE HYDROCHLORIDE 10 MILLIGRAM(S): 5 TABLET ORAL at 12:20

## 2024-05-05 RX ADMIN — ATORVASTATIN CALCIUM 80 MILLIGRAM(S): 80 TABLET, FILM COATED ORAL at 21:51

## 2024-05-05 RX ADMIN — METHOCARBAMOL 500 MILLIGRAM(S): 500 TABLET, FILM COATED ORAL at 11:53

## 2024-05-05 RX ADMIN — Medication 10 MILLIGRAM(S): at 05:51

## 2024-05-05 RX ADMIN — METHOCARBAMOL 500 MILLIGRAM(S): 500 TABLET, FILM COATED ORAL at 21:03

## 2024-05-05 RX ADMIN — Medication 25 GRAM(S): at 11:03

## 2024-05-05 RX ADMIN — Medication 100 MILLIGRAM(S): at 17:48

## 2024-05-05 RX ADMIN — GABAPENTIN 100 MILLIGRAM(S): 400 CAPSULE ORAL at 21:03

## 2024-05-05 RX ADMIN — Medication 500 MILLIGRAM(S): at 17:17

## 2024-05-05 RX ADMIN — METHYLNALTREXONE BROMIDE 12 MILLIGRAM(S): 12 INJECTION, SOLUTION SUBCUTANEOUS at 18:21

## 2024-05-05 NOTE — PROGRESS NOTE ADULT - SUBJECTIVE AND OBJECTIVE BOX
Day 1 of Anesthesia Pain Management Service    SUBJECTIVE: Patient is doing well with IV PCA    Pain Scale Score:	[X] Refer to charted pain scores    THERAPY:    [ ] IV PCA Morphine		[ ] 5 mg/mL	[ ] 1 mg/mL  [X] IV PCA Hydromorphone	[ ] 5 mg/mL	[X] 1 mg/mL  [ ] IV PCA Fentanyl		[ ] 50 micrograms/mL    Demand dose: 0.1 mg     Lockout: 6 minutes   Continuous Rate: 0 mg/hr  4 Hour Limit: 3 mg    MEDICATIONS  (STANDING):  acetaminophen     Tablet .. 650 milliGRAM(s) Oral every 6 hours  albuterol    90 MICROgram(s) HFA Inhaler 2 Puff(s) Inhalation every 6 hours  albuterol/ipratropium for Nebulization 3 milliLiter(s) Nebulizer every 6 hours  aMIOdarone    Tablet 400 milliGRAM(s) Oral two times a day  amLODIPine   Tablet 5 milliGRAM(s) Oral daily  ascorbic acid 500 milliGRAM(s) Oral two times a day  aspirin enteric coated 81 milliGRAM(s) Oral daily  atorvastatin 80 milliGRAM(s) Oral at bedtime  bisacodyl Suppository 10 milliGRAM(s) Rectal once  cefuroxime  IVPB 1500 milliGRAM(s) IV Intermittent every 12 hours  chlorhexidine 2% Cloths 1 Application(s) Topical daily  clopidogrel Tablet 75 milliGRAM(s) Oral daily  dextrose 50% Injectable 25 milliLiter(s) IV Push every 15 minutes  dextrose 50% Injectable 50 milliLiter(s) IV Push every 15 minutes  enoxaparin Injectable 40 milliGRAM(s) SubCutaneous every 24 hours  gabapentin 100 milliGRAM(s) Oral every 8 hours  HYDROmorphone PCA (1 mG/mL) 30 milliLiter(s) PCA Continuous PCA Continuous  insulin glargine Injectable (LANTUS) 25 Unit(s) SubCutaneous every morning  insulin lispro (ADMELOG) corrective regimen sliding scale   SubCutaneous three times a day before meals  insulin lispro Injectable (ADMELOG) 7 Unit(s) SubCutaneous three times a day before meals  insulin regular Infusion 3 Unit(s)/Hr (3 mL/Hr) IV Continuous <Continuous>  milrinone Infusion 0.125 MICROgram(s)/kG/Min (3.1 mL/Hr) IV Continuous <Continuous>  niCARdipine Infusion 5 mG/Hr (25 mL/Hr) IV Continuous <Continuous>  pantoprazole    Tablet 40 milliGRAM(s) Oral before breakfast  polyethylene glycol 3350 17 Gram(s) Oral daily  potassium chloride  10 mEq/50 mL IVPB 10 milliEquivalent(s) IV Intermittent every 1 hour  potassium chloride  10 mEq/50 mL IVPB 10 milliEquivalent(s) IV Intermittent every 1 hour  potassium chloride  10 mEq/50 mL IVPB 10 milliEquivalent(s) IV Intermittent every 1 hour  senna 2 Tablet(s) Oral at bedtime  sodium chloride 0.9%. 1000 milliLiter(s) (10 mL/Hr) IV Continuous <Continuous>    MEDICATIONS  (PRN):  naloxone Injectable 0.1 milliGRAM(s) IV Push every 3 minutes PRN For ANY of the following changes in patient status:  A. RR LESS THAN 10 breaths per minute, B. Oxygen saturation LESS THAN 90%, C. Sedation score of 6  ondansetron Injectable 4 milliGRAM(s) IV Push every 6 hours PRN Nausea      OBJECTIVE:    Sedation Score:	[ X] Alert	[ ] Drowsy 	[ ] Arousable	[ ] Asleep	[ ] Unresponsive    Side Effects:	[X ] None	[ ] Nausea	[ ] Vomiting	[ ] Pruritus  		[ ] Other:    Vital Signs Last 24 Hrs  T(C): 37.4 (05 May 2024 08:00), Max: 38.3 (05 May 2024 04:00)  T(F): 99.3 (05 May 2024 08:00), Max: 100.9 (05 May 2024 04:00)  HR: 75 (05 May 2024 08:40) (68 - 93)  BP: 115/63 (05 May 2024 08:00) (110/61 - 161/74)  BP(mean): 83 (05 May 2024 08:00) (79 - 107)  RR: 9 (05 May 2024 08:00) (7 - 38)  SpO2: 86% (05 May 2024 08:40) (62% - 98%)    Parameters below as of 05 May 2024 08:00  Patient On (Oxygen Delivery Method): BiPAP/CPAP    O2 Concentration (%): 50    ASSESSMENT/ PLAN    Therapy to  be:               [X] Continued   [ ] Discontinued   [ ] Changed to PRN Analgesics    Documentation and Verification of current medications:   [X] Done	[ ] Not done, not eligible    Comments:

## 2024-05-05 NOTE — PROGRESS NOTE ADULT - ASSESSMENT
68 year-old male, with past history significant for Type-2 DM, HLD, HTN and Hyperkalemia, presented to the ED secondary to L-sided chest pain, shortness of breath.  Transferred to [facility] for CABG eval   Assessment  DMT2: 68y Male with DM T2 with hyperglycemia, A1C 8.9% , was on insulin at home, on  basal bolus insulin with coverage, now s/p surgery, on IV insulin, on BiPap now, not eating full  meals.  CAD: on medications, stable, monitored. preop CABG  HTN: on antihypertensive medications, monitored, asymptomatic.    Leela Feldman MD  Cell: [phone]  Office: [phone]

## 2024-05-05 NOTE — PROGRESS NOTE ADULT - ASSESSMENT
Assessment and recommendation :   chest pain coronary artery Disease   S/P cardiac catheterization triple   vessel disease   acute hypoxic respiratory failure on high flow 02  cardiogenic shock on Milrinone   ischemic cardiomyopathy   S/P CABG X 4  Acute post operative respiratory Failure  acute blood loss  anemia   DM continue glycemic control   HTN continue BP control   continue glycemic control   DVT prophylaxis   GI prophylaxis   critical time spend > 40 minutes discussed with cardiac surgeon , and TCU staff

## 2024-05-05 NOTE — PROGRESS NOTE ADULT - SUBJECTIVE AND OBJECTIVE BOX
Chief complaint    Patient is a 68y old  Male who presents with a chief complaint of cardiac surgery (05 May 2024 09:14)   Review of systems  Patient appears comfortable.    Labs and Fingersticks  CAPILLARY BLOOD GLUCOSE  139 (05 May 2024 07:00)  133 (05 May 2024 05:00)  144 (05 May 2024 04:00)  160 (05 May 2024 03:00)  199 (05 May 2024 02:00)  253 (05 May 2024 01:00)  102 (04 May 2024 17:00)  118 (04 May 2024 16:00)  142 (04 May 2024 15:00)  135 (04 May 2024 14:00)  143 (04 May 2024 13:00)  159 (04 May 2024 12:00)      POCT Blood Glucose.: 132 mg/dL (05 May 2024 08:16)  POCT Blood Glucose.: 139 mg/dL (05 May 2024 06:58)  POCT Blood Glucose.: 133 mg/dL (05 May 2024 05:02)  POCT Blood Glucose.: 144 mg/dL (05 May 2024 03:57)  POCT Blood Glucose.: 160 mg/dL (05 May 2024 02:58)  POCT Blood Glucose.: 199 mg/dL (05 May 2024 01:50)  POCT Blood Glucose.: 252 mg/dL (05 May 2024 00:41)  POCT Blood Glucose.: 102 mg/dL (04 May 2024 17:05)  POCT Blood Glucose.: 118 mg/dL (04 May 2024 15:53)  POCT Blood Glucose.: 142 mg/dL (04 May 2024 14:47)  POCT Blood Glucose.: 135 mg/dL (04 May 2024 14:01)  POCT Blood Glucose.: 143 mg/dL (04 May 2024 13:01)  POCT Blood Glucose.: 159 mg/dL (04 May 2024 11:55)      Anion Gap: 13 (05-05 @ 00:00)  Anion Gap: 10 (05-04 @ 00:17)  Anion Gap: 12 (05-03 @ 16:13)      Calcium: 8.5 (05-05 @ 00:00)  Calcium: 8.9 (05-04 @ 00:17)  Calcium: 9.1 (05-03 @ 16:13)  Albumin: 3.7 (05-05 @ 00:00)  Albumin: 4.1 (05-04 @ 00:17)  Albumin: 3.5 (05-03 @ 16:13)    Alanine Aminotransferase (ALT/SGPT): 21 (05-05 @ 00:00)  Alanine Aminotransferase (ALT/SGPT): 19 (05-04 @ 00:17)  Alanine Aminotransferase (ALT/SGPT): 18 (05-03 @ 16:13)  Alkaline Phosphatase: 36 *L* (05-05 @ 00:00)  Alkaline Phosphatase: 33 *L* (05-04 @ 00:17)  Alkaline Phosphatase: 38 *L* (05-03 @ 16:13)  Aspartate Aminotransferase (AST/SGOT): 53 *H* (05-05 @ 00:00)  Aspartate Aminotransferase (AST/SGOT): 60 *H* (05-04 @ 00:17)  Aspartate Aminotransferase (AST/SGOT): 52 *H* (05-03 @ 16:13)        05-05    136  |  103  |  28<H>  ----------------------------<  241<H>  4.8   |  20<L>  |  1.31<H>    Ca    8.5      05 May 2024 00:00  Phos  4.2     05-05  Mg     2.2     05-05    TPro  5.8<L>  /  Alb  3.7  /  TBili  0.5  /  DBili  x   /  AST  53<H>  /  ALT  21  /  AlkPhos  36<L>  05-05                        9.5    12.94 )-----------( 142      ( 05 May 2024 00:00 )             28.6     Medications  MEDICATIONS  (STANDING):  acetaminophen     Tablet .. 650 milliGRAM(s) Oral every 6 hours  albuterol    90 MICROgram(s) HFA Inhaler 2 Puff(s) Inhalation every 6 hours  albuterol/ipratropium for Nebulization 3 milliLiter(s) Nebulizer every 6 hours  aMIOdarone    Tablet 400 milliGRAM(s) Oral two times a day  amLODIPine   Tablet 5 milliGRAM(s) Oral daily  ascorbic acid 500 milliGRAM(s) Oral two times a day  aspirin enteric coated 81 milliGRAM(s) Oral daily  atorvastatin 80 milliGRAM(s) Oral at bedtime  bisacodyl Suppository 10 milliGRAM(s) Rectal once  cefuroxime  IVPB 1500 milliGRAM(s) IV Intermittent every 12 hours  chlorhexidine 2% Cloths 1 Application(s) Topical daily  clopidogrel Tablet 75 milliGRAM(s) Oral daily  dextrose 50% Injectable 50 milliLiter(s) IV Push every 15 minutes  dextrose 50% Injectable 25 milliLiter(s) IV Push every 15 minutes  enoxaparin Injectable 40 milliGRAM(s) SubCutaneous every 24 hours  furosemide   Injectable 40 milliGRAM(s) IV Push once  gabapentin 100 milliGRAM(s) Oral every 8 hours  HYDROmorphone PCA (1 mG/mL) 30 milliLiter(s) PCA Continuous PCA Continuous  insulin glargine Injectable (LANTUS) 35 Unit(s) SubCutaneous at bedtime  insulin lispro (ADMELOG) corrective regimen sliding scale   SubCutaneous three times a day before meals  insulin lispro Injectable (ADMELOG) 8 Unit(s) SubCutaneous before dinner  insulin lispro Injectable (ADMELOG) 8 Unit(s) SubCutaneous before breakfast  insulin lispro Injectable (ADMELOG) 8 Unit(s) SubCutaneous before lunch  insulin regular Infusion 3 Unit(s)/Hr (3 mL/Hr) IV Continuous <Continuous>  methocarbamol 500 milliGRAM(s) Oral every 8 hours  milrinone Infusion 0.125 MICROgram(s)/kG/Min (3.1 mL/Hr) IV Continuous <Continuous>  niCARdipine Infusion 5 mG/Hr (25 mL/Hr) IV Continuous <Continuous>  pantoprazole    Tablet 40 milliGRAM(s) Oral before breakfast  polyethylene glycol 3350 17 Gram(s) Oral daily  potassium chloride  10 mEq/50 mL IVPB 10 milliEquivalent(s) IV Intermittent every 1 hour  potassium chloride  10 mEq/50 mL IVPB 10 milliEquivalent(s) IV Intermittent every 1 hour  potassium chloride  10 mEq/50 mL IVPB 10 milliEquivalent(s) IV Intermittent every 1 hour  senna 2 Tablet(s) Oral at bedtime  sodium chloride 0.9%. 1000 milliLiter(s) (10 mL/Hr) IV Continuous <Continuous>      Physical Exam  General: Patient appears comfortable.  Vital Signs Last 12 Hrs  T(F): 99.3 (05-05-24 @ 08:00), Max: 100.9 (05-05-24 @ 04:00)  HR: 82 (05-05-24 @ 10:00) (68 - 82)  BP: 151/65 (05-05-24 @ 09:00) (110/61 - 151/65)  BP(mean): 93 (05-05-24 @ 09:00) (79 - 93)  RR: 28 (05-05-24 @ 10:00) (7 - 31)  SpO2: 86% (05-05-24 @ 10:00) (62% - 98%)  Neck: No palpable thyroid nodules.  CVS: S1S2, No murmurs  Respiratory: No wheezing, no crepitations  GI: Abdomen soft, non tender.    Diagnostics        Radiology:

## 2024-05-05 NOTE — OCCUPATIONAL THERAPY INITIAL EVALUATION ADULT - ADL RETRAINING, OT EVAL
Pt will perform UB dressing with independence within 2 weeks. Pt will perform toileting with independence within 2 weeks.

## 2024-05-05 NOTE — OCCUPATIONAL THERAPY INITIAL EVALUATION ADULT - PERTINENT HX OF CURRENT PROBLEM, REHAB EVAL
68 year-old male, with past history significant for Type-2 DM, HLD, HTN and Hyperkalemia, presented to the ED secondary to L-sided chest pain, shortness of breath.  Patient was admitted to telemetry service for further evaluation.  Patient had positive stress and subsequently underwent coronary angiography on 4/29/2024  via RRA--->M 70%, pLAD 70%, mLCx 70%, mRCA 90%, LVEDP 17, RRA accessed.  ,the patient  denied SOB , coughing wheezing non smoker non alcoholic no palpitation or dizzy spell , patient transfer to [facility] for CABG this weak  , no new C/O . no more chest pain , cardiology and cardiac surgery note appreciated no new events patient seen and evaluated and examined in the cardiac ICU. Pt now s/p  CABG X 4, on nicardipine drip, on CMV, extubated , awake responsive , the patient developed hypoxemia, required High flow 02 , chest x ray pulmonary vascular congestion , become hypotensive started on Milrinone.

## 2024-05-05 NOTE — PROGRESS NOTE ADULT - SUBJECTIVE AND OBJECTIVE BOX
Patient seen and examined at the bedside.    Remained critically ill on continuous ICU monitoring.    HPI:  68 year-old male, with past history significant for Type-2 DM, HLD, HTN and Hyperkalemia, presented to the ED secondary to L-sided chest pain, shortness of breath.  Patient was admitted to telemetry service for further evaluation.  Patient had positive stress and subsequently underwent coronary angiography on 4/29/2024  via RRA--->M 70%, pLAD 70%, mLCx 70%, mRCA 90%, LVEDP 17, RRA accessed.  Patient transferred to [facility] for CABG eval d/t triple vessel disease.       (29 Apr 2024 21:54)      =================== LABS =========================                        9.5    12.94 )-----------( 142      ( 05 May 2024 00:00 )             28.6     05-05    136  |  103  |  28<H>  ----------------------------<  241<H>  4.8   |  20<L>  |  1.31<H>    Ca    8.5      05 May 2024 00:00  Phos  4.2     05-05  Mg     2.2     05-05    TPro  5.8<L>  /  Alb  3.7  /  TBili  0.5  /  DBili  x   /  AST  53<H>  /  ALT  21  /  AlkPhos  36<L>  05-05    LIVER FUNCTIONS - ( 05 May 2024 00:00 )  Alb: 3.7 g/dL / Pro: 5.8 g/dL / ALK PHOS: 36 U/L / ALT: 21 U/L / AST: 53 U/L / GGT: x           PT/INR - ( 04 May 2024 00:17 )   PT: 13.3 sec;   INR: 1.22 ratio         PTT - ( 05 May 2024 00:00 )  PTT:30.5 sec  ABG - ( 05 May 2024 07:00 )  pH, Arterial: 7.34  pH, Blood: x     /  pCO2: 40    /  pO2: 83    / HCO3: 22    / Base Excess: -3.9  /  SaO2: 96.1      Urinalysis Basic - ( 05 May 2024 00:00 )    Color: x / Appearance: x / SG: x / pH: x  Gluc: 241 mg/dL / Ketone: x  / Bili: x / Urobili: x   Blood: x / Protein: x / Nitrite: x   Leuk Esterase: x / RBC: x / WBC x   Sq Epi: x / Non Sq Epi: x / Bacteria: x  ==================================================    OBJECTIVE:  Vital Signs Last 24 Hrs  T(C): 37.4 (05 May 2024 08:00), Max: 38.3 (05 May 2024 04:00)  T(F): 99.3 (05 May 2024 08:00), Max: 100.9 (05 May 2024 04:00)  HR: 71 (05 May 2024 08:00) (68 - 93)  BP: 115/63 (05 May 2024 08:00) (110/61 - 161/74)  BP(mean): 83 (05 May 2024 08:00) (79 - 107)  RR: 9 (05 May 2024 08:00) (7 - 38)  SpO2: 94% (05 May 2024 08:00) (62% - 98%)    Parameters below as of 05 May 2024 08:00  Patient On (Oxygen Delivery Method): BiPAP/CPAP    O2 Concentration (%): 50      Physical Exam:  General: NAD, multiple lines gtt & tubes   Neurology: AA+Ox3, no focal deficits  Eyes: bilateral pupils equal and reactive   ENT/Neck: +ETT midline, Neck supple, trachea midline, No JVD   Respiratory: Rales noted bilaterally   CV: RRR, S1S2, no murmurs        [x] Sternal dressing, [x] Mediastinal CTx2, [x] L. Pleural CT        [x] Sinus rhythm  Abdominal: Soft, NT, ND +BS,   Extremities: 1-2+ pedal edema noted, + peripheral pulses   Skin: No Rashes, Hematoma, Ecchymosis                           Assessment:  68 year-old male, with past history significant for Type-2 DM, HLD, HTN and Hyperkalemia, presented to the ED secondary to L-sided chest pain, shortness of breath.  Patient was admitted to telemetry service for further evaluation.  Patient had positive stress and subsequently underwent coronary angiography on 4/29/2024  via [facility]--->M 70%, pLAD 70%, mLCx 70%, mRCA 90%, LVEDP 17, RRA accessed.  Patient transferred to [facility] for CABG eval d/t triple vessel disease.    CAD s/p CABG, with POPPY, ANIYA Ligation on 5/3/24   Post-op respiratory failure  Post-op blood loss anemia  Thrombocytopenia  Hypovolemia      Plan:   ***Neuro***  [x] Nonfocal  Post operative neuro assessment   Pain management Tylenol, Gabapentin, Dilaudid and prns.  PRN Narcan   OOBTC    ***Cardiovascular***  Invasive hemodynamic monitoring, assess perfusion indices   SR / CVP 19/ MAP 72/ SvO2 94 / Hct 28.6/ Lactate 1.0  [x] Primacor 0.125 mcgs/kG/Min   [x] Cardene 5 mG/Hr    Reassessment of hemodynamics post resuscitation   Monitor chest tube outputs   Amiodarone for AFib prophylaxis   Norvasc and Cardene for afterload reduction  [x] Nonbleeding ___   [x] VTE ppx with SQ Lovenox   [x] ASA [x] Plavix [x] Statin   Serial EKG and cardiac enzymes     ***Pulmonary***  [x] NC 6L  [x] BiPAP 50%  Titration of FiO2 and PEEP, follow SpO2, CXR, blood gasses   Continue use of bronchodilators as needed     ***GI***  [x] CC diet   [x] Protonix for stress ulcer prophylaxis  Bowel regimen with Miralax and Senna   Zofran for nausea     ***Renal***  GFR 60  Continue to monitor I/Os, BUN/Creatinine.   Replete lytes PRN  Trejo present [x] positive     ***ID***  Perioperative coverage with Cefuroxime     ***Endocrine***  [x] DM2: HbA1c 8.9%              - [x] Insulin gtt  [x] ISS  [x] Lantus             - Need tight glycemic control to prevent wound infection.        Patient requires continuous monitoring with bedside rhythm monitoring, pulse oximetry monitoring, and continuous central venous and arterial pressure monitoring; and intermittent blood gas analysis. Care plan discussed with the ICU care team.   Patient remained critical, at risk for life threatening decompensation.    I have spent 30 minutes providing critical care management to this patient.    By signing my name below, I, Chloe Dean, attest that this documentation has been prepared under the direction and in the presence of Aranza Hein NP   Electronically signed: Kelly Garcia, 05-05-24 @ 08:27    I, Aranza Hein, personally performed the services described in this documentation. all medical record entries made by the scribe were at my direction and in my presence. I have reviewed the chart and agree that the record reflects my personal performance and is accurate and complete  Electronically signed: Aranza Hein NP  Patient seen and examined at the bedside.    Remained critically ill on continuous ICU monitoring.    HPI:  68 year-old male, with past history significant for Type-2 DM, HLD, HTN and Hyperkalemia, presented to the ED secondary to L-sided chest pain, shortness of breath.  Patient was admitted to telemetry service for further evaluation.  Patient had positive stress and subsequently underwent coronary angiography on 4/29/2024  via [facility]--->M 70%, pLAD 70%, mLCx 70%, mRCA 90%, LVEDP 17, RRA accessed.  Patient transferred to [facility] for CABG eval d/t triple vessel disease.       (29 Apr 2024 21:54)      =================== LABS =========================                        9.5    12.94 )-----------( 142      ( 05 May 2024 00:00 )             28.6     05-05    136  |  103  |  28<H>  ----------------------------<  241<H>  4.8   |  20<L>  |  1.31<H>    Ca    8.5      05 May 2024 00:00  Phos  4.2     05-05  Mg     2.2     05-05    TPro  5.8<L>  /  Alb  3.7  /  TBili  0.5  /  DBili  x   /  AST  53<H>  /  ALT  21  /  AlkPhos  36<L>  05-05    LIVER FUNCTIONS - ( 05 May 2024 00:00 )  Alb: 3.7 g/dL / Pro: 5.8 g/dL / ALK PHOS: 36 U/L / ALT: 21 U/L / AST: 53 U/L / GGT: x           PT/INR - ( 04 May 2024 00:17 )   PT: 13.3 sec;   INR: 1.22 ratio         PTT - ( 05 May 2024 00:00 )  PTT:30.5 sec  ABG - ( 05 May 2024 07:00 )  pH, Arterial: 7.34  pH, Blood: x     /  pCO2: 40    /  pO2: 83    / HCO3: 22    / Base Excess: -3.9  /  SaO2: 96.1      Urinalysis Basic - ( 05 May 2024 00:00 )    Color: x / Appearance: x / SG: x / pH: x  Gluc: 241 mg/dL / Ketone: x  / Bili: x / Urobili: x   Blood: x / Protein: x / Nitrite: x   Leuk Esterase: x / RBC: x / WBC x   Sq Epi: x / Non Sq Epi: x / Bacteria: x  ==================================================    OBJECTIVE:  Vital Signs Last 24 Hrs  T(C): 37.4 (05 May 2024 08:00), Max: 38.3 (05 May 2024 04:00)  T(F): 99.3 (05 May 2024 08:00), Max: 100.9 (05 May 2024 04:00)  HR: 71 (05 May 2024 08:00) (68 - 93)  BP: 115/63 (05 May 2024 08:00) (110/61 - 161/74)  BP(mean): 83 (05 May 2024 08:00) (79 - 107)  RR: 9 (05 May 2024 08:00) (7 - 38)  SpO2: 94% (05 May 2024 08:00) (62% - 98%)    Parameters below as of 05 May 2024 08:00  Patient On (Oxygen Delivery Method): BiPAP/CPAP    O2 Concentration (%): 50      Physical Exam:  General: NAD, multiple lines gtt & tubes   Neurology: AA+Ox3, no focal deficits  Eyes: bilateral pupils equal and reactive   ENT/Neck: +ETT midline, Neck supple, trachea midline, No JVD   Respiratory: Rales noted bilaterally   CV: RRR, S1S2, no murmurs        [x] Sternal dressing, [x] Mediastinal CTx2, [x] L. Pleural CT        [x] Sinus rhythm  Abdominal: Soft, NT, ND +BS,   Extremities: 1-2+ pedal edema noted, + peripheral pulses   Skin: No Rashes, Hematoma, Ecchymosis                           Assessment:  68 year-old male, with past history significant for Type-2 DM, HLD, HTN and Hyperkalemia, presented to the ED secondary to L-sided chest pain, shortness of breath.  Patient was admitted to telemetry service for further evaluation.  Patient had positive stress and subsequently underwent coronary angiography on 4/29/2024  via [facility]--->M 70%, pLAD 70%, mLCx 70%, mRCA 90%, LVEDP 17, RRA accessed.  Patient transferred to [facility] for CABG eval d/t triple vessel disease.    CAD s/p CABG, with POPPY, ANIYA Ligation on 5/3/24   Post-op respiratory failure  Post-op blood loss anemia  Thrombocytopenia  Hypovolemia      Plan:   ***Neuro***  [x] Nonfocal  Post operative neuro assessment   Pain management Tylenol, Gabapentin, Dilaudid and prns.  PRN Narcan   OOBTC    ***Cardiovascular***  Invasive hemodynamic monitoring, assess perfusion indices   SR / CVP 19/ MAP 72/ SvO2 94 / Hct 28.6/ Lactate 1.0  [x] Primacor 0.125 mcgs/kG/Min   Reassessment of hemodynamics post resuscitation   Monitor chest tube outputs , Dc @  2 mediasinal chest tubes  Amiodarone for AFib prophylaxis   Norvasc and Cardene for afterload reduction  [x] Nonbleeding ___   [x] VTE ppx with SQ Lovenox   [x] ASA [x] Plavix [x] Statin   Serial EKG and cardiac enzymes     ***Pulmonary***  [x] NC 6L  [x] BiPAP 50%  Titration of FiO2 and PEEP, follow SpO2, CXR, blood gasses   Continue use of bronchodilators as needed     ***GI***  [x] CC diet   [x] Protonix for stress ulcer prophylaxis  Bowel regimen with Miralax and Senna   Zofran for nausea     ***Renal***  GFR 60  Continue to monitor I/Os, BUN/Creatinine.   Replete lytes PRN  Trejo present [x] positive     ***ID***  Perioperative coverage with Cefuroxime     ***Endocrine***  [x] DM2: HbA1c 8.9%              - [x] SSI     [x] Lantus             - Need tight glycemic control to prevent wound infection.        Patient requires continuous monitoring with bedside rhythm monitoring, pulse oximetry monitoring, and continuous central venous and arterial pressure monitoring; and intermittent blood gas analysis. Care plan discussed with the ICU care team.   Patient remained critical, at risk for life threatening decompensation.    I have spent 50 minutes providing critical care management to this patient.    By signing my name below, I, Chloe Dean, attest that this documentation has been prepared under the direction and in the presence of Aranza Hein NP   Electronically signed: Kelly Garcia, 05-05-24 @ 08:27    I, Aranza Hein, personally performed the services described in this documentation. all medical record entries made by the scribe were at my direction and in my presence. I have reviewed the chart and agree that the record reflects my personal performance and is accurate and complete  Electronically signed: Aranza Hein NP

## 2024-05-05 NOTE — OCCUPATIONAL THERAPY INITIAL EVALUATION ADULT - NS ASR FOLLOW COMMAND OT EVAL
100% of the time/able to follow single-step instructions/unable to follow multi-step instructions difficulty with multi step directions/100% of the time/able to follow single-step instructions/unable to follow multi-step instructions

## 2024-05-05 NOTE — OCCUPATIONAL THERAPY INITIAL EVALUATION ADULT - GENERAL OBSERVATIONS, REHAB EVAL
Pt received seated in chair at bedside with +chest tube, +ext pacing wires, +HFNC, +Cardiac monitor, +A line, +Trejo, +PIV.
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
## 2024-05-05 NOTE — PROGRESS NOTE ADULT - SUBJECTIVE AND OBJECTIVE BOX
CARDIOLOGY FOLLOW UP - Dr. Brewer  Date of Service: 5/5/2024  CC: no events    Review of Systems:  Constitutional: No fever, weight loss, or fatigue  Respiratory: No cough, wheezing, or hemoptysis, no shortness of breath  Cardiovascular: No chest pain, palpitations, passing out, dizziness, or leg swelling  Gastrointestinal: No abd or epigastric pain. No nausea, vomiting, or hematemesis; no diarrhea or consiptaiton, no melena or hematochezia  Vascular: No edema     TELEMETRY:    PHYSICAL EXAM:  T(C): 38.3 (05-05-24 @ 04:00), Max: 38.3 (05-05-24 @ 04:00)  HR: 72 (05-05-24 @ 07:00) (68 - 93)  BP: 114/61 (05-05-24 @ 07:00) (110/61 - 161/74)  RR: 7 (05-05-24 @ 07:00) (7 - 38)  SpO2: 96% (05-05-24 @ 07:00) (62% - 98%)  Wt(kg): --  I&O's Summary    04 May 2024 07:01  -  05 May 2024 07:00  --------------------------------------------------------  IN: 2154.6 mL / OUT: 1495 mL / NET: 659.6 mL        Appearance: Normal	  Cardiovascular: Normal S1 S2,RRR, No JVD, No murmurs  Respiratory: Lungs clear to auscultation	  Gastrointestinal:  Soft, Non-tender, + BS	  Extremities: Normal range of motion, No clubbing, cyanosis or edema  Vascular: Peripheral pulses palpable 2+ bilaterally       Home Medications:  aspirin 81 mg oral delayed release tablet: 1 tab(s) orally once a day (29 Apr 2024 18:21)  atorvastatin 40 mg oral tablet: 1 tab(s) orally once a day (at bedtime) (29 Apr 2024 18:21)  insulin glargine 100 units/mL subcutaneous solution: 50 unit(s) subcutaneous once a day (at bedtime) (29 Apr 2024 18:21)  insulin lispro 100 units/mL injectable solution: 15 unit(s) injectable once a day (in the morning) BEFORE BREAKFAST (29 Apr 2024 18:21)  insulin lispro 100 units/mL injectable solution: 1 unit(s) injectable 3 times a day (before meals) 1 Unit(s) if Glucose 151 - 200  2 Unit(s) if Glucose 201 - 250  3 Unit(s) if Glucose 251 - 300  4 Unit(s) if Glucose 301 - 350  5 Unit(s) if Glucose 351 - 400  6 Unit(s) if Glucose Greater Than 400 (29 Apr 2024 18:21)  insulin lispro 100 units/mL injectable solution: 15 unit(s) injectable once a day before dinner (29 Apr 2024 18:21)  lisinopril 20 mg oral tablet: 1 tab(s) orally once a day (29 Apr 2024 02:23)  metoprolol tartrate 25 mg oral tablet: 1 tab(s) orally every 12 hours (29 Apr 2024 18:21)        MEDICATIONS  (STANDING):  acetaminophen     Tablet .. 650 milliGRAM(s) Oral every 6 hours  albuterol    90 MICROgram(s) HFA Inhaler 2 Puff(s) Inhalation every 6 hours  albuterol/ipratropium for Nebulization 3 milliLiter(s) Nebulizer every 6 hours  aMIOdarone    Tablet 400 milliGRAM(s) Oral two times a day  amLODIPine   Tablet 5 milliGRAM(s) Oral daily  ascorbic acid 500 milliGRAM(s) Oral two times a day  aspirin enteric coated 81 milliGRAM(s) Oral daily  atorvastatin 80 milliGRAM(s) Oral at bedtime  bisacodyl Suppository 10 milliGRAM(s) Rectal once  cefuroxime  IVPB 1500 milliGRAM(s) IV Intermittent every 8 hours  chlorhexidine 2% Cloths 1 Application(s) Topical daily  dextrose 50% Injectable 50 milliLiter(s) IV Push every 15 minutes  dextrose 50% Injectable 25 milliLiter(s) IV Push every 15 minutes  enoxaparin Injectable 40 milliGRAM(s) SubCutaneous every 24 hours  gabapentin 100 milliGRAM(s) Oral every 8 hours  HYDROmorphone PCA (1 mG/mL) 30 milliLiter(s) PCA Continuous PCA Continuous  insulin glargine Injectable (LANTUS) 25 Unit(s) SubCutaneous every morning  insulin lispro (ADMELOG) corrective regimen sliding scale   SubCutaneous three times a day before meals  insulin lispro Injectable (ADMELOG) 7 Unit(s) SubCutaneous three times a day before meals  insulin regular Infusion 3 Unit(s)/Hr (3 mL/Hr) IV Continuous <Continuous>  milrinone Infusion 0.125 MICROgram(s)/kG/Min (3.1 mL/Hr) IV Continuous <Continuous>  niCARdipine Infusion 5 mG/Hr (25 mL/Hr) IV Continuous <Continuous>  pantoprazole    Tablet 40 milliGRAM(s) Oral before breakfast  polyethylene glycol 3350 17 Gram(s) Oral daily  potassium chloride  10 mEq/50 mL IVPB 10 milliEquivalent(s) IV Intermittent every 1 hour  potassium chloride  10 mEq/50 mL IVPB 10 milliEquivalent(s) IV Intermittent every 1 hour  potassium chloride  10 mEq/50 mL IVPB 10 milliEquivalent(s) IV Intermittent every 1 hour  senna 2 Tablet(s) Oral at bedtime  sodium chloride 0.9%. 1000 milliLiter(s) (10 mL/Hr) IV Continuous <Continuous>        EKG:  RADIOLOGY:  DIAGNOSTIC TESTING:  [ ] Echocardiogram:  [ ] Catherterization:  [ ] Stress Test:  OTHER:     LABS:	 	                          9.5    12.94 )-----------( 142      ( 05 May 2024 00:00 )             28.6     05-05    136  |  103  |  28<H>  ----------------------------<  241<H>  4.8   |  20<L>  |  1.31<H>    Ca    8.5      05 May 2024 00:00  Phos  4.2     05-05  Mg     2.2     05-05    TPro  5.8<L>  /  Alb  3.7  /  TBili  0.5  /  DBili  x   /  AST  53<H>  /  ALT  21  /  AlkPhos  36<L>  05-05      PT/INR - ( 04 May 2024 00:17 )   PT: 13.3 sec;   INR: 1.22 ratio         PTT - ( 05 May 2024 00:00 )  PTT:30.5 sec    CARDIAC MARKERS:

## 2024-05-06 LAB
ALBUMIN SERPL ELPH-MCNC: 3.5 G/DL — SIGNIFICANT CHANGE UP (ref 3.3–5)
ALP SERPL-CCNC: 46 U/L — SIGNIFICANT CHANGE UP (ref 40–120)
ALT FLD-CCNC: 73 U/L — HIGH (ref 10–45)
ANION GAP SERPL CALC-SCNC: 13 MMOL/L — SIGNIFICANT CHANGE UP (ref 5–17)
APTT BLD: 31.7 SEC — SIGNIFICANT CHANGE UP (ref 24.5–35.6)
AST SERPL-CCNC: 91 U/L — HIGH (ref 10–40)
BASE EXCESS BLDV CALC-SCNC: 0.3 MMOL/L — SIGNIFICANT CHANGE UP (ref -2–3)
BASOPHILS # BLD AUTO: 0.02 K/UL — SIGNIFICANT CHANGE UP (ref 0–0.2)
BASOPHILS NFR BLD AUTO: 0.2 % — SIGNIFICANT CHANGE UP (ref 0–2)
BILIRUB SERPL-MCNC: 0.5 MG/DL — SIGNIFICANT CHANGE UP (ref 0.2–1.2)
BLD GP AB SCN SERPL QL: NEGATIVE — SIGNIFICANT CHANGE UP
BUN SERPL-MCNC: 41 MG/DL — HIGH (ref 7–23)
CALCIUM SERPL-MCNC: 8.3 MG/DL — LOW (ref 8.4–10.5)
CHLORIDE SERPL-SCNC: 103 MMOL/L — SIGNIFICANT CHANGE UP (ref 96–108)
CO2 BLDV-SCNC: 27 MMOL/L — HIGH (ref 22–26)
CO2 SERPL-SCNC: 22 MMOL/L — SIGNIFICANT CHANGE UP (ref 22–31)
CREAT SERPL-MCNC: 1.55 MG/DL — HIGH (ref 0.5–1.3)
EGFR: 49 ML/MIN/1.73M2 — LOW
EOSINOPHIL # BLD AUTO: 0.03 K/UL — SIGNIFICANT CHANGE UP (ref 0–0.5)
EOSINOPHIL NFR BLD AUTO: 0.2 % — SIGNIFICANT CHANGE UP (ref 0–6)
GAS PNL BLDA: SIGNIFICANT CHANGE UP
GAS PNL BLDA: SIGNIFICANT CHANGE UP
GAS PNL BLDV: SIGNIFICANT CHANGE UP
GLUCOSE BLDC GLUCOMTR-MCNC: 104 MG/DL — HIGH (ref 70–99)
GLUCOSE BLDC GLUCOMTR-MCNC: 107 MG/DL — HIGH (ref 70–99)
GLUCOSE BLDC GLUCOMTR-MCNC: 109 MG/DL — HIGH (ref 70–99)
GLUCOSE BLDC GLUCOMTR-MCNC: 112 MG/DL — HIGH (ref 70–99)
GLUCOSE BLDC GLUCOMTR-MCNC: 121 MG/DL — HIGH (ref 70–99)
GLUCOSE BLDC GLUCOMTR-MCNC: 126 MG/DL — HIGH (ref 70–99)
GLUCOSE BLDC GLUCOMTR-MCNC: 167 MG/DL — HIGH (ref 70–99)
GLUCOSE BLDC GLUCOMTR-MCNC: 169 MG/DL — HIGH (ref 70–99)
GLUCOSE BLDC GLUCOMTR-MCNC: 173 MG/DL — HIGH (ref 70–99)
GLUCOSE BLDC GLUCOMTR-MCNC: 97 MG/DL — SIGNIFICANT CHANGE UP (ref 70–99)
GLUCOSE SERPL-MCNC: 113 MG/DL — HIGH (ref 70–99)
HCO3 BLDV-SCNC: 25 MMOL/L — SIGNIFICANT CHANGE UP (ref 22–29)
HCT VFR BLD CALC: 26.2 % — LOW (ref 39–50)
HGB BLD-MCNC: 8.9 G/DL — LOW (ref 13–17)
HOROWITZ INDEX BLDV+IHG-RTO: 50 — SIGNIFICANT CHANGE UP
IMM GRANULOCYTES NFR BLD AUTO: 0.4 % — SIGNIFICANT CHANGE UP (ref 0–0.9)
LYMPHOCYTES # BLD AUTO: 16.3 % — SIGNIFICANT CHANGE UP (ref 13–44)
LYMPHOCYTES # BLD AUTO: 2 K/UL — SIGNIFICANT CHANGE UP (ref 1–3.3)
MAGNESIUM SERPL-MCNC: 3.5 MG/DL — HIGH (ref 1.6–2.6)
MCHC RBC-ENTMCNC: 27.6 PG — SIGNIFICANT CHANGE UP (ref 27–34)
MCHC RBC-ENTMCNC: 34 GM/DL — SIGNIFICANT CHANGE UP (ref 32–36)
MCV RBC AUTO: 81.1 FL — SIGNIFICANT CHANGE UP (ref 80–100)
MONOCYTES # BLD AUTO: 1.34 K/UL — HIGH (ref 0–0.9)
MONOCYTES NFR BLD AUTO: 10.9 % — SIGNIFICANT CHANGE UP (ref 2–14)
NEUTROPHILS # BLD AUTO: 8.83 K/UL — HIGH (ref 1.8–7.4)
NEUTROPHILS NFR BLD AUTO: 72 % — SIGNIFICANT CHANGE UP (ref 43–77)
NRBC # BLD: 0 /100 WBCS — SIGNIFICANT CHANGE UP (ref 0–0)
PCO2 BLDV: 42 MMHG — SIGNIFICANT CHANGE UP (ref 42–55)
PH BLDV: 7.39 — SIGNIFICANT CHANGE UP (ref 7.32–7.43)
PHOSPHATE SERPL-MCNC: 2.5 MG/DL — SIGNIFICANT CHANGE UP (ref 2.5–4.5)
PLATELET # BLD AUTO: 137 K/UL — LOW (ref 150–400)
PO2 BLDV: 41 MMHG — SIGNIFICANT CHANGE UP (ref 25–45)
POTASSIUM SERPL-MCNC: 4.1 MMOL/L — SIGNIFICANT CHANGE UP (ref 3.5–5.3)
POTASSIUM SERPL-SCNC: 4.1 MMOL/L — SIGNIFICANT CHANGE UP (ref 3.5–5.3)
PROT SERPL-MCNC: 6.2 G/DL — SIGNIFICANT CHANGE UP (ref 6–8.3)
RBC # BLD: 3.23 M/UL — LOW (ref 4.2–5.8)
RBC # FLD: 14.6 % — HIGH (ref 10.3–14.5)
RH IG SCN BLD-IMP: POSITIVE — SIGNIFICANT CHANGE UP
SAO2 % BLDV: 66.8 % — LOW (ref 67–88)
SODIUM SERPL-SCNC: 138 MMOL/L — SIGNIFICANT CHANGE UP (ref 135–145)
WBC # BLD: 12.27 K/UL — HIGH (ref 3.8–10.5)
WBC # FLD AUTO: 12.27 K/UL — HIGH (ref 3.8–10.5)

## 2024-05-06 PROCEDURE — 71045 X-RAY EXAM CHEST 1 VIEW: CPT | Mod: 26

## 2024-05-06 PROCEDURE — 99222 1ST HOSP IP/OBS MODERATE 55: CPT

## 2024-05-06 PROCEDURE — 99291 CRITICAL CARE FIRST HOUR: CPT

## 2024-05-06 RX ORDER — DIGOXIN 250 MCG
250 TABLET ORAL ONCE
Refills: 0 | Status: COMPLETED | OUTPATIENT
Start: 2024-05-06 | End: 2024-05-06

## 2024-05-06 RX ORDER — AMIODARONE HYDROCHLORIDE 400 MG/1
200 TABLET ORAL DAILY
Refills: 0 | Status: DISCONTINUED | OUTPATIENT
Start: 2024-05-07 | End: 2024-05-10

## 2024-05-06 RX ORDER — AMIODARONE HYDROCHLORIDE 400 MG/1
400 TABLET ORAL EVERY 8 HOURS
Refills: 0 | Status: DISCONTINUED | OUTPATIENT
Start: 2024-05-06 | End: 2024-05-06

## 2024-05-06 RX ORDER — ACETAMINOPHEN 500 MG
1000 TABLET ORAL ONCE
Refills: 0 | Status: COMPLETED | OUTPATIENT
Start: 2024-05-06 | End: 2024-05-06

## 2024-05-06 RX ORDER — AMIODARONE HYDROCHLORIDE 400 MG/1
1 TABLET ORAL
Qty: 450 | Refills: 0 | Status: DISCONTINUED | OUTPATIENT
Start: 2024-05-06 | End: 2024-05-06

## 2024-05-06 RX ORDER — METOPROLOL TARTRATE 50 MG
50 TABLET ORAL EVERY 12 HOURS
Refills: 0 | Status: DISCONTINUED | OUTPATIENT
Start: 2024-05-06 | End: 2024-05-09

## 2024-05-06 RX ORDER — METOPROLOL TARTRATE 50 MG
25 TABLET ORAL THREE TIMES A DAY
Refills: 0 | Status: DISCONTINUED | OUTPATIENT
Start: 2024-05-06 | End: 2024-05-06

## 2024-05-06 RX ORDER — METOPROLOL TARTRATE 50 MG
25 TABLET ORAL
Refills: 0 | Status: DISCONTINUED | OUTPATIENT
Start: 2024-05-06 | End: 2024-05-06

## 2024-05-06 RX ORDER — BUMETANIDE 0.25 MG/ML
2 INJECTION INTRAMUSCULAR; INTRAVENOUS ONCE
Refills: 0 | Status: COMPLETED | OUTPATIENT
Start: 2024-05-06 | End: 2024-05-06

## 2024-05-06 RX ORDER — METOPROLOL TARTRATE 50 MG
5 TABLET ORAL ONCE
Refills: 0 | Status: COMPLETED | OUTPATIENT
Start: 2024-05-06 | End: 2024-05-06

## 2024-05-06 RX ORDER — DIGOXIN 250 MCG
250 TABLET ORAL ONCE
Refills: 0 | Status: DISCONTINUED | OUTPATIENT
Start: 2024-05-06 | End: 2024-05-06

## 2024-05-06 RX ORDER — AMIODARONE HYDROCHLORIDE 400 MG/1
200 TABLET ORAL EVERY 12 HOURS
Refills: 0 | Status: DISCONTINUED | OUTPATIENT
Start: 2024-05-06 | End: 2024-05-06

## 2024-05-06 RX ORDER — DIGOXIN 250 MCG
500 TABLET ORAL ONCE
Refills: 0 | Status: COMPLETED | OUTPATIENT
Start: 2024-05-06 | End: 2024-05-06

## 2024-05-06 RX ORDER — IPRATROPIUM BROMIDE 0.2 MG/ML
1 SOLUTION, NON-ORAL INHALATION EVERY 6 HOURS
Refills: 0 | Status: DISCONTINUED | OUTPATIENT
Start: 2024-05-06 | End: 2024-05-06

## 2024-05-06 RX ORDER — IPRATROPIUM BROMIDE 0.2 MG/ML
500 SOLUTION, NON-ORAL INHALATION EVERY 6 HOURS
Refills: 0 | Status: DISCONTINUED | OUTPATIENT
Start: 2024-05-06 | End: 2024-05-10

## 2024-05-06 RX ORDER — AMIODARONE HYDROCHLORIDE 400 MG/1
TABLET ORAL
Refills: 0 | Status: DISCONTINUED | OUTPATIENT
Start: 2024-05-06 | End: 2024-05-06

## 2024-05-06 RX ADMIN — OXYCODONE HYDROCHLORIDE 10 MILLIGRAM(S): 5 TABLET ORAL at 10:45

## 2024-05-06 RX ADMIN — Medication 3 MILLILITER(S): at 11:09

## 2024-05-06 RX ADMIN — ATORVASTATIN CALCIUM 80 MILLIGRAM(S): 80 TABLET, FILM COATED ORAL at 21:41

## 2024-05-06 RX ADMIN — Medication 400 MILLIGRAM(S): at 01:00

## 2024-05-06 RX ADMIN — METHOCARBAMOL 500 MILLIGRAM(S): 500 TABLET, FILM COATED ORAL at 05:10

## 2024-05-06 RX ADMIN — GABAPENTIN 100 MILLIGRAM(S): 400 CAPSULE ORAL at 21:41

## 2024-05-06 RX ADMIN — CHLORHEXIDINE GLUCONATE 1 APPLICATION(S): 213 SOLUTION TOPICAL at 21:42

## 2024-05-06 RX ADMIN — HEPARIN SODIUM 5000 UNIT(S): 5000 INJECTION INTRAVENOUS; SUBCUTANEOUS at 21:41

## 2024-05-06 RX ADMIN — HEPARIN SODIUM 5000 UNIT(S): 5000 INJECTION INTRAVENOUS; SUBCUTANEOUS at 13:49

## 2024-05-06 RX ADMIN — Medication 500 MILLIGRAM(S): at 17:03

## 2024-05-06 RX ADMIN — INSULIN GLARGINE 35 UNIT(S): 100 INJECTION, SOLUTION SUBCUTANEOUS at 21:41

## 2024-05-06 RX ADMIN — Medication 650 MILLIGRAM(S): at 05:13

## 2024-05-06 RX ADMIN — HEPARIN SODIUM 5000 UNIT(S): 5000 INJECTION INTRAVENOUS; SUBCUTANEOUS at 05:10

## 2024-05-06 RX ADMIN — Medication 25 MILLIGRAM(S): at 05:13

## 2024-05-06 RX ADMIN — Medication 1000 MILLIGRAM(S): at 01:30

## 2024-05-06 RX ADMIN — PANTOPRAZOLE SODIUM 40 MILLIGRAM(S): 20 TABLET, DELAYED RELEASE ORAL at 06:05

## 2024-05-06 RX ADMIN — AMIODARONE HYDROCHLORIDE 200 MILLIGRAM(S): 400 TABLET ORAL at 17:03

## 2024-05-06 RX ADMIN — Medication 500 MICROGRAM(S): at 08:28

## 2024-05-06 RX ADMIN — Medication 3 MILLILITER(S): at 05:30

## 2024-05-06 RX ADMIN — Medication 50 MILLIGRAM(S): at 11:55

## 2024-05-06 RX ADMIN — Medication 1: at 11:55

## 2024-05-06 RX ADMIN — GABAPENTIN 100 MILLIGRAM(S): 400 CAPSULE ORAL at 13:48

## 2024-05-06 RX ADMIN — AMIODARONE HYDROCHLORIDE 400 MILLIGRAM(S): 400 TABLET ORAL at 13:48

## 2024-05-06 RX ADMIN — CLOPIDOGREL BISULFATE 75 MILLIGRAM(S): 75 TABLET, FILM COATED ORAL at 11:54

## 2024-05-06 RX ADMIN — Medication 81 MILLIGRAM(S): at 11:54

## 2024-05-06 RX ADMIN — Medication 500 MICROGRAM(S): at 23:06

## 2024-05-06 RX ADMIN — SENNA PLUS 2 TABLET(S): 8.6 TABLET ORAL at 21:41

## 2024-05-06 RX ADMIN — Medication 8 UNIT(S): at 11:56

## 2024-05-06 RX ADMIN — Medication 1: at 15:30

## 2024-05-06 RX ADMIN — POLYETHYLENE GLYCOL 3350 17 GRAM(S): 17 POWDER, FOR SOLUTION ORAL at 11:54

## 2024-05-06 RX ADMIN — Medication 500 MICROGRAM(S): at 17:15

## 2024-05-06 RX ADMIN — Medication 50 MILLIGRAM(S): at 17:03

## 2024-05-06 RX ADMIN — Medication 8 UNIT(S): at 07:51

## 2024-05-06 RX ADMIN — Medication 650 MILLIGRAM(S): at 12:09

## 2024-05-06 RX ADMIN — Medication 500 MILLIGRAM(S): at 05:10

## 2024-05-06 RX ADMIN — OXYCODONE HYDROCHLORIDE 10 MILLIGRAM(S): 5 TABLET ORAL at 10:30

## 2024-05-06 RX ADMIN — Medication 650 MILLIGRAM(S): at 06:00

## 2024-05-06 RX ADMIN — AMIODARONE HYDROCHLORIDE 33.3 MG/MIN: 400 TABLET ORAL at 04:29

## 2024-05-06 RX ADMIN — METHOCARBAMOL 500 MILLIGRAM(S): 500 TABLET, FILM COATED ORAL at 13:49

## 2024-05-06 RX ADMIN — METHOCARBAMOL 500 MILLIGRAM(S): 500 TABLET, FILM COATED ORAL at 21:41

## 2024-05-06 RX ADMIN — BUMETANIDE 2 MILLIGRAM(S): 0.25 INJECTION INTRAMUSCULAR; INTRAVENOUS at 11:55

## 2024-05-06 RX ADMIN — Medication 250 MICROGRAM(S): at 11:55

## 2024-05-06 RX ADMIN — Medication 650 MILLIGRAM(S): at 11:54

## 2024-05-06 RX ADMIN — GABAPENTIN 100 MILLIGRAM(S): 400 CAPSULE ORAL at 05:10

## 2024-05-06 RX ADMIN — Medication 8 UNIT(S): at 15:31

## 2024-05-06 RX ADMIN — Medication 5 MILLIGRAM(S): at 04:29
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
## 2024-05-06 NOTE — PROGRESS NOTE ADULT - ASSESSMENT
68 year-old male, with past history significant for Type-2 DM, HLD, HTN and Hyperkalemia, presented to the ED secondary to L-sided chest pain, shortness of breath.  Transferred to [facility] for CABG eval   Assessment  DMT2: 68y Male with DM T2 with hyperglycemia, A1C 8.9% , was on insulin at home, on  basal bolus insulin with coverage, now s/p surgery, on basal bolus insulin, eating full  meals, sugars improving..  CAD: on medications, stable, monitored.  HTN: on antihypertensive medications, monitored, asymptomatic.    Leela Feldman MD  Cell: [phone]  Office: [phone]

## 2024-05-06 NOTE — PROGRESS NOTE ADULT - SUBJECTIVE AND OBJECTIVE BOX
Chief complaint    Patient is a 68y old  Male who presents with a chief complaint of cardiac surgery (06 May 2024 14:49)   Review of systems  Patient appears comfortable.    Labs and Fingersticks  CAPILLARY BLOOD GLUCOSE  121 (06 May 2024 06:00)  109 (06 May 2024 04:00)  104 (06 May 2024 03:00)  97 (06 May 2024 02:00)  107 (06 May 2024 01:00)  112 (06 May 2024 00:00)  125 (05 May 2024 23:00)  170 (05 May 2024 22:00)  140 (05 May 2024 21:00)  159 (05 May 2024 20:00)      POCT Blood Glucose.: 167 mg/dL (06 May 2024 15:23)  POCT Blood Glucose.: 169 mg/dL (06 May 2024 11:47)  POCT Blood Glucose.: 126 mg/dL (06 May 2024 07:36)  POCT Blood Glucose.: 121 mg/dL (06 May 2024 06:07)  POCT Blood Glucose.: 109 mg/dL (06 May 2024 04:12)  POCT Blood Glucose.: 104 mg/dL (06 May 2024 02:53)  POCT Blood Glucose.: 97 mg/dL (06 May 2024 01:58)  POCT Blood Glucose.: 107 mg/dL (06 May 2024 01:12)  POCT Blood Glucose.: 112 mg/dL (06 May 2024 00:17)  POCT Blood Glucose.: 125 mg/dL (05 May 2024 23:17)  POCT Blood Glucose.: 170 mg/dL (05 May 2024 22:14)  POCT Blood Glucose.: 140 mg/dL (05 May 2024 21:08)  POCT Blood Glucose.: 159 mg/dL (05 May 2024 20:17)  POCT Blood Glucose.: 188 mg/dL (05 May 2024 18:54)  POCT Blood Glucose.: 223 mg/dL (05 May 2024 17:54)  POCT Blood Glucose.: 256 mg/dL (05 May 2024 17:09)  POCT Blood Glucose.: 264 mg/dL (05 May 2024 16:17)      Anion Gap: 13 (05-06 @ 00:33)  Anion Gap: 14 (05-05 @ 17:14)  Anion Gap: 13 (05-05 @ 00:00)      Calcium: 8.3 *L* (05-06 @ 00:33)  Calcium: 8.1 *L* (05-05 @ 17:14)  Calcium: 8.5 (05-05 @ 00:00)  Albumin: 3.5 (05-06 @ 00:33)  Albumin: 3.7 (05-05 @ 00:00)    Alanine Aminotransferase (ALT/SGPT): 73 *H* (05-06 @ 00:33)  Alanine Aminotransferase (ALT/SGPT): 21 (05-05 @ 00:00)  Alkaline Phosphatase: 46 (05-06 @ 00:33)  Alkaline Phosphatase: 36 *L* (05-05 @ 00:00)  Aspartate Aminotransferase (AST/SGOT): 91 *H* (05-06 @ 00:33)  Aspartate Aminotransferase (AST/SGOT): 53 *H* (05-05 @ 00:00)        05-06    138  |  103  |  41<H>  ----------------------------<  113<H>  4.1   |  22  |  1.55<H>    Ca    8.3<L>      06 May 2024 00:33  Phos  2.5     05-06  Mg     3.5     05-06    TPro  6.2  /  Alb  3.5  /  TBili  0.5  /  DBili  x   /  AST  91<H>  /  ALT  73<H>  /  AlkPhos  46  05-06                        8.9    12.27 )-----------( 137      ( 06 May 2024 00:33 )             26.2     Medications  MEDICATIONS  (STANDING):  aMIOdarone    Tablet   Oral   aMIOdarone    Tablet 400 milliGRAM(s) Oral every 8 hours  aMIOdarone Infusion 1 mG/Min (33.3 mL/Hr) IV Continuous <Continuous>  ascorbic acid 500 milliGRAM(s) Oral two times a day  aspirin enteric coated 81 milliGRAM(s) Oral daily  atorvastatin 80 milliGRAM(s) Oral at bedtime  bisacodyl Suppository 10 milliGRAM(s) Rectal once  chlorhexidine 2% Cloths 1 Application(s) Topical daily  clopidogrel Tablet 75 milliGRAM(s) Oral daily  dextrose 50% Injectable 50 milliLiter(s) IV Push every 15 minutes  dextrose 50% Injectable 25 milliLiter(s) IV Push every 15 minutes  digoxin  Injectable 250 MICROGram(s) IV Push once  gabapentin 100 milliGRAM(s) Oral every 8 hours  heparin   Injectable 5000 Unit(s) SubCutaneous every 8 hours  insulin glargine Injectable (LANTUS) 35 Unit(s) SubCutaneous at bedtime  insulin lispro (ADMELOG) corrective regimen sliding scale   SubCutaneous three times a day before meals  insulin lispro Injectable (ADMELOG) 8 Unit(s) SubCutaneous before lunch  insulin lispro Injectable (ADMELOG) 8 Unit(s) SubCutaneous before dinner  insulin lispro Injectable (ADMELOG) 8 Unit(s) SubCutaneous before breakfast  insulin regular Infusion 3 Unit(s)/Hr (3 mL/Hr) IV Continuous <Continuous>  ipratropium 17 MICROgram(s) HFA Inhaler 1 Puff(s) Inhalation every 6 hours  methocarbamol 500 milliGRAM(s) Oral every 8 hours  metoprolol tartrate 50 milliGRAM(s) Oral every 12 hours  pantoprazole    Tablet 40 milliGRAM(s) Oral before breakfast  polyethylene glycol 3350 17 Gram(s) Oral daily  potassium chloride  10 mEq/50 mL IVPB 10 milliEquivalent(s) IV Intermittent every 1 hour  potassium chloride  10 mEq/50 mL IVPB 10 milliEquivalent(s) IV Intermittent every 1 hour  potassium chloride  10 mEq/50 mL IVPB 10 milliEquivalent(s) IV Intermittent every 1 hour  senna 2 Tablet(s) Oral at bedtime  sodium chloride 0.9%. 1000 milliLiter(s) (10 mL/Hr) IV Continuous <Continuous>      Physical Exam  General: Patient appears comfortable.  Vital Signs Last 12 Hrs  T(F): 99.1 (05-06-24 @ 12:00), Max: 100 (05-06-24 @ 04:00)  HR: 91 (05-06-24 @ 15:00) (91 - 122)  BP: 137/64 (05-06-24 @ 15:00) (124/70 - 141/67)  BP(mean): 91 (05-06-24 @ 15:00) (90 - 95)  RR: 29 (05-06-24 @ 15:00) (12 - 29)  SpO2: 97% (05-06-24 @ 15:00) (92% - 100%)  Neck: No palpable thyroid nodules.  CVS: S1S2, No murmurs  Respiratory: No wheezing, no crepitations  GI: Abdomen soft, non tender.    Diagnostics        Radiology:

## 2024-05-06 NOTE — PROGRESS NOTE ADULT - SUBJECTIVE AND OBJECTIVE BOX
CRITICAL CARE ATTENDING - CTICU    MEDICATIONS  (STANDING):  acetaminophen     Tablet .. 650 milliGRAM(s) Oral every 6 hours  albuterol    90 MICROgram(s) HFA Inhaler 2 Puff(s) Inhalation every 6 hours  albuterol/ipratropium for Nebulization 3 milliLiter(s) Nebulizer every 6 hours  aMIOdarone Infusion 1 mG/Min (33.3 mL/Hr) IV Continuous <Continuous>  ascorbic acid 500 milliGRAM(s) Oral two times a day  aspirin enteric coated 81 milliGRAM(s) Oral daily  atorvastatin 80 milliGRAM(s) Oral at bedtime  bisacodyl Suppository 10 milliGRAM(s) Rectal once  chlorhexidine 2% Cloths 1 Application(s) Topical daily  clopidogrel Tablet 75 milliGRAM(s) Oral daily  dextrose 50% Injectable 50 milliLiter(s) IV Push every 15 minutes  dextrose 50% Injectable 25 milliLiter(s) IV Push every 15 minutes  gabapentin 100 milliGRAM(s) Oral every 8 hours  heparin   Injectable 5000 Unit(s) SubCutaneous every 8 hours  insulin glargine Injectable (LANTUS) 35 Unit(s) SubCutaneous at bedtime  insulin lispro (ADMELOG) corrective regimen sliding scale   SubCutaneous three times a day before meals  insulin lispro Injectable (ADMELOG) 8 Unit(s) SubCutaneous before dinner  insulin lispro Injectable (ADMELOG) 8 Unit(s) SubCutaneous before breakfast  insulin lispro Injectable (ADMELOG) 8 Unit(s) SubCutaneous before lunch  insulin regular Infusion 3 Unit(s)/Hr (3 mL/Hr) IV Continuous <Continuous>  methocarbamol 500 milliGRAM(s) Oral every 8 hours  metoprolol tartrate 25 milliGRAM(s) Oral three times a day  pantoprazole    Tablet 40 milliGRAM(s) Oral before breakfast  polyethylene glycol 3350 17 Gram(s) Oral daily  potassium chloride  10 mEq/50 mL IVPB 10 milliEquivalent(s) IV Intermittent every 1 hour  potassium chloride  10 mEq/50 mL IVPB 10 milliEquivalent(s) IV Intermittent every 1 hour  potassium chloride  10 mEq/50 mL IVPB 10 milliEquivalent(s) IV Intermittent every 1 hour  senna 2 Tablet(s) Oral at bedtime  sodium chloride 0.9%. 1000 milliLiter(s) (10 mL/Hr) IV Continuous <Continuous>                                    8.9    12.27 )-----------( 137      ( 06 May 2024 00:33 )             26.2       -    138  |  103  |  41<H>  ----------------------------<  113<H>  4.1   |  22  |  1.55<H>    Ca    8.3<L>      06 May 2024 00:33  Phos  2.5       Mg     3.5         TPro  6.2  /  Alb  3.5  /  TBili  0.5  /  DBili  x   /  AST  91<H>  /  ALT  73<H>  /  AlkPhos  46  -      PTT - ( 06 May 2024 00:33 )  PTT:31.7 sec        Daily     Daily Weight in k.5 (06 May 2024 00:00)       @ 07:  -   @ 07:00  --------------------------------------------------------  IN: 2414.8 mL / OUT: 1615 mL / NET: 799.8 mL     @ 07:01  -   @ 06:53  --------------------------------------------------------  IN: 1490 mL / OUT: 2300 mL / NET: -810 mL        Critically Ill patient  : [ ] preoperative ,   [ x] post operative    Requires :  [x ] Arterial Line   [ x] Central Line  [ ] PA catheter  [ ] IABP  [ ] ECMO  [ ] LVAD  [ ] Ventilator  [x ] pacemaker- TPM [ ] Impella. [x] HFNC                      [ x] ABG's     [x ] Pulse Oxymetry Monitoring  Bedside evaluation , monitoring , treatment of hemodynamics , fluids , IVP/ IVCD meds.        Diagnosis:     POD 3- CABG x4/ LAAC    Hypovolemia    Thrombocytopenia     ASA/ Lopressor     Hypoxemia - Requires [ ] BIPAP  [ x] HF O2- 35L/50%    Acute respiratory insufficiency     Chest Tube Drainage/ Management     Hemodynamic lability,  instability. Requires IVCD [ ] vasopressors [ ] inotropes  [ x] Amiodarone [x ]IVSS fluid  to maintain MAP, perfusion, C.I.     Requires chest PT, pulmonary toilet, suctioning to maintain SaO2,  patent airway and treat atelectasis.     Hyperglycemia- IVCD insulin/ Sliding scale/ Lantus     Requires bedside physical therapy, mobilization and total [facility] care.       I, Gallo Tucker, personally performed the services described in this documentation. All medical record entries made by the scribe were at my direction and in my presence. I have reviewed the chart and agree that the record reflects my personal performance and is accurate and complete.   Gallo Tucker MD.       By signing my name below, I, Terrence Morelos, attest that this documentation has been prepared under the direction and in the presence of Gallo Tucker MD.   Electronically Signed: Kelly Mathew 24 @ 06:53        Discussed with CT surgeon, Physician Assistant - Nurse Practitioner- Critical care medicine team.   Dicussed at  AM / PM rounds.   Chart, labs , films reviewed.    Cumulative Critical Care Time Given Today:  CRITICAL CARE ATTENDING - CTICU    MEDICATIONS  (STANDING):  acetaminophen     Tablet .. 650 milliGRAM(s) Oral every 6 hours  albuterol    90 MICROgram(s) HFA Inhaler 2 Puff(s) Inhalation every 6 hours  albuterol/ipratropium for Nebulization 3 milliLiter(s) Nebulizer every 6 hours  aMIOdarone Infusion 1 mG/Min (33.3 mL/Hr) IV Continuous <Continuous>  ascorbic acid 500 milliGRAM(s) Oral two times a day  aspirin enteric coated 81 milliGRAM(s) Oral daily  atorvastatin 80 milliGRAM(s) Oral at bedtime  bisacodyl Suppository 10 milliGRAM(s) Rectal once  chlorhexidine 2% Cloths 1 Application(s) Topical daily  clopidogrel Tablet 75 milliGRAM(s) Oral daily  dextrose 50% Injectable 50 milliLiter(s) IV Push every 15 minutes  dextrose 50% Injectable 25 milliLiter(s) IV Push every 15 minutes  gabapentin 100 milliGRAM(s) Oral every 8 hours  heparin   Injectable 5000 Unit(s) SubCutaneous every 8 hours  insulin glargine Injectable (LANTUS) 35 Unit(s) SubCutaneous at bedtime  insulin lispro (ADMELOG) corrective regimen sliding scale   SubCutaneous three times a day before meals  insulin lispro Injectable (ADMELOG) 8 Unit(s) SubCutaneous before dinner  insulin lispro Injectable (ADMELOG) 8 Unit(s) SubCutaneous before breakfast  insulin lispro Injectable (ADMELOG) 8 Unit(s) SubCutaneous before lunch  insulin regular Infusion 3 Unit(s)/Hr (3 mL/Hr) IV Continuous <Continuous>  methocarbamol 500 milliGRAM(s) Oral every 8 hours  metoprolol tartrate 25 milliGRAM(s) Oral three times a day  pantoprazole    Tablet 40 milliGRAM(s) Oral before breakfast  polyethylene glycol 3350 17 Gram(s) Oral daily  potassium chloride  10 mEq/50 mL IVPB 10 milliEquivalent(s) IV Intermittent every 1 hour  potassium chloride  10 mEq/50 mL IVPB 10 milliEquivalent(s) IV Intermittent every 1 hour  potassium chloride  10 mEq/50 mL IVPB 10 milliEquivalent(s) IV Intermittent every 1 hour  senna 2 Tablet(s) Oral at bedtime  sodium chloride 0.9%. 1000 milliLiter(s) (10 mL/Hr) IV Continuous <Continuous>                                    8.9    12.27 )-----------( 137      ( 06 May 2024 00:33 )             26.2       -    138  |  103  |  41<H>  ----------------------------<  113<H>  4.1   |  22  |  1.55<H>    Ca    8.3<L>      06 May 2024 00:33  Phos  2.5       Mg     3.5     -    TPro  6.2  /  Alb  3.5  /  TBili  0.5  /  DBili  x   /  AST  91<H>  /  ALT  73<H>  /  AlkPhos  46  -      PTT - ( 06 May 2024 00:33 )  PTT:31.7 sec        Daily     Daily Weight in k.5 (06 May 2024 00:00)       @ 07:  -   @ 07:00  --------------------------------------------------------  IN: 2414.8 mL / OUT: 1615 mL / NET: 799.8 mL     @ 07:01  -  06 @ 06:53  --------------------------------------------------------  IN: 1490 mL / OUT: 2300 mL / NET: -810 mL        Critically Ill patient  : [ ] preoperative ,   [ x] post operative    Requires :  [x ] Arterial Line   [ x] Central Line  [ ] PA catheter  [ ] IABP  [ ] ECMO  [ ] LVAD  [ ] Ventilator  [x ] pacemaker- TPM [ ] Impella. [x] HFNC                      [ x] ABG's     [x ] Pulse Oxymetry Monitoring  Bedside evaluation , monitoring , treatment of hemodynamics , fluids , IVP/ IVCD meds.        Diagnosis:     POD 3- CABG x4/ LAAC    Hypovolemia    Thrombocytopenia     ASA/ Lopressor     Afib    Hypoxemia - Requires [ ] BIPAP  [ x] HF O2- 35L/50%    Temporary pacemaker (TPM) interrogation and setting.     Acute respiratory insufficiency     Chest Tube Drainage/ Management     Hemodynamic lability,  instability. Requires IVCD [ ] vasopressors [ ] inotropes  [ x] Amiodarone [x ]IVSS fluid  to maintain MAP, perfusion, C.I.     Requires chest PT, pulmonary toilet, suctioning to maintain SaO2,  patent airway and treat atelectasis.     Hyperglycemia- IVCD insulin/ Sliding scale/ Lantus     Requires bedside physical therapy, mobilization and total [facility] care.       I, Gallo Tucker, personally performed the services described in this documentation. All medical record entries made by the scribe were at my direction and in my presence. I have reviewed the chart and agree that the record reflects my personal performance and is accurate and complete.   Gallo Tucker MD.       By signing my name below, I, Terrence Morelos, attest that this documentation has been prepared under the direction and in the presence of Gallo Tucker MD.   Electronically Signed: Kelly Mathew 24 @ 06:53        Discussed with CT surgeon, Physician Assistant - Nurse Practitioner- Critical care medicine team.   Dicussed at  AM / PM rounds.   Chart, labs , films reviewed.    Cumulative Critical Care Time Given Today:  CRITICAL CARE ATTENDING - CTICU    MEDICATIONS  (STANDING):  acetaminophen     Tablet .. 650 milliGRAM(s) Oral every 6 hours  albuterol    90 MICROgram(s) HFA Inhaler 2 Puff(s) Inhalation every 6 hours  albuterol/ipratropium for Nebulization 3 milliLiter(s) Nebulizer every 6 hours  aMIOdarone Infusion 1 mG/Min (33.3 mL/Hr) IV Continuous <Continuous>  ascorbic acid 500 milliGRAM(s) Oral two times a day  aspirin enteric coated 81 milliGRAM(s) Oral daily  atorvastatin 80 milliGRAM(s) Oral at bedtime  bisacodyl Suppository 10 milliGRAM(s) Rectal once  chlorhexidine 2% Cloths 1 Application(s) Topical daily  clopidogrel Tablet 75 milliGRAM(s) Oral daily  dextrose 50% Injectable 50 milliLiter(s) IV Push every 15 minutes  dextrose 50% Injectable 25 milliLiter(s) IV Push every 15 minutes  gabapentin 100 milliGRAM(s) Oral every 8 hours  heparin   Injectable 5000 Unit(s) SubCutaneous every 8 hours  insulin glargine Injectable (LANTUS) 35 Unit(s) SubCutaneous at bedtime  insulin lispro (ADMELOG) corrective regimen sliding scale   SubCutaneous three times a day before meals  insulin lispro Injectable (ADMELOG) 8 Unit(s) SubCutaneous before dinner  insulin lispro Injectable (ADMELOG) 8 Unit(s) SubCutaneous before breakfast  insulin lispro Injectable (ADMELOG) 8 Unit(s) SubCutaneous before lunch  insulin regular Infusion 3 Unit(s)/Hr (3 mL/Hr) IV Continuous <Continuous>  methocarbamol 500 milliGRAM(s) Oral every 8 hours  metoprolol tartrate 25 milliGRAM(s) Oral three times a day  pantoprazole    Tablet 40 milliGRAM(s) Oral before breakfast  polyethylene glycol 3350 17 Gram(s) Oral daily  potassium chloride  10 mEq/50 mL IVPB 10 milliEquivalent(s) IV Intermittent every 1 hour  potassium chloride  10 mEq/50 mL IVPB 10 milliEquivalent(s) IV Intermittent every 1 hour  potassium chloride  10 mEq/50 mL IVPB 10 milliEquivalent(s) IV Intermittent every 1 hour  senna 2 Tablet(s) Oral at bedtime  sodium chloride 0.9%. 1000 milliLiter(s) (10 mL/Hr) IV Continuous <Continuous>                                    8.9    12.27 )-----------( 137      ( 06 May 2024 00:33 )             26.2       -    138  |  103  |  41<H>  ----------------------------<  113<H>  4.1   |  22  |  1.55<H>    Ca    8.3<L>      06 May 2024 00:33  Phos  2.5       Mg     3.5     -    TPro  6.2  /  Alb  3.5  /  TBili  0.5  /  DBili  x   /  AST  91<H>  /  ALT  73<H>  /  AlkPhos  46  -      PTT - ( 06 May 2024 00:33 )  PTT:31.7 sec        Daily     Daily Weight in k.5 (06 May 2024 00:00)      04 @ 07:  -   @ 07:00  --------------------------------------------------------  IN: 2414.8 mL / OUT: 1615 mL / NET: 799.8 mL     @ 07:01  -  06 @ 06:53  --------------------------------------------------------  IN: 1490 mL / OUT: 2300 mL / NET: -810 mL        Critically Ill patient  : [ ] preoperative ,   [ x] post operative    Requires :  [x ] Arterial Line   [ x] Central Line  [ ] PA catheter  [ ] IABP  [ ] ECMO  [ ] LVAD  [ ] Ventilator  [x ] pacemaker- TPM [ ] Impella. [x] HFNC / BIPAP                      [ x] ABG's     [x ] Pulse Oxymetry Monitoring  Bedside evaluation , monitoring , treatment of hemodynamics , fluids , IVP/ IVCD meds.        Diagnosis:     POD 3- CABG x4/ LAAC    Hypovolemia    Thrombocytopenia     Renal Failure - Acute Kidney Injury     Afib    Pain / Splinting / Atelectasis / Hypoxemia    Hypoxemia - Requires [x ] BIPAP  [ x] HF O2- 35L/50%    Temporary pacemaker (TPM) interrogation and setting.     Acute respiratory insufficiency     Chest Tube Drainage/ Management     Hemodynamic lability,  instability. Requires IVCD [ ] vasopressors [ ] inotropes  [ x] Amiodarone [x ]IVSS fluid  to maintain MAP, perfusion, C.I.     Requires chest PT, pulmonary toilet, suctioning to maintain SaO2,  patent airway and treat atelectasis.     Hyperglycemia- IVCD insulin/ Sliding scale/ Lantus     Requires bedside physical therapy, mobilization and total shelter care.       I, Gallo Tucker, personally performed the services described in this documentation. All medical record entries made by the scribe were at my direction and in my presence. I have reviewed the chart and agree that the record reflects my personal performance and is accurate and complete.   Gallo Tucker MD.       By signing my name below, I, Terrence Morelos, attest that this documentation has been prepared under the direction and in the presence of Gallo Tucker MD.   Electronically Signed: Kelly Mathew 24 @ 06:53        Discussed with CT surgeon, Physician Assistant - Nurse Practitioner- Critical care medicine team.   Dicussed at  AM / PM rounds.   Chart, labs , films reviewed.    Cumulative Critical Care Time Given Today:  30 min

## 2024-05-06 NOTE — CONSULT NOTE ADULT - SUBJECTIVE AND OBJECTIVE BOX
Patient is a 68y old  Male who presents with a chief complaint of cardiac surgery (06 May 2024 14:39)    Admission HPI:  68 year-old male, with past history significant for Type-2 DM, HLD, HTN and Hyperkalemia, presented to the ED secondary to L-sided chest pain, shortness of breath.  Patient was admitted to telemetry service for further evaluation.  Patient had positive stress and subsequently underwent coronary angiography on 4/29/2024  via [facility]--->M 70%, pLAD 70%, mLCx 70%, mRCA 90%, LVEDP 17, RRA accessed.  Patient transferred to [facility] for CABG eval d/t triple vessel disease.   (29 Apr 2024 21:54)    Interval History:  Patient s/p CABG x 4 on 5/3.  Post-op with functional deficits.    REVIEW OF SYSTEMS: + weakness, + incision discomfort (controlled on meds), No chest pain, shortness of breath, nausea, vomiting or diarhea; other ROS neg     PAST MEDICAL & SURGICAL HISTORY  DM (Diabetes Mellitus)  High Cholesterol  HTN - Hypertension  Right Leg Pain    FUNCTIONAL HISTORY:   Lives w spouse in home w 5 [address]  PTA Independent    CURRENT FUNCTIONAL STATUS:  Mod A transfers, Min A gait    FAMILY HISTORY   Family history of acute myocardial infarction (Father)  Family history of diet-controlled diabetes (Mother)    MEDICATIONS   albuterol    90 MICROgram(s) HFA Inhaler 2 Puff(s) Inhalation every 6 hours  albuterol/ipratropium for Nebulization 3 milliLiter(s) Nebulizer every 6 hours  aMIOdarone    Tablet 400 milliGRAM(s) Oral every 8 hours  aMIOdarone    Tablet   Oral   aMIOdarone Infusion 1 mG/Min IV Continuous <Continuous>  ascorbic acid 500 milliGRAM(s) Oral two times a day  aspirin enteric coated 81 milliGRAM(s) Oral daily  atorvastatin 80 milliGRAM(s) Oral at bedtime  bisacodyl Suppository 10 milliGRAM(s) Rectal once  chlorhexidine 2% Cloths 1 Application(s) Topical daily  clopidogrel Tablet 75 milliGRAM(s) Oral daily  dextrose 50% Injectable 25 milliLiter(s) IV Push every 15 minutes  dextrose 50% Injectable 50 milliLiter(s) IV Push every 15 minutes  digoxin  Injectable 250 MICROGram(s) IV Push once  gabapentin 100 milliGRAM(s) Oral every 8 hours  heparin   Injectable 5000 Unit(s) SubCutaneous every 8 hours  insulin glargine Injectable (LANTUS) 35 Unit(s) SubCutaneous at bedtime  insulin lispro (ADMELOG) corrective regimen sliding scale   SubCutaneous three times a day before meals  insulin lispro Injectable (ADMELOG) 8 Unit(s) SubCutaneous before dinner  insulin lispro Injectable (ADMELOG) 8 Unit(s) SubCutaneous before breakfast  insulin lispro Injectable (ADMELOG) 8 Unit(s) SubCutaneous before lunch  insulin regular Infusion 3 Unit(s)/Hr IV Continuous <Continuous>  methocarbamol 500 milliGRAM(s) Oral every 8 hours  metoprolol tartrate 50 milliGRAM(s) Oral every 12 hours  naloxone Injectable 0.1 milliGRAM(s) IV Push every 3 minutes PRN  ondansetron Injectable 4 milliGRAM(s) IV Push every 6 hours PRN  oxyCODONE    IR 10 milliGRAM(s) Oral every 4 hours PRN  pantoprazole    Tablet 40 milliGRAM(s) Oral before breakfast  polyethylene glycol 3350 17 Gram(s) Oral daily  potassium chloride  10 mEq/50 mL IVPB 10 milliEquivalent(s) IV Intermittent every 1 hour  potassium chloride  10 mEq/50 mL IVPB 10 milliEquivalent(s) IV Intermittent every 1 hour  potassium chloride  10 mEq/50 mL IVPB 10 milliEquivalent(s) IV Intermittent every 1 hour  senna 2 Tablet(s) Oral at bedtime  sodium chloride 0.9%. 1000 milliLiter(s) IV Continuous <Continuous>    ALLERGIES  No Known Allergies    VITALS  T(C): 37.3 (05-06-24 @ 12:00), Max: 38.4 (05-06-24 @ 00:00)  HR: 95 (05-06-24 @ 14:39) (71 - 144)  BP: 141/67 (05-06-24 @ 13:00) (121/59 - 141/67)  RR: 17 (05-06-24 @ 13:00) (11 - 27)  SpO2: 92% (05-06-24 @ 14:39) (92% - 100%)  Wt(kg): --    PHYSICAL EXAM  Constitutional - NAD, Comfortable  HEENT - NCAT, EOMI  Neck - Supple  Chest - CW intact, on O2, No distress, no use of accessory muscles for respiration  Cardiovascular -Well perfused  Abdomen - BS+, Soft, NTND  Extremities - Trace Edema, No calf tenderness   Neurologic Exam -                 AAO x 3  Motor 4+/5 bl UE and LEs  No clonus  Psychiatric - Mood stable, Affect WNL    RECENT LABS/IMAGING  CBC Full  -  ( 06 May 2024 00:33 )  WBC Count : 12.27 K/uL  RBC Count : 3.23 M/uL  Hemoglobin : 8.9 g/dL  Hematocrit : 26.2 %  Platelet Count - Automated : 137 K/uL  Mean Cell Volume : 81.1 fl  Mean Cell Hemoglobin : 27.6 pg  Mean Cell Hemoglobin Concentration : 34.0 gm/dL  Auto Neutrophil # : 8.83 K/uL  Auto Lymphocyte # : 2.00 K/uL  Auto Monocyte # : 1.34 K/uL  Auto Eosinophil # : 0.03 K/uL  Auto Basophil # : 0.02 K/uL  Auto Neutrophil % : 72.0 %  Auto Lymphocyte % : 16.3 %  Auto Monocyte % : 10.9 %  Auto Eosinophil % : 0.2 %  Auto Basophil % : 0.2 %    05-06    138  |  103  |  41<H>  ----------------------------<  113<H>  4.1   |  22  |  1.55<H>    Ca    8.3<L>      06 May 2024 00:33  Phos  2.5     05-06  Mg     3.5     05-06    TPro  6.2  /  Alb  3.5  /  TBili  0.5  /  DBili  x   /  AST  91<H>  /  ALT  73<H>  /  AlkPhos  46  05-06    Urinalysis Basic - ( 06 May 2024 00:33 )    Color: x / Appearance: x / SG: x / pH: x  Gluc: 113 mg/dL / Ketone: x  / Bili: x / Urobili: x   Blood: x / Protein: x / Nitrite: x   Leuk Esterase: x / RBC: x / WBC x   Sq Epi: x / Non Sq Epi: x / Bacteria: x    Impression:  69 yo with functional deficits secondary to diagnosis of debility    Plan:  PT- ROM, Bed Mob, Transfers, Amb w AD and bracing as needed  OT- ADLs, bracing  Prec- Falls, Cardiac, Sternal, Pulmonary  DVT Prophylaxis  Skin- Turn q2 h  Dispo- Acute Rehab- patient requires active and ongoing therapeutic interventions of multiple disciplines and can tolerate 3 hours of therapies x 2-4wks depending on progress at rehabilitation facility. Can actively participate and benefit from  an intensive rehabilitation program. Requires supervision by a rehabilitation physician and a coordinated interdisciplinary approach to providing rehabilitation.

## 2024-05-06 NOTE — PROGRESS NOTE ADULT - ASSESSMENT
Assessment and recommendation :   chest pain coronary artery Disease   S/P cardiac catheterization triple   vessel disease   acute hypoxic respiratory failure on high flow 02  S/P cardiogenic shock off  Milrinone   AF with RVR on Amiodarone   ischemic cardiomyopathy   S/P CABG X 4  Acute post operative respiratory Failure  acute blood loss  anemia   DM continue glycemic control   HTN continue BP control   continue glycemic control   DVT prophylaxis   GI prophylaxis   critical time spend > 35 minutes discussed with cardiac surgeon , and TCU staff

## 2024-05-06 NOTE — CONSULT NOTE ADULT - PROVIDER SPECIALTY LIST ADULT
Internal Medicine
Pulmonology
Rehab Medicine
Note authored by Pediatric Hospitalist Attending  Marce Panchal MD  Pediatric Hospitalist  [phone] (office)  [phone] (pager)
Note authored by Pediatric Hospitalist Attending  Marce Panchal MD  Pediatric Hospitalist  [phone] (office)  [phone] (pager)
Cardiology
Endocrinology

## 2024-05-06 NOTE — PROGRESS NOTE ADULT - ASSESSMENT
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    #CAD  -Sp cath with significant left main plus TVD   -s/p cabg x 4, atriclip 5/3  -amiodarone gtt per cticu  -post op care per cts    dvt ppx

## 2024-05-06 NOTE — PROGRESS NOTE ADULT - SUBJECTIVE AND OBJECTIVE BOX
CARDIOLOGY FOLLOW UP - Dr. Brewer  DATE OF SERVICE: 5/6/24    CC  No cv complaints     REVIEW OF SYSTEMS:  CONSTITUTIONAL: No fever, weight loss, or fatigue  RESPIRATORY: No cough, wheezing, chills or hemoptysis; No Shortness of Breath  CARDIOVASCULAR: No chest pain, palpitations, passing out, dizziness, or leg swelling  GASTROINTESTINAL: No abdominal or epigastric pain. No nausea, vomiting, or hematemesis; No diarrhea or constipation. No melena or hematochezia.  VASCULAR: No edema     PHYSICAL EXAM:  T(C): 37.3 (05-06-24 @ 12:00), Max: 38.4 (05-06-24 @ 00:00)  HR: 94 (05-06-24 @ 13:00) (71 - 144)  BP: 141/67 (05-06-24 @ 13:00) (121/59 - 141/67)  RR: 17 (05-06-24 @ 13:00) (11 - 27)  SpO2: 94% (05-06-24 @ 13:00) (92% - 100%)  Wt(kg): --  I&O's Summary    05 May 2024 07:01  -  06 May 2024 07:00  --------------------------------------------------------  IN: 1490 mL / OUT: 2360 mL / NET: -870 mL    06 May 2024 07:01  -  06 May 2024 14:39  --------------------------------------------------------  IN: 803.1 mL / OUT: 1130 mL / NET: -326.9 mL        Appearance: Normal	  Cardiovascular: Normal S1 S2,RRR, No JVD, No murmurs  Respiratory: Decreased bs L side, otherwise cta  Gastrointestinal:  Soft, Non-tender, + BS	  Extremities: Normal range of motion, No clubbing, cyanosis. +b/l le edema      Home Medications:  aspirin 81 mg oral delayed release tablet: 1 tab(s) orally once a day (29 Apr 2024 18:21)  atorvastatin 40 mg oral tablet: 1 tab(s) orally once a day (at bedtime) (29 Apr 2024 18:21)  insulin glargine 100 units/mL subcutaneous solution: 50 unit(s) subcutaneous once a day (at bedtime) (29 Apr 2024 18:21)  insulin lispro 100 units/mL injectable solution: 15 unit(s) injectable once a day (in the morning) BEFORE BREAKFAST (29 Apr 2024 18:21)  insulin lispro 100 units/mL injectable solution: 1 unit(s) injectable 3 times a day (before meals) 1 Unit(s) if Glucose 151 - 200  2 Unit(s) if Glucose 201 - 250  3 Unit(s) if Glucose 251 - 300  4 Unit(s) if Glucose 301 - 350  5 Unit(s) if Glucose 351 - 400  6 Unit(s) if Glucose Greater Than 400 (29 Apr 2024 18:21)  insulin lispro 100 units/mL injectable solution: 15 unit(s) injectable once a day before dinner (29 Apr 2024 18:21)  lisinopril 20 mg oral tablet: 1 tab(s) orally once a day (29 Apr 2024 02:23)  metoprolol tartrate 25 mg oral tablet: 1 tab(s) orally every 12 hours (29 Apr 2024 18:21)      MEDICATIONS  (STANDING):  albuterol    90 MICROgram(s) HFA Inhaler 2 Puff(s) Inhalation every 6 hours  albuterol/ipratropium for Nebulization 3 milliLiter(s) Nebulizer every 6 hours  aMIOdarone    Tablet 400 milliGRAM(s) Oral every 8 hours  aMIOdarone    Tablet   Oral   aMIOdarone Infusion 1 mG/Min (33.3 mL/Hr) IV Continuous <Continuous>  ascorbic acid 500 milliGRAM(s) Oral two times a day  aspirin enteric coated 81 milliGRAM(s) Oral daily  atorvastatin 80 milliGRAM(s) Oral at bedtime  bisacodyl Suppository 10 milliGRAM(s) Rectal once  chlorhexidine 2% Cloths 1 Application(s) Topical daily  clopidogrel Tablet 75 milliGRAM(s) Oral daily  dextrose 50% Injectable 50 milliLiter(s) IV Push every 15 minutes  dextrose 50% Injectable 25 milliLiter(s) IV Push every 15 minutes  digoxin  Injectable 250 MICROGram(s) IV Push once  gabapentin 100 milliGRAM(s) Oral every 8 hours  heparin   Injectable 5000 Unit(s) SubCutaneous every 8 hours  insulin glargine Injectable (LANTUS) 35 Unit(s) SubCutaneous at bedtime  insulin lispro (ADMELOG) corrective regimen sliding scale   SubCutaneous three times a day before meals  insulin lispro Injectable (ADMELOG) 8 Unit(s) SubCutaneous before dinner  insulin lispro Injectable (ADMELOG) 8 Unit(s) SubCutaneous before lunch  insulin lispro Injectable (ADMELOG) 8 Unit(s) SubCutaneous before breakfast  insulin regular Infusion 3 Unit(s)/Hr (3 mL/Hr) IV Continuous <Continuous>  methocarbamol 500 milliGRAM(s) Oral every 8 hours  metoprolol tartrate 50 milliGRAM(s) Oral every 12 hours  pantoprazole    Tablet 40 milliGRAM(s) Oral before breakfast  polyethylene glycol 3350 17 Gram(s) Oral daily  potassium chloride  10 mEq/50 mL IVPB 10 milliEquivalent(s) IV Intermittent every 1 hour  potassium chloride  10 mEq/50 mL IVPB 10 milliEquivalent(s) IV Intermittent every 1 hour  potassium chloride  10 mEq/50 mL IVPB 10 milliEquivalent(s) IV Intermittent every 1 hour  senna 2 Tablet(s) Oral at bedtime  sodium chloride 0.9%. 1000 milliLiter(s) (10 mL/Hr) IV Continuous <Continuous>      TELEMETRY: Afib 90s	    ECG:  	  RADIOLOGY:   DIAGNOSTIC TESTING:  [ ] Echocardiogram:  [ ]  Catheterization:  [ ] Stress Test:    OTHER: 	    LABS:	 	    Creatine Kinase, Serum: 607 U/L [30 - 200] (05-03 @ 16:13)  CKMB Units: 36.4 ng/mL [0.0 - 6.7] (05-03 @ 16:13)  Troponin T, High Sensitivity Result: 759 ng/L [0 - 51] (05-03 @ 16:13)                          8.9    12.27 )-----------( 137      ( 06 May 2024 00:33 )             26.2     05-06    138  |  103  |  41<H>  ----------------------------<  113<H>  4.1   |  22  |  1.55<H>    Ca    8.3<L>      06 May 2024 00:33  Phos  2.5     05-06  Mg     3.5     05-06    TPro  6.2  /  Alb  3.5  /  TBili  0.5  /  DBili  x   /  AST  91<H>  /  ALT  73<H>  /  AlkPhos  46  05-06    PTT - ( 06 May 2024 00:33 )  PTT:31.7 sec

## 2024-05-07 DIAGNOSIS — Z95.1 PRESENCE OF AORTOCORONARY BYPASS GRAFT: ICD-10-CM

## 2024-05-07 DIAGNOSIS — E11.9 TYPE 2 DIABETES MELLITUS WITHOUT COMPLICATIONS: ICD-10-CM

## 2024-05-07 DIAGNOSIS — R60.0 LOCALIZED EDEMA: ICD-10-CM

## 2024-05-07 DIAGNOSIS — I48.91 UNSPECIFIED ATRIAL FIBRILLATION: ICD-10-CM

## 2024-05-07 LAB
ALBUMIN SERPL ELPH-MCNC: 3.1 G/DL — LOW (ref 3.3–5)
ALBUMIN SERPL ELPH-MCNC: 3.2 G/DL — LOW (ref 3.3–5)
ALP SERPL-CCNC: 58 U/L — SIGNIFICANT CHANGE UP (ref 40–120)
ALP SERPL-CCNC: 67 U/L — SIGNIFICANT CHANGE UP (ref 40–120)
ALT FLD-CCNC: 110 U/L — HIGH (ref 10–45)
ALT FLD-CCNC: 113 U/L — HIGH (ref 10–45)
ANION GAP SERPL CALC-SCNC: 12 MMOL/L — SIGNIFICANT CHANGE UP (ref 5–17)
ANION GAP SERPL CALC-SCNC: 14 MMOL/L — SIGNIFICANT CHANGE UP (ref 5–17)
APTT BLD: 31.8 SEC — SIGNIFICANT CHANGE UP (ref 24.5–35.6)
APTT BLD: 34.2 SEC — SIGNIFICANT CHANGE UP (ref 24.5–35.6)
AST SERPL-CCNC: 87 U/L — HIGH (ref 10–40)
AST SERPL-CCNC: 93 U/L — HIGH (ref 10–40)
BASOPHILS # BLD AUTO: 0 K/UL — SIGNIFICANT CHANGE UP (ref 0–0.2)
BASOPHILS NFR BLD AUTO: 0 % — SIGNIFICANT CHANGE UP (ref 0–2)
BILIRUB SERPL-MCNC: 0.6 MG/DL — SIGNIFICANT CHANGE UP (ref 0.2–1.2)
BILIRUB SERPL-MCNC: 0.6 MG/DL — SIGNIFICANT CHANGE UP (ref 0.2–1.2)
BUN SERPL-MCNC: 36 MG/DL — HIGH (ref 7–23)
BUN SERPL-MCNC: 41 MG/DL — HIGH (ref 7–23)
CALCIUM SERPL-MCNC: 7.9 MG/DL — LOW (ref 8.4–10.5)
CALCIUM SERPL-MCNC: 8.3 MG/DL — LOW (ref 8.4–10.5)
CHLORIDE SERPL-SCNC: 101 MMOL/L — SIGNIFICANT CHANGE UP (ref 96–108)
CHLORIDE SERPL-SCNC: 101 MMOL/L — SIGNIFICANT CHANGE UP (ref 96–108)
CO2 SERPL-SCNC: 21 MMOL/L — LOW (ref 22–31)
CO2 SERPL-SCNC: 22 MMOL/L — SIGNIFICANT CHANGE UP (ref 22–31)
CREAT SERPL-MCNC: 1.11 MG/DL — SIGNIFICANT CHANGE UP (ref 0.5–1.3)
CREAT SERPL-MCNC: 1.32 MG/DL — HIGH (ref 0.5–1.3)
EGFR: 59 ML/MIN/1.73M2 — LOW
EGFR: 73 ML/MIN/1.73M2 — SIGNIFICANT CHANGE UP
EOSINOPHIL # BLD AUTO: 0 K/UL — SIGNIFICANT CHANGE UP (ref 0–0.5)
EOSINOPHIL NFR BLD AUTO: 0 % — SIGNIFICANT CHANGE UP (ref 0–6)
GAS PNL BLDA: SIGNIFICANT CHANGE UP
GIANT PLATELETS BLD QL SMEAR: PRESENT — SIGNIFICANT CHANGE UP
GLUCOSE BLDC GLUCOMTR-MCNC: 100 MG/DL — HIGH (ref 70–99)
GLUCOSE BLDC GLUCOMTR-MCNC: 104 MG/DL — HIGH (ref 70–99)
GLUCOSE BLDC GLUCOMTR-MCNC: 123 MG/DL — HIGH (ref 70–99)
GLUCOSE BLDC GLUCOMTR-MCNC: 173 MG/DL — HIGH (ref 70–99)
GLUCOSE BLDC GLUCOMTR-MCNC: 94 MG/DL — SIGNIFICANT CHANGE UP (ref 70–99)
GLUCOSE SERPL-MCNC: 161 MG/DL — HIGH (ref 70–99)
GLUCOSE SERPL-MCNC: 87 MG/DL — SIGNIFICANT CHANGE UP (ref 70–99)
HCT VFR BLD CALC: 25.8 % — LOW (ref 39–50)
HCT VFR BLD CALC: 26.9 % — LOW (ref 39–50)
HGB BLD-MCNC: 8.4 G/DL — LOW (ref 13–17)
HGB BLD-MCNC: 8.9 G/DL — LOW (ref 13–17)
INR BLD: 1.11 RATIO — SIGNIFICANT CHANGE UP (ref 0.85–1.18)
INR BLD: 1.13 RATIO — SIGNIFICANT CHANGE UP (ref 0.85–1.18)
LYMPHOCYTES # BLD AUTO: 1.9 K/UL — SIGNIFICANT CHANGE UP (ref 1–3.3)
LYMPHOCYTES # BLD AUTO: 17.4 % — SIGNIFICANT CHANGE UP (ref 13–44)
MAGNESIUM SERPL-MCNC: 2.8 MG/DL — HIGH (ref 1.6–2.6)
MAGNESIUM SERPL-MCNC: 2.9 MG/DL — HIGH (ref 1.6–2.6)
MANUAL SMEAR VERIFICATION: SIGNIFICANT CHANGE UP
MCHC RBC-ENTMCNC: 27.2 PG — SIGNIFICANT CHANGE UP (ref 27–34)
MCHC RBC-ENTMCNC: 27.3 PG — SIGNIFICANT CHANGE UP (ref 27–34)
MCHC RBC-ENTMCNC: 32.6 GM/DL — SIGNIFICANT CHANGE UP (ref 32–36)
MCHC RBC-ENTMCNC: 33.1 GM/DL — SIGNIFICANT CHANGE UP (ref 32–36)
MCV RBC AUTO: 82.5 FL — SIGNIFICANT CHANGE UP (ref 80–100)
MCV RBC AUTO: 83.5 FL — SIGNIFICANT CHANGE UP (ref 80–100)
MONOCYTES # BLD AUTO: 0.47 K/UL — SIGNIFICANT CHANGE UP (ref 0–0.9)
MONOCYTES NFR BLD AUTO: 4.3 % — SIGNIFICANT CHANGE UP (ref 2–14)
NEUTROPHILS # BLD AUTO: 8.57 K/UL — HIGH (ref 1.8–7.4)
NEUTROPHILS NFR BLD AUTO: 78.3 % — HIGH (ref 43–77)
NRBC # BLD: 0 /100 WBCS — SIGNIFICANT CHANGE UP (ref 0–0)
PHOSPHATE SERPL-MCNC: 2.4 MG/DL — LOW (ref 2.5–4.5)
PHOSPHATE SERPL-MCNC: 2.9 MG/DL — SIGNIFICANT CHANGE UP (ref 2.5–4.5)
PLAT MORPH BLD: NORMAL — SIGNIFICANT CHANGE UP
PLATELET # BLD AUTO: 148 K/UL — LOW (ref 150–400)
PLATELET # BLD AUTO: 176 K/UL — SIGNIFICANT CHANGE UP (ref 150–400)
POTASSIUM SERPL-MCNC: 4.4 MMOL/L — SIGNIFICANT CHANGE UP (ref 3.5–5.3)
POTASSIUM SERPL-MCNC: 4.6 MMOL/L — SIGNIFICANT CHANGE UP (ref 3.5–5.3)
POTASSIUM SERPL-SCNC: 4.4 MMOL/L — SIGNIFICANT CHANGE UP (ref 3.5–5.3)
POTASSIUM SERPL-SCNC: 4.6 MMOL/L — SIGNIFICANT CHANGE UP (ref 3.5–5.3)
PROT SERPL-MCNC: 6.1 G/DL — SIGNIFICANT CHANGE UP (ref 6–8.3)
PROT SERPL-MCNC: 6.4 G/DL — SIGNIFICANT CHANGE UP (ref 6–8.3)
PROTHROM AB SERPL-ACNC: 12.2 SEC — SIGNIFICANT CHANGE UP (ref 9.5–13)
PROTHROM AB SERPL-ACNC: 12.4 SEC — SIGNIFICANT CHANGE UP (ref 9.5–13)
RBC # BLD: 3.09 M/UL — LOW (ref 4.2–5.8)
RBC # BLD: 3.26 M/UL — LOW (ref 4.2–5.8)
RBC # FLD: 14.7 % — HIGH (ref 10.3–14.5)
RBC # FLD: 15 % — HIGH (ref 10.3–14.5)
RBC BLD AUTO: SIGNIFICANT CHANGE UP
SODIUM SERPL-SCNC: 135 MMOL/L — SIGNIFICANT CHANGE UP (ref 135–145)
SODIUM SERPL-SCNC: 136 MMOL/L — SIGNIFICANT CHANGE UP (ref 135–145)
WBC # BLD: 10.94 K/UL — HIGH (ref 3.8–10.5)
WBC # BLD: 11.42 K/UL — HIGH (ref 3.8–10.5)
WBC # FLD AUTO: 10.94 K/UL — HIGH (ref 3.8–10.5)
WBC # FLD AUTO: 11.42 K/UL — HIGH (ref 3.8–10.5)

## 2024-05-07 PROCEDURE — 93971 EXTREMITY STUDY: CPT | Mod: 26,RT

## 2024-05-07 PROCEDURE — 71045 X-RAY EXAM CHEST 1 VIEW: CPT | Mod: 26

## 2024-05-07 PROCEDURE — 99291 CRITICAL CARE FIRST HOUR: CPT

## 2024-05-07 RX ORDER — SORBITOL SOLUTION 70 %
25 SOLUTION, ORAL MISCELLANEOUS ONCE
Refills: 0 | Status: COMPLETED | OUTPATIENT
Start: 2024-05-07 | End: 2024-05-07

## 2024-05-07 RX ORDER — SPIRONOLACTONE 25 MG/1
25 TABLET, FILM COATED ORAL DAILY
Refills: 0 | Status: DISCONTINUED | OUTPATIENT
Start: 2024-05-07 | End: 2024-05-10

## 2024-05-07 RX ORDER — ATORVASTATIN CALCIUM 80 MG/1
40 TABLET, FILM COATED ORAL AT BEDTIME
Refills: 0 | Status: DISCONTINUED | OUTPATIENT
Start: 2024-05-07 | End: 2024-05-10

## 2024-05-07 RX ORDER — HYDRALAZINE HCL 50 MG
10 TABLET ORAL ONCE
Refills: 0 | Status: COMPLETED | OUTPATIENT
Start: 2024-05-07 | End: 2024-05-07

## 2024-05-07 RX ORDER — AMLODIPINE BESYLATE 2.5 MG/1
5 TABLET ORAL DAILY
Refills: 0 | Status: DISCONTINUED | OUTPATIENT
Start: 2024-05-07 | End: 2024-05-09

## 2024-05-07 RX ORDER — INSULIN LISPRO 100/ML
7 VIAL (ML) SUBCUTANEOUS
Refills: 0 | Status: DISCONTINUED | OUTPATIENT
Start: 2024-05-07 | End: 2024-05-08

## 2024-05-07 RX ORDER — WARFARIN SODIUM 2.5 MG/1
5 TABLET ORAL ONCE
Refills: 0 | Status: COMPLETED | OUTPATIENT
Start: 2024-05-07 | End: 2024-05-07

## 2024-05-07 RX ORDER — SODIUM CHLORIDE 9 MG/ML
3 INJECTION INTRAMUSCULAR; INTRAVENOUS; SUBCUTANEOUS EVERY 8 HOURS
Refills: 0 | Status: DISCONTINUED | OUTPATIENT
Start: 2024-05-07 | End: 2024-05-10

## 2024-05-07 RX ORDER — FUROSEMIDE 40 MG
40 TABLET ORAL DAILY
Refills: 0 | Status: DISCONTINUED | OUTPATIENT
Start: 2024-05-07 | End: 2024-05-10

## 2024-05-07 RX ORDER — GLUCAGON INJECTION, SOLUTION 0.5 MG/.1ML
1 INJECTION, SOLUTION SUBCUTANEOUS ONCE
Refills: 0 | Status: DISCONTINUED | OUTPATIENT
Start: 2024-05-07 | End: 2024-05-10

## 2024-05-07 RX ORDER — DEXTROSE 50 % IN WATER 50 %
15 SYRINGE (ML) INTRAVENOUS ONCE
Refills: 0 | Status: DISCONTINUED | OUTPATIENT
Start: 2024-05-07 | End: 2024-05-10

## 2024-05-07 RX ORDER — INSULIN GLARGINE 100 [IU]/ML
30 INJECTION, SOLUTION SUBCUTANEOUS AT BEDTIME
Refills: 0 | Status: DISCONTINUED | OUTPATIENT
Start: 2024-05-07 | End: 2024-05-08

## 2024-05-07 RX ORDER — CALCIUM GLUCONATE 100 MG/ML
2 VIAL (ML) INTRAVENOUS ONCE
Refills: 0 | Status: COMPLETED | OUTPATIENT
Start: 2024-05-07 | End: 2024-05-07

## 2024-05-07 RX ADMIN — Medication 200 GRAM(S): at 02:27

## 2024-05-07 RX ADMIN — Medication 500 MICROGRAM(S): at 05:07

## 2024-05-07 RX ADMIN — SODIUM CHLORIDE 3 MILLILITER(S): 9 INJECTION INTRAMUSCULAR; INTRAVENOUS; SUBCUTANEOUS at 21:53

## 2024-05-07 RX ADMIN — Medication 500 MILLIGRAM(S): at 17:00

## 2024-05-07 RX ADMIN — AMIODARONE HYDROCHLORIDE 200 MILLIGRAM(S): 400 TABLET ORAL at 07:32

## 2024-05-07 RX ADMIN — INSULIN GLARGINE 30 UNIT(S): 100 INJECTION, SOLUTION SUBCUTANEOUS at 21:59

## 2024-05-07 RX ADMIN — METHOCARBAMOL 500 MILLIGRAM(S): 500 TABLET, FILM COATED ORAL at 05:16

## 2024-05-07 RX ADMIN — Medication 500 MILLIGRAM(S): at 05:16

## 2024-05-07 RX ADMIN — Medication 10 MILLIGRAM(S): at 06:00

## 2024-05-07 RX ADMIN — Medication 500 MICROGRAM(S): at 11:05

## 2024-05-07 RX ADMIN — AMLODIPINE BESYLATE 5 MILLIGRAM(S): 2.5 TABLET ORAL at 02:27

## 2024-05-07 RX ADMIN — OXYCODONE HYDROCHLORIDE 10 MILLIGRAM(S): 5 TABLET ORAL at 02:30

## 2024-05-07 RX ADMIN — Medication 500 MICROGRAM(S): at 17:00

## 2024-05-07 RX ADMIN — METHOCARBAMOL 500 MILLIGRAM(S): 500 TABLET, FILM COATED ORAL at 14:30

## 2024-05-07 RX ADMIN — Medication 8 UNIT(S): at 08:22

## 2024-05-07 RX ADMIN — GABAPENTIN 100 MILLIGRAM(S): 400 CAPSULE ORAL at 14:29

## 2024-05-07 RX ADMIN — Medication 7 UNIT(S): at 17:01

## 2024-05-07 RX ADMIN — OXYCODONE HYDROCHLORIDE 10 MILLIGRAM(S): 5 TABLET ORAL at 13:41

## 2024-05-07 RX ADMIN — OXYCODONE HYDROCHLORIDE 10 MILLIGRAM(S): 5 TABLET ORAL at 03:00

## 2024-05-07 RX ADMIN — HEPARIN SODIUM 5000 UNIT(S): 5000 INJECTION INTRAVENOUS; SUBCUTANEOUS at 05:17

## 2024-05-07 RX ADMIN — ATORVASTATIN CALCIUM 40 MILLIGRAM(S): 80 TABLET, FILM COATED ORAL at 21:52

## 2024-05-07 RX ADMIN — SENNA PLUS 2 TABLET(S): 8.6 TABLET ORAL at 21:53

## 2024-05-07 RX ADMIN — OXYCODONE HYDROCHLORIDE 10 MILLIGRAM(S): 5 TABLET ORAL at 14:31

## 2024-05-07 RX ADMIN — Medication 50 MILLIGRAM(S): at 05:18

## 2024-05-07 RX ADMIN — POLYETHYLENE GLYCOL 3350 17 GRAM(S): 17 POWDER, FOR SOLUTION ORAL at 11:24

## 2024-05-07 RX ADMIN — Medication 81 MILLIGRAM(S): at 11:25

## 2024-05-07 RX ADMIN — Medication 40 MILLIGRAM(S): at 19:09

## 2024-05-07 RX ADMIN — CLOPIDOGREL BISULFATE 75 MILLIGRAM(S): 75 TABLET, FILM COATED ORAL at 11:25

## 2024-05-07 RX ADMIN — WARFARIN SODIUM 5 MILLIGRAM(S): 2.5 TABLET ORAL at 21:52

## 2024-05-07 RX ADMIN — HEPARIN SODIUM 5000 UNIT(S): 5000 INJECTION INTRAVENOUS; SUBCUTANEOUS at 21:52

## 2024-05-07 RX ADMIN — GABAPENTIN 100 MILLIGRAM(S): 400 CAPSULE ORAL at 05:16

## 2024-05-07 RX ADMIN — PANTOPRAZOLE SODIUM 40 MILLIGRAM(S): 20 TABLET, DELAYED RELEASE ORAL at 07:37

## 2024-05-07 RX ADMIN — SODIUM CHLORIDE 3 MILLILITER(S): 9 INJECTION INTRAMUSCULAR; INTRAVENOUS; SUBCUTANEOUS at 14:31

## 2024-05-07 RX ADMIN — GABAPENTIN 100 MILLIGRAM(S): 400 CAPSULE ORAL at 21:52

## 2024-05-07 RX ADMIN — SPIRONOLACTONE 25 MILLIGRAM(S): 25 TABLET, FILM COATED ORAL at 19:09

## 2024-05-07 RX ADMIN — Medication 25 MILLILITER(S): at 17:01

## 2024-05-07 RX ADMIN — HEPARIN SODIUM 5000 UNIT(S): 5000 INJECTION INTRAVENOUS; SUBCUTANEOUS at 16:53

## 2024-05-07 RX ADMIN — Medication 50 MILLIGRAM(S): at 17:01

## 2024-05-07 NOTE — PROGRESS NOTE ADULT - SUBJECTIVE AND OBJECTIVE BOX
VITAL SIGNS    Subjective: "I feel okay" Denies CP, palpitation, SOB, THAKKAR, HA, dizziness, N/V/D, fever or chills.  No acute event noted overnight.    Telemetry:  Afib 90s    Vital Signs Last 24 Hrs  T(C): 36.4 (24 @ 13:21), Max: 37.8 (24 @ 00:00)  T(F): 97.5 (24 @ :21), Max: 100 (24 @ 00:00)  HR: 95 (24:) (49 - 95)  BP: 154/73 (24 13:21) (106/51 - 163/74)  RR: 18 (24:21) (11 - 33)  SpO2: 95% (24:21) (90% - 100%)                07:01  -   @ 07:00  --------------------------------------------------------  IN: 1573 mL / OUT: 2360 mL / NET: -787 mL     @ 07:01  -   @ 15:10  --------------------------------------------------------  IN: 30 mL / OUT: 210 mL / NET: -180 mL        LABS      136  |  101  |  36<H>  ----------------------------<  87  4.4   |  21<L>  |  1.11    Ca    8.3<L>      07 May 2024 12:20  Phos  2.4       Mg     2.8         TPro  6.4  /  Alb  3.1<L>  /  TBili  0.6  /  DBili  x   /  AST  87<H>  /  ALT  113<H>  /  AlkPhos  67                                   8.9    11.42 )-----------( 176      ( 07 May 2024 12:20 )             26.9          PT/INR - ( 07 May 2024 12:20 )   PT: 12.2 sec;   INR: 1.11 ratio         PTT - ( 07 May 2024 12:20 )  PTT:31.8 sec        Daily     Daily Weight in k.7 (07 May 2024 00:00)      CAPILLARY BLOOD GLUCOSE  100 (07 May 2024 07:00)      POCT Blood Glucose.: 94 mg/dL (07 May 2024 12:02)  POCT Blood Glucose.: 100 mg/dL (07 May 2024 07:29)  POCT Blood Glucose.: 173 mg/dL (06 May 2024 23:59)  POCT Blood Glucose.: 173 mg/dL (06 May 2024 21:33)  POCT Blood Glucose.: 167 mg/dL (06 May 2024 15:23)          PHYSICAL EXAM      Neurology: alert and oriented x 3, nonfocal, no gross deficits    CV:     Sternal Wound: MSI -->CDI, sternum stable    Lungs: CTA B/L     Abdomen: soft, nontender, nondistended, positive bowel sounds, (+) Flatus; (+) BM     :  Voiding               Extremities:  B/L LE (+) edema; negative calf tenderness; (+) 2 DP palpable          aMIOdarone    Tablet 200 milliGRAM(s) Oral daily  amLODIPine   Tablet 5 milliGRAM(s) Oral daily  ascorbic acid 500 milliGRAM(s) Oral two times a day  aspirin enteric coated 81 milliGRAM(s) Oral daily  atorvastatin 40 milliGRAM(s) Oral at bedtime  bisacodyl Suppository 10 milliGRAM(s) Rectal once  chlorhexidine 2% Cloths 1 Application(s) Topical daily  clopidogrel Tablet 75 milliGRAM(s) Oral daily  dextrose 50% Injectable 50 milliLiter(s) IV Push every 15 minutes  dextrose 50% Injectable 25 milliLiter(s) IV Push every 15 minutes  dextrose Oral Gel 15 Gram(s) Oral once  gabapentin 100 milliGRAM(s) Oral every 8 hours  glucagon  Injectable 1 milliGRAM(s) IntraMuscular once  heparin   Injectable 5000 Unit(s) SubCutaneous every 8 hours  insulin glargine Injectable (LANTUS) 30 Unit(s) SubCutaneous at bedtime  insulin lispro (ADMELOG) corrective regimen sliding scale   SubCutaneous three times a day before meals  insulin lispro Injectable (ADMELOG) 7 Unit(s) SubCutaneous three times a day before meals  ipratropium    for Nebulization 500 MICROGram(s) Nebulizer every 6 hours  methocarbamol 500 milliGRAM(s) Oral every 8 hours  metoprolol tartrate 50 milliGRAM(s) Oral every 12 hours  oxyCODONE    IR 10 milliGRAM(s) Oral every 4 hours PRN  pantoprazole    Tablet 40 milliGRAM(s) Oral before breakfast  polyethylene glycol 3350 17 Gram(s) Oral daily  potassium chloride  10 mEq/50 mL IVPB 10 milliEquivalent(s) IV Intermittent every 1 hour  potassium chloride  10 mEq/50 mL IVPB 10 milliEquivalent(s) IV Intermittent every 1 hour  potassium chloride  10 mEq/50 mL IVPB 10 milliEquivalent(s) IV Intermittent every 1 hour  senna 2 Tablet(s) Oral at bedtime  sodium chloride 0.9% lock flush 3 milliLiter(s) IV Push every 8 hours  sodium chloride 0.9%. 1000 milliLiter(s) IV Continuous <Continuous>                 Physical Therapy Rec:   Home  [  ]   Home w/ PT  [  ]  Rehab  [ x ]    Discussed with Cardiothoracic Team at AM rounds.     VITAL SIGNS    Subjective: "I feel okay" Denies CP, palpitation, SOB, THAKKAR, HA, dizziness, N/V/D, fever or chills.  No acute event noted overnight.    Telemetry:  Afib 90s    Vital Signs Last 24 Hrs  T(C): 36.4 (24 @ 13:21), Max: 37.8 (24 @ 00:00)  T(F): 97.5 (24 @ :21), Max: 100 (24 @ 00:00)  HR: 95 (24:) (49 - 95)  BP: 154/73 (24 13:21) (106/51 - 163/74)  RR: 18 (24:21) (11 - 33)  SpO2: 95% (24:21) (90% - 100%)                07:01  -   @ 07:00  --------------------------------------------------------  IN: 1573 mL / OUT: 2360 mL / NET: -787 mL     @ 07:01  -   @ 15:10  --------------------------------------------------------  IN: 30 mL / OUT: 210 mL / NET: -180 mL        LABS      136  |  101  |  36<H>  ----------------------------<  87  4.4   |  21<L>  |  1.11    Ca    8.3<L>      07 May 2024 12:20  Phos  2.4       Mg     2.8         TPro  6.4  /  Alb  3.1<L>  /  TBili  0.6  /  DBili  x   /  AST  87<H>  /  ALT  113<H>  /  AlkPhos  67                                   8.9    11.42 )-----------( 176      ( 07 May 2024 12:20 )             26.9          PT/INR - ( 07 May 2024 12:20 )   PT: 12.2 sec;   INR: 1.11 ratio         PTT - ( 07 May 2024 12:20 )  PTT:31.8 sec        Daily     Daily Weight in k.7 (07 May 2024 00:00)      CAPILLARY BLOOD GLUCOSE  100 (07 May 2024 07:00)      POCT Blood Glucose.: 94 mg/dL (07 May 2024 12:02)  POCT Blood Glucose.: 100 mg/dL (07 May 2024 07:29)  POCT Blood Glucose.: 173 mg/dL (06 May 2024 23:59)  POCT Blood Glucose.: 173 mg/dL (06 May 2024 21:33)  POCT Blood Glucose.: 167 mg/dL (06 May 2024 15:23)          PHYSICAL EXAM      Neurology: alert and oriented x 3, nonfocal, no gross deficits    CV: +S1, S2. no heart murmurs.     Sternal Wound: MSI --> oppsite dressing CDI, sternum stable    Lungs: Diminished b/l lung sounds     Abdomen: soft, nontender, distended, hypoactive bowel sounds, (+) Flatus; (-) BM     :  Voiding               Extremities:  B/L LE (+) edema; negative calf tenderness; (+) 2 DP palpable      Right arm ecchymosis and +1 Edema + 2 Radial Pulse   Left arm radial harvest site dressing c/d/i   Right Saphenous Vein harvest site dressing c/d/i       aMIOdarone    Tablet 200 milliGRAM(s) Oral daily  amLODIPine   Tablet 5 milliGRAM(s) Oral daily  ascorbic acid 500 milliGRAM(s) Oral two times a day  aspirin enteric coated 81 milliGRAM(s) Oral daily  atorvastatin 40 milliGRAM(s) Oral at bedtime  bisacodyl Suppository 10 milliGRAM(s) Rectal once  chlorhexidine 2% Cloths 1 Application(s) Topical daily  clopidogrel Tablet 75 milliGRAM(s) Oral daily  dextrose 50% Injectable 50 milliLiter(s) IV Push every 15 minutes  dextrose 50% Injectable 25 milliLiter(s) IV Push every 15 minutes  dextrose Oral Gel 15 Gram(s) Oral once  gabapentin 100 milliGRAM(s) Oral every 8 hours  glucagon  Injectable 1 milliGRAM(s) IntraMuscular once  heparin   Injectable 5000 Unit(s) SubCutaneous every 8 hours  insulin glargine Injectable (LANTUS) 30 Unit(s) SubCutaneous at bedtime  insulin lispro (ADMELOG) corrective regimen sliding scale   SubCutaneous three times a day before meals  insulin lispro Injectable (ADMELOG) 7 Unit(s) SubCutaneous three times a day before meals  ipratropium    for Nebulization 500 MICROGram(s) Nebulizer every 6 hours  methocarbamol 500 milliGRAM(s) Oral every 8 hours  metoprolol tartrate 50 milliGRAM(s) Oral every 12 hours  oxyCODONE    IR 10 milliGRAM(s) Oral every 4 hours PRN  pantoprazole    Tablet 40 milliGRAM(s) Oral before breakfast  polyethylene glycol 3350 17 Gram(s) Oral daily  potassium chloride  10 mEq/50 mL IVPB 10 milliEquivalent(s) IV Intermittent every 1 hour  potassium chloride  10 mEq/50 mL IVPB 10 milliEquivalent(s) IV Intermittent every 1 hour  potassium chloride  10 mEq/50 mL IVPB 10 milliEquivalent(s) IV Intermittent every 1 hour  senna 2 Tablet(s) Oral at bedtime  sodium chloride 0.9% lock flush 3 milliLiter(s) IV Push every 8 hours  sodium chloride 0.9%. 1000 milliLiter(s) IV Continuous <Continuous>                 Physical Therapy Rec:   Home  [  ]   Home w/ PT  [  ]  Rehab  [ x ]    Discussed with Cardiothoracic Team at AM rounds.     VITAL SIGNS    Subjective: "I feel okay" Denies CP, palpitation, SOB, THAKKAR, HA, dizziness, N/V/D, fever or chills.  No acute event noted overnight.    Telemetry:  Afib 90s    Vital Signs Last 24 Hrs  T(C): 36.4 (24 @ 13:21), Max: 37.8 (24 @ 00:00)  T(F): 97.5 (24 @ :21), Max: 100 (24 @ 00:00)  HR: 95 (24:) (49 - 95)  BP: 154/73 (24 13:21) (106/51 - 163/74)  RR: 18 (24:21) (11 - 33)  SpO2: 95% (24:21) (90% - 100%)                07:01  -   @ 07:00  --------------------------------------------------------  IN: 1573 mL / OUT: 2360 mL / NET: -787 mL     @ 07:01  -   @ 15:10  --------------------------------------------------------  IN: 30 mL / OUT: 210 mL / NET: -180 mL        LABS      136  |  101  |  36<H>  ----------------------------<  87  4.4   |  21<L>  |  1.11    Ca    8.3<L>      07 May 2024 12:20  Phos  2.4       Mg     2.8         TPro  6.4  /  Alb  3.1<L>  /  TBili  0.6  /  DBili  x   /  AST  87<H>  /  ALT  113<H>  /  AlkPhos  67                                   8.9    11.42 )-----------( 176      ( 07 May 2024 12:20 )             26.9          PT/INR - ( 07 May 2024 12:20 )   PT: 12.2 sec;   INR: 1.11 ratio         PTT - ( 07 May 2024 12:20 )  PTT:31.8 sec        Daily     Daily Weight in k.7 (07 May 2024 00:00)      CAPILLARY BLOOD GLUCOSE  100 (07 May 2024 07:00)      POCT Blood Glucose.: 94 mg/dL (07 May 2024 12:02)  POCT Blood Glucose.: 100 mg/dL (07 May 2024 07:29)  POCT Blood Glucose.: 173 mg/dL (06 May 2024 23:59)  POCT Blood Glucose.: 173 mg/dL (06 May 2024 21:33)  POCT Blood Glucose.: 167 mg/dL (06 May 2024 15:23)          PHYSICAL EXAM      Neurology: alert and oriented x 3, nonfocal, no gross deficits    CV: +S1, S2. no heart murmurs.     Sternal Wound: MSI --> oppsite dressing CDI, sternum stable    Lungs: Diminished b/l lung sounds   4 L NC     Abdomen: soft, nontender, distended, hypoactive bowel sounds, (+) Flatus; (-) BM     :  Voiding               Extremities:  B/L LE (+) edema; negative calf tenderness; (+) 2 DP palpable      Right arm ecchymosis and +1 Edema + 2 Radial Pulse   Left arm radial harvest site dressing c/d/i   Right Saphenous Vein harvest site dressing c/d/i       aMIOdarone    Tablet 200 milliGRAM(s) Oral daily  amLODIPine   Tablet 5 milliGRAM(s) Oral daily  ascorbic acid 500 milliGRAM(s) Oral two times a day  aspirin enteric coated 81 milliGRAM(s) Oral daily  atorvastatin 40 milliGRAM(s) Oral at bedtime  bisacodyl Suppository 10 milliGRAM(s) Rectal once  chlorhexidine 2% Cloths 1 Application(s) Topical daily  clopidogrel Tablet 75 milliGRAM(s) Oral daily  dextrose 50% Injectable 50 milliLiter(s) IV Push every 15 minutes  dextrose 50% Injectable 25 milliLiter(s) IV Push every 15 minutes  dextrose Oral Gel 15 Gram(s) Oral once  gabapentin 100 milliGRAM(s) Oral every 8 hours  glucagon  Injectable 1 milliGRAM(s) IntraMuscular once  heparin   Injectable 5000 Unit(s) SubCutaneous every 8 hours  insulin glargine Injectable (LANTUS) 30 Unit(s) SubCutaneous at bedtime  insulin lispro (ADMELOG) corrective regimen sliding scale   SubCutaneous three times a day before meals  insulin lispro Injectable (ADMELOG) 7 Unit(s) SubCutaneous three times a day before meals  ipratropium    for Nebulization 500 MICROGram(s) Nebulizer every 6 hours  methocarbamol 500 milliGRAM(s) Oral every 8 hours  metoprolol tartrate 50 milliGRAM(s) Oral every 12 hours  oxyCODONE    IR 10 milliGRAM(s) Oral every 4 hours PRN  pantoprazole    Tablet 40 milliGRAM(s) Oral before breakfast  polyethylene glycol 3350 17 Gram(s) Oral daily  potassium chloride  10 mEq/50 mL IVPB 10 milliEquivalent(s) IV Intermittent every 1 hour  potassium chloride  10 mEq/50 mL IVPB 10 milliEquivalent(s) IV Intermittent every 1 hour  potassium chloride  10 mEq/50 mL IVPB 10 milliEquivalent(s) IV Intermittent every 1 hour  senna 2 Tablet(s) Oral at bedtime  sodium chloride 0.9% lock flush 3 milliLiter(s) IV Push every 8 hours  sodium chloride 0.9%. 1000 milliLiter(s) IV Continuous <Continuous>                 Physical Therapy Rec:   Home  [  ]   Home w/ PT  [  ]  Rehab  [ x ]    Discussed with Cardiothoracic Team at AM rounds.     VITAL SIGNS    Subjective: "I feel okay" Denies CP, palpitation, SOB, THAKKAR, HA, dizziness, N/V/D, fever or chills.  No acute event noted overnight.    Telemetry:  Afib 90s    Vital Signs Last 24 Hrs  T(C): 36.4 (24 @ 13:21), Max: 37.8 (24 @ 00:00)  T(F): 97.5 (24 @ :21), Max: 100 (24 @ 00:00)  HR: 95 (24:) (49 - 95)  BP: 154/73 (24 13:21) (106/51 - 163/74)  RR: 18 (24:21) (11 - 33)  SpO2: 95% (24:21) (90% - 100%)                07:01  -   @ 07:00  --------------------------------------------------------  IN: 1573 mL / OUT: 2360 mL / NET: -787 mL     @ 07:01  -   @ 15:10  --------------------------------------------------------  IN: 30 mL / OUT: 210 mL / NET: -180 mL        LABS      136  |  101  |  36<H>  ----------------------------<  87  4.4   |  21<L>  |  1.11    Ca    8.3<L>      07 May 2024 12:20  Phos  2.4       Mg     2.8         TPro  6.4  /  Alb  3.1<L>  /  TBili  0.6  /  DBili  x   /  AST  87<H>  /  ALT  113<H>  /  AlkPhos  67                                   8.9    11.42 )-----------( 176      ( 07 May 2024 12:20 )             26.9          PT/INR - ( 07 May 2024 12:20 )   PT: 12.2 sec;   INR: 1.11 ratio         PTT - ( 07 May 2024 12:20 )  PTT:31.8 sec        Daily     Daily Weight in k.7 (07 May 2024 00:00)      CAPILLARY BLOOD GLUCOSE  100 (07 May 2024 07:00)      POCT Blood Glucose.: 94 mg/dL (07 May 2024 12:02)  POCT Blood Glucose.: 100 mg/dL (07 May 2024 07:29)  POCT Blood Glucose.: 173 mg/dL (06 May 2024 23:59)  POCT Blood Glucose.: 173 mg/dL (06 May 2024 21:33)  POCT Blood Glucose.: 167 mg/dL (06 May 2024 15:23)          PHYSICAL EXAM      Neurology: alert and oriented x 3, nonfocal, no gross deficits    CV: +S1, S2. no heart murmurs.     Sternal Wound: MSI --> oppsite dressing CDI, sternum stable    Lungs: Diminished b/l lung sounds   4 L NC     Abdomen: soft, nontender, distended, hypoactive bowel sounds, (+) Flatus; (-) BM     :  Voiding               Extremities:  B/L LE (+) edema; negative calf tenderness; (+) 2 DP palpable      Right arm ecchymosis and +1 Edema + 2 Radial Pulse   Left arm radial harvest site dressing c/d/i   Right Saphenous Vein harvest site dressing c/d/i   Pacing wires --> EPM VVI 40/10       aMIOdarone    Tablet 200 milliGRAM(s) Oral daily  amLODIPine   Tablet 5 milliGRAM(s) Oral daily  ascorbic acid 500 milliGRAM(s) Oral two times a day  aspirin enteric coated 81 milliGRAM(s) Oral daily  atorvastatin 40 milliGRAM(s) Oral at bedtime  bisacodyl Suppository 10 milliGRAM(s) Rectal once  chlorhexidine 2% Cloths 1 Application(s) Topical daily  clopidogrel Tablet 75 milliGRAM(s) Oral daily  dextrose 50% Injectable 50 milliLiter(s) IV Push every 15 minutes  dextrose 50% Injectable 25 milliLiter(s) IV Push every 15 minutes  dextrose Oral Gel 15 Gram(s) Oral once  gabapentin 100 milliGRAM(s) Oral every 8 hours  glucagon  Injectable 1 milliGRAM(s) IntraMuscular once  heparin   Injectable 5000 Unit(s) SubCutaneous every 8 hours  insulin glargine Injectable (LANTUS) 30 Unit(s) SubCutaneous at bedtime  insulin lispro (ADMELOG) corrective regimen sliding scale   SubCutaneous three times a day before meals  insulin lispro Injectable (ADMELOG) 7 Unit(s) SubCutaneous three times a day before meals  ipratropium    for Nebulization 500 MICROGram(s) Nebulizer every 6 hours  methocarbamol 500 milliGRAM(s) Oral every 8 hours  metoprolol tartrate 50 milliGRAM(s) Oral every 12 hours  oxyCODONE    IR 10 milliGRAM(s) Oral every 4 hours PRN  pantoprazole    Tablet 40 milliGRAM(s) Oral before breakfast  polyethylene glycol 3350 17 Gram(s) Oral daily  potassium chloride  10 mEq/50 mL IVPB 10 milliEquivalent(s) IV Intermittent every 1 hour  potassium chloride  10 mEq/50 mL IVPB 10 milliEquivalent(s) IV Intermittent every 1 hour  potassium chloride  10 mEq/50 mL IVPB 10 milliEquivalent(s) IV Intermittent every 1 hour  senna 2 Tablet(s) Oral at bedtime  sodium chloride 0.9% lock flush 3 milliLiter(s) IV Push every 8 hours  sodium chloride 0.9%. 1000 milliLiter(s) IV Continuous <Continuous>                 Physical Therapy Rec:   Home  [  ]   Home w/ PT  [  ]  Rehab  [ x ]    Discussed with Cardiothoracic Team at AM rounds.

## 2024-05-07 NOTE — PROGRESS NOTE ADULT - SUBJECTIVE AND OBJECTIVE BOX
Chief complaint    Patient is a 68y old  Male who presents with a chief complaint of cardiac surgery (07 May 2024 12:08)   Review of systems  Patient appears comfortable.    Labs and Fingersticks  CAPILLARY BLOOD GLUCOSE  100 (07 May 2024 07:00)      POCT Blood Glucose.: 94 mg/dL (07 May 2024 12:02)  POCT Blood Glucose.: 100 mg/dL (07 May 2024 07:29)  POCT Blood Glucose.: 173 mg/dL (06 May 2024 23:59)  POCT Blood Glucose.: 173 mg/dL (06 May 2024 21:33)  POCT Blood Glucose.: 167 mg/dL (06 May 2024 15:23)      Anion Gap: 12 (05-07 @ 00:15)  Anion Gap: 13 (05-06 @ 00:33)  Anion Gap: 14 (05-05 @ 17:14)      Calcium: 7.9 *L* (05-07 @ 00:15)  Calcium: 8.3 *L* (05-06 @ 00:33)  Calcium: 8.1 *L* (05-05 @ 17:14)  Albumin: 3.2 *L* (05-07 @ 00:15)  Albumin: 3.5 (05-06 @ 00:33)    Alanine Aminotransferase (ALT/SGPT): 110 *H* (05-07 @ 00:15)  Alanine Aminotransferase (ALT/SGPT): 73 *H* (05-06 @ 00:33)  Alkaline Phosphatase: 58 (05-07 @ 00:15)  Alkaline Phosphatase: 46 (05-06 @ 00:33)  Aspartate Aminotransferase (AST/SGOT): 93 *H* (05-07 @ 00:15)  Aspartate Aminotransferase (AST/SGOT): 91 *H* (05-06 @ 00:33)        05-07    135  |  101  |  41<H>  ----------------------------<  161<H>  4.6   |  22  |  1.32<H>    Ca    7.9<L>      07 May 2024 00:15  Phos  2.9     05-07  Mg     2.9     05-07    TPro  6.1  /  Alb  3.2<L>  /  TBili  0.6  /  DBili  x   /  AST  93<H>  /  ALT  110<H>  /  AlkPhos  58  05-07                        8.4    10.94 )-----------( 148      ( 07 May 2024 00:15 )             25.8     Medications  MEDICATIONS  (STANDING):  aMIOdarone    Tablet 200 milliGRAM(s) Oral daily  amLODIPine   Tablet 5 milliGRAM(s) Oral daily  ascorbic acid 500 milliGRAM(s) Oral two times a day  aspirin enteric coated 81 milliGRAM(s) Oral daily  atorvastatin 40 milliGRAM(s) Oral at bedtime  bisacodyl Suppository 10 milliGRAM(s) Rectal once  chlorhexidine 2% Cloths 1 Application(s) Topical daily  clopidogrel Tablet 75 milliGRAM(s) Oral daily  dextrose 50% Injectable 50 milliLiter(s) IV Push every 15 minutes  dextrose 50% Injectable 25 milliLiter(s) IV Push every 15 minutes  dextrose Oral Gel 15 Gram(s) Oral once  gabapentin 100 milliGRAM(s) Oral every 8 hours  glucagon  Injectable 1 milliGRAM(s) IntraMuscular once  heparin   Injectable 5000 Unit(s) SubCutaneous every 8 hours  insulin glargine Injectable (LANTUS) 30 Unit(s) SubCutaneous at bedtime  insulin lispro (ADMELOG) corrective regimen sliding scale   SubCutaneous three times a day before meals  insulin lispro Injectable (ADMELOG) 7 Unit(s) SubCutaneous three times a day before meals  insulin regular Infusion 3 Unit(s)/Hr (3 mL/Hr) IV Continuous <Continuous>  ipratropium    for Nebulization 500 MICROGram(s) Nebulizer every 6 hours  methocarbamol 500 milliGRAM(s) Oral every 8 hours  metoprolol tartrate 50 milliGRAM(s) Oral every 12 hours  pantoprazole    Tablet 40 milliGRAM(s) Oral before breakfast  polyethylene glycol 3350 17 Gram(s) Oral daily  potassium chloride  10 mEq/50 mL IVPB 10 milliEquivalent(s) IV Intermittent every 1 hour  potassium chloride  10 mEq/50 mL IVPB 10 milliEquivalent(s) IV Intermittent every 1 hour  potassium chloride  10 mEq/50 mL IVPB 10 milliEquivalent(s) IV Intermittent every 1 hour  senna 2 Tablet(s) Oral at bedtime  sodium chloride 0.9% lock flush 3 milliLiter(s) IV Push every 8 hours  sodium chloride 0.9%. 1000 milliLiter(s) (10 mL/Hr) IV Continuous <Continuous>      Physical Exam  General: Patient appears comfortable.  Vital Signs Last 12 Hrs  T(F): 99.1 (05-07-24 @ 08:00), Max: 100 (05-07-24 @ 04:00)  HR: 82 (05-07-24 @ 12:00) (56 - 94)  BP: 135/65 (05-07-24 @ 12:00) (135/65 - 163/74)  BP(mean): 89 (05-07-24 @ 10:00) (89 - 107)  RR: 18 (05-07-24 @ 12:00) (11 - 33)  SpO2: 93% (05-07-24 @ 12:00) (90% - 100%)  Neck: No palpable thyroid nodules.  CVS: S1S2, No murmurs  Respiratory: No wheezing, no crepitations  GI: Abdomen soft, non tender.    Diagnostics        Radiology:

## 2024-05-07 NOTE — PROGRESS NOTE ADULT - ASSESSMENT
68 year-old male, with past history significant for Type-2 DM, HLD, HTN and Hyperkalemia, presented to the ED secondary to L-sided chest pain, shortness of breath.  Transferred to [facility] for CABG eval   Assessment  DMT2: 68y Male with DM T2 with hyperglycemia, A1C 8.9% , was on insulin at home, on  basal bolus insulin with coverage, now s/p surgery, on basal bolus insulin, eating full  meals, sugars trending down.  CAD: on medications, stable, monitored.  HTN: on antihypertensive medications, monitored, asymptomatic.    Leela Feldman MD  Cell: [phone]  Office: [phone]

## 2024-05-07 NOTE — PROGRESS NOTE ADULT - SUBJECTIVE AND OBJECTIVE BOX
CRITICAL CARE ATTENDING - CTICU    MEDICATIONS  (STANDING):  aMIOdarone    Tablet 200 milliGRAM(s) Oral daily  amLODIPine   Tablet 5 milliGRAM(s) Oral daily  ascorbic acid 500 milliGRAM(s) Oral two times a day  aspirin enteric coated 81 milliGRAM(s) Oral daily  atorvastatin 80 milliGRAM(s) Oral at bedtime  bisacodyl Suppository 10 milliGRAM(s) Rectal once  chlorhexidine 2% Cloths 1 Application(s) Topical daily  clopidogrel Tablet 75 milliGRAM(s) Oral daily  dextrose 50% Injectable 50 milliLiter(s) IV Push every 15 minutes  dextrose 50% Injectable 25 milliLiter(s) IV Push every 15 minutes  gabapentin 100 milliGRAM(s) Oral every 8 hours  heparin   Injectable 5000 Unit(s) SubCutaneous every 8 hours  insulin glargine Injectable (LANTUS) 35 Unit(s) SubCutaneous at bedtime  insulin lispro (ADMELOG) corrective regimen sliding scale   SubCutaneous three times a day before meals  insulin lispro Injectable (ADMELOG) 8 Unit(s) SubCutaneous before breakfast  insulin lispro Injectable (ADMELOG) 8 Unit(s) SubCutaneous before dinner  insulin lispro Injectable (ADMELOG) 8 Unit(s) SubCutaneous before lunch  insulin regular Infusion 3 Unit(s)/Hr (3 mL/Hr) IV Continuous <Continuous>  ipratropium    for Nebulization 500 MICROGram(s) Nebulizer every 6 hours  methocarbamol 500 milliGRAM(s) Oral every 8 hours  metoprolol tartrate 50 milliGRAM(s) Oral every 12 hours  pantoprazole    Tablet 40 milliGRAM(s) Oral before breakfast  polyethylene glycol 3350 17 Gram(s) Oral daily  potassium chloride  10 mEq/50 mL IVPB 10 milliEquivalent(s) IV Intermittent every 1 hour  potassium chloride  10 mEq/50 mL IVPB 10 milliEquivalent(s) IV Intermittent every 1 hour  potassium chloride  10 mEq/50 mL IVPB 10 milliEquivalent(s) IV Intermittent every 1 hour  senna 2 Tablet(s) Oral at bedtime  sodium chloride 0.9%. 1000 milliLiter(s) (10 mL/Hr) IV Continuous <Continuous>                                    8.4    10.94 )-----------( 148      ( 07 May 2024 00:15 )             25.8       05-07    135  |  101  |  41<H>  ----------------------------<  161<H>  4.6   |  22  |  1.32<H>    Ca    7.9<L>      07 May 2024 00:15  Phos  2.9       Mg     2.9         TPro  6.1  /  Alb  3.2<L>  /  TBili  0.6  /  DBili  x   /  AST  93<H>  /  ALT  110<H>  /  AlkPhos  58        PTT - ( 06 May 2024 00:33 )  PTT:31.7 sec        Daily     Daily Weight in k.7 (07 May 2024 00:00)       @ 07:01  -   @ 07:00  --------------------------------------------------------  IN: 1490 mL / OUT: 2360 mL / NET: -870 mL     @ 07:01  -   @ 06:50  --------------------------------------------------------  IN: 1573 mL / OUT: 2285 mL / NET: -712 mL        Critically Ill patient  : [ ] preoperative ,   [ x] post operative    Requires :  [x ] Arterial Line   [ x] Central Line  [ ] PA catheter  [ ] IABP  [ ] ECMO  [ ] LVAD  [ ] Ventilator  [x ] pacemaker- TPM [ ] Impella.                       [ x] ABG's     [x ] Pulse Oxymetry Monitoring  Bedside evaluation , monitoring , treatment of hemodynamics , fluids , IVP/ IVCD meds.        Diagnosis:     POD 4- CABG x4/ LAAC    Hypovolemia    Thrombocytopenia     Renal Failure - Acute Kidney Injury     Tachycardia     ASA/ Lopressor     Pain / Splinting / Atelectasis / Hypoxemia    Temporary pacemaker (TPM) interrogation and setting.     Acute respiratory insufficiency     Chest Tube Drainage/ Management     Hemodynamic lability,  instability. Requires IVCD/PO [ ] vasopressors [ ] inotropes  [ x] Amiodarone/Norvasc [x ]IVSS fluid  to maintain MAP, perfusion, C.I.     Requires chest PT, pulmonary toilet, suctioning to maintain SaO2,  patent airway and treat atelectasis.     Hyperglycemia- IVCD insulin/Sliding scale/ Lantus     Requires bedside physical therapy, mobilization and total [facility] care.           I, Gallo Tucker, personally performed the services described in this documentation. All medical record entries made by the scribe were at my direction and in my presence. I have reviewed the chart and agree that the record reflects my personal performance and is accurate and complete.   Gallo Tucker MD.       By signing my name below, I, Terrence Morelos, attest that this documentation has been prepared under the direction and in the presence of Gallo Tucker MD.   Electronically Signed: Kelly Mathew 24 @ 06:50        Discussed with CT surgeon, Physician Assistant - Nurse Practitioner- Critical care medicine team.   Dicussed at  AM / PM rounds.   Chart, labs , films reviewed.    Cumulative Critical Care Time Given Today:  CRITICAL CARE ATTENDING - CTICU    MEDICATIONS  (STANDING):  aMIOdarone    Tablet 200 milliGRAM(s) Oral daily  amLODIPine   Tablet 5 milliGRAM(s) Oral daily  ascorbic acid 500 milliGRAM(s) Oral two times a day  aspirin enteric coated 81 milliGRAM(s) Oral daily  atorvastatin 80 milliGRAM(s) Oral at bedtime  bisacodyl Suppository 10 milliGRAM(s) Rectal once  chlorhexidine 2% Cloths 1 Application(s) Topical daily  clopidogrel Tablet 75 milliGRAM(s) Oral daily  dextrose 50% Injectable 50 milliLiter(s) IV Push every 15 minutes  dextrose 50% Injectable 25 milliLiter(s) IV Push every 15 minutes  gabapentin 100 milliGRAM(s) Oral every 8 hours  heparin   Injectable 5000 Unit(s) SubCutaneous every 8 hours  insulin glargine Injectable (LANTUS) 35 Unit(s) SubCutaneous at bedtime  insulin lispro (ADMELOG) corrective regimen sliding scale   SubCutaneous three times a day before meals  insulin lispro Injectable (ADMELOG) 8 Unit(s) SubCutaneous before breakfast  insulin lispro Injectable (ADMELOG) 8 Unit(s) SubCutaneous before dinner  insulin lispro Injectable (ADMELOG) 8 Unit(s) SubCutaneous before lunch  insulin regular Infusion 3 Unit(s)/Hr (3 mL/Hr) IV Continuous <Continuous>  ipratropium    for Nebulization 500 MICROGram(s) Nebulizer every 6 hours  methocarbamol 500 milliGRAM(s) Oral every 8 hours  metoprolol tartrate 50 milliGRAM(s) Oral every 12 hours  pantoprazole    Tablet 40 milliGRAM(s) Oral before breakfast  polyethylene glycol 3350 17 Gram(s) Oral daily  potassium chloride  10 mEq/50 mL IVPB 10 milliEquivalent(s) IV Intermittent every 1 hour  potassium chloride  10 mEq/50 mL IVPB 10 milliEquivalent(s) IV Intermittent every 1 hour  potassium chloride  10 mEq/50 mL IVPB 10 milliEquivalent(s) IV Intermittent every 1 hour  senna 2 Tablet(s) Oral at bedtime  sodium chloride 0.9%. 1000 milliLiter(s) (10 mL/Hr) IV Continuous <Continuous>                                    8.4    10.94 )-----------( 148      ( 07 May 2024 00:15 )             25.8       05-07    135  |  101  |  41<H>  ----------------------------<  161<H>  4.6   |  22  |  1.32<H>    Ca    7.9<L>      07 May 2024 00:15  Phos  2.9       Mg     2.9         TPro  6.1  /  Alb  3.2<L>  /  TBili  0.6  /  DBili  x   /  AST  93<H>  /  ALT  110<H>  /  AlkPhos  58        PTT - ( 06 May 2024 00:33 )  PTT:31.7 sec        Daily     Daily Weight in k.7 (07 May 2024 00:00)       @ 07:01  -   @ 07:00  --------------------------------------------------------  IN: 1490 mL / OUT: 2360 mL / NET: -870 mL     @ 07:01  -   @ 06:50  --------------------------------------------------------  IN: 1573 mL / OUT: 2285 mL / NET: -712 mL        Critically Ill patient  : [ ] preoperative ,   [ x] post operative    Requires :  [x ] Arterial Line   [ x] Central Line  [ ] PA catheter  [ ] IABP  [ ] ECMO  [ ] LVAD  [ ] Ventilator  [x ] pacemaker- TPM [ ] Impella.                       [ x] ABG's     [x ] Pulse Oxymetry Monitoring  Bedside evaluation , monitoring , treatment of hemodynamics , fluids , IVP/ IVCD meds.        Diagnosis:     POD 4- CABG x4/ LAAC    Hypotension a/w Hypertension, requiring intravenous medication.     Hypovolemia    Thrombocytopenia     Renal Failure - Acute Kidney Injury     Tachycardia     ASA/ Lopressor     Pain / Splinting / Atelectasis / Hypoxemia    Temporary pacemaker (TPM) interrogation and setting.     Acute respiratory insufficiency     Smoker     Chest Tube Drainage/ Management     Hemodynamic lability,  instability. Requires IVCD/PO [ ] vasopressors [ ] inotropes  [ x] Amiodarone/Norvasc [x ]IVSS fluid  to maintain MAP, perfusion, C.I.     Requires chest PT, pulmonary toilet, suctioning to maintain SaO2,  patent airway and treat atelectasis.     Hyperglycemia- IVCD insulin  Conversion to - /Sliding scale/ Lantus     Requires bedside physical therapy, mobilization and total group home care.           I, Gallo Tucker, personally performed the services described in this documentation. All medical record entries made by the scribe were at my direction and in my presence. I have reviewed the chart and agree that the record reflects my personal performance and is accurate and complete.   Gallo Tucker MD.       By signing my name below, I, Terrence Morelos, attest that this documentation has been prepared under the direction and in the presence of Gallo Tucker MD.   Electronically Signed: Kelly Mathew 24 @ 06:50        Discussed with CT surgeon, Physician Assistant - Nurse Practitioner- Critical care medicine team.   Dicussed at  AM / PM rounds.   Chart, labs , films reviewed.    Cumulative Critical Care Time Given Today:  30 min

## 2024-05-07 NOTE — PROGRESS NOTE ADULT - SUBJECTIVE AND OBJECTIVE BOX
CARDIOLOGY FOLLOW UP - Dr. Brewer  DATE OF SERVICE: 5/7/24    CC  No cv complaints     REVIEW OF SYSTEMS:  CONSTITUTIONAL: No fever, weight loss, or fatigue  RESPIRATORY: No cough, wheezing, chills or hemoptysis; No Shortness of Breath  CARDIOVASCULAR: No chest pain, palpitations, passing out, dizziness, or leg swelling  GASTROINTESTINAL: No abdominal or epigastric pain. No nausea, vomiting, or hematemesis; No diarrhea or constipation. No melena or hematochezia.  VASCULAR: No edema     PHYSICAL EXAM:  T(C): 37.3 (05-07-24 @ 08:00), Max: 37.8 (05-07-24 @ 00:00)  HR: 82 (05-07-24 @ 12:00) (49 - 95)  BP: 140/62 (05-07-24 @ 10:00) (106/51 - 163/74)  RR: 18 (05-07-24 @ 12:00) (11 - 33)  SpO2: 93% (05-07-24 @ 12:00) (90% - 100%)  Wt(kg): --  I&O's Summary    06 May 2024 07:01  -  07 May 2024 07:00  --------------------------------------------------------  IN: 1573 mL / OUT: 2360 mL / NET: -787 mL    07 May 2024 07:01  -  07 May 2024 12:08  --------------------------------------------------------  IN: 30 mL / OUT: 210 mL / NET: -180 mL        Appearance: Normal	  Cardiovascular: Normal S1 S2,RRR, No JVD, No murmurs  Respiratory: Lungs clear to auscultation b/l   Gastrointestinal:  Soft, Non-tender, + BS	  Extremities: Normal range of motion, No clubbing, cyanosis or edema      Home Medications:  aspirin 81 mg oral delayed release tablet: 1 tab(s) orally once a day (29 Apr 2024 18:21)  atorvastatin 40 mg oral tablet: 1 tab(s) orally once a day (at bedtime) (29 Apr 2024 18:21)  insulin glargine 100 units/mL subcutaneous solution: 50 unit(s) subcutaneous once a day (at bedtime) (29 Apr 2024 18:21)  insulin lispro 100 units/mL injectable solution: 15 unit(s) injectable once a day (in the morning) BEFORE BREAKFAST (29 Apr 2024 18:21)  insulin lispro 100 units/mL injectable solution: 1 unit(s) injectable 3 times a day (before meals) 1 Unit(s) if Glucose 151 - 200  2 Unit(s) if Glucose 201 - 250  3 Unit(s) if Glucose 251 - 300  4 Unit(s) if Glucose 301 - 350  5 Unit(s) if Glucose 351 - 400  6 Unit(s) if Glucose Greater Than 400 (29 Apr 2024 18:21)  insulin lispro 100 units/mL injectable solution: 15 unit(s) injectable once a day before dinner (29 Apr 2024 18:21)  lisinopril 20 mg oral tablet: 1 tab(s) orally once a day (29 Apr 2024 02:23)  metoprolol tartrate 25 mg oral tablet: 1 tab(s) orally every 12 hours (29 Apr 2024 18:21)      MEDICATIONS  (STANDING):  aMIOdarone    Tablet 200 milliGRAM(s) Oral daily  amLODIPine   Tablet 5 milliGRAM(s) Oral daily  ascorbic acid 500 milliGRAM(s) Oral two times a day  aspirin enteric coated 81 milliGRAM(s) Oral daily  atorvastatin 40 milliGRAM(s) Oral at bedtime  bisacodyl Suppository 10 milliGRAM(s) Rectal once  chlorhexidine 2% Cloths 1 Application(s) Topical daily  clopidogrel Tablet 75 milliGRAM(s) Oral daily  dextrose 50% Injectable 50 milliLiter(s) IV Push every 15 minutes  dextrose 50% Injectable 25 milliLiter(s) IV Push every 15 minutes  gabapentin 100 milliGRAM(s) Oral every 8 hours  heparin   Injectable 5000 Unit(s) SubCutaneous every 8 hours  insulin glargine Injectable (LANTUS) 35 Unit(s) SubCutaneous at bedtime  insulin lispro (ADMELOG) corrective regimen sliding scale   SubCutaneous three times a day before meals  insulin lispro Injectable (ADMELOG) 8 Unit(s) SubCutaneous before dinner  insulin lispro Injectable (ADMELOG) 8 Unit(s) SubCutaneous before breakfast  insulin lispro Injectable (ADMELOG) 8 Unit(s) SubCutaneous before lunch  insulin regular Infusion 3 Unit(s)/Hr (3 mL/Hr) IV Continuous <Continuous>  ipratropium    for Nebulization 500 MICROGram(s) Nebulizer every 6 hours  methocarbamol 500 milliGRAM(s) Oral every 8 hours  metoprolol tartrate 50 milliGRAM(s) Oral every 12 hours  pantoprazole    Tablet 40 milliGRAM(s) Oral before breakfast  polyethylene glycol 3350 17 Gram(s) Oral daily  potassium chloride  10 mEq/50 mL IVPB 10 milliEquivalent(s) IV Intermittent every 1 hour  potassium chloride  10 mEq/50 mL IVPB 10 milliEquivalent(s) IV Intermittent every 1 hour  potassium chloride  10 mEq/50 mL IVPB 10 milliEquivalent(s) IV Intermittent every 1 hour  senna 2 Tablet(s) Oral at bedtime  sodium chloride 0.9% lock flush 3 milliLiter(s) IV Push every 8 hours  sodium chloride 0.9%. 1000 milliLiter(s) (10 mL/Hr) IV Continuous <Continuous>      TELEMETRY: 	    ECG:  	  RADIOLOGY:   DIAGNOSTIC TESTING:  [ ] Echocardiogram:  [ ]  Catheterization:  [ ] Stress Test:    OTHER: 	    LABS:	 	    Creatine Kinase, Serum: 607 U/L [30 - 200] (05-03 @ 16:13)  CKMB Units: 36.4 ng/mL [0.0 - 6.7] (05-03 @ 16:13)  Troponin T, High Sensitivity Result: 759 ng/L [0 - 51] (05-03 @ 16:13)                          8.4    10.94 )-----------( 148      ( 07 May 2024 00:15 )             25.8     05-07    135  |  101  |  41<H>  ----------------------------<  161<H>  4.6   |  22  |  1.32<H>    Ca    7.9<L>      07 May 2024 00:15  Phos  2.9     05-07  Mg     2.9     05-07    TPro  6.1  /  Alb  3.2<L>  /  TBili  0.6  /  DBili  x   /  AST  93<H>  /  ALT  110<H>  /  AlkPhos  58  05-07    PT/INR - ( 07 May 2024 07:46 )   PT: 12.4 sec;   INR: 1.13 ratio         PTT - ( 07 May 2024 07:46 )  PTT:34.2 sec

## 2024-05-07 NOTE — PROGRESS NOTE ADULT - ASSESSMENT
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
## 2024-05-07 NOTE — PROGRESS NOTE ADULT - ASSESSMENT
Assessment and recommendation :   chest pain coronary artery Disease   S/P cardiac catheterization triple   vessel disease   acute hypoxic respiratory failure on high flow 02  S/P cardiogenic shock off  Milrinone   AF with RVR on Amiodarone   ischemic cardiomyopathy   S/P CABG X 4  Acute post operative respiratory Failure  acute blood loss  anemia   DM continue glycemic control   HTN continue BP control   continue glycemic control   DVT prophylaxis   GI prophylaxis   care discussed with TCV team

## 2024-05-07 NOTE — PROGRESS NOTE ADULT - ASSESSMENT
68 year-old male, with past history significant for Type-2 DM, HLD, HTN and Hyperkalemia, presented to the ED secondary to L-sided chest pain, shortness of breath, sp cath found to have TVD. Now s/p C4L, ANIYA on 5/3.     5/3 s/p C4L, ANIYA. Extubated to HFNC. Post Op requiring 3 units of PRBC and insulin gtt. Developed new onset Afib/RVR received modified amio load due to bradycardia. Continued on 200mg QD of Amio and Lopressor 50 BID.   5/7 Right Radial A -line, Neil, CT d/c'd, Now Afib with HR in the 90s --> tx to 2 sellers   68 year-old male, with past history significant for Type-2 DM, HLD, HTN and Hyperkalemia, presented to the ED secondary to L-sided chest pain, shortness of breath, sp cath found to have TVD. Now s/p C4L, ANIYA on 5/3.     5/3 s/p C4L, ANIYA. Extubated to HFNC. Post Op requiring 3 units of PRBC and insulin gtt. Developed new onset Afib/RVR received modified amio load due to bradycardia. Continued on 200mg QD of Amio and Lopressor 50 BID.   5/7 Right Radial A -line, Trejo, CT d/c'd, Remains in Afib with HR in the 90s --> tx to 2 sellers. Received pt with ecchymosis to right arm, edema +1 with pain on palpation. Pending Duplex to right extremity. Passed TOV. No BM since prior to surgery --> Sorbitol and dulcolax ordered.   Disposition:  Acute Rehab  68 year-old male, with past history significant for Type-2 DM, HLD, HTN and Hyperkalemia, presented to the ED secondary to L-sided chest pain, shortness of breath, sp cath found to have TVD. Now s/p C4L, ANIYA on 5/3.     5/3 s/p C4L, ANIYA. Extubated to HFNC. Post Op requiring 3 units of PRBC and insulin gtt. Developed new onset Afib/RVR received modified amio load due to bradycardia. Continued on 200mg QD of Amio and Lopressor 50 BID.   5/7 Right Radial A -line, Trejo, CT d/c'd, Remains in Afib with HR in the 90s --> tx to 2 sellers. Received pt with ecchymosis to right arm, edema +1 with pain on palpation. Pending Duplex to right extremity. Passed TOV. No BM since prior to surgery --> Sorbitol and dulcolax ordered. Wean Supplemental O2 as tolerated. Increase Ambulation. Increase use of Incentive Spirometry.   Disposition:  Acute Rehab

## 2024-05-08 LAB
ANION GAP SERPL CALC-SCNC: 12 MMOL/L — SIGNIFICANT CHANGE UP (ref 5–17)
BUN SERPL-MCNC: 33 MG/DL — HIGH (ref 7–23)
CALCIUM SERPL-MCNC: 8.6 MG/DL — SIGNIFICANT CHANGE UP (ref 8.4–10.5)
CHLORIDE SERPL-SCNC: 100 MMOL/L — SIGNIFICANT CHANGE UP (ref 96–108)
CO2 SERPL-SCNC: 23 MMOL/L — SIGNIFICANT CHANGE UP (ref 22–31)
CREAT SERPL-MCNC: 1.05 MG/DL — SIGNIFICANT CHANGE UP (ref 0.5–1.3)
EGFR: 78 ML/MIN/1.73M2 — SIGNIFICANT CHANGE UP
GLUCOSE BLDC GLUCOMTR-MCNC: 130 MG/DL — HIGH (ref 70–99)
GLUCOSE BLDC GLUCOMTR-MCNC: 139 MG/DL — HIGH (ref 70–99)
GLUCOSE BLDC GLUCOMTR-MCNC: 159 MG/DL — HIGH (ref 70–99)
GLUCOSE BLDC GLUCOMTR-MCNC: 93 MG/DL — SIGNIFICANT CHANGE UP (ref 70–99)
GLUCOSE SERPL-MCNC: 77 MG/DL — SIGNIFICANT CHANGE UP (ref 70–99)
HCT VFR BLD CALC: 26.7 % — LOW (ref 39–50)
HGB BLD-MCNC: 8.7 G/DL — LOW (ref 13–17)
INR BLD: 1.1 RATIO — SIGNIFICANT CHANGE UP (ref 0.85–1.18)
MAGNESIUM SERPL-MCNC: 2.6 MG/DL — SIGNIFICANT CHANGE UP (ref 1.6–2.6)
MCHC RBC-ENTMCNC: 27.3 PG — SIGNIFICANT CHANGE UP (ref 27–34)
MCHC RBC-ENTMCNC: 32.6 GM/DL — SIGNIFICANT CHANGE UP (ref 32–36)
MCV RBC AUTO: 83.7 FL — SIGNIFICANT CHANGE UP (ref 80–100)
NRBC # BLD: 0 /100 WBCS — SIGNIFICANT CHANGE UP (ref 0–0)
PHOSPHATE SERPL-MCNC: 2.5 MG/DL — SIGNIFICANT CHANGE UP (ref 2.5–4.5)
PLATELET # BLD AUTO: 212 K/UL — SIGNIFICANT CHANGE UP (ref 150–400)
POTASSIUM SERPL-MCNC: 4.2 MMOL/L — SIGNIFICANT CHANGE UP (ref 3.5–5.3)
POTASSIUM SERPL-SCNC: 4.2 MMOL/L — SIGNIFICANT CHANGE UP (ref 3.5–5.3)
PROTHROM AB SERPL-ACNC: 12.1 SEC — SIGNIFICANT CHANGE UP (ref 9.5–13)
RBC # BLD: 3.19 M/UL — LOW (ref 4.2–5.8)
RBC # FLD: 14.6 % — HIGH (ref 10.3–14.5)
SODIUM SERPL-SCNC: 135 MMOL/L — SIGNIFICANT CHANGE UP (ref 135–145)
WBC # BLD: 8.53 K/UL — SIGNIFICANT CHANGE UP (ref 3.8–10.5)
WBC # FLD AUTO: 8.53 K/UL — SIGNIFICANT CHANGE UP (ref 3.8–10.5)

## 2024-05-08 PROCEDURE — 71045 X-RAY EXAM CHEST 1 VIEW: CPT | Mod: 26

## 2024-05-08 RX ORDER — INSULIN LISPRO 100/ML
6 VIAL (ML) SUBCUTANEOUS
Refills: 0 | Status: DISCONTINUED | OUTPATIENT
Start: 2024-05-08 | End: 2024-05-10

## 2024-05-08 RX ORDER — WARFARIN SODIUM 2.5 MG/1
5 TABLET ORAL ONCE
Refills: 0 | Status: COMPLETED | OUTPATIENT
Start: 2024-05-08 | End: 2024-05-08

## 2024-05-08 RX ORDER — SIMETHICONE 80 MG/1
80 TABLET, CHEWABLE ORAL
Refills: 0 | Status: DISCONTINUED | OUTPATIENT
Start: 2024-05-08 | End: 2024-05-10

## 2024-05-08 RX ORDER — INSULIN GLARGINE 100 [IU]/ML
18 INJECTION, SOLUTION SUBCUTANEOUS AT BEDTIME
Refills: 0 | Status: DISCONTINUED | OUTPATIENT
Start: 2024-05-08 | End: 2024-05-10

## 2024-05-08 RX ADMIN — Medication 6 UNIT(S): at 12:27

## 2024-05-08 RX ADMIN — Medication 500 MICROGRAM(S): at 13:17

## 2024-05-08 RX ADMIN — SODIUM CHLORIDE 3 MILLILITER(S): 9 INJECTION INTRAMUSCULAR; INTRAVENOUS; SUBCUTANEOUS at 22:15

## 2024-05-08 RX ADMIN — Medication 6 UNIT(S): at 08:05

## 2024-05-08 RX ADMIN — METHOCARBAMOL 500 MILLIGRAM(S): 500 TABLET, FILM COATED ORAL at 21:53

## 2024-05-08 RX ADMIN — METHOCARBAMOL 500 MILLIGRAM(S): 500 TABLET, FILM COATED ORAL at 05:17

## 2024-05-08 RX ADMIN — GABAPENTIN 100 MILLIGRAM(S): 400 CAPSULE ORAL at 13:17

## 2024-05-08 RX ADMIN — SIMETHICONE 80 MILLIGRAM(S): 80 TABLET, CHEWABLE ORAL at 04:46

## 2024-05-08 RX ADMIN — Medication 50 MILLIGRAM(S): at 17:19

## 2024-05-08 RX ADMIN — CHLORHEXIDINE GLUCONATE 1 APPLICATION(S): 213 SOLUTION TOPICAL at 22:15

## 2024-05-08 RX ADMIN — Medication 50 MILLIGRAM(S): at 05:16

## 2024-05-08 RX ADMIN — AMIODARONE HYDROCHLORIDE 200 MILLIGRAM(S): 400 TABLET ORAL at 05:17

## 2024-05-08 RX ADMIN — INSULIN GLARGINE 18 UNIT(S): 100 INJECTION, SOLUTION SUBCUTANEOUS at 21:51

## 2024-05-08 RX ADMIN — SODIUM CHLORIDE 3 MILLILITER(S): 9 INJECTION INTRAMUSCULAR; INTRAVENOUS; SUBCUTANEOUS at 05:31

## 2024-05-08 RX ADMIN — Medication 500 MICROGRAM(S): at 00:00

## 2024-05-08 RX ADMIN — AMLODIPINE BESYLATE 5 MILLIGRAM(S): 2.5 TABLET ORAL at 05:16

## 2024-05-08 RX ADMIN — ATORVASTATIN CALCIUM 40 MILLIGRAM(S): 80 TABLET, FILM COATED ORAL at 21:53

## 2024-05-08 RX ADMIN — SENNA PLUS 2 TABLET(S): 8.6 TABLET ORAL at 21:52

## 2024-05-08 RX ADMIN — Medication 6 UNIT(S): at 16:48

## 2024-05-08 RX ADMIN — HEPARIN SODIUM 5000 UNIT(S): 5000 INJECTION INTRAVENOUS; SUBCUTANEOUS at 05:16

## 2024-05-08 RX ADMIN — HEPARIN SODIUM 5000 UNIT(S): 5000 INJECTION INTRAVENOUS; SUBCUTANEOUS at 13:17

## 2024-05-08 RX ADMIN — Medication 500 MILLIGRAM(S): at 05:16

## 2024-05-08 RX ADMIN — GABAPENTIN 100 MILLIGRAM(S): 400 CAPSULE ORAL at 21:53

## 2024-05-08 RX ADMIN — Medication 500 MICROGRAM(S): at 05:29

## 2024-05-08 RX ADMIN — HEPARIN SODIUM 5000 UNIT(S): 5000 INJECTION INTRAVENOUS; SUBCUTANEOUS at 21:52

## 2024-05-08 RX ADMIN — SPIRONOLACTONE 25 MILLIGRAM(S): 25 TABLET, FILM COATED ORAL at 05:16

## 2024-05-08 RX ADMIN — Medication 10 MILLIGRAM(S): at 06:33

## 2024-05-08 RX ADMIN — PANTOPRAZOLE SODIUM 40 MILLIGRAM(S): 20 TABLET, DELAYED RELEASE ORAL at 05:16

## 2024-05-08 RX ADMIN — METHOCARBAMOL 500 MILLIGRAM(S): 500 TABLET, FILM COATED ORAL at 13:17

## 2024-05-08 RX ADMIN — Medication 81 MILLIGRAM(S): at 12:28

## 2024-05-08 RX ADMIN — Medication 500 MICROGRAM(S): at 17:19

## 2024-05-08 RX ADMIN — SODIUM CHLORIDE 3 MILLILITER(S): 9 INJECTION INTRAMUSCULAR; INTRAVENOUS; SUBCUTANEOUS at 13:12

## 2024-05-08 RX ADMIN — Medication 500 MICROGRAM(S): at 23:15

## 2024-05-08 RX ADMIN — Medication 40 MILLIGRAM(S): at 05:16

## 2024-05-08 RX ADMIN — GABAPENTIN 100 MILLIGRAM(S): 400 CAPSULE ORAL at 05:16

## 2024-05-08 RX ADMIN — WARFARIN SODIUM 5 MILLIGRAM(S): 2.5 TABLET ORAL at 21:53

## 2024-05-08 NOTE — PROGRESS NOTE ADULT - SUBJECTIVE AND OBJECTIVE BOX
VITAL SIGNS-Telemetry:  SR (48/60 -PAF ()  2.2 second conversion pause-SR 60s since 4:30am  Vital Signs Last 24 Hrs  T(C): 36.7 (05-08-24 @ 05:08), Max: 37.3 (05-07-24 @ 08:00)  T(F): 98.1 (05-08-24 @ 05:08), Max: 99.1 (05-07-24 @ 08:00)  HR: 68 (05-08-24 @ 05:08) (68 - 101)  BP: 159/72 (05-08-24 @ 05:08) (125/68 - 163/61)  RR: 18 (05-08-24 @ 05:08) (14 - 33)  SpO2: 98% (05-08-24 @ 05:08) (90% - 98%)         05-07 @ 07:01  -  05-08 @ 07:00  --------------------------------------------------------  IN: 620 mL / OUT: 1160 mL / NET: -540 mL    Daily     Daily     CAPILLARY BLOOD GLUCOSE  POCT Blood Glucose.: 104 mg/dL (07 May 2024 21:55)  POCT Blood Glucose.: 123 mg/dL (07 May 2024 16:24)  POCT Blood Glucose.: 94 mg/dL (07 May 2024 12:02)  POCT Blood Glucose.: 100 mg/dL (07 May 2024 07:29)        Pacing Wires        [ x ]   Settings:           vvi 40/10                       Isolated  [  ]  Coumadin    [ x] YES          [  ]      NO         Reason:    paf     PHYSICAL EXAM:  Neurology: alert and oriented x 3, nonfocal, no gross deficits  CV : S1S2  Sternal Wound :  CDI , Stable  Lungs: cta  Abdomen: soft, nontender, nondistended, positive bowel sounds, last bowel movement   +bm      Extremities:     leg inc cdi, +edema , no calf tenderness    aMIOdarone    Tablet 200 milliGRAM(s) Oral daily  amLODIPine   Tablet 5 milliGRAM(s) Oral daily  aspirin enteric coated 81 milliGRAM(s) Oral daily  atorvastatin 40 milliGRAM(s) Oral at bedtime  bisacodyl Suppository 10 milliGRAM(s) Rectal once  chlorhexidine 2% Cloths 1 Application(s) Topical daily  dextrose 50% Injectable 25 milliLiter(s) IV Push every 15 minutes  dextrose 50% Injectable 50 milliLiter(s) IV Push every 15 minutes  dextrose Oral Gel 15 Gram(s) Oral once  furosemide    Tablet 40 milliGRAM(s) Oral daily  gabapentin 100 milliGRAM(s) Oral every 8 hours  glucagon  Injectable 1 milliGRAM(s) IntraMuscular once  heparin   Injectable 5000 Unit(s) SubCutaneous every 8 hours  insulin glargine Injectable (LANTUS) 30 Unit(s) SubCutaneous at bedtime  insulin lispro (ADMELOG) corrective regimen sliding scale   SubCutaneous three times a day before meals  insulin lispro Injectable (ADMELOG) 7 Unit(s) SubCutaneous three times a day before meals  ipratropium    for Nebulization 500 MICROGram(s) Nebulizer every 6 hours  methocarbamol 500 milliGRAM(s) Oral every 8 hours  metoprolol tartrate 50 milliGRAM(s) Oral every 12 hours  oxyCODONE    IR 10 milliGRAM(s) Oral every 4 hours PRN  pantoprazole    Tablet 40 milliGRAM(s) Oral before breakfast  polyethylene glycol 3350 17 Gram(s) Oral daily  potassium chloride  10 mEq/50 mL IVPB 10 milliEquivalent(s) IV Intermittent every 1 hour  potassium chloride  10 mEq/50 mL IVPB 10 milliEquivalent(s) IV Intermittent every 1 hour  potassium chloride  10 mEq/50 mL IVPB 10 milliEquivalent(s) IV Intermittent every 1 hour  senna 2 Tablet(s) Oral at bedtime  simethicone 80 milliGRAM(s) Chew four times a day PRN  sodium chloride 0.9% lock flush 3 milliLiter(s) IV Push every 8 hours  sodium chloride 0.9%. 1000 milliLiter(s) IV Continuous <Continuous>  spironolactone 25 milliGRAM(s) Oral daily      Physical Therapy Rec:   Home  [  ]   Home w/ PT  [  ]  acute Rehab  [ x ]  Discussed with Cardiothoracic Team at AM rounds.
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
## 2024-05-08 NOTE — PROGRESS NOTE ADULT - ASSESSMENT
68 year-old male, with past history significant for Type-2 DM, HLD, HTN and Hyperkalemia, presented to the ED secondary to L-sided chest pain, shortness of breath.  Transferred to [facility] for CABG eval   Assessment  DMT2: 68y Male with DM T2 with hyperglycemia, A1C 8.9% , was on insulin at home, on  basal bolus insulin with coverage, now s/p surgery, on basal bolus insulin, eating full  meals, sugars trending down.  CAD: on medications, stable, monitored.  HTN: on antihypertensive medications, monitored, asymptomatic.      Discussed plan and management with Dr Gaston Castillo NP - TEAMS  Leela Feldman MD  Cell: [phone]  Office: [phone]            68 year-old male, with past history significant for Type-2 DM, HLD, HTN and Hyperkalemia, presented to the ED secondary to L-sided chest pain, shortness of breath.  Transferred to [facility] for CABG eval   Assessment  DMT2: 68y Male with DM T2 with hyperglycemia, A1C 8.9% , was on insulin at home, on  basal bolus insulin with  coverage, now s/p surgery, on basal bolus insulin, eating full  meals, sugars trending down.  CAD: on medications, stable, monitored.  HTN: on antihypertensive medications, monitored, asymptomatic.      Discussed plan and management with Dr Gaston Castillo NP - TEAMS  Leela Feldman MD  Cell: [phone]  Office: [phone]

## 2024-05-08 NOTE — PROGRESS NOTE ADULT - ASSESSMENT
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    #CAD  -Sp cath with significant left main plus TVD   -s/p cabg x 4, atriclip 5/3  -post op care per cts  -Remains afib  -Amio, bb, warfarin per cts  -Diuretics per cts    dvt ppx

## 2024-05-08 NOTE — PROGRESS NOTE ADULT - SUBJECTIVE AND OBJECTIVE BOX
Patient is a 68y old  Male who presents with a chief complaint of cardiac surgery (08 May 2024 14:15)      SUBJECTIVE / OVERNIGHT EVENTS:    Events noted.  CONSTITUTIONAL: No fever,  or fatigue  RESPIRATORY: No cough, wheezing,  No shortness of breath  CARDIOVASCULAR: No chest pain, palpitations, dizziness, or leg swelling  GASTROINTESTINAL: No abdominal or epigastric pain. No nausea, vomiting.  NEUROLOGICAL: No headache    MEDICATIONS  (STANDING):  aMIOdarone    Tablet 200 milliGRAM(s) Oral daily  amLODIPine   Tablet 5 milliGRAM(s) Oral daily  aspirin enteric coated 81 milliGRAM(s) Oral daily  atorvastatin 40 milliGRAM(s) Oral at bedtime  bisacodyl Suppository 10 milliGRAM(s) Rectal once  chlorhexidine 2% Cloths 1 Application(s) Topical daily  dextrose 50% Injectable 25 milliLiter(s) IV Push every 15 minutes  dextrose 50% Injectable 50 milliLiter(s) IV Push every 15 minutes  dextrose Oral Gel 15 Gram(s) Oral once  furosemide    Tablet 40 milliGRAM(s) Oral daily  gabapentin 100 milliGRAM(s) Oral every 8 hours  glucagon  Injectable 1 milliGRAM(s) IntraMuscular once  heparin   Injectable 5000 Unit(s) SubCutaneous every 8 hours  insulin glargine Injectable (LANTUS) 18 Unit(s) SubCutaneous at bedtime  insulin lispro (ADMELOG) corrective regimen sliding scale   SubCutaneous three times a day before meals  insulin lispro Injectable (ADMELOG) 6 Unit(s) SubCutaneous three times a day before meals  ipratropium    for Nebulization 500 MICROGram(s) Nebulizer every 6 hours  methocarbamol 500 milliGRAM(s) Oral every 8 hours  metoprolol tartrate 50 milliGRAM(s) Oral every 12 hours  pantoprazole    Tablet 40 milliGRAM(s) Oral before breakfast  polyethylene glycol 3350 17 Gram(s) Oral daily  potassium chloride  10 mEq/50 mL IVPB 10 milliEquivalent(s) IV Intermittent every 1 hour  potassium chloride  10 mEq/50 mL IVPB 10 milliEquivalent(s) IV Intermittent every 1 hour  potassium chloride  10 mEq/50 mL IVPB 10 milliEquivalent(s) IV Intermittent every 1 hour  senna 2 Tablet(s) Oral at bedtime  sodium chloride 0.9% lock flush 3 milliLiter(s) IV Push every 8 hours  sodium chloride 0.9%. 1000 milliLiter(s) (10 mL/Hr) IV Continuous <Continuous>  spironolactone 25 milliGRAM(s) Oral daily  warfarin 5 milliGRAM(s) Oral once    MEDICATIONS  (PRN):  oxyCODONE    IR 10 milliGRAM(s) Oral every 4 hours PRN Severe Pain (7 - 10)  simethicone 80 milliGRAM(s) Chew four times a day PRN Gas        CAPILLARY BLOOD GLUCOSE      POCT Blood Glucose.: 130 mg/dL (08 May 2024 16:30)  POCT Blood Glucose.: 139 mg/dL (08 May 2024 11:49)  POCT Blood Glucose.: 93 mg/dL (08 May 2024 07:35)  POCT Blood Glucose.: 104 mg/dL (07 May 2024 21:55)    I&O's Summary    07 May 2024 07:01  -  08 May 2024 07:00  --------------------------------------------------------  IN: 620 mL / OUT: 1160 mL / NET: -540 mL    08 May 2024 07:01  -  08 May 2024 20:45  --------------------------------------------------------  IN: 600 mL / OUT: 900 mL / NET: -300 mL        T(C): 36.8 (05-08-24 @ 19:37), Max: 37.1 (05-08-24 @ 12:01)  HR: 64 (05-08-24 @ 19:37) (64 - 73)  BP: 161/72 (05-08-24 @ 19:37) (147/67 - 161/72)  RR: 18 (05-08-24 @ 19:37) (18 - 18)  SpO2: 97% (05-08-24 @ 19:37) (92% - 98%)    PHYSICAL EXAM:  GENERAL: NAD  NECK: Supple, No JVD  CHEST/LUNG: Clear to auscultation bilaterally; No wheezing.  HEART: Regular rate and rhythm; No murmurs, rubs, or gallops  ABDOMEN: Soft, Nontender, Nondistended; Bowel sounds present  EXTREMITIES:   No edema  NEUROLOGY: AAO X 3      LABS:                        8.7    8.53  )-----------( 212      ( 08 May 2024 06:14 )             26.7     05-08    135  |  100  |  33<H>  ----------------------------<  77  4.2   |  23  |  1.05    Ca    8.6      08 May 2024 06:15  Phos  2.5     05-08  Mg     2.6     05-08    TPro  6.4  /  Alb  3.1<L>  /  TBili  0.6  /  DBili  x   /  AST  87<H>  /  ALT  113<H>  /  AlkPhos  67  05-07    PT/INR - ( 08 May 2024 06:15 )   PT: 12.1 sec;   INR: 1.10 ratio         PTT - ( 07 May 2024 12:20 )  PTT:31.8 sec      Urinalysis Basic - ( 08 May 2024 06:15 )    Color: x / Appearance: x / SG: x / pH: x  Gluc: 77 mg/dL / Ketone: x  / Bili: x / Urobili: x   Blood: x / Protein: x / Nitrite: x   Leuk Esterase: x / RBC: x / WBC x   Sq Epi: x / Non Sq Epi: x / Bacteria: x      CAPILLARY BLOOD GLUCOSE      POCT Blood Glucose.: 130 mg/dL (08 May 2024 16:30)  POCT Blood Glucose.: 139 mg/dL (08 May 2024 11:49)  POCT Blood Glucose.: 93 mg/dL (08 May 2024 07:35)  POCT Blood Glucose.: 104 mg/dL (07 May 2024 21:55)        RADIOLOGY & ADDITIONAL TESTS:    Imaging Personally Reviewed:    Consultant(s) Notes Reviewed:      Care Discussed with Consultants/Other Providers:    Rj Robison MD, CMD, FACP    [address] [place] [address]  Office Tel: [phone]  Cell: [phone]

## 2024-05-08 NOTE — PROGRESS NOTE ADULT - SUBJECTIVE AND OBJECTIVE BOX
CARDIOLOGY FOLLOW UP - Dr. Brewer  DATE OF SERVICE: 5/8/24    CC  No cv complaints     REVIEW OF SYSTEMS:  CONSTITUTIONAL: No fever, weight loss, or fatigue  RESPIRATORY: No cough, wheezing, chills or hemoptysis; No Shortness of Breath  CARDIOVASCULAR: No chest pain, palpitations, passing out, dizziness, or leg swelling  GASTROINTESTINAL: No abdominal or epigastric pain. No nausea, vomiting, or hematemesis; No diarrhea or constipation. No melena or hematochezia.  VASCULAR: No edema     PHYSICAL EXAM:  T(C): 36.7 (05-08-24 @ 05:08), Max: 36.8 (05-07-24 @ 19:40)  HR: 68 (05-08-24 @ 05:08) (68 - 101)  BP: 159/72 (05-08-24 @ 05:08) (125/68 - 163/61)  RR: 18 (05-08-24 @ 05:08) (14 - 33)  SpO2: 98% (05-08-24 @ 05:08) (90% - 98%)  Wt(kg): --  I&O's Summary    07 May 2024 07:01  -  08 May 2024 07:00  --------------------------------------------------------  IN: 620 mL / OUT: 1160 mL / NET: -540 mL        Appearance: Normal	  Cardiovascular: Normal S1 S2,RRR, No JVD, No murmurs  Respiratory: Lungs clear to auscultation b/l   Gastrointestinal:  Soft, Non-tender, + BS	  Extremities: Normal range of motion, No clubbing, cyanosis or edema      Home Medications:  aspirin 81 mg oral delayed release tablet: 1 tab(s) orally once a day (29 Apr 2024 18:21)  atorvastatin 40 mg oral tablet: 1 tab(s) orally once a day (at bedtime) (29 Apr 2024 18:21)  insulin glargine 100 units/mL subcutaneous solution: 50 unit(s) subcutaneous once a day (at bedtime) (29 Apr 2024 18:21)  insulin lispro 100 units/mL injectable solution: 15 unit(s) injectable once a day (in the morning) BEFORE BREAKFAST (29 Apr 2024 18:21)  insulin lispro 100 units/mL injectable solution: 1 unit(s) injectable 3 times a day (before meals) 1 Unit(s) if Glucose 151 - 200  2 Unit(s) if Glucose 201 - 250  3 Unit(s) if Glucose 251 - 300  4 Unit(s) if Glucose 301 - 350  5 Unit(s) if Glucose 351 - 400  6 Unit(s) if Glucose Greater Than 400 (29 Apr 2024 18:21)  insulin lispro 100 units/mL injectable solution: 15 unit(s) injectable once a day before dinner (29 Apr 2024 18:21)  lisinopril 20 mg oral tablet: 1 tab(s) orally once a day (29 Apr 2024 02:23)  metoprolol tartrate 25 mg oral tablet: 1 tab(s) orally every 12 hours (29 Apr 2024 18:21)      MEDICATIONS  (STANDING):  aMIOdarone    Tablet 200 milliGRAM(s) Oral daily  amLODIPine   Tablet 5 milliGRAM(s) Oral daily  aspirin enteric coated 81 milliGRAM(s) Oral daily  atorvastatin 40 milliGRAM(s) Oral at bedtime  bisacodyl Suppository 10 milliGRAM(s) Rectal once  chlorhexidine 2% Cloths 1 Application(s) Topical daily  dextrose 50% Injectable 50 milliLiter(s) IV Push every 15 minutes  dextrose 50% Injectable 25 milliLiter(s) IV Push every 15 minutes  dextrose Oral Gel 15 Gram(s) Oral once  furosemide    Tablet 40 milliGRAM(s) Oral daily  gabapentin 100 milliGRAM(s) Oral every 8 hours  glucagon  Injectable 1 milliGRAM(s) IntraMuscular once  heparin   Injectable 5000 Unit(s) SubCutaneous every 8 hours  insulin glargine Injectable (LANTUS) 18 Unit(s) SubCutaneous at bedtime  insulin lispro (ADMELOG) corrective regimen sliding scale   SubCutaneous three times a day before meals  insulin lispro Injectable (ADMELOG) 6 Unit(s) SubCutaneous three times a day before meals  ipratropium    for Nebulization 500 MICROGram(s) Nebulizer every 6 hours  methocarbamol 500 milliGRAM(s) Oral every 8 hours  metoprolol tartrate 50 milliGRAM(s) Oral every 12 hours  pantoprazole    Tablet 40 milliGRAM(s) Oral before breakfast  polyethylene glycol 3350 17 Gram(s) Oral daily  potassium chloride  10 mEq/50 mL IVPB 10 milliEquivalent(s) IV Intermittent every 1 hour  potassium chloride  10 mEq/50 mL IVPB 10 milliEquivalent(s) IV Intermittent every 1 hour  potassium chloride  10 mEq/50 mL IVPB 10 milliEquivalent(s) IV Intermittent every 1 hour  senna 2 Tablet(s) Oral at bedtime  sodium chloride 0.9% lock flush 3 milliLiter(s) IV Push every 8 hours  sodium chloride 0.9%. 1000 milliLiter(s) (10 mL/Hr) IV Continuous <Continuous>  spironolactone 25 milliGRAM(s) Oral daily  warfarin 5 milliGRAM(s) Oral once      TELEMETRY: Afib > SR with brief conversion pauses 	    ECG:  	  RADIOLOGY:   DIAGNOSTIC TESTING:  [ ] Echocardiogram:  [ ]  Catheterization:  [ ] Stress Test:    OTHER: 	    LABS:	 	    Creatine Kinase, Serum: 607 U/L [30 - 200] (05-03 @ 16:13)  CKMB Units: 36.4 ng/mL [0.0 - 6.7] (05-03 @ 16:13)  Troponin T, High Sensitivity Result: 759 ng/L [0 - 51] (05-03 @ 16:13)                          8.7    8.53  )-----------( 212      ( 08 May 2024 06:14 )             26.7     05-08    135  |  100  |  33<H>  ----------------------------<  77  4.2   |  23  |  1.05    Ca    8.6      08 May 2024 06:15  Phos  2.5     05-08  Mg     2.6     05-08    TPro  6.4  /  Alb  3.1<L>  /  TBili  0.6  /  DBili  x   /  AST  87<H>  /  ALT  113<H>  /  AlkPhos  67  05-07    PT/INR - ( 08 May 2024 06:15 )   PT: 12.1 sec;   INR: 1.10 ratio         PTT - ( 07 May 2024 12:20 )  PTT:31.8 sec

## 2024-05-08 NOTE — PROGRESS NOTE ADULT - ASSESSMENT
68 year-old male, with past history significant for Type-2 DM, HLD, HTN and Hyperkalemia, presented to the ED secondary to L-sided chest pain, shortness of breath, sp cath found to have TVD. Now s/p C4L, ANIYA on 5/3.     5/3 s/p C4L, ANIYA. Extubated to HFNC. Post Op requiring 3 units of PRBC and insulin gtt. Developed new onset Afib/RVR received modified amio load due to bradycardia. Continued on 200mg QD of Amio and Lopressor 50 BID.   5/7 Right Radial A -line, Trejo, CT d/c'd, Remains in Afib with HR in the 90s --> tx to 2 sellers. Received pt with ecchymosis to right arm, edema +1 with pain on palpation. Pending Duplex to right extremity. Passed TOV. No BM since prior to surgery --> Sorbitol and dulcolax ordered. Wean Supplemental O2 as tolerated. Increase Ambulation. Increase use of Incentive Spirometry.   5/8 VSS SR since 4:30am  on BB & amio  coumadin daily  Disposition:  Acute Rehab

## 2024-05-08 NOTE — PROGRESS NOTE ADULT - SUBJECTIVE AND OBJECTIVE BOX
Chief complaint  Patient is a 68y old  Male who presents with a chief complaint of cardiac surgery (08 May 2024 12:16)         Labs and Fingersticks  CAPILLARY BLOOD GLUCOSE      POCT Blood Glucose.: 139 mg/dL (08 May 2024 11:49)  POCT Blood Glucose.: 93 mg/dL (08 May 2024 07:35)  POCT Blood Glucose.: 104 mg/dL (07 May 2024 21:55)  POCT Blood Glucose.: 123 mg/dL (07 May 2024 16:24)      Anion Gap: 12 (05-08 @ 06:15)  Anion Gap: 14 (05-07 @ 12:20)  Anion Gap: 12 (05-07 @ 00:15)      Calcium: 8.6 (05-08 @ 06:15)  Calcium: 8.3 *L* (05-07 @ 12:20)  Calcium: 7.9 *L* (05-07 @ 00:15)  Albumin: 3.1 *L* (05-07 @ 12:20)  Albumin: 3.2 *L* (05-07 @ 00:15)    Alanine Aminotransferase (ALT/SGPT): 113 *H* (05-07 @ 12:20)  Alanine Aminotransferase (ALT/SGPT): 110 *H* (05-07 @ 00:15)  Alkaline Phosphatase: 67 (05-07 @ 12:20)  Alkaline Phosphatase: 58 (05-07 @ 00:15)  Aspartate Aminotransferase (AST/SGOT): 87 *H* (05-07 @ 12:20)  Aspartate Aminotransferase (AST/SGOT): 93 *H* (05-07 @ 00:15)        05-08    135  |  100  |  33<H>  ----------------------------<  77  4.2   |  23  |  1.05    Ca    8.6      08 May 2024 06:15  Phos  2.5     05-08  Mg     2.6     05-08    TPro  6.4  /  Alb  3.1<L>  /  TBili  0.6  /  DBili  x   /  AST  87<H>  /  ALT  113<H>  /  AlkPhos  67  05-07                        8.7    8.53  )-----------( 212      ( 08 May 2024 06:14 )             26.7     Medications  MEDICATIONS  (STANDING):  aMIOdarone    Tablet 200 milliGRAM(s) Oral daily  amLODIPine   Tablet 5 milliGRAM(s) Oral daily  aspirin enteric coated 81 milliGRAM(s) Oral daily  atorvastatin 40 milliGRAM(s) Oral at bedtime  bisacodyl Suppository 10 milliGRAM(s) Rectal once  chlorhexidine 2% Cloths 1 Application(s) Topical daily  dextrose 50% Injectable 50 milliLiter(s) IV Push every 15 minutes  dextrose 50% Injectable 25 milliLiter(s) IV Push every 15 minutes  dextrose Oral Gel 15 Gram(s) Oral once  furosemide    Tablet 40 milliGRAM(s) Oral daily  gabapentin 100 milliGRAM(s) Oral every 8 hours  glucagon  Injectable 1 milliGRAM(s) IntraMuscular once  heparin   Injectable 5000 Unit(s) SubCutaneous every 8 hours  insulin glargine Injectable (LANTUS) 18 Unit(s) SubCutaneous at bedtime  insulin lispro (ADMELOG) corrective regimen sliding scale   SubCutaneous three times a day before meals  insulin lispro Injectable (ADMELOG) 6 Unit(s) SubCutaneous three times a day before meals  ipratropium    for Nebulization 500 MICROGram(s) Nebulizer every 6 hours  methocarbamol 500 milliGRAM(s) Oral every 8 hours  metoprolol tartrate 50 milliGRAM(s) Oral every 12 hours  pantoprazole    Tablet 40 milliGRAM(s) Oral before breakfast  polyethylene glycol 3350 17 Gram(s) Oral daily  potassium chloride  10 mEq/50 mL IVPB 10 milliEquivalent(s) IV Intermittent every 1 hour  potassium chloride  10 mEq/50 mL IVPB 10 milliEquivalent(s) IV Intermittent every 1 hour  potassium chloride  10 mEq/50 mL IVPB 10 milliEquivalent(s) IV Intermittent every 1 hour  senna 2 Tablet(s) Oral at bedtime  sodium chloride 0.9% lock flush 3 milliLiter(s) IV Push every 8 hours  sodium chloride 0.9%. 1000 milliLiter(s) (10 mL/Hr) IV Continuous <Continuous>  spironolactone 25 milliGRAM(s) Oral daily  warfarin 5 milliGRAM(s) Oral once      Physical Exam  General: Patient comfortable in bed   Vital Signs Last 12 Hrs  T(F): 98.8 (05-08-24 @ 12:01), Max: 98.8 (05-08-24 @ 12:01)  HR: 73 (05-08-24 @ 12:01) (68 - 73)  BP: 147/67 (05-08-24 @ 12:01) (147/67 - 159/72)  BP(mean): --  RR: 18 (05-08-24 @ 12:01) (18 - 18)  SpO2: 92% (05-08-24 @ 12:01) (92% - 98%)    CVS: S1S2   Respiratory: No wheezing, no crepitations  GI: Abdomen soft, bowel sounds positive  Musculoskeletal:  moves all extremities         Chief complaint  Patient is a 68y old  Male who presents with a chief complaint of cardiac surgery  (08 May 2024 12:16)         Labs and Fingersticks  CAPILLARY BLOOD GLUCOSE      POCT Blood Glucose.: 139 mg/dL (08 May 2024 11:49)  POCT Blood Glucose.: 93 mg/dL (08 May 2024 07:35)  POCT Blood Glucose.: 104 mg/dL (07 May 2024 21:55)  POCT Blood Glucose.: 123 mg/dL (07 May 2024 16:24)      Anion Gap: 12 (05-08 @ 06:15)  Anion Gap: 14 (05-07 @ 12:20)  Anion Gap: 12 (05-07 @ 00:15)      Calcium: 8.6 (05-08 @ 06:15)  Calcium: 8.3 *L* (05-07 @ 12:20)  Calcium: 7.9 *L* (05-07 @ 00:15)  Albumin: 3.1 *L* (05-07 @ 12:20)  Albumin: 3.2 *L* (05-07 @ 00:15)    Alanine Aminotransferase (ALT/SGPT): 113 *H* (05-07 @ 12:20)  Alanine Aminotransferase (ALT/SGPT): 110 *H* (05-07 @ 00:15)  Alkaline Phosphatase: 67 (05-07 @ 12:20)  Alkaline Phosphatase: 58 (05-07 @ 00:15)  Aspartate Aminotransferase (AST/SGOT): 87 *H* (05-07 @ 12:20)  Aspartate Aminotransferase (AST/SGOT): 93 *H* (05-07 @ 00:15)        05-08    135  |  100  |  33<H>  ----------------------------<  77  4.2   |  23  |  1.05    Ca    8.6      08 May 2024 06:15  Phos  2.5     05-08  Mg     2.6     05-08    TPro  6.4  /  Alb  3.1<L>  /  TBili  0.6  /  DBili  x   /  AST  87<H>  /  ALT  113<H>  /  AlkPhos  67  05-07                        8.7    8.53  )-----------( 212      ( 08 May 2024 06:14 )             26.7     Medications  MEDICATIONS  (STANDING):  aMIOdarone    Tablet 200 milliGRAM(s) Oral daily  amLODIPine   Tablet 5 milliGRAM(s) Oral daily  aspirin enteric coated 81 milliGRAM(s) Oral daily  atorvastatin 40 milliGRAM(s) Oral at bedtime  bisacodyl Suppository 10 milliGRAM(s) Rectal once  chlorhexidine 2% Cloths 1 Application(s) Topical daily  dextrose 50% Injectable 50 milliLiter(s) IV Push every 15 minutes  dextrose 50% Injectable 25 milliLiter(s) IV Push every 15 minutes  dextrose Oral Gel 15 Gram(s) Oral once  furosemide    Tablet 40 milliGRAM(s) Oral daily  gabapentin 100 milliGRAM(s) Oral every 8 hours  glucagon  Injectable 1 milliGRAM(s) IntraMuscular once  heparin   Injectable 5000 Unit(s) SubCutaneous every 8 hours  insulin glargine Injectable (LANTUS) 18 Unit(s) SubCutaneous at bedtime  insulin lispro (ADMELOG) corrective regimen sliding scale   SubCutaneous three times a day before meals  insulin lispro Injectable (ADMELOG) 6 Unit(s) SubCutaneous three times a day before meals  ipratropium    for Nebulization 500 MICROGram(s) Nebulizer every 6 hours  methocarbamol 500 milliGRAM(s) Oral every 8 hours  metoprolol tartrate 50 milliGRAM(s) Oral every 12 hours  pantoprazole    Tablet 40 milliGRAM(s) Oral before breakfast  polyethylene glycol 3350 17 Gram(s) Oral daily  potassium chloride  10 mEq/50 mL IVPB 10 milliEquivalent(s) IV Intermittent every 1 hour  potassium chloride  10 mEq/50 mL IVPB 10 milliEquivalent(s) IV Intermittent every 1 hour  potassium chloride  10 mEq/50 mL IVPB 10 milliEquivalent(s) IV Intermittent every 1 hour  senna 2 Tablet(s) Oral at bedtime  sodium chloride 0.9% lock flush 3 milliLiter(s) IV Push every 8 hours  sodium chloride 0.9%. 1000 milliLiter(s) (10 mL/Hr) IV Continuous <Continuous>  spironolactone 25 milliGRAM(s) Oral daily  warfarin 5 milliGRAM(s) Oral once      Physical Exam  General: Patient comfortable in bed   Vital Signs Last 12 Hrs  T(F): 98.8 (05-08-24 @ 12:01), Max: 98.8 (05-08-24 @ 12:01)  HR: 73 (05-08-24 @ 12:01) (68 - 73)  BP: 147/67 (05-08-24 @ 12:01) (147/67 - 159/72)  BP(mean): --  RR: 18 (05-08-24 @ 12:01) (18 - 18)  SpO2: 92% (05-08-24 @ 12:01) (92% - 98%)    CVS: S1S2   Respiratory: No wheezing, no crepitations  GI: Abdomen soft, bowel sounds positive  Musculoskeletal:  moves all extremities

## 2024-05-09 LAB
ANION GAP SERPL CALC-SCNC: 12 MMOL/L — SIGNIFICANT CHANGE UP (ref 5–17)
APTT BLD: 28.6 SEC — SIGNIFICANT CHANGE UP (ref 24.5–35.6)
BUN SERPL-MCNC: 34 MG/DL — HIGH (ref 7–23)
CALCIUM SERPL-MCNC: 8.3 MG/DL — LOW (ref 8.4–10.5)
CHLORIDE SERPL-SCNC: 100 MMOL/L — SIGNIFICANT CHANGE UP (ref 96–108)
CO2 SERPL-SCNC: 21 MMOL/L — LOW (ref 22–31)
CREAT SERPL-MCNC: 1.03 MG/DL — SIGNIFICANT CHANGE UP (ref 0.5–1.3)
EGFR: 80 ML/MIN/1.73M2 — SIGNIFICANT CHANGE UP
GLUCOSE BLDC GLUCOMTR-MCNC: 135 MG/DL — HIGH (ref 70–99)
GLUCOSE BLDC GLUCOMTR-MCNC: 138 MG/DL — HIGH (ref 70–99)
GLUCOSE BLDC GLUCOMTR-MCNC: 142 MG/DL — HIGH (ref 70–99)
GLUCOSE BLDC GLUCOMTR-MCNC: 146 MG/DL — HIGH (ref 70–99)
GLUCOSE BLDC GLUCOMTR-MCNC: 93 MG/DL — SIGNIFICANT CHANGE UP (ref 70–99)
GLUCOSE SERPL-MCNC: 137 MG/DL — HIGH (ref 70–99)
HCT VFR BLD CALC: 26.8 % — LOW (ref 39–50)
HGB BLD-MCNC: 8.8 G/DL — LOW (ref 13–17)
INR BLD: 1.27 RATIO — HIGH (ref 0.85–1.18)
MCHC RBC-ENTMCNC: 27.6 PG — SIGNIFICANT CHANGE UP (ref 27–34)
MCHC RBC-ENTMCNC: 32.8 GM/DL — SIGNIFICANT CHANGE UP (ref 32–36)
MCV RBC AUTO: 84 FL — SIGNIFICANT CHANGE UP (ref 80–100)
NRBC # BLD: 0 /100 WBCS — SIGNIFICANT CHANGE UP (ref 0–0)
PLATELET # BLD AUTO: 244 K/UL — SIGNIFICANT CHANGE UP (ref 150–400)
POTASSIUM SERPL-MCNC: 4.3 MMOL/L — SIGNIFICANT CHANGE UP (ref 3.5–5.3)
POTASSIUM SERPL-SCNC: 4.3 MMOL/L — SIGNIFICANT CHANGE UP (ref 3.5–5.3)
PROTHROM AB SERPL-ACNC: 13.2 SEC — HIGH (ref 9.5–13)
RBC # BLD: 3.19 M/UL — LOW (ref 4.2–5.8)
RBC # FLD: 14.5 % — SIGNIFICANT CHANGE UP (ref 10.3–14.5)
SODIUM SERPL-SCNC: 133 MMOL/L — LOW (ref 135–145)
WBC # BLD: 7.45 K/UL — SIGNIFICANT CHANGE UP (ref 3.8–10.5)
WBC # FLD AUTO: 7.45 K/UL — SIGNIFICANT CHANGE UP (ref 3.8–10.5)

## 2024-05-09 PROCEDURE — 71045 X-RAY EXAM CHEST 1 VIEW: CPT | Mod: 26

## 2024-05-09 RX ORDER — AMLODIPINE BESYLATE 2.5 MG/1
10 TABLET ORAL DAILY
Refills: 0 | Status: DISCONTINUED | OUTPATIENT
Start: 2024-05-10 | End: 2024-05-10

## 2024-05-09 RX ORDER — WARFARIN SODIUM 2.5 MG/1
5 TABLET ORAL ONCE
Refills: 0 | Status: COMPLETED | OUTPATIENT
Start: 2024-05-09 | End: 2024-05-09

## 2024-05-09 RX ORDER — METOPROLOL TARTRATE 50 MG
100 TABLET ORAL DAILY
Refills: 0 | Status: DISCONTINUED | OUTPATIENT
Start: 2024-05-09 | End: 2024-05-10

## 2024-05-09 RX ORDER — AMLODIPINE BESYLATE 2.5 MG/1
5 TABLET ORAL ONCE
Refills: 0 | Status: COMPLETED | OUTPATIENT
Start: 2024-05-09 | End: 2024-05-09

## 2024-05-09 RX ADMIN — Medication 500 MICROGRAM(S): at 04:57

## 2024-05-09 RX ADMIN — METHOCARBAMOL 500 MILLIGRAM(S): 500 TABLET, FILM COATED ORAL at 04:58

## 2024-05-09 RX ADMIN — Medication 500 MICROGRAM(S): at 12:05

## 2024-05-09 RX ADMIN — Medication 40 MILLIGRAM(S): at 04:58

## 2024-05-09 RX ADMIN — PANTOPRAZOLE SODIUM 40 MILLIGRAM(S): 20 TABLET, DELAYED RELEASE ORAL at 04:58

## 2024-05-09 RX ADMIN — METHOCARBAMOL 500 MILLIGRAM(S): 500 TABLET, FILM COATED ORAL at 21:53

## 2024-05-09 RX ADMIN — HEPARIN SODIUM 5000 UNIT(S): 5000 INJECTION INTRAVENOUS; SUBCUTANEOUS at 13:20

## 2024-05-09 RX ADMIN — SPIRONOLACTONE 25 MILLIGRAM(S): 25 TABLET, FILM COATED ORAL at 04:59

## 2024-05-09 RX ADMIN — Medication 6 UNIT(S): at 08:00

## 2024-05-09 RX ADMIN — AMIODARONE HYDROCHLORIDE 200 MILLIGRAM(S): 400 TABLET ORAL at 04:59

## 2024-05-09 RX ADMIN — Medication 6 UNIT(S): at 16:41

## 2024-05-09 RX ADMIN — GABAPENTIN 100 MILLIGRAM(S): 400 CAPSULE ORAL at 13:22

## 2024-05-09 RX ADMIN — HEPARIN SODIUM 5000 UNIT(S): 5000 INJECTION INTRAVENOUS; SUBCUTANEOUS at 04:58

## 2024-05-09 RX ADMIN — Medication 50 MILLIGRAM(S): at 04:58

## 2024-05-09 RX ADMIN — POLYETHYLENE GLYCOL 3350 17 GRAM(S): 17 POWDER, FOR SOLUTION ORAL at 08:00

## 2024-05-09 RX ADMIN — SODIUM CHLORIDE 3 MILLILITER(S): 9 INJECTION INTRAMUSCULAR; INTRAVENOUS; SUBCUTANEOUS at 05:06

## 2024-05-09 RX ADMIN — Medication 81 MILLIGRAM(S): at 08:00

## 2024-05-09 RX ADMIN — Medication 100 MILLIGRAM(S): at 10:03

## 2024-05-09 RX ADMIN — Medication 6 UNIT(S): at 12:05

## 2024-05-09 RX ADMIN — GABAPENTIN 100 MILLIGRAM(S): 400 CAPSULE ORAL at 21:53

## 2024-05-09 RX ADMIN — INSULIN GLARGINE 18 UNIT(S): 100 INJECTION, SOLUTION SUBCUTANEOUS at 21:52

## 2024-05-09 RX ADMIN — METHOCARBAMOL 500 MILLIGRAM(S): 500 TABLET, FILM COATED ORAL at 13:20

## 2024-05-09 RX ADMIN — AMLODIPINE BESYLATE 5 MILLIGRAM(S): 2.5 TABLET ORAL at 15:44

## 2024-05-09 RX ADMIN — WARFARIN SODIUM 5 MILLIGRAM(S): 2.5 TABLET ORAL at 21:53

## 2024-05-09 RX ADMIN — HEPARIN SODIUM 5000 UNIT(S): 5000 INJECTION INTRAVENOUS; SUBCUTANEOUS at 21:53

## 2024-05-09 RX ADMIN — ATORVASTATIN CALCIUM 40 MILLIGRAM(S): 80 TABLET, FILM COATED ORAL at 21:53

## 2024-05-09 RX ADMIN — SENNA PLUS 2 TABLET(S): 8.6 TABLET ORAL at 21:53

## 2024-05-09 RX ADMIN — SODIUM CHLORIDE 3 MILLILITER(S): 9 INJECTION INTRAMUSCULAR; INTRAVENOUS; SUBCUTANEOUS at 13:19

## 2024-05-09 RX ADMIN — GABAPENTIN 100 MILLIGRAM(S): 400 CAPSULE ORAL at 04:58

## 2024-05-09 RX ADMIN — SODIUM CHLORIDE 3 MILLILITER(S): 9 INJECTION INTRAMUSCULAR; INTRAVENOUS; SUBCUTANEOUS at 21:00

## 2024-05-09 RX ADMIN — Medication 500 MICROGRAM(S): at 16:41

## 2024-05-09 RX ADMIN — AMLODIPINE BESYLATE 5 MILLIGRAM(S): 2.5 TABLET ORAL at 04:59

## 2024-05-09 NOTE — PROGRESS NOTE ADULT - PROBLEM SELECTOR PLAN 4
Received pt with ecchymosis to right arm, edema +1 with pain on palpation  Pending right arm Duplex

## 2024-05-09 NOTE — PROGRESS NOTE ADULT - ASSESSMENT
68 year-old male, with past history significant for Type-2 DM, HLD, HTN and Hyperkalemia, presented to the ED secondary to L-sided chest pain, shortness of breath.  Transferred to [facility] for CABG eval   Assessment  DMT2: 68y Male with DM T2 with hyperglycemia, A1C 8.9% , was on insulin at home, on  basal bolus insulin with  coverage, now s/p surgery, on basal bolus insulin, eating full meals, sugars within acceptable range.  CAD: on medications, stable, monitored.  HTN: on antihypertensive medications, monitored, asymptomatic.      Discussed plan and management with Dr Gaston Castillo NP - TEAMS  Leela Feldman MD  Cell: [phone]  Office: [phone]

## 2024-05-09 NOTE — PROGRESS NOTE ADULT - SUBJECTIVE AND OBJECTIVE BOX
Patient is a 68y old  Male who presents with a chief complaint of cardiac surgery (09 May 2024 09:18)      SUBJECTIVE / OVERNIGHT EVENTS:    Events noted.  CONSTITUTIONAL: No fever,  or fatigue  RESPIRATORY: No cough, wheezing,  No shortness of breath  CARDIOVASCULAR: No chest pain, palpitations, dizziness, or leg swelling  GASTROINTESTINAL: No abdominal or epigastric pain.       MEDICATIONS  (STANDING):  aMIOdarone    Tablet 200 milliGRAM(s) Oral daily  amLODIPine   Tablet 10 milliGRAM(s) Oral daily  aspirin enteric coated 81 milliGRAM(s) Oral daily  atorvastatin 40 milliGRAM(s) Oral at bedtime  bisacodyl Suppository 10 milliGRAM(s) Rectal once  chlorhexidine 2% Cloths 1 Application(s) Topical daily  dextrose 50% Injectable 50 milliLiter(s) IV Push every 15 minutes  dextrose 50% Injectable 25 milliLiter(s) IV Push every 15 minutes  dextrose Oral Gel 15 Gram(s) Oral once  furosemide    Tablet 40 milliGRAM(s) Oral daily  gabapentin 100 milliGRAM(s) Oral every 8 hours  glucagon  Injectable 1 milliGRAM(s) IntraMuscular once  heparin   Injectable 5000 Unit(s) SubCutaneous every 8 hours  insulin glargine Injectable (LANTUS) 18 Unit(s) SubCutaneous at bedtime  insulin lispro (ADMELOG) corrective regimen sliding scale   SubCutaneous three times a day before meals  insulin lispro Injectable (ADMELOG) 6 Unit(s) SubCutaneous three times a day before meals  ipratropium    for Nebulization 500 MICROGram(s) Nebulizer every 6 hours  methocarbamol 500 milliGRAM(s) Oral every 8 hours  metoprolol succinate  milliGRAM(s) Oral daily  pantoprazole    Tablet 40 milliGRAM(s) Oral before breakfast  polyethylene glycol 3350 17 Gram(s) Oral daily  potassium chloride  10 mEq/50 mL IVPB 10 milliEquivalent(s) IV Intermittent every 1 hour  potassium chloride  10 mEq/50 mL IVPB 10 milliEquivalent(s) IV Intermittent every 1 hour  potassium chloride  10 mEq/50 mL IVPB 10 milliEquivalent(s) IV Intermittent every 1 hour  senna 2 Tablet(s) Oral at bedtime  sodium chloride 0.9% lock flush 3 milliLiter(s) IV Push every 8 hours  spironolactone 25 milliGRAM(s) Oral daily    MEDICATIONS  (PRN):  oxyCODONE    IR 10 milliGRAM(s) Oral every 4 hours PRN Severe Pain (7 - 10)  simethicone 80 milliGRAM(s) Chew four times a day PRN Gas        CAPILLARY BLOOD GLUCOSE      POCT Blood Glucose.: 142 mg/dL (09 May 2024 21:43)  POCT Blood Glucose.: 146 mg/dL (09 May 2024 21:07)  POCT Blood Glucose.: 93 mg/dL (09 May 2024 16:36)  POCT Blood Glucose.: 135 mg/dL (09 May 2024 11:13)  POCT Blood Glucose.: 138 mg/dL (09 May 2024 07:53)    I&O's Summary    08 May 2024 07:01  -  09 May 2024 07:00  --------------------------------------------------------  IN: 1080 mL / OUT: 1200 mL / NET: -120 mL    09 May 2024 07:01  -  10 May 2024 00:35  --------------------------------------------------------  IN: 720 mL / OUT: 350 mL / NET: 370 mL        T(C): 36.8 (05-09-24 @ 20:06), Max: 37.1 (05-09-24 @ 04:25)  HR: 63 (05-09-24 @ 20:06) (63 - 89)  BP: 135/64 (05-09-24 @ 20:06) (127/62 - 164/67)  RR: 18 (05-09-24 @ 20:06) (18 - 18)  SpO2: 95% (05-09-24 @ 20:06) (92% - 95%)    PHYSICAL EXAM:  GENERAL: NAD  NECK: Supple, No JVD  CHEST/LUNG: Clear to auscultation bilaterally; No wheezing.  HEART: Regular rate and rhythm; No murmurs, rubs, or gallops  ABDOMEN: Soft, Nontender, Nondistended; Bowel sounds present  EXTREMITIES:   No edema  NEUROLOGY: AAO X 3      LABS:                        8.8    7.45  )-----------( 244      ( 09 May 2024 06:00 )             26.8     05-09    133<L>  |  100  |  34<H>  ----------------------------<  137<H>  4.3   |  21<L>  |  1.03    Ca    8.3<L>      09 May 2024 06:00  Phos  2.5     05-08  Mg     2.6     05-08      PT/INR - ( 09 May 2024 06:00 )   PT: 13.2 sec;   INR: 1.27 ratio         PTT - ( 09 May 2024 06:00 )  PTT:28.6 sec      Urinalysis Basic - ( 09 May 2024 06:00 )    Color: x / Appearance: x / SG: x / pH: x  Gluc: 137 mg/dL / Ketone: x  / Bili: x / Urobili: x   Blood: x / Protein: x / Nitrite: x   Leuk Esterase: x / RBC: x / WBC x   Sq Epi: x / Non Sq Epi: x / Bacteria: x      CAPILLARY BLOOD GLUCOSE      POCT Blood Glucose.: 142 mg/dL (09 May 2024 21:43)  POCT Blood Glucose.: 146 mg/dL (09 May 2024 21:07)  POCT Blood Glucose.: 93 mg/dL (09 May 2024 16:36)  POCT Blood Glucose.: 135 mg/dL (09 May 2024 11:13)  POCT Blood Glucose.: 138 mg/dL (09 May 2024 07:53)        RADIOLOGY & ADDITIONAL TESTS:    Imaging Personally Reviewed:    Consultant(s) Notes Reviewed:      Care Discussed with Consultants/Other Providers:    Rj Robison MD, CMD, FACP    [id number] [place] [address]  Office Tel: [phone]  Cell: [phone]

## 2024-05-09 NOTE — PROGRESS NOTE ADULT - ASSESSMENT
Assessment and recommendation :   chest pain coronary artery Disease   S/P cardiac catheterization triple   vessel disease   acute hypoxic respiratory failure on high flow 02  S/P cardiogenic shock off  Milrinone   venous doppler   AF with RVR on Amiodarone   ischemic cardiomyopathy   S/P CABG X 4  Acute post operative respiratory Failure  acute blood loss  anemia   DM continue glycemic control   HTN continue BP control   continue glycemic control   DVT prophylaxis   GI prophylaxis   care discussed with TCV team

## 2024-05-09 NOTE — PROGRESS NOTE ADULT - ASSESSMENT
68 year-old male, with past history significant for Type-2 DM, HLD, HTN and Hyperkalemia, presented to the ED secondary to L-sided chest pain, shortness of breath, sp cath found to have TVD. Now s/p C4L, ANIYA on 5/3.     5/3 s/p C4L, ANIYA. Extubated to HFNC. Post Op requiring 3 units of PRBC and insulin gtt. Developed new onset Afib/RVR received modified amio load due to bradycardia. Continued on 200mg QD of Amio and Lopressor 50 BID.   5/7 Right Radial A -line, Trejo, CT d/c'd, Remains in Afib with HR in the 90s --> tx to [facility]. Received pt with ecchymosis to right arm, edema +1 with pain on palpation. Pending Duplex to right extremity. Passed TOV. No BM since prior to surgery --> Sorbitol and dulcolax ordered. Wean Supplemental O2 as tolerated. Increase Ambulation. Increase use of Incentive Spirometry.   5/8 VSS SR since 4:30am  on BB & amio  coumadin daily  5/9 VSS SR x 24 hrs - converted to afib @ 4:30am 100's on lopress bid - will change to toprol  a/c  d/c plan home
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
## 2024-05-09 NOTE — PROGRESS NOTE ADULT - SUBJECTIVE AND OBJECTIVE BOX
/CARDIOLOGY FOLLOW UP - Dr. Brewer  DATE OF SERVICE: 5/9/24    CC  No cv complaints     REVIEW OF SYSTEMS:  CONSTITUTIONAL: No fever, weight loss, or fatigue  RESPIRATORY: No cough, wheezing, chills or hemoptysis; No Shortness of Breath  CARDIOVASCULAR: No chest pain, palpitations, passing out, dizziness, or leg swelling  GASTROINTESTINAL: No abdominal or epigastric pain. No nausea, vomiting, or hematemesis; No diarrhea or constipation. No melena or hematochezia.  VASCULAR: No edema     PHYSICAL EXAM:  T(C): 37.1 (05-09-24 @ 04:25), Max: 37.1 (05-08-24 @ 12:01)  HR: 71 (05-09-24 @ 04:25) (64 - 73)  BP: 164/67 (05-09-24 @ 04:25) (147/67 - 164/67)  RR: 18 (05-09-24 @ 04:25) (18 - 18)  SpO2: 92% (05-09-24 @ 04:25) (92% - 97%)  Wt(kg): --  I&O's Summary    08 May 2024 07:01  -  09 May 2024 07:00  --------------------------------------------------------  IN: 1080 mL / OUT: 1200 mL / NET: -120 mL    09 May 2024 07:01  -  09 May 2024 09:04  --------------------------------------------------------  IN: 240 mL / OUT: 0 mL / NET: 240 mL        Appearance: Normal	  Cardiovascular: Normal S1 S2,Irregular, No JVD, No murmurs  Respiratory: Lungs clear to auscultation b/l   Gastrointestinal:  Soft, Non-tender, + BS	  Extremities: Normal range of motion, No clubbing, cyanosis or edema      Home Medications:  aspirin 81 mg oral delayed release tablet: 1 tab(s) orally once a day (29 Apr 2024 18:21)  atorvastatin 40 mg oral tablet: 1 tab(s) orally once a day (at bedtime) (29 Apr 2024 18:21)  insulin glargine 100 units/mL subcutaneous solution: 50 unit(s) subcutaneous once a day (at bedtime) (29 Apr 2024 18:21)  insulin lispro 100 units/mL injectable solution: 15 unit(s) injectable once a day (in the morning) BEFORE BREAKFAST (29 Apr 2024 18:21)  insulin lispro 100 units/mL injectable solution: 1 unit(s) injectable 3 times a day (before meals) 1 Unit(s) if Glucose 151 - 200  2 Unit(s) if Glucose 201 - 250  3 Unit(s) if Glucose 251 - 300  4 Unit(s) if Glucose 301 - 350  5 Unit(s) if Glucose 351 - 400  6 Unit(s) if Glucose Greater Than 400 (29 Apr 2024 18:21)  insulin lispro 100 units/mL injectable solution: 15 unit(s) injectable once a day before dinner (29 Apr 2024 18:21)  lisinopril 20 mg oral tablet: 1 tab(s) orally once a day (29 Apr 2024 02:23)  metoprolol tartrate 25 mg oral tablet: 1 tab(s) orally every 12 hours (29 Apr 2024 18:21)      MEDICATIONS  (STANDING):  aMIOdarone    Tablet 200 milliGRAM(s) Oral daily  amLODIPine   Tablet 5 milliGRAM(s) Oral daily  aspirin enteric coated 81 milliGRAM(s) Oral daily  atorvastatin 40 milliGRAM(s) Oral at bedtime  bisacodyl Suppository 10 milliGRAM(s) Rectal once  chlorhexidine 2% Cloths 1 Application(s) Topical daily  dextrose 50% Injectable 50 milliLiter(s) IV Push every 15 minutes  dextrose 50% Injectable 25 milliLiter(s) IV Push every 15 minutes  dextrose Oral Gel 15 Gram(s) Oral once  furosemide    Tablet 40 milliGRAM(s) Oral daily  gabapentin 100 milliGRAM(s) Oral every 8 hours  glucagon  Injectable 1 milliGRAM(s) IntraMuscular once  heparin   Injectable 5000 Unit(s) SubCutaneous every 8 hours  insulin glargine Injectable (LANTUS) 18 Unit(s) SubCutaneous at bedtime  insulin lispro (ADMELOG) corrective regimen sliding scale   SubCutaneous three times a day before meals  insulin lispro Injectable (ADMELOG) 6 Unit(s) SubCutaneous three times a day before meals  ipratropium    for Nebulization 500 MICROGram(s) Nebulizer every 6 hours  methocarbamol 500 milliGRAM(s) Oral every 8 hours  metoprolol tartrate 50 milliGRAM(s) Oral every 12 hours  pantoprazole    Tablet 40 milliGRAM(s) Oral before breakfast  polyethylene glycol 3350 17 Gram(s) Oral daily  potassium chloride  10 mEq/50 mL IVPB 10 milliEquivalent(s) IV Intermittent every 1 hour  potassium chloride  10 mEq/50 mL IVPB 10 milliEquivalent(s) IV Intermittent every 1 hour  potassium chloride  10 mEq/50 mL IVPB 10 milliEquivalent(s) IV Intermittent every 1 hour  senna 2 Tablet(s) Oral at bedtime  sodium chloride 0.9% lock flush 3 milliLiter(s) IV Push every 8 hours  spironolactone 25 milliGRAM(s) Oral daily      TELEMETRY: SR > Afib 	    ECG:  	  RADIOLOGY:   DIAGNOSTIC TESTING:  [ ] Echocardiogram:  [ ]  Catheterization:  [ ] Stress Test:    OTHER: 	    LABS:	 	    Creatine Kinase, Serum: 607 U/L [30 - 200] (05-03 @ 16:13)  CKMB Units: 36.4 ng/mL [0.0 - 6.7] (05-03 @ 16:13)  Troponin T, High Sensitivity Result: 759 ng/L [0 - 51] (05-03 @ 16:13)                          8.8    7.45  )-----------( 244      ( 09 May 2024 06:00 )             26.8     05-09    133<L>  |  100  |  34<H>  ----------------------------<  137<H>  4.3   |  21<L>  |  1.03    Ca    8.3<L>      09 May 2024 06:00  Phos  2.5     05-08  Mg     2.6     05-08    TPro  6.4  /  Alb  3.1<L>  /  TBili  0.6  /  DBili  x   /  AST  87<H>  /  ALT  113<H>  /  AlkPhos  67  05-07    PT/INR - ( 09 May 2024 06:00 )   PT: 13.2 sec;   INR: 1.27 ratio         PTT - ( 09 May 2024 06:00 )  PTT:28.6 sec

## 2024-05-09 NOTE — PROGRESS NOTE ADULT - SUBJECTIVE AND OBJECTIVE BOX
VITAL SIGNS-Telemetry:  SR 1st deg   Vital Signs Last 24 Hrs  T(C): 37.1 (24 @ 04:25), Max: 37.1 (24 @ 12:01)  T(F): 98.8 (24 @ 04:25), Max: 98.8 (24 @ 12:01)  HR: 71 (24 @ 04:25) (64 - 73)  BP: 164/67 (24 @ 04:25) (147/67 - 164/67)  RR: 18 (24 @ 04:25) (18 - 18)  SpO2: 92% (24 @ 04:25) (92% - 97%)          @ 07:01  -   @ 07:00  --------------------------------------------------------  IN: 1080 mL / OUT: 1200 mL / NET: -120 mL    Daily     Daily Weight in k.1 (08 May 2024 08:00)      PHYSICAL EXAM:  Neurology: alert and oriented x 3, nonfocal, no gross deficits  CV : S1S2  Sternal Wound :  CDI , Stable  Lungs: cta  Abdomen: soft, nontender, nondistended, positive bowel sounds, last bowel movement         Extremities:     no edema  no calf tenderness    aMIOdarone    Tablet 200 milliGRAM(s) Oral daily  amLODIPine   Tablet 5 milliGRAM(s) Oral daily  aspirin enteric coated 81 milliGRAM(s) Oral daily  atorvastatin 40 milliGRAM(s) Oral at bedtime  bisacodyl Suppository 10 milliGRAM(s) Rectal once  chlorhexidine 2% Cloths 1 Application(s) Topical daily  dextrose 50% Injectable 25 milliLiter(s) IV Push every 15 minutes  dextrose 50% Injectable 50 milliLiter(s) IV Push every 15 minutes  dextrose Oral Gel 15 Gram(s) Oral once  furosemide    Tablet 40 milliGRAM(s) Oral daily  gabapentin 100 milliGRAM(s) Oral every 8 hours  glucagon  Injectable 1 milliGRAM(s) IntraMuscular once  heparin   Injectable 5000 Unit(s) SubCutaneous every 8 hours  insulin glargine Injectable (LANTUS) 18 Unit(s) SubCutaneous at bedtime  insulin lispro (ADMELOG) corrective regimen sliding scale   SubCutaneous three times a day before meals  insulin lispro Injectable (ADMELOG) 6 Unit(s) SubCutaneous three times a day before meals  ipratropium    for Nebulization 500 MICROGram(s) Nebulizer every 6 hours  methocarbamol 500 milliGRAM(s) Oral every 8 hours  metoprolol tartrate 50 milliGRAM(s) Oral every 12 hours  oxyCODONE    IR 10 milliGRAM(s) Oral every 4 hours PRN  pantoprazole    Tablet 40 milliGRAM(s) Oral before breakfast  polyethylene glycol 3350 17 Gram(s) Oral daily  potassium chloride  10 mEq/50 mL IVPB 10 milliEquivalent(s) IV Intermittent every 1 hour  potassium chloride  10 mEq/50 mL IVPB 10 milliEquivalent(s) IV Intermittent every 1 hour  potassium chloride  10 mEq/50 mL IVPB 10 milliEquivalent(s) IV Intermittent every 1 hour  senna 2 Tablet(s) Oral at bedtime  simethicone 80 milliGRAM(s) Chew four times a day PRN  sodium chloride 0.9% lock flush 3 milliLiter(s) IV Push every 8 hours  spironolactone 25 milliGRAM(s) Oral daily      Physical Therapy Rec:   Home  [  x]   Home w/ PT  [  ]  Rehab  [  ]  Discussed with Cardiothoracic Team at AM rounds. VITAL SIGNS-Telemetry:  SR 1st deg - converted to afib  @ 4:30am  Vital Signs Last 24 Hrs  T(C): 37.1 (24 @ 04:25), Max: 37.1 (24 @ 12:01)  T(F): 98.8 (24 @ 04:25), Max: 98.8 (24 @ 12:01)  HR: 71 (24 @ 04:25) (64 - 73)  BP: 164/67 (24 @ 04:25) (147/67 - 164/67)  RR: 18 (24 @ 04:25) (18 - 18)  SpO2: 92% (24 @ 04:25) (92% - 97%)          @ 07:01  -   @ 07:00  --------------------------------------------------------  IN: 1080 mL / OUT: 1200 mL / NET: -120 mL    Daily     Daily Weight in k.1 (08 May 2024 08:00)      PHYSICAL EXAM:  Neurology: alert and oriented x 3, nonfocal, no gross deficits  CV : S1S2  Sternal Wound :  CDI , Stable  Lungs: cta  Abdomen: soft, nontender, nondistended, positive bowel sounds, last bowel movement         Extremities:     + edema  no calf tenderness  leg inc cdi    aMIOdarone    Tablet 200 milliGRAM(s) Oral daily  amLODIPine   Tablet 5 milliGRAM(s) Oral daily  aspirin enteric coated 81 milliGRAM(s) Oral daily  atorvastatin 40 milliGRAM(s) Oral at bedtime  bisacodyl Suppository 10 milliGRAM(s) Rectal once  chlorhexidine 2% Cloths 1 Application(s) Topical daily  dextrose 50% Injectable 25 milliLiter(s) IV Push every 15 minutes  dextrose 50% Injectable 50 milliLiter(s) IV Push every 15 minutes  dextrose Oral Gel 15 Gram(s) Oral once  furosemide    Tablet 40 milliGRAM(s) Oral daily  gabapentin 100 milliGRAM(s) Oral every 8 hours  glucagon  Injectable 1 milliGRAM(s) IntraMuscular once  heparin   Injectable 5000 Unit(s) SubCutaneous every 8 hours  insulin glargine Injectable (LANTUS) 18 Unit(s) SubCutaneous at bedtime  insulin lispro (ADMELOG) corrective regimen sliding scale   SubCutaneous three times a day before meals  insulin lispro Injectable (ADMELOG) 6 Unit(s) SubCutaneous three times a day before meals  ipratropium    for Nebulization 500 MICROGram(s) Nebulizer every 6 hours  methocarbamol 500 milliGRAM(s) Oral every 8 hours  metoprolol tartrate 50 milliGRAM(s) Oral every 12 hours  oxyCODONE    IR 10 milliGRAM(s) Oral every 4 hours PRN  pantoprazole    Tablet 40 milliGRAM(s) Oral before breakfast  polyethylene glycol 3350 17 Gram(s) Oral daily  potassium chloride  10 mEq/50 mL IVPB 10 milliEquivalent(s) IV Intermittent every 1 hour  potassium chloride  10 mEq/50 mL IVPB 10 milliEquivalent(s) IV Intermittent every 1 hour  potassium chloride  10 mEq/50 mL IVPB 10 milliEquivalent(s) IV Intermittent every 1 hour  senna 2 Tablet(s) Oral at bedtime  simethicone 80 milliGRAM(s) Chew four times a day PRN  sodium chloride 0.9% lock flush 3 milliLiter(s) IV Push every 8 hours  spironolactone 25 milliGRAM(s) Oral daily      Physical Therapy Rec:   Home  [  x]   Home w/ PT  [  ]  Rehab  [  ]  Discussed with Cardiothoracic Team at AM rounds.

## 2024-05-09 NOTE — PROGRESS NOTE ADULT - SUBJECTIVE AND OBJECTIVE BOX
Chief complaint    Patient is a 68y old  Male who presents with a chief complaint of cardiac surgery (09 May 2024 07:30)   Review of systems  Patient appears comfortable.    Labs and Fingersticks  CAPILLARY BLOOD GLUCOSE      POCT Blood Glucose.: 138 mg/dL (09 May 2024 07:53)  POCT Blood Glucose.: 159 mg/dL (08 May 2024 21:51)  POCT Blood Glucose.: 130 mg/dL (08 May 2024 16:30)  POCT Blood Glucose.: 139 mg/dL (08 May 2024 11:49)      Anion Gap: 12 (05-09 @ 06:00)  Anion Gap: 12 (05-08 @ 06:15)  Anion Gap: 14 (05-07 @ 12:20)      Calcium: 8.3 *L* (05-09 @ 06:00)  Calcium: 8.6 (05-08 @ 06:15)  Calcium: 8.3 *L* (05-07 @ 12:20)  Albumin: 3.1 *L* (05-07 @ 12:20)    Alanine Aminotransferase (ALT/SGPT): 113 *H* (05-07 @ 12:20)  Alkaline Phosphatase: 67 (05-07 @ 12:20)  Aspartate Aminotransferase (AST/SGOT): 87 *H* (05-07 @ 12:20)        05-09    133<L>  |  100  |  34<H>  ----------------------------<  137<H>  4.3   |  21<L>  |  1.03    Ca    8.3<L>      09 May 2024 06:00  Phos  2.5     05-08  Mg     2.6     05-08    TPro  6.4  /  Alb  3.1<L>  /  TBili  0.6  /  DBili  x   /  AST  87<H>  /  ALT  113<H>  /  AlkPhos  67  05-07                        8.8    7.45  )-----------( 244      ( 09 May 2024 06:00 )             26.8     Medications  MEDICATIONS  (STANDING):  aMIOdarone    Tablet 200 milliGRAM(s) Oral daily  amLODIPine   Tablet 5 milliGRAM(s) Oral daily  aspirin enteric coated 81 milliGRAM(s) Oral daily  atorvastatin 40 milliGRAM(s) Oral at bedtime  bisacodyl Suppository 10 milliGRAM(s) Rectal once  chlorhexidine 2% Cloths 1 Application(s) Topical daily  dextrose 50% Injectable 50 milliLiter(s) IV Push every 15 minutes  dextrose 50% Injectable 25 milliLiter(s) IV Push every 15 minutes  dextrose Oral Gel 15 Gram(s) Oral once  furosemide    Tablet 40 milliGRAM(s) Oral daily  gabapentin 100 milliGRAM(s) Oral every 8 hours  glucagon  Injectable 1 milliGRAM(s) IntraMuscular once  heparin   Injectable 5000 Unit(s) SubCutaneous every 8 hours  insulin glargine Injectable (LANTUS) 18 Unit(s) SubCutaneous at bedtime  insulin lispro (ADMELOG) corrective regimen sliding scale   SubCutaneous three times a day before meals  insulin lispro Injectable (ADMELOG) 6 Unit(s) SubCutaneous three times a day before meals  ipratropium    for Nebulization 500 MICROGram(s) Nebulizer every 6 hours  methocarbamol 500 milliGRAM(s) Oral every 8 hours  metoprolol tartrate 50 milliGRAM(s) Oral every 12 hours  pantoprazole    Tablet 40 milliGRAM(s) Oral before breakfast  polyethylene glycol 3350 17 Gram(s) Oral daily  potassium chloride  10 mEq/50 mL IVPB 10 milliEquivalent(s) IV Intermittent every 1 hour  potassium chloride  10 mEq/50 mL IVPB 10 milliEquivalent(s) IV Intermittent every 1 hour  potassium chloride  10 mEq/50 mL IVPB 10 milliEquivalent(s) IV Intermittent every 1 hour  senna 2 Tablet(s) Oral at bedtime  sodium chloride 0.9% lock flush 3 milliLiter(s) IV Push every 8 hours  spironolactone 25 milliGRAM(s) Oral daily      Physical Exam  General: Patient appears comfortable.  Vital Signs Last 12 Hrs  T(F): 98.8 (05-09-24 @ 04:25), Max: 98.8 (05-09-24 @ 04:25)  HR: 71 (05-09-24 @ 04:25) (71 - 71)  BP: 164/67 (05-09-24 @ 04:25) (164/67 - 164/67)  BP(mean): --  RR: 18 (05-09-24 @ 04:25) (18 - 18)  SpO2: 92% (05-09-24 @ 04:25) (92% - 92%)  Neck: No palpable thyroid nodules.  CVS: S1S2, No murmurs  Respiratory: No wheezing, no crepitations  GI: Abdomen soft, non tender.    Diagnostics        Radiology:

## 2024-05-09 NOTE — PROGRESS NOTE ADULT - ASSESSMENT
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 Now s/p C4L, ANIYA on 5/3.     S/p CABG x 4:    CTS/Cardio f/up appreciated.  Cw ASA/Lipitor    PAF:  On Coumadin    DM II:    Cw Lantus  Admelog

## 2024-05-10 ENCOUNTER — TRANSCRIPTION ENCOUNTER (OUTPATIENT)
Age: 69
End: 2024-05-10

## 2024-05-10 VITALS — WEIGHT: 193.35 LBS

## 2024-05-10 LAB
ANION GAP SERPL CALC-SCNC: 13 MMOL/L — SIGNIFICANT CHANGE UP (ref 5–17)
APTT BLD: 21 SEC — LOW (ref 24.5–35.6)
BUN SERPL-MCNC: 25 MG/DL — HIGH (ref 7–23)
CALCIUM SERPL-MCNC: 8.4 MG/DL — SIGNIFICANT CHANGE UP (ref 8.4–10.5)
CHLORIDE SERPL-SCNC: 101 MMOL/L — SIGNIFICANT CHANGE UP (ref 96–108)
CO2 SERPL-SCNC: 21 MMOL/L — LOW (ref 22–31)
CREAT SERPL-MCNC: 0.97 MG/DL — SIGNIFICANT CHANGE UP (ref 0.5–1.3)
EGFR: 86 ML/MIN/1.73M2 — SIGNIFICANT CHANGE UP
GLUCOSE BLDC GLUCOMTR-MCNC: 127 MG/DL — HIGH (ref 70–99)
GLUCOSE SERPL-MCNC: 123 MG/DL — HIGH (ref 70–99)
HCT VFR BLD CALC: 25.2 % — LOW (ref 39–50)
HGB BLD-MCNC: 8.3 G/DL — LOW (ref 13–17)
INR BLD: 1.37 RATIO — HIGH (ref 0.85–1.18)
MCHC RBC-ENTMCNC: 27.8 PG — SIGNIFICANT CHANGE UP (ref 27–34)
MCHC RBC-ENTMCNC: 32.9 GM/DL — SIGNIFICANT CHANGE UP (ref 32–36)
MCV RBC AUTO: 84.3 FL — SIGNIFICANT CHANGE UP (ref 80–100)
NRBC # BLD: 0 /100 WBCS — SIGNIFICANT CHANGE UP (ref 0–0)
PLATELET # BLD AUTO: 280 K/UL — SIGNIFICANT CHANGE UP (ref 150–400)
POTASSIUM SERPL-MCNC: 4.1 MMOL/L — SIGNIFICANT CHANGE UP (ref 3.5–5.3)
POTASSIUM SERPL-SCNC: 4.1 MMOL/L — SIGNIFICANT CHANGE UP (ref 3.5–5.3)
PROTHROM AB SERPL-ACNC: 14.9 SEC — HIGH (ref 9.5–13)
RBC # BLD: 2.99 M/UL — LOW (ref 4.2–5.8)
RBC # FLD: 14.4 % — SIGNIFICANT CHANGE UP (ref 10.3–14.5)
SODIUM SERPL-SCNC: 135 MMOL/L — SIGNIFICANT CHANGE UP (ref 135–145)
WBC # BLD: 8.07 K/UL — SIGNIFICANT CHANGE UP (ref 3.8–10.5)
WBC # FLD AUTO: 8.07 K/UL — SIGNIFICANT CHANGE UP (ref 3.8–10.5)

## 2024-05-10 PROCEDURE — 97116 GAIT TRAINING THERAPY: CPT

## 2024-05-10 PROCEDURE — 85025 COMPLETE CBC W/AUTO DIFF WBC: CPT

## 2024-05-10 PROCEDURE — 85384 FIBRINOGEN ACTIVITY: CPT

## 2024-05-10 PROCEDURE — P9100: CPT

## 2024-05-10 PROCEDURE — 85576 BLOOD PLATELET AGGREGATION: CPT

## 2024-05-10 PROCEDURE — 80053 COMPREHEN METABOLIC PANEL: CPT

## 2024-05-10 PROCEDURE — P9037: CPT

## 2024-05-10 PROCEDURE — 97530 THERAPEUTIC ACTIVITIES: CPT

## 2024-05-10 PROCEDURE — P9016: CPT

## 2024-05-10 PROCEDURE — 82435 ASSAY OF BLOOD CHLORIDE: CPT

## 2024-05-10 PROCEDURE — 82962 GLUCOSE BLOOD TEST: CPT

## 2024-05-10 PROCEDURE — 71045 X-RAY EXAM CHEST 1 VIEW: CPT

## 2024-05-10 PROCEDURE — 97164 PT RE-EVAL EST PLAN CARE: CPT

## 2024-05-10 PROCEDURE — 85027 COMPLETE CBC AUTOMATED: CPT

## 2024-05-10 PROCEDURE — C1889: CPT

## 2024-05-10 PROCEDURE — 84132 ASSAY OF SERUM POTASSIUM: CPT

## 2024-05-10 PROCEDURE — 81003 URINALYSIS AUTO W/O SCOPE: CPT

## 2024-05-10 PROCEDURE — 36415 COLL VENOUS BLD VENIPUNCTURE: CPT

## 2024-05-10 PROCEDURE — 94640 AIRWAY INHALATION TREATMENT: CPT

## 2024-05-10 PROCEDURE — 93880 EXTRACRANIAL BILAT STUDY: CPT

## 2024-05-10 PROCEDURE — 82565 ASSAY OF CREATININE: CPT

## 2024-05-10 PROCEDURE — C9399: CPT

## 2024-05-10 PROCEDURE — 82803 BLOOD GASES ANY COMBINATION: CPT

## 2024-05-10 PROCEDURE — 85610 PROTHROMBIN TIME: CPT

## 2024-05-10 PROCEDURE — 86891 AUTOLOGOUS BLOOD OP SALVAGE: CPT

## 2024-05-10 PROCEDURE — 93010 ELECTROCARDIOGRAM REPORT: CPT

## 2024-05-10 PROCEDURE — 80048 BASIC METABOLIC PNL TOTAL CA: CPT

## 2024-05-10 PROCEDURE — 83880 ASSAY OF NATRIURETIC PEPTIDE: CPT

## 2024-05-10 PROCEDURE — 94002 VENT MGMT INPAT INIT DAY: CPT

## 2024-05-10 PROCEDURE — 86923 COMPATIBILITY TEST ELECTRIC: CPT

## 2024-05-10 PROCEDURE — 82947 ASSAY GLUCOSE BLOOD QUANT: CPT

## 2024-05-10 PROCEDURE — 84484 ASSAY OF TROPONIN QUANT: CPT

## 2024-05-10 PROCEDURE — 87640 STAPH A DNA AMP PROBE: CPT

## 2024-05-10 PROCEDURE — 93005 ELECTROCARDIOGRAM TRACING: CPT

## 2024-05-10 PROCEDURE — 97166 OT EVAL MOD COMPLEX 45 MIN: CPT

## 2024-05-10 PROCEDURE — 84295 ASSAY OF SERUM SODIUM: CPT

## 2024-05-10 PROCEDURE — 87641 MR-STAPH DNA AMP PROBE: CPT

## 2024-05-10 PROCEDURE — 82553 CREATINE MB FRACTION: CPT

## 2024-05-10 PROCEDURE — 86850 RBC ANTIBODY SCREEN: CPT

## 2024-05-10 PROCEDURE — 85018 HEMOGLOBIN: CPT

## 2024-05-10 PROCEDURE — 83735 ASSAY OF MAGNESIUM: CPT

## 2024-05-10 PROCEDURE — 86900 BLOOD TYPING SEROLOGIC ABO: CPT

## 2024-05-10 PROCEDURE — 94010 BREATHING CAPACITY TEST: CPT

## 2024-05-10 PROCEDURE — 84100 ASSAY OF PHOSPHORUS: CPT

## 2024-05-10 PROCEDURE — P9045: CPT

## 2024-05-10 PROCEDURE — 83605 ASSAY OF LACTIC ACID: CPT

## 2024-05-10 PROCEDURE — 82550 ASSAY OF CK (CPK): CPT

## 2024-05-10 PROCEDURE — 86901 BLOOD TYPING SEROLOGIC RH(D): CPT

## 2024-05-10 PROCEDURE — 97535 SELF CARE MNGMENT TRAINING: CPT

## 2024-05-10 PROCEDURE — C8929: CPT

## 2024-05-10 PROCEDURE — C1769: CPT

## 2024-05-10 PROCEDURE — 82330 ASSAY OF CALCIUM: CPT

## 2024-05-10 PROCEDURE — 85730 THROMBOPLASTIN TIME PARTIAL: CPT

## 2024-05-10 PROCEDURE — 97110 THERAPEUTIC EXERCISES: CPT

## 2024-05-10 PROCEDURE — 71045 X-RAY EXAM CHEST 1 VIEW: CPT | Mod: 26

## 2024-05-10 PROCEDURE — C1751: CPT

## 2024-05-10 PROCEDURE — 85396 CLOTTING ASSAY WHOLE BLOOD: CPT

## 2024-05-10 PROCEDURE — 84134 ASSAY OF PREALBUMIN: CPT

## 2024-05-10 PROCEDURE — 94660 CPAP INITIATION&MGMT: CPT

## 2024-05-10 PROCEDURE — 93971 EXTREMITY STUDY: CPT

## 2024-05-10 PROCEDURE — 85014 HEMATOCRIT: CPT

## 2024-05-10 PROCEDURE — 97162 PT EVAL MOD COMPLEX 30 MIN: CPT

## 2024-05-10 PROCEDURE — 85520 HEPARIN ASSAY: CPT

## 2024-05-10 RX ORDER — LISINOPRIL 2.5 MG/1
1 TABLET ORAL
Refills: 0 | DISCHARGE

## 2024-05-10 RX ORDER — ASPIRIN/CALCIUM CARB/MAGNESIUM 324 MG
1 TABLET ORAL
Qty: 30 | Refills: 0
Start: 2024-05-10 | End: 2024-06-08

## 2024-05-10 RX ORDER — SPIRONOLACTONE 25 MG/1
1 TABLET, FILM COATED ORAL
Qty: 30 | Refills: 0
Start: 2024-05-10 | End: 2024-06-08

## 2024-05-10 RX ORDER — FOLIC ACID 0.8 MG
1 TABLET ORAL
Qty: 30 | Refills: 0
Start: 2024-05-10 | End: 2024-06-08

## 2024-05-10 RX ORDER — OXYCODONE HYDROCHLORIDE 5 MG/1
1 TABLET ORAL
Qty: 28 | Refills: 0
Start: 2024-05-10 | End: 2024-05-16

## 2024-05-10 RX ORDER — ATORVASTATIN CALCIUM 80 MG/1
1 TABLET, FILM COATED ORAL
Qty: 30 | Refills: 0
Start: 2024-05-10 | End: 2024-06-08

## 2024-05-10 RX ORDER — ASCORBIC ACID 60 MG
500 TABLET,CHEWABLE ORAL DAILY
Refills: 0 | Status: DISCONTINUED | OUTPATIENT
Start: 2024-05-10 | End: 2024-05-10

## 2024-05-10 RX ORDER — AMIODARONE HYDROCHLORIDE 400 MG/1
1 TABLET ORAL
Qty: 30 | Refills: 0
Start: 2024-05-10 | End: 2024-06-08

## 2024-05-10 RX ORDER — AMLODIPINE BESYLATE 2.5 MG/1
1 TABLET ORAL
Qty: 30 | Refills: 0
Start: 2024-05-10 | End: 2024-06-08

## 2024-05-10 RX ORDER — SIMETHICONE 80 MG/1
1 TABLET, CHEWABLE ORAL
Qty: 0 | Refills: 0 | DISCHARGE
Start: 2024-05-10

## 2024-05-10 RX ORDER — ASCORBIC ACID 60 MG
1 TABLET,CHEWABLE ORAL
Qty: 30 | Refills: 0
Start: 2024-05-10 | End: 2024-06-08

## 2024-05-10 RX ORDER — APIXABAN 2.5 MG/1
2.5 TABLET, FILM COATED ORAL
Refills: 0 | Status: DISCONTINUED | OUTPATIENT
Start: 2024-05-10 | End: 2024-05-10

## 2024-05-10 RX ORDER — FERROUS SULFATE 325(65) MG
1 TABLET ORAL
Qty: 90 | Refills: 0
Start: 2024-05-10 | End: 2024-06-08

## 2024-05-10 RX ORDER — POLYETHYLENE GLYCOL 3350 17 G/17G
17 POWDER, FOR SOLUTION ORAL
Qty: 0 | Refills: 0 | DISCHARGE
Start: 2024-05-10

## 2024-05-10 RX ORDER — FOLIC ACID 0.8 MG
1 TABLET ORAL DAILY
Refills: 0 | Status: DISCONTINUED | OUTPATIENT
Start: 2024-05-10 | End: 2024-05-10

## 2024-05-10 RX ORDER — FUROSEMIDE 40 MG
1 TABLET ORAL
Qty: 30 | Refills: 0
Start: 2024-05-10 | End: 2024-06-08

## 2024-05-10 RX ORDER — SENNA PLUS 8.6 MG/1
2 TABLET ORAL
Qty: 0 | Refills: 0 | DISCHARGE
Start: 2024-05-10

## 2024-05-10 RX ORDER — PANTOPRAZOLE SODIUM 20 MG/1
1 TABLET, DELAYED RELEASE ORAL
Qty: 30 | Refills: 0
Start: 2024-05-10 | End: 2024-06-08

## 2024-05-10 RX ORDER — GABAPENTIN 400 MG/1
1 CAPSULE ORAL
Qty: 90 | Refills: 0
Start: 2024-05-10 | End: 2024-06-08

## 2024-05-10 RX ORDER — APIXABAN 2.5 MG/1
1 TABLET, FILM COATED ORAL
Qty: 60 | Refills: 0
Start: 2024-05-10 | End: 2024-06-08

## 2024-05-10 RX ORDER — FERROUS SULFATE 325(65) MG
325 TABLET ORAL THREE TIMES A DAY
Refills: 0 | Status: DISCONTINUED | OUTPATIENT
Start: 2024-05-10 | End: 2024-05-10

## 2024-05-10 RX ORDER — METOPROLOL TARTRATE 50 MG
1 TABLET ORAL
Qty: 30 | Refills: 0
Start: 2024-05-10 | End: 2024-06-08

## 2024-05-10 RX ADMIN — SPIRONOLACTONE 25 MILLIGRAM(S): 25 TABLET, FILM COATED ORAL at 05:28

## 2024-05-10 RX ADMIN — Medication 6 UNIT(S): at 08:12

## 2024-05-10 RX ADMIN — GABAPENTIN 100 MILLIGRAM(S): 400 CAPSULE ORAL at 05:31

## 2024-05-10 RX ADMIN — PANTOPRAZOLE SODIUM 40 MILLIGRAM(S): 20 TABLET, DELAYED RELEASE ORAL at 05:28

## 2024-05-10 RX ADMIN — Medication 500 MICROGRAM(S): at 00:05

## 2024-05-10 RX ADMIN — Medication 1 MILLIGRAM(S): at 12:23

## 2024-05-10 RX ADMIN — GABAPENTIN 100 MILLIGRAM(S): 400 CAPSULE ORAL at 13:14

## 2024-05-10 RX ADMIN — Medication 325 MILLIGRAM(S): at 13:15

## 2024-05-10 RX ADMIN — METHOCARBAMOL 500 MILLIGRAM(S): 500 TABLET, FILM COATED ORAL at 05:28

## 2024-05-10 RX ADMIN — AMLODIPINE BESYLATE 10 MILLIGRAM(S): 2.5 TABLET ORAL at 05:28

## 2024-05-10 RX ADMIN — Medication 40 MILLIGRAM(S): at 05:28

## 2024-05-10 RX ADMIN — Medication 500 MICROGRAM(S): at 05:28

## 2024-05-10 RX ADMIN — AMIODARONE HYDROCHLORIDE 200 MILLIGRAM(S): 400 TABLET ORAL at 05:28

## 2024-05-10 RX ADMIN — HEPARIN SODIUM 5000 UNIT(S): 5000 INJECTION INTRAVENOUS; SUBCUTANEOUS at 05:27

## 2024-05-10 RX ADMIN — Medication 81 MILLIGRAM(S): at 12:24

## 2024-05-10 RX ADMIN — METHOCARBAMOL 500 MILLIGRAM(S): 500 TABLET, FILM COATED ORAL at 13:14

## 2024-05-10 RX ADMIN — Medication 100 MILLIGRAM(S): at 05:27

## 2024-05-10 RX ADMIN — SODIUM CHLORIDE 3 MILLILITER(S): 9 INJECTION INTRAMUSCULAR; INTRAVENOUS; SUBCUTANEOUS at 05:37

## 2024-05-10 RX ADMIN — Medication 500 MILLIGRAM(S): at 12:23

## 2024-05-10 NOTE — DISCHARGE NOTE PROVIDER - NSDCMRMEDTOKEN_GEN_ALL_CORE_FT
aspirin 81 mg oral delayed release tablet: 1 tab(s) orally once a day  atorvastatin 40 mg oral tablet: 1 tab(s) orally once a day (at bedtime)  insulin glargine 100 units/mL subcutaneous solution: 50 unit(s) subcutaneous once a day (at bedtime)  insulin lispro 100 units/mL injectable solution: 15 unit(s) injectable once a day (in the morning) BEFORE BREAKFAST  insulin lispro 100 units/mL injectable solution: 1 unit(s) injectable 3 times a day (before meals) 1 Unit(s) if Glucose 151 - 200  2 Unit(s) if Glucose 201 - 250  3 Unit(s) if Glucose 251 - 300  4 Unit(s) if Glucose 301 - 350  5 Unit(s) if Glucose 351 - 400  6 Unit(s) if Glucose Greater Than 400  insulin lispro 100 units/mL injectable solution: 15 unit(s) injectable once a day before dinner  lisinopril 20 mg oral tablet: 1 tab(s) orally once a day  metoprolol tartrate 25 mg oral tablet: 1 tab(s) orally every 12 hours   amiodarone 200 mg oral tablet: 1 tab(s) orally once a day  amLODIPine 10 mg oral tablet: 1 tab(s) orally once a day  apixaban 2.5 mg oral tablet: 1 tab(s) orally 2 times a day MDD: 2  ascorbic acid 500 mg oral tablet: 1 tab(s) orally once a day  aspirin 81 mg oral delayed release tablet: 1 tab(s) orally once a day  atorvastatin 40 mg oral tablet: 1 tab(s) orally once a day (at bedtime)  ferrous sulfate 325 mg (65 mg elemental iron) oral tablet: 1 tab(s) orally 3 times a day  folic acid 1 mg oral tablet: 1 tab(s) orally once a day  furosemide 40 mg oral tablet: 1 tab(s) orally once a day  gabapentin 100 mg oral capsule: 1 cap(s) orally every 8 hours MDD: 3  insulin glargine 100 units/mL subcutaneous solution: 18 unit(s) subcutaneous once a day (at bedtime)  insulin lispro 100 units/mL injectable solution: 6 unit(s) subcutaneous 3 times a day take before meals  oxyCODONE 5 mg oral tablet: 1 tab(s) orally every 6 hours as needed for  moderate pain MDD: 4  pantoprazole 40 mg oral delayed release tablet: 1 tab(s) orally once a day (before a meal)  polyethylene glycol 3350 oral powder for reconstitution: 17 gram(s) orally once a day  senna leaf extract oral tablet: 2 tab(s) orally once a day (at bedtime)  simethicone 80 mg oral tablet, chewable: 1 tab(s) orally 4 times a day As needed Gas  spironolactone 25 mg oral tablet: 1 tab(s) orally once a day  Toprol-XL 50 mg oral tablet, extended release: 1 tab(s) orally once a day start in am sat 5/11

## 2024-05-10 NOTE — DISCHARGE NOTE PROVIDER - PROVIDER TOKENS
PROVIDER:[TOKEN:[[id number]],FOLLOWUP:[2 weeks]],PROVIDER:[TOKEN:[[id number]]],PROVIDER:[TOKEN:[[id number]]]

## 2024-05-10 NOTE — PROGRESS NOTE ADULT - ASSESSMENT
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    #CAD  -Sp cath with significant left main plus TVD   -s/p cabg x 4, atriclip 5/3  -post op care per cts  -Amio, bb, warfarin per cts  -Diuretics per cts    #post op afib   -now in NSR   -Amio, bb, warfarin per cts    dvt ppx

## 2024-05-10 NOTE — DISCHARGE NOTE PROVIDER - HOSPITAL COURSE
68 year-old male, with past history significant for Type-2 DM, HLD, HTN and Hyperkalemia, presented to the ED secondary to L-sided chest pain, shortness of breath, sp cath found to have TVD. Now s/p C4L, ANIYA on 5/3.     5/3 s/p C4L, ANIYA. Extubated to HFNC. Post Op requiring 3 units of PRBC and insulin gtt. Developed new onset Afib/RVR received modified amio load due to bradycardia. Continued on 200mg QD of Amio and Lopressor 50 BID.   5/7 Right Radial A -line, Trejo, CT d/c'd, Remains in Afib with HR in the 90s --> tx to 2 sellers. Received pt with ecchymosis to right arm, edema +1 with pain on palpation. Pending Duplex to right extremity. Passed TOV. No BM since prior to surgery --> Sorbitol and dulcolax ordered. Wean Supplemental O2 as tolerated. Increase Ambulation. Increase use of Incentive Spirometry.   5/8 VSS SR since 4:30am  on BB & amio  coumadin daily  5/9 VSS SR x 24 hrs - converted to afib @ 4:30am 100's on lopress bid - will change to toprol  a/c  d/c plan home    68 year-old male, with past history significant for Type-2 DM, HLD, HTN and Hyperkalemia, presented to the ED secondary to L-sided chest pain, shortness of breath, sp cath found to have TVD. Now s/p C4L, ANIYA on 5/3.     5/3 s/p C4L, ANIYA. Extubated to HFNC. Post Op requiring 3 units of PRBC and insulin gtt. Developed new onset Afib/RVR received modified amio load due to bradycardia. Continued on 200mg QD of Amio and Lopressor 50 BID.   5/7 Right Radial A -line, Trejo, CT d/c'd, Remains in Afib with HR in the 90s --> tx to 2 sellers. Received pt with ecchymosis to right arm, edema +1 with pain on palpation. Pending Duplex to right extremity. Passed TOV. No BM since prior to surgery --> Sorbitol and dulcolax ordered. Wean Supplemental O2 as tolerated. Increase Ambulation. Increase use of Incentive Spirometry.   5/8 VSS SR since 4:30am  on BB & amio  coumadin daily  5/9 VSS SR x 24 hrs - converted to afib @ 4:30am 100's on lopress bid - will change to toprol  a/c  d/c plan home   5/10 VSS; RSR 65-70; pw d/c this am; pafib noted- start eliquis 2.5 bid for AC as per Dr. Jimenez; QTC on 12 lead ekg is 552- decrease toprol 50 qd as per Dr. Jimenez  discharge pt  Dr. Jimenez ; insulin recs as per Endo

## 2024-05-10 NOTE — PROGRESS NOTE ADULT - PROBLEM SELECTOR PROBLEM 2
CAD, multiple vessel
Afib
Afib
Diabetes mellitus, type II, insulin dependent
CAD, multiple vessel
Diabetes mellitus, type II, insulin dependent
Afib
CAD, multiple vessel

## 2024-05-10 NOTE — DISCHARGE NOTE PROVIDER - CARE PROVIDER_API CALL
Velma Jimenez  Thoracic and Cardiac Surgery  [address] [place] [address]  Phone: [phone]  Fax: [phone]  Follow Up Time: 2 weeks    Bala Brewer  Cardiovascular Disease  [address] [facility], [address]  [place] [address]  Phone: [phone]  Fax: [phone]  Follow Up Time:     Leela Feldman)  Endocrinology/Metab/Diabetes  [address] [place] [id number]  Phone: [phone]  Fax: [phone]  Follow Up Time:

## 2024-05-10 NOTE — PROGRESS NOTE ADULT - TIME BILLING
Agree with above ACP note.  cv stable  await CABG  care per cts
Agree with above ACP note.  cv stable  cont current tx  dcp
Agree with above ACP note.  cv stable  cont current tx per cticu
Patient seen and examined.  Agree with above ACP note.  cv stable  cont current tx per cts
Agree with above ACP note.  cv stable  cont current tx per cticu
Agree with above ACP note.  cv stable  cont current tx per cts
Patient seen and examined, agree with the above assessment and plan by ACP  Cardiac cath images and results were shown to the patient and his wife per their request  all questions about his diagnosis and bypass operation were discussed in extensive detail  pt and his wife are agreeable to surgery  CABG per CTS

## 2024-05-10 NOTE — PROGRESS NOTE ADULT - PROBLEM SELECTOR PLAN 2
Modified Amio Load due to Bradycardia  200mg Amio QD  Lopressor 50 mg BID
Modified Amio Load due to Bradycardia  200mg Amio QD  Lopressor 50 mg BID
On medications,  no chest pain, stable, monitored and followed up by primary cardiothoracic team/cardiology team.
-FS AC HS  -c/w basal bolus  -endocrine consult
-FS AC HS  -c/w basal bolus  -endocrine consult  called
On medications,  no chest pain, stable, monitored and followed up by primary cardiothoracic team/cardiology team.
Modified Amio Load due to Bradycardia  200mg Amio QD  Lopressor 50 mg BID
On medications,  no chest pain, stable, monitored and followed up by primary cardiothoracic team/cardiology team.

## 2024-05-10 NOTE — CHART NOTE - NSCHARTNOTEFT_GEN_A_CORE
Verbal consult for nutrition education prior to discharge.     Provided verbal and written education on heart healthy nutrition for people with diabetes. Emphasis on pairing carbohydrates with protein for glycemic control; portion sizes; choosing whole grains vs refined carbohydrates; limiting saturated fat intake. RD also reviewed nutrition therapy for s/p midsternal incision. Emphasized importance of adequate lean protein intake and optimal blood glucose levels for wound healing. Advised pt to limit concentrated sweets, portion control, and importance of fiber intake. Provided Heart-Healthy Consistent Carbohydrate Nutrition Therapy. Good comprehension noted. Pt made aware RD to remain available for any questions.     Tri Porter, MS, RD, CDN, CNSC, CDCES TEAMS

## 2024-05-10 NOTE — PROGRESS NOTE ADULT - PROBLEM SELECTOR PLAN 3
Hgb A1C 8.9   Endo Following   Lantus 30 units HS   Admelog 7 units B4 bed   Corrective sliding scale  Accuchecks B4 meals and at HS
Suggest to continue medications, monitoring, FU primary team recommendations.
Hgb A1C 8.9   Endo Following   Lantus 30 units HS   Admelog 7 units B4 bed   Corrective sliding scale  Accuchecks B4 meals and at HS
Suggest to continue medications, monitoring, FU primary team recommendations.
GI PPI  DVT: hep gtt
Suggest to continue medications, monitoring, FU primary team recommendations.
Suggest to continue medications, monitoring, FU primary team recommendations.
GI PPI  DVT: hep gtt
Suggest to continue medications, monitoring, FU primary team recommendations.
Hgb A1C 8.9   Endo Following   Lantus 30 units HS   Admelog 7 units B4 bed   Corrective sliding scale  Accuchecks B4 meals and at HS

## 2024-05-10 NOTE — PROGRESS NOTE ADULT - PROBLEM/PLAN-2
DISPLAY PLAN FREE TEXT
complains of pain/discomfort

## 2024-05-10 NOTE — DISCHARGE NOTE NURSING/CASE MANAGEMENT/SOCIAL WORK - PATIENT PORTAL LINK FT
You can access the FollowMyHealth Patient Portal offered by [facility] by registering at the following website: [email or web address]. By joining [facility]’s FollowMyHealth portal, you will also be able to view your health information using other applications (apps) compatible with our system.

## 2024-05-10 NOTE — PROGRESS NOTE ADULT - PROVIDER SPECIALTY LIST ADULT
Cardiology
Critical Care
Endocrinology
Internal Medicine
Pulmonology
Anesthesia
Cardiology
Critical Care
Critical Care
Endocrinology
Pulmonology
CT Surgery
Cardiology
Endocrinology
Endocrinology
Internal Medicine
Pulmonology
Pulmonology
CT Surgery
Cardiology
Endocrinology
Pulmonology
Pulmonology
Endocrinology
CT Surgery
Endocrinology
CT Surgery
Endocrinology
CT Surgery

## 2024-05-10 NOTE — DISCHARGE NOTE PROVIDER - NSDCFUSCHEDAPPT_GEN_ALL_CORE_FT
Velma Jimenez  [facility]  CTSURG [address]  Scheduled Appointment: 05/20/2024

## 2024-05-10 NOTE — PROGRESS NOTE ADULT - NS ATTEND AMEND GEN_ALL_CORE FT
Chart, labs, vitals, radiology reviewed. Above H&P reviewed and edited where appropriate. Agree with history and physical exam. Agree with assessment and plan. I reviewed the overnight course of events and discussed the care with the patient/ family.  All the decisions in assessment and plan are made by me.

## 2024-05-10 NOTE — PROGRESS NOTE ADULT - SUBJECTIVE AND OBJECTIVE BOX
Chief complaint  Patient is a 68y old  Male who presents with a chief complaint of s/p 5/3/24 open heart surgery: quadruple bypass (10 May 2024 11:24)         Labs and Fingersticks  CAPILLARY BLOOD GLUCOSE      POCT Blood Glucose.: 127 mg/dL (10 May 2024 07:43)  POCT Blood Glucose.: 142 mg/dL (09 May 2024 21:43)  POCT Blood Glucose.: 146 mg/dL (09 May 2024 21:07)  POCT Blood Glucose.: 93 mg/dL (09 May 2024 16:36)      Anion Gap: 13 (05-10 @ 06:08)  Anion Gap: 12 (05-09 @ 06:00)      Calcium: 8.4 (05-10 @ 06:08)  Calcium: 8.3 *L* (05-09 @ 06:00)          05-10    135  |  101  |  25<H>  ----------------------------<  123<H>  4.1   |  21<L>  |  0.97    Ca    8.4      10 May 2024 06:08                          8.3    8.07  )-----------( 280      ( 10 May 2024 06:08 )             25.2     Medications  MEDICATIONS  (STANDING):  aMIOdarone    Tablet 200 milliGRAM(s) Oral daily  amLODIPine   Tablet 10 milliGRAM(s) Oral daily  ascorbic acid 500 milliGRAM(s) Oral daily  aspirin enteric coated 81 milliGRAM(s) Oral daily  atorvastatin 40 milliGRAM(s) Oral at bedtime  bisacodyl Suppository 10 milliGRAM(s) Rectal once  chlorhexidine 2% Cloths 1 Application(s) Topical daily  dextrose 50% Injectable 50 milliLiter(s) IV Push every 15 minutes  dextrose 50% Injectable 25 milliLiter(s) IV Push every 15 minutes  dextrose Oral Gel 15 Gram(s) Oral once  ferrous    sulfate 325 milliGRAM(s) Oral three times a day  folic acid 1 milliGRAM(s) Oral daily  furosemide    Tablet 40 milliGRAM(s) Oral daily  gabapentin 100 milliGRAM(s) Oral every 8 hours  glucagon  Injectable 1 milliGRAM(s) IntraMuscular once  heparin   Injectable 5000 Unit(s) SubCutaneous every 8 hours  insulin glargine Injectable (LANTUS) 18 Unit(s) SubCutaneous at bedtime  insulin lispro (ADMELOG) corrective regimen sliding scale   SubCutaneous three times a day before meals  insulin lispro Injectable (ADMELOG) 6 Unit(s) SubCutaneous three times a day before meals  ipratropium    for Nebulization 500 MICROGram(s) Nebulizer every 6 hours  methocarbamol 500 milliGRAM(s) Oral every 8 hours  metoprolol succinate  milliGRAM(s) Oral daily  pantoprazole    Tablet 40 milliGRAM(s) Oral before breakfast  polyethylene glycol 3350 17 Gram(s) Oral daily  potassium chloride  10 mEq/50 mL IVPB 10 milliEquivalent(s) IV Intermittent every 1 hour  potassium chloride  10 mEq/50 mL IVPB 10 milliEquivalent(s) IV Intermittent every 1 hour  potassium chloride  10 mEq/50 mL IVPB 10 milliEquivalent(s) IV Intermittent every 1 hour  senna 2 Tablet(s) Oral at bedtime  sodium chloride 0.9% lock flush 3 milliLiter(s) IV Push every 8 hours  spironolactone 25 milliGRAM(s) Oral daily      Physical Exam  General: Patient comfortable in bed   Vital Signs Last 12 Hrs  T(F): 99.1 (05-10-24 @ 05:06), Max: 99.1 (05-10-24 @ 05:06)  HR: 66 (05-10-24 @ 05:06) (66 - 66)  BP: 154/63 (05-10-24 @ 05:06) (154/63 - 154/63)  BP(mean): --  RR: 18 (05-10-24 @ 05:06) (18 - 18)  SpO2: 91% (05-10-24 @ 05:06) (91% - 91%)    CVS: S1S2   Respiratory: No wheezing, no crepitations  GI: Abdomen soft, bowel sounds positive  Musculoskeletal:  moves all extremities  : Voiding        Chief complaint  Patient is a 68y old  Male who presents with a chief complaint of s/p 5/3/24 open heart surgery: quadruple  bypass (10 May 2024 11:24)         Labs and Fingersticks  CAPILLARY BLOOD GLUCOSE      POCT Blood Glucose.: 127 mg/dL (10 May 2024 07:43)  POCT Blood Glucose.: 142 mg/dL (09 May 2024 21:43)  POCT Blood Glucose.: 146 mg/dL (09 May 2024 21:07)  POCT Blood Glucose.: 93 mg/dL (09 May 2024 16:36)      Anion Gap: 13 (05-10 @ 06:08)  Anion Gap: 12 (05-09 @ 06:00)      Calcium: 8.4 (05-10 @ 06:08)  Calcium: 8.3 *L* (05-09 @ 06:00)          05-10    135  |  101  |  25<H>  ----------------------------<  123<H>  4.1   |  21<L>  |  0.97    Ca    8.4      10 May 2024 06:08                          8.3    8.07  )-----------( 280      ( 10 May 2024 06:08 )             25.2     Medications  MEDICATIONS  (STANDING):  aMIOdarone    Tablet 200 milliGRAM(s) Oral daily  amLODIPine   Tablet 10 milliGRAM(s) Oral daily  ascorbic acid 500 milliGRAM(s) Oral daily  aspirin enteric coated 81 milliGRAM(s) Oral daily  atorvastatin 40 milliGRAM(s) Oral at bedtime  bisacodyl Suppository 10 milliGRAM(s) Rectal once  chlorhexidine 2% Cloths 1 Application(s) Topical daily  dextrose 50% Injectable 50 milliLiter(s) IV Push every 15 minutes  dextrose 50% Injectable 25 milliLiter(s) IV Push every 15 minutes  dextrose Oral Gel 15 Gram(s) Oral once  ferrous    sulfate 325 milliGRAM(s) Oral three times a day  folic acid 1 milliGRAM(s) Oral daily  furosemide    Tablet 40 milliGRAM(s) Oral daily  gabapentin 100 milliGRAM(s) Oral every 8 hours  glucagon  Injectable 1 milliGRAM(s) IntraMuscular once  heparin   Injectable 5000 Unit(s) SubCutaneous every 8 hours  insulin glargine Injectable (LANTUS) 18 Unit(s) SubCutaneous at bedtime  insulin lispro (ADMELOG) corrective regimen sliding scale   SubCutaneous three times a day before meals  insulin lispro Injectable (ADMELOG) 6 Unit(s) SubCutaneous three times a day before meals  ipratropium    for Nebulization 500 MICROGram(s) Nebulizer every 6 hours  methocarbamol 500 milliGRAM(s) Oral every 8 hours  metoprolol succinate  milliGRAM(s) Oral daily  pantoprazole    Tablet 40 milliGRAM(s) Oral before breakfast  polyethylene glycol 3350 17 Gram(s) Oral daily  potassium chloride  10 mEq/50 mL IVPB 10 milliEquivalent(s) IV Intermittent every 1 hour  potassium chloride  10 mEq/50 mL IVPB 10 milliEquivalent(s) IV Intermittent every 1 hour  potassium chloride  10 mEq/50 mL IVPB 10 milliEquivalent(s) IV Intermittent every 1 hour  senna 2 Tablet(s) Oral at bedtime  sodium chloride 0.9% lock flush 3 milliLiter(s) IV Push every 8 hours  spironolactone 25 milliGRAM(s) Oral daily      Physical Exam  General: Patient comfortable in bed   Vital Signs Last 12 Hrs  T(F): 99.1 (05-10-24 @ 05:06), Max: 99.1 (05-10-24 @ 05:06)  HR: 66 (05-10-24 @ 05:06) (66 - 66)  BP: 154/63 (05-10-24 @ 05:06) (154/63 - 154/63)  BP(mean): --  RR: 18 (05-10-24 @ 05:06) (18 - 18)  SpO2: 91% (05-10-24 @ 05:06) (91% - 91%)    CVS: S1S2   Respiratory: No wheezing, no crepitations  GI: Abdomen soft, bowel sounds positive  Musculoskeletal:  moves all extremities  : Voiding

## 2024-05-10 NOTE — PROGRESS NOTE ADULT - PROBLEM SELECTOR PROBLEM 3
Diabetes mellitus
HTN (hypertension)
HTN (hypertension)
Prophylactic measure
HTN (hypertension)
HTN (hypertension)
Prophylactic measure
HTN (hypertension)
Diabetes mellitus
Diabetes mellitus

## 2024-05-10 NOTE — DISCHARGE NOTE PROVIDER - NSDCPNSUBOBJ_GEN_ALL_CORE
General: NAD  HEENT:  NC/AT  Neuro: A&Ox4, gait steady, speech clear, no focal deficits noted  Respiratory: B/L BS CTA, no wheeze, no rhonchi, no crackles noted  Cardiovascular: RRR, normal S1S2, no murmur noted  GI: Abd soft, NT/ND, +BSx4Q +BM  Peripheral Vascular:  B/L LE neg edema, 2+ peripheral pulses, no clubbing, cyanosis, varicosities/PVD noted  Musculoskeletal: B/L UE and LE 5/5 strength   Psychiatric: Normal mood, normal affect observed  Skin: Normal exam to inspection and palpation. no bleeding, no hematoma. General: NAD  HEENT:  NC/AT  Neuro: A&Ox4, gait steady, speech clear, no focal deficits noted  Respiratory: B/L BS CTA, no wheeze, no rhonchi, no crackles noted  Cardiovascular: RSR 65-70;  normal S1S2, no murmur noted  GI: Abd soft, NT/ND, +BSx4Q +BM  Peripheral Vascular:  B/L LE trace LE edema, 2+ peripheral pulses, no clubbing, cyanosis, varicosities/PVD noted  Musculoskeletal: B/L UE and LE 5/5 strength   Psychiatric: Normal mood, normal affect observed  Skin: Normal exam to inspection and palpation. no bleeding, no hematoma; midsternal incision cdi kings- sternum stable; left radial site cdi kings; rt svg site cdi kings     discharge pt home today 5/10 as per Dr. Jimenez

## 2024-05-10 NOTE — DISCHARGE NOTE PROVIDER - NSRESEARCHGRANT_OVERRIDEREC_GEN_A_CORE
Attempted to hook up patient for portable EEG test . Patient started screaming & swearing at me and the nurse. He would not let me try to finish hooking him up.  Xiomara Horner This is a surgical and/or non-medical patient.

## 2024-05-10 NOTE — DISCHARGE NOTE PROVIDER - NSDCFUADDINST_GEN_ALL_CORE_FT
no driving until cleared by Dr. Jimenez  call Dr. Jimenez for fever > 101  refer to cardiac surgery do and don't checklist  check blood sugar levels before meals and at bedtime- record levels

## 2024-05-10 NOTE — DISCHARGE NOTE PROVIDER - NSDCCPCAREPLAN_GEN_ALL_CORE_FT
PRINCIPAL DISCHARGE DIAGNOSIS  Diagnosis: S/P CABG x 4  Assessment and Plan of Treatment: shower daily  weigh yourself daily  continue current prescriptions as ordered  increase activity as tolerated   no added salt; low fat; low cholesterol, low salt diet.   follow up with Cardiologist in 1-2 weeks. Call to schedule appointment.  follow up with cardiac surgeon       PRINCIPAL DISCHARGE DIAGNOSIS  Diagnosis: S/P CABG x 4  Assessment and Plan of Treatment: shower daily  weigh yourself daily  continue current prescriptions as ordered  increase activity as tolerated   no added salt; low fat; low cholesterol, low salt diabetic diet  check blood sugar levels before meals and at bedtime- record levels   follow up with Cardiologist, Dr. Brewer, in 1-2 weeks. Call to schedule appointment.  follow up with cardiac surgeon, Dr. Jimenez, on May 20th at 11:15 am; call to confirm appointment. [phone]  follow up with endocrinologist, within 2-3 weeks of discharge; Call to schedule appointment.

## 2024-05-10 NOTE — PROGRESS NOTE ADULT - ASSESSMENT
68 year-old male, with past history significant for Type-2 DM, HLD, HTN and Hyperkalemia, presented to the ED secondary to L-sided chest pain, shortness of breath.  Transferred to [facility] for CABG eval   Assessment  DMT2: 68y Male with DM T2 with hyperglycemia, A1C 8.9% , was on insulin at home, on  basal bolus insulin with  coverage, now s/p surgery, on basal bolus insulin, eating full meals, sugars within acceptable range.  CAD: on medications, stable, monitored.  HTN: on antihypertensive medications, monitored, asymptomatic.      Discussed plan and management with Dr Gaston Castillo NP - TEAMS  Leela Feldman MD  Cell: [phone]  Office: [phone]

## 2024-05-10 NOTE — PROGRESS NOTE ADULT - SUBJECTIVE AND OBJECTIVE BOX
CARDIOLOGY FOLLOW UP - Dr. Brewer  DATE OF SERVICE: 5/10/24    CC no acute cv events       REVIEW OF SYSTEMS:  CONSTITUTIONAL: No fever, weight loss, or fatigue  RESPIRATORY: No cough, wheezing, chills or hemoptysis; No Shortness of Breath  CARDIOVASCULAR: No chest pain, palpitations, passing out, dizziness, or leg swelling  GASTROINTESTINAL: No abdominal or epigastric pain. No nausea, vomiting, or hematemesis; No diarrhea or constipation. No melena or hematochezia.  VASCULAR: No edema     PHYSICAL EXAM:  T(C): 37.3 (05-10-24 @ 05:06), Max: 37.3 (05-10-24 @ 05:06)  HR: 66 (05-10-24 @ 05:06) (63 - 68)  BP: 154/63 (05-10-24 @ 05:06) (127/62 - 154/63)  RR: 18 (05-10-24 @ 05:06) (18 - 18)  SpO2: 91% (05-10-24 @ 05:06) (91% - 95%)  Wt(kg): --  I&O's Summary    09 May 2024 07:01  -  10 May 2024 07:00  --------------------------------------------------------  IN: 960 mL / OUT: 700 mL / NET: 260 mL    10 May 2024 07:01  -  10 May 2024 11:14  --------------------------------------------------------  IN: 240 mL / OUT: 0 mL / NET: 240 mL        Appearance: Normal	  Cardiovascular: Normal S1 S2,RRR, No JVD, No murmurs  Respiratory: Lungs clear to auscultation	  Gastrointestinal:  Soft, Non-tender, + BS	  Extremities: Normal range of motion, No clubbing, cyanosis or edema      Home Medications:  aspirin 81 mg oral delayed release tablet: 1 tab(s) orally once a day (29 Apr 2024 18:21)  atorvastatin 40 mg oral tablet: 1 tab(s) orally once a day (at bedtime) (29 Apr 2024 18:21)  insulin glargine 100 units/mL subcutaneous solution: 50 unit(s) subcutaneous once a day (at bedtime) (29 Apr 2024 18:21)  insulin lispro 100 units/mL injectable solution: 15 unit(s) injectable once a day (in the morning) BEFORE BREAKFAST (29 Apr 2024 18:21)  insulin lispro 100 units/mL injectable solution: 1 unit(s) injectable 3 times a day (before meals) 1 Unit(s) if Glucose 151 - 200  2 Unit(s) if Glucose 201 - 250  3 Unit(s) if Glucose 251 - 300  4 Unit(s) if Glucose 301 - 350  5 Unit(s) if Glucose 351 - 400  6 Unit(s) if Glucose Greater Than 400 (29 Apr 2024 18:21)  insulin lispro 100 units/mL injectable solution: 15 unit(s) injectable once a day before dinner (29 Apr 2024 18:21)  lisinopril 20 mg oral tablet: 1 tab(s) orally once a day (29 Apr 2024 02:23)  metoprolol tartrate 25 mg oral tablet: 1 tab(s) orally every 12 hours (29 Apr 2024 18:21)      MEDICATIONS  (STANDING):  aMIOdarone    Tablet 200 milliGRAM(s) Oral daily  amLODIPine   Tablet 10 milliGRAM(s) Oral daily  aspirin enteric coated 81 milliGRAM(s) Oral daily  atorvastatin 40 milliGRAM(s) Oral at bedtime  bisacodyl Suppository 10 milliGRAM(s) Rectal once  chlorhexidine 2% Cloths 1 Application(s) Topical daily  dextrose 50% Injectable 50 milliLiter(s) IV Push every 15 minutes  dextrose 50% Injectable 25 milliLiter(s) IV Push every 15 minutes  dextrose Oral Gel 15 Gram(s) Oral once  furosemide    Tablet 40 milliGRAM(s) Oral daily  gabapentin 100 milliGRAM(s) Oral every 8 hours  glucagon  Injectable 1 milliGRAM(s) IntraMuscular once  heparin   Injectable 5000 Unit(s) SubCutaneous every 8 hours  insulin glargine Injectable (LANTUS) 18 Unit(s) SubCutaneous at bedtime  insulin lispro (ADMELOG) corrective regimen sliding scale   SubCutaneous three times a day before meals  insulin lispro Injectable (ADMELOG) 6 Unit(s) SubCutaneous three times a day before meals  ipratropium    for Nebulization 500 MICROGram(s) Nebulizer every 6 hours  methocarbamol 500 milliGRAM(s) Oral every 8 hours  metoprolol succinate  milliGRAM(s) Oral daily  pantoprazole    Tablet 40 milliGRAM(s) Oral before breakfast  polyethylene glycol 3350 17 Gram(s) Oral daily  potassium chloride  10 mEq/50 mL IVPB 10 milliEquivalent(s) IV Intermittent every 1 hour  potassium chloride  10 mEq/50 mL IVPB 10 milliEquivalent(s) IV Intermittent every 1 hour  potassium chloride  10 mEq/50 mL IVPB 10 milliEquivalent(s) IV Intermittent every 1 hour  senna 2 Tablet(s) Oral at bedtime  sodium chloride 0.9% lock flush 3 milliLiter(s) IV Push every 8 hours  spironolactone 25 milliGRAM(s) Oral daily      TELEMETRY: nsr	    ECG:  	  RADIOLOGY:   DIAGNOSTIC TESTING:  [ ] Echocardiogram:  [ ]  Catheterization:  [ ] Stress Test:    OTHER: 	    LABS:	 	    Creatine Kinase, Serum: 607 U/L [30 - 200] (05-03 @ 16:13)  CKMB Units: 36.4 ng/mL [0.0 - 6.7] (05-03 @ 16:13)  Troponin T, High Sensitivity Result: 759 ng/L [0 - 51] (05-03 @ 16:13)                          8.3    8.07  )-----------( 280      ( 10 May 2024 06:08 )             25.2     05-10    135  |  101  |  25<H>  ----------------------------<  123<H>  4.1   |  21<L>  |  0.97    Ca    8.4      10 May 2024 06:08      PT/INR - ( 10 May 2024 06:08 )   PT: 14.9 sec;   INR: 1.37 ratio         PTT - ( 10 May 2024 06:08 )  PTT:21.0 sec

## 2024-05-10 NOTE — DISCHARGE NOTE PROVIDER - NSDCACTIVITY_GEN_ALL_CORE
Anesthesia Evaluation     . Pt has had prior anesthetic.     No history of anesthetic complications     ROS/MED HX    ENT/Pulmonary:      (-) sleep apnea   Neurologic:       Cardiovascular:     (+) hypertension----. : . . . :. .       METS/Exercise Tolerance:     Hematologic:         Musculoskeletal:         GI/Hepatic:     (+) GERD Asymptomatic on medication,       Renal/Genitourinary:         Endo:     (+) type I DM, Using insulin Obesity, .      Psychiatric:     (+) psychiatric history depression      Infectious Disease:         Malignancy:         Other:               Physical Exam  Normal systems: dental    Airway   Mallampati: II  TM distance: >3 FB  Neck ROM: full    Dental     Cardiovascular   Rhythm and rate: regular and normal      Pulmonary    breath sounds clear to auscultation                    Anesthesia Plan      History & Physical Review  History and physical reviewed and following examination; no interval change.    ASA Status:  2 .    NPO Status:  > 8 hours    Plan for MAC with Intravenous induction. Reason for MAC:  Procedure to face, neck, head or breast         Postoperative Care      Consents  Anesthetic plan, risks, benefits and alternatives discussed with:  Patient..                          .   Sex allowed/Do not drive or operate machinery/Showering allowed/Do not make important decisions/Stairs allowed/Walking - Indoors allowed/No heavy lifting/straining/Walking - Outdoors allowed/Follow Instructions Provided by your Surgical Team/Activity as tolerated

## 2024-05-10 NOTE — DISCHARGE NOTE PROVIDER - REASON FOR ADMISSION
s/p 5/3/24 open heart surgery: quadruple bypass s/p 5/3/24 open heart surgery--> quadruple bypass/ left radial/ ANIYA clip

## 2024-05-10 NOTE — PROGRESS NOTE ADULT - REASON FOR ADMISSION
cardiac surgery

## 2024-05-10 NOTE — PROGRESS NOTE ADULT - PROBLEM SELECTOR PROBLEM 1
Diabetes mellitus, type II, insulin dependent
CAD, multiple vessel
Diabetes mellitus, type II, insulin dependent
S/P CABG x 4
Diabetes mellitus, type II, insulin dependent
CAD, multiple vessel
Diabetes mellitus, type II, insulin dependent
Diabetes mellitus, type II, insulin dependent
S/P CABG x 4
S/P CABG x 4

## 2024-05-10 NOTE — DISCHARGE NOTE PROVIDER - CARE PROVIDERS DIRECT ADDRESSES
,[email or web address]

## 2024-05-11 ENCOUNTER — TRANSCRIPTION ENCOUNTER (OUTPATIENT)
Age: 69
End: 2024-05-11

## 2024-05-11 RX ORDER — INSULIN LISPRO 100 [IU]/ML
0 INJECTION, SUSPENSION SUBCUTANEOUS
Refills: 0
Start: 2024-05-11

## 2024-05-11 RX ORDER — GLUCAGON INJECTION, SOLUTION 0.5 MG/.1ML
0 INJECTION, SOLUTION SUBCUTANEOUS
Qty: 1 | Refills: 0
Start: 2024-05-11

## 2024-05-11 RX ORDER — ISOPROPYL ALCOHOL, BENZOCAINE .7; .06 ML/ML; ML/ML
0 SWAB TOPICAL
Qty: 100 | Refills: 1
Start: 2024-05-11

## 2024-05-11 RX ORDER — INSULIN ASPART 100 [IU]/ML
0 INJECTION, SUSPENSION SUBCUTANEOUS
Refills: 0
Start: 2024-05-11

## 2024-05-11 RX ORDER — INSULIN NPH HUM/REG INSULIN HM 70-30/ML
0 VIAL (ML) SUBCUTANEOUS
Refills: 0
Start: 2024-05-11

## 2024-05-11 RX ORDER — INSULIN LISPRO 100/ML
0 VIAL (ML) SUBCUTANEOUS
Qty: 1 | Refills: 0
Start: 2024-05-11

## 2024-05-11 RX ORDER — INSULIN LISPRO 100/ML
0 VIAL (ML) SUBCUTANEOUS
Qty: 1 | Refills: 1
Start: 2024-05-11

## 2024-05-11 RX ORDER — INSULIN GLARGINE 100 [IU]/ML
0 INJECTION, SOLUTION SUBCUTANEOUS
Qty: 5 | Refills: 0
Start: 2024-05-11

## 2024-05-11 RX ORDER — INSULIN LISPRO 100/ML
0 VIAL (ML) SUBCUTANEOUS
Qty: 5 | Refills: 0
Start: 2024-05-11

## 2024-05-11 RX ORDER — INSULIN LISPRO 100/ML
0 VIAL (ML) SUBCUTANEOUS
Qty: 5 | Refills: 1
Start: 2024-05-11

## 2024-05-11 RX ORDER — INSULIN LISPRO 100/ML
6 VIAL (ML) SUBCUTANEOUS
Qty: 1 | Refills: 0
Start: 2024-05-11 | End: 2024-06-09

## 2024-05-11 RX ORDER — INSULIN ASPART 100 [IU]/ML
0 INJECTION, SOLUTION SUBCUTANEOUS
Qty: 5 | Refills: 0
Start: 2024-05-11

## 2024-05-11 RX ORDER — METFORMIN HYDROCHLORIDE 850 MG/1
1 TABLET ORAL
Qty: 60 | Refills: 0
Start: 2024-05-11

## 2024-05-11 RX ORDER — INSULIN DETEMIR 100/ML (3)
0 INSULIN PEN (ML) SUBCUTANEOUS
Qty: 5 | Refills: 0
Start: 2024-05-11

## 2024-05-13 ENCOUNTER — APPOINTMENT (OUTPATIENT)
Dept: CARE COORDINATION | Facility: HOME HEALTH | Age: 69
End: 2024-05-13
Payer: MEDICARE

## 2024-05-13 VITALS
HEART RATE: 104 BPM | OXYGEN SATURATION: 95 % | RESPIRATION RATE: 14 BRPM | SYSTOLIC BLOOD PRESSURE: 144 MMHG | DIASTOLIC BLOOD PRESSURE: 84 MMHG

## 2024-05-13 PROCEDURE — 99024 POSTOP FOLLOW-UP VISIT: CPT

## 2024-05-13 RX ORDER — APIXABAN 2.5 MG/1
2.5 TABLET, FILM COATED ORAL
Qty: 180 | Refills: 0 | Status: ACTIVE | COMMUNITY
Start: 2024-05-13

## 2024-05-13 RX ORDER — INSULIN GLARGINE 100 [IU]/ML
INJECTION, SOLUTION SUBCUTANEOUS AT BEDTIME
Refills: 0 | Status: ACTIVE | COMMUNITY

## 2024-05-13 RX ORDER — POLYETHYLENE GLYCOL 3350 17 G/17G
17 POWDER, FOR SOLUTION ORAL DAILY
Qty: 20 | Refills: 0 | Status: ACTIVE | COMMUNITY
Start: 2024-05-13

## 2024-05-13 RX ORDER — GABAPENTIN 100 MG/1
100 CAPSULE ORAL 3 TIMES DAILY
Qty: 90 | Refills: 0 | Status: ACTIVE | COMMUNITY
Start: 2024-05-13

## 2024-05-13 RX ORDER — SIMVASTATIN 10 MG/1
10 TABLET, FILM COATED ORAL
Refills: 0 | Status: DISCONTINUED | COMMUNITY
End: 2024-05-13

## 2024-05-13 RX ORDER — CHLORHEXIDINE GLUCONATE 4 %
325 (65 FE) LIQUID (ML) TOPICAL 3 TIMES DAILY
Refills: 0 | Status: ACTIVE | COMMUNITY
Start: 2024-05-13

## 2024-05-13 RX ORDER — ASPIRIN ENTERIC COATED TABLETS 81 MG 81 MG/1
81 TABLET, DELAYED RELEASE ORAL DAILY
Qty: 30 | Refills: 0 | Status: ACTIVE | COMMUNITY
Start: 2024-05-13

## 2024-05-13 RX ORDER — SIMETHICONE 80 MG/1
80 TABLET, CHEWABLE ORAL EVERY 6 HOURS
Refills: 0 | Status: ACTIVE | COMMUNITY
Start: 2024-05-13

## 2024-05-13 RX ORDER — PANTOPRAZOLE 40 MG/1
40 TABLET, DELAYED RELEASE ORAL DAILY
Qty: 30 | Refills: 1 | Status: ACTIVE | COMMUNITY
Start: 2024-05-13

## 2024-05-13 RX ORDER — MULTIVIT-MIN/FOLIC/VIT K/LYCOP 400-300MCG
500 TABLET ORAL DAILY
Qty: 30 | Refills: 1 | Status: ACTIVE | COMMUNITY
Start: 2024-05-13

## 2024-05-13 RX ORDER — FOLIC ACID 1 MG/1
1 TABLET ORAL DAILY
Qty: 30 | Refills: 0 | Status: COMPLETED | COMMUNITY
Start: 2024-05-13 | End: 2024-06-12

## 2024-05-13 RX ORDER — SENNA 8.6 MG/1
8.6 TABLET, FILM COATED ORAL
Qty: 60 | Refills: 0 | Status: ACTIVE | COMMUNITY
Start: 2024-05-13

## 2024-05-13 RX ORDER — OXYCODONE 5 MG/1
5 TABLET ORAL
Qty: 16 | Refills: 0 | Status: ACTIVE | COMMUNITY
Start: 2024-05-13

## 2024-05-13 RX ORDER — ATORVASTATIN CALCIUM 40 MG/1
40 TABLET, FILM COATED ORAL
Qty: 30 | Refills: 1 | Status: ACTIVE | COMMUNITY
Start: 2024-05-13

## 2024-05-13 RX ORDER — INSULIN ASPART 100 [IU]/ML
100 INJECTION, SOLUTION INTRAVENOUS; SUBCUTANEOUS
Refills: 0 | Status: DISCONTINUED | COMMUNITY
End: 2024-05-13

## 2024-05-13 RX ORDER — INSULIN LISPRO 100 [IU]/ML
100 INJECTION, SOLUTION INTRAVENOUS; SUBCUTANEOUS
Refills: 0 | Status: ACTIVE | COMMUNITY
Start: 2024-05-13

## 2024-05-13 NOTE — HISTORY OF PRESENT ILLNESS
[FreeTextEntry1] : 68 year-old male, with past history significant for Type-2 DM, HLD, HTN and Hyperkalemia, presented to the ED secondary to L-sided chest pain, shortness of breath, sp cath found to have TVD. Now s/p C4L, ANIYA on 5/3.   5/3 s/p C4L, ANIYA. Extubated to HFNC. Post Op requiring 3 units of PRBC and insulin gtt. Developed new onset Afib/RVR received modified amio load due to bradycardia. Continued on 200mg QD of Amio and Lopressor 50 BID. 5/7 Right Radial A -line, Trejo, CT d/c'd, Remains in Afib with HR in the 90s --> tx to 2 sellers. Received pt with ecchymosis to right arm, edema +1 with pain on palpation. Pending Duplex to right extremity. Passed TOV. No BM since prior to surgery --> Sorbitol and dulcolax ordered. Wean Supplemental O2 as tolerated. Increase Ambulation. Increase use of Incentive Spirometry. 5/8 VSS SR since 4:30am  on BB & amio  coumadin daily 5/9 VSS SR x 24 hrs - converted to afib @ 4:30am 100's on lopress bid - will change to toprol  a/c  d/c plan home 5/10 VSS; RSR 65-70; pw d/c this am; pafib noted- start eliquis 2.5 bid for AC as per Dr. Jimenez; QTC on 12 lead ekg is 552- decrease toprol 50 qd as per Dr. Jimenez discharge pt home as per Dr. Jimenez ; insulin recs as per Endo 5/13- Seen by [facility] NP for a f/u home visit. Emotional support and education provided. All questions answered. Pt overall is recovering well w/o complaints.

## 2024-05-13 NOTE — REASON FOR VISIT
[Post Hospitalization] : a post hospitalization visit [FreeTextEntry1] : FOLLOW YOUR HEART - Transitional Care Management Program - [facility]

## 2024-05-13 NOTE — PLAN
[TextEntry] : 1) Weigh yourself in the AM prior to eating & drinking. Contact FY team if you note a wt gain of 1-2 lbs overnight or 5 lbs within 1 week. Continue current meds. Keep your legs elevated & ambulate as tolerated. Continue incentive spirometry 10x/hr while awake. Shower using mild soap daily. Your diet should be low salt, low fat, high protein. 2) Call FY team 24/7 w/ any questions, issues, or concerns. My number [phone] was provided to the pt. Explained to pt I personally work from Mon to Fri from 8a to 4p but before or after those hours this number still functions 24/7 and pt will get in touch w/ a team member of CT Surgeon at all times. 3) Report to your FY NP any signs of infection such as redness, swelling, warmth, drainage, or pain at an incision site. Additionally, report to your FY NP if you develop a fever. 4) Do not lift anything more than 5 lbs. 5) Do not drive until you are cleared to do so by your surgeon. 6) Please walk 3x/day.  7) Please buy a scale to be able to weigh yourself daily.   FOLLOW UP APPOINTMENTS: CTSx: Dr. Jimenez on 5/20/24  CARDIOLOGIST: Pt advised to call to schedule appt within 2 weeks ENDO: Dr Feldman- F/U in office within 1-2 weeks. PCP: Dr Rincon -Pt advised to call to schedule appt within 1 month of discharge.

## 2024-05-13 NOTE — PHYSICAL EXAM
[Sclera] : the sclera and conjunctiva were normal [Neck Appearance] : the appearance of the neck was normal [] : no respiratory distress [Respiration, Rhythm And Depth] : normal respiratory rhythm and effort [Exaggerated Use Of Accessory Muscles For Inspiration] : no accessory muscle use [Auscultation Breath Sounds / Voice Sounds] : lungs were clear to auscultation bilaterally [Apical Impulse] : the apical impulse was normal [Murmurs] : no murmurs [Heart Sounds Gallop] : no gallops [Heart Sounds Pericardial Friction Rub] : no pericardial rub [Examination Of The Chest] : the chest was normal in appearance [Chest Visual Inspection Thoracic Asymmetry] : no chest asymmetry [Diminished Respiratory Excursion] : normal chest expansion [Bowel Sounds] : normal bowel sounds [Abdomen Soft] : soft [Skin Color & Pigmentation] : normal skin color and pigmentation [No Focal Deficits] : no focal deficits [Oriented To Time, Place, And Person] : oriented to person, place, and time [Impaired Insight] : insight and judgment were intact [Affect] : the affect was normal [Mood] : the mood was normal [FreeTextEntry1] : Irregular heart rate/rhythm. MSI, CT sites and RLE & LUE graft sites without erythema, drainage or warmth, with edges well approximated.  Sternum stable. B/L LE w/ 2+ edema.

## 2024-05-13 NOTE — REVIEW OF SYSTEMS
[Shortness Of Breath] : shortness of breath [Negative] : Heme/Lymph [de-identified] : PMH: DM2 on insulin

## 2024-05-16 PROBLEM — Z09 POSTOPERATIVE FOLLOW-UP: Status: ACTIVE | Noted: 2024-05-16

## 2024-05-16 NOTE — CONSULT LETTER
[Dear  ___] : Dear  [unfilled], [Courtesy Letter:] : I had the pleasure of seeing your patient, [unfilled], in my office today. [Please see my note below.] : Please see my note below. [Consult Closing:] : Thank you very much for allowing me to participate in the care of this patient.  If you have any questions, please do not hesitate to contact me. [Sincerely,] : Sincerely, [FreeTextEntry2] : DR DEENA WISDOM [FreeTextEntry3] : Velma Jimenez MD Attending Surgeon  [facility]   Cardiovascular & Thoracic Surgery [facility]

## 2024-05-16 NOTE — REASON FOR VISIT
Subjective   History was provided by the mother.  Diaz Bhatt is a 16 y.o. male who is here for this well child visit.  Immunization History   Administered Date(s) Administered    DTaP / HiB / IPV 2008, 2008, 02/18/2009    DTaP vaccine, pediatric (DAPTACEL) 09/06/2013    DTaP, Unspecified 11/18/2009    HPV 9-valent vaccine (GARDASIL 9) 08/22/2019, 09/01/2020    Hepatitis A vaccine, pediatric/adolescent (HAVRIX, VAQTA) 08/26/2009, 02/17/2010    Hepatitis B vaccine, pediatric/adolescent (RECOMBIVAX, ENGERIX) 2008, 2008, 02/18/2009    HiB PRP-OMP conjugate vaccine, pediatric (PEDVAXHIB) 2008, 2008, 02/18/2009    Hib (PRP-D) 11/18/2009    Influenza, Split (incl. purified surface antigen) 11/21/2014    Influenza, Unspecified 11/03/2010, 08/22/2012    Influenza, injectable, MDCK, preservative free, quadrivalent 11/16/2018    Influenza, injectable, quadrivalent 09/01/2020    Influenza, live, intranasal, quadrivalent 09/06/2013, 09/29/2017    Influenza, seasonal, injectable, preservative free 10/12/2015    MMR vaccine, subcutaneous (MMR II) 08/26/2009, 02/17/2010    Meningococcal ACWY vaccine (MENVEO) 08/22/2019    Pfizer Gray Cap SARS-CoV-2 06/04/2022    Pfizer Purple Cap SARS-CoV-2 05/21/2021, 06/13/2021    Pneumococcal Conjugate PCV 7 2008, 2008, 02/18/2009, 11/18/2009    Pneumococcal conjugate vaccine, 13-valent (PREVNAR 13) 08/23/2011    Poliovirus vaccine, subcutaneous (IPOL) 2008, 2008, 02/18/2009, 09/06/2013    Rotavirus pentavalent vaccine, oral (ROTATEQ) 2008, 2008, 02/18/2009    Tdap vaccine, age 10 years and older (BOOSTRIX) 08/22/2019    Varicella vaccine, subcutaneous (VARIVAX) 08/26/2009, 02/17/2010     History of previous adverse reactions to immunizations? no  The following portions of the patient's history were reviewed by a provider in this encounter and updated as appropriate:  Allergies  Meds  Problems       Well Child 12-22  "Year  No current concerns  Ongoing dandruff issues    Balanced diet, good appetite, + dairy, + mvi,   Fast food less than monthly  Nl void and stool  Sleeping 6+ hours overnight, denies daytime tiredness  Completed 9th grade at [employer], gpa 2.5 average, no peer/teacher issues.   Active child, involved in soccer, wrestling, lifting  + seat belt, + detectors, no changes at home, + dentist, + optho   Denies high risk behaviors including tobacco/nicotine, etoh, other drug use  Not currently dating or sexually active.   Nl teen behavior at home     Objective   Vitals:    08/01/23 0840   BP: 119/62   Pulse: (!) 56   Weight: 54.8 kg   Height: 1.651 m (5' 5\")     Growth parameters are noted and are appropriate for age.  Physical Exam  Alert and NAD  HEENT RR bilaterally, TM's nl, nares clear, tonsils nl, MMM, neck supple, FROM  Chest CTA  Cardiac RRR, no murmur  ABD SNT, nl bowel sounds, no masses   nl male  Skin no rashes  Neuro alert and active     Assessment/Plan   Well adolescent.  1. Anticipatory guidance discussed.  Gave handout on well-child issues at this age.  2.  Weight management:  The patient was counseled regarding nutrition and physical activity.  3. Development: appropriate for age  4. No orders of the defined types were placed in this encounter.    5. Follow-up visit in 1 year for next well child visit, or sooner as needed.    Recommendations for teenagers    You received the \"Caring for you 15-18 year old\" packet today    Diet; Continue to encourage a balanced diet.  Monitor snacking, food choices and portion size.  Make sure you discuss any supplements your child in taking    Social:  Monitor school progress.  Set age appropriate limits.  Encourage community or social involvement.  Know your teenagers friends    Safety:  Your teenager was counseled on sun safety, alcohol, tobacco and other drug use consequences.  Safe dating and safe sex were discussed. Your teenager should be monitored for safe online " and social media practices.    Safe driving and seatbelt use was discussed.    Immunizations:  Your teenager is up to date on vaccinations and is recommended to receive a flu vaccine yearly        [de-identified] : Quadruple coronary artery bypass grafting utilizing the left internal mammary artery to left anterior descending, left radial graft to the posterior  descending branch of the right and separate reversed saphenous vein grafts to the diagonal and ramus coronary arteries.  Exclusion of left atrial appendage with an AtriClip. [de-identified] : 5/3/24

## 2024-05-16 NOTE — ASSESSMENT
[FreeTextEntry1] :  68 year-old male, with past history significant for Type-2 DM, HLD, HTN and Hyperkalemia, presented to the ED secondary to L-sided chest pain, shortness of breath, sp cath found to have TVD. Now s/p C4L, ANIYA on 5/3.  He is S/P Quadruple coronary artery bypass grafting utilizing the left internal mammary artery to left anterior descending, left radial graft to the posterior  descending branch of the right and separate reversed saphenous vein grafts to the diagonal and ramus coronary arteries,  Exclusion of left atrial appendage with an AtriClip on 5/3/24. Extubated to HFNC. Post Op requiring 3 units of PRBC and insulin gtt. Developed new onset Afib/RVR received modified amio load due to bradycardia. Continued on 200mg QD of Amio and Lopressor 50 BID. 5/7 Right Radial A -line, Trejo, CT d/c'd, Remains in Afib with HR in the 90s --> tx to 2 sellers. Received pt with ecchymosis to right arm, edema +1 with pain on palpation. Pending Duplex to right extremity. Passed TOV. No BM since prior to surgery --> Sorbitol and dulcolax ordered. Wean Supplemental O2 as tolerated. Increase Ambulation. Increase use of Incentive Spirometry. 5/8 VSS SR since 4:30am  on BB & amio  coumadin daily, 5/9 SR x 24 hrs - converted to afib @ 4:30am 100's on lopress bid - will change to toprol  a/c  d/c plan home, 5/10  pafib noted- start eliquis 2.5 bid for AC,  QTC on 12 lead ekg is 552- decrease toprol 50 qd as per Dr. Jimenez.

## 2024-05-19 ENCOUNTER — TRANSCRIPTION ENCOUNTER (OUTPATIENT)
Age: 69
End: 2024-05-19

## 2024-05-20 ENCOUNTER — RESULT REVIEW (OUTPATIENT)
Age: 69
End: 2024-05-20

## 2024-05-20 ENCOUNTER — APPOINTMENT (OUTPATIENT)
Dept: CARDIOTHORACIC SURGERY | Facility: CLINIC | Age: 69
End: 2024-05-20
Payer: MEDICARE

## 2024-05-20 ENCOUNTER — INPATIENT (INPATIENT)
Facility: HOSPITAL | Age: 69
LOS: 5 days | Discharge: HOME HEALTH | DRG: 206 | End: 2024-05-26
Attending: THORACIC SURGERY (CARDIOTHORACIC VASCULAR SURGERY) | Admitting: THORACIC SURGERY (CARDIOTHORACIC VASCULAR SURGERY)
Payer: MEDICARE

## 2024-05-20 VITALS
HEIGHT: 70 IN | TEMPERATURE: 98 F | SYSTOLIC BLOOD PRESSURE: 161 MMHG | WEIGHT: 184.09 LBS | DIASTOLIC BLOOD PRESSURE: 66 MMHG | HEART RATE: 66 BPM | RESPIRATION RATE: 20 BRPM | OXYGEN SATURATION: 99 %

## 2024-05-20 DIAGNOSIS — R06.00 DYSPNEA, UNSPECIFIED: ICD-10-CM

## 2024-05-20 DIAGNOSIS — Z09 ENCOUNTER FOR FOLLOW-UP EXAMINATION AFTER COMPLETED TREATMENT FOR CONDITIONS OTHER THAN MALIGNANT NEOPLASM: ICD-10-CM

## 2024-05-20 DIAGNOSIS — I48.0 PAROXYSMAL ATRIAL FIBRILLATION: ICD-10-CM

## 2024-05-20 DIAGNOSIS — Z95.1 PRESENCE OF AORTOCORONARY BYPASS GRAFT: ICD-10-CM

## 2024-05-20 DIAGNOSIS — Z95.1 PRESENCE OF AORTOCORONARY BYPASS GRAFT: Chronic | ICD-10-CM

## 2024-05-20 DIAGNOSIS — Z29.9 ENCOUNTER FOR PROPHYLACTIC MEASURES, UNSPECIFIED: ICD-10-CM

## 2024-05-20 DIAGNOSIS — E11.9 TYPE 2 DIABETES MELLITUS WITHOUT COMPLICATIONS: ICD-10-CM

## 2024-05-20 DIAGNOSIS — R06.02 SHORTNESS OF BREATH: ICD-10-CM

## 2024-05-20 DIAGNOSIS — J90 PLEURAL EFFUSION, NOT ELSEWHERE CLASSIFIED: ICD-10-CM

## 2024-05-20 LAB
ALBUMIN SERPL ELPH-MCNC: 3.1 G/DL — LOW (ref 3.3–5)
ALP SERPL-CCNC: 99 U/L — SIGNIFICANT CHANGE UP (ref 40–120)
ALT FLD-CCNC: 20 U/L — SIGNIFICANT CHANGE UP (ref 10–45)
ANION GAP SERPL CALC-SCNC: 12 MMOL/L — SIGNIFICANT CHANGE UP (ref 5–17)
APPEARANCE UR: CLEAR — SIGNIFICANT CHANGE UP
APTT BLD: 33 SEC — SIGNIFICANT CHANGE UP (ref 24.5–35.6)
AST SERPL-CCNC: 13 U/L — SIGNIFICANT CHANGE UP (ref 10–40)
BASOPHILS # BLD AUTO: 0.04 K/UL — SIGNIFICANT CHANGE UP (ref 0–0.2)
BASOPHILS NFR BLD AUTO: 0.5 % — SIGNIFICANT CHANGE UP (ref 0–2)
BILIRUB SERPL-MCNC: 0.5 MG/DL — SIGNIFICANT CHANGE UP (ref 0.2–1.2)
BILIRUB UR-MCNC: NEGATIVE — SIGNIFICANT CHANGE UP
BUN SERPL-MCNC: 20 MG/DL — SIGNIFICANT CHANGE UP (ref 7–23)
CALCIUM SERPL-MCNC: 8.8 MG/DL — SIGNIFICANT CHANGE UP (ref 8.4–10.5)
CHLORIDE SERPL-SCNC: 96 MMOL/L — SIGNIFICANT CHANGE UP (ref 96–108)
CO2 SERPL-SCNC: 23 MMOL/L — SIGNIFICANT CHANGE UP (ref 22–31)
COLOR SPEC: YELLOW — SIGNIFICANT CHANGE UP
CREAT SERPL-MCNC: 1.03 MG/DL — SIGNIFICANT CHANGE UP (ref 0.5–1.3)
DIFF PNL FLD: NEGATIVE — SIGNIFICANT CHANGE UP
EGFR: 80 ML/MIN/1.73M2 — SIGNIFICANT CHANGE UP
EOSINOPHIL # BLD AUTO: 0.14 K/UL — SIGNIFICANT CHANGE UP (ref 0–0.5)
EOSINOPHIL NFR BLD AUTO: 1.7 % — SIGNIFICANT CHANGE UP (ref 0–6)
FLUAV AG NPH QL: SIGNIFICANT CHANGE UP
FLUBV AG NPH QL: SIGNIFICANT CHANGE UP
GLUCOSE BLDC GLUCOMTR-MCNC: 147 MG/DL — HIGH (ref 70–99)
GLUCOSE BLDC GLUCOMTR-MCNC: 159 MG/DL — HIGH (ref 70–99)
GLUCOSE BLDC GLUCOMTR-MCNC: 175 MG/DL — HIGH (ref 70–99)
GLUCOSE BLDC GLUCOMTR-MCNC: 92 MG/DL — SIGNIFICANT CHANGE UP (ref 70–99)
GLUCOSE SERPL-MCNC: 130 MG/DL — HIGH (ref 70–99)
GLUCOSE UR QL: NEGATIVE MG/DL — SIGNIFICANT CHANGE UP
HCT VFR BLD CALC: 25.6 % — LOW (ref 39–50)
HGB BLD-MCNC: 8.3 G/DL — LOW (ref 13–17)
IMM GRANULOCYTES NFR BLD AUTO: 0.5 % — SIGNIFICANT CHANGE UP (ref 0–0.9)
INR BLD: 1.4 RATIO — HIGH (ref 0.85–1.18)
KETONES UR-MCNC: NEGATIVE MG/DL — SIGNIFICANT CHANGE UP
LEUKOCYTE ESTERASE UR-ACNC: NEGATIVE — SIGNIFICANT CHANGE UP
LYMPHOCYTES # BLD AUTO: 1.01 K/UL — SIGNIFICANT CHANGE UP (ref 1–3.3)
LYMPHOCYTES # BLD AUTO: 12 % — LOW (ref 13–44)
MCHC RBC-ENTMCNC: 27.1 PG — SIGNIFICANT CHANGE UP (ref 27–34)
MCHC RBC-ENTMCNC: 32.4 GM/DL — SIGNIFICANT CHANGE UP (ref 32–36)
MCV RBC AUTO: 83.7 FL — SIGNIFICANT CHANGE UP (ref 80–100)
MONOCYTES # BLD AUTO: 0.75 K/UL — SIGNIFICANT CHANGE UP (ref 0–0.9)
MONOCYTES NFR BLD AUTO: 8.9 % — SIGNIFICANT CHANGE UP (ref 2–14)
MRSA PCR RESULT.: SIGNIFICANT CHANGE UP
NEUTROPHILS # BLD AUTO: 6.45 K/UL — SIGNIFICANT CHANGE UP (ref 1.8–7.4)
NEUTROPHILS NFR BLD AUTO: 76.4 % — SIGNIFICANT CHANGE UP (ref 43–77)
NITRITE UR-MCNC: NEGATIVE — SIGNIFICANT CHANGE UP
NRBC # BLD: 0 /100 WBCS — SIGNIFICANT CHANGE UP (ref 0–0)
NT-PROBNP SERPL-SCNC: 965 PG/ML — HIGH (ref 0–300)
PH UR: 5.5 — SIGNIFICANT CHANGE UP (ref 5–8)
PLATELET # BLD AUTO: 509 K/UL — HIGH (ref 150–400)
POTASSIUM SERPL-MCNC: 4.5 MMOL/L — SIGNIFICANT CHANGE UP (ref 3.5–5.3)
POTASSIUM SERPL-SCNC: 4.5 MMOL/L — SIGNIFICANT CHANGE UP (ref 3.5–5.3)
PROT SERPL-MCNC: 6.7 G/DL — SIGNIFICANT CHANGE UP (ref 6–8.3)
PROT UR-MCNC: NEGATIVE MG/DL — SIGNIFICANT CHANGE UP
PROTHROM AB SERPL-ACNC: 15.3 SEC — HIGH (ref 9.5–13)
RBC # BLD: 3.06 M/UL — LOW (ref 4.2–5.8)
RBC # FLD: 14.1 % — SIGNIFICANT CHANGE UP (ref 10.3–14.5)
RSV RNA NPH QL NAA+NON-PROBE: SIGNIFICANT CHANGE UP
S AUREUS DNA NOSE QL NAA+PROBE: SIGNIFICANT CHANGE UP
SARS-COV-2 RNA SPEC QL NAA+PROBE: SIGNIFICANT CHANGE UP
SODIUM SERPL-SCNC: 131 MMOL/L — LOW (ref 135–145)
SP GR SPEC: 1.01 — SIGNIFICANT CHANGE UP (ref 1–1.03)
TROPONIN T, HIGH SENSITIVITY RESULT: 157 NG/L — HIGH (ref 0–51)
TROPONIN T, HIGH SENSITIVITY RESULT: 167 NG/L — HIGH (ref 0–51)
UROBILINOGEN FLD QL: 1 MG/DL — SIGNIFICANT CHANGE UP (ref 0.2–1)
WBC # BLD: 8.43 K/UL — SIGNIFICANT CHANGE UP (ref 3.8–10.5)
WBC # FLD AUTO: 8.43 K/UL — SIGNIFICANT CHANGE UP (ref 3.8–10.5)

## 2024-05-20 PROCEDURE — 99285 EMERGENCY DEPT VISIT HI MDM: CPT

## 2024-05-20 PROCEDURE — 71275 CT ANGIOGRAPHY CHEST: CPT | Mod: 26,MC

## 2024-05-20 PROCEDURE — 93308 TTE F-UP OR LMTD: CPT | Mod: 26

## 2024-05-20 PROCEDURE — 71045 X-RAY EXAM CHEST 1 VIEW: CPT | Mod: 26

## 2024-05-20 PROCEDURE — 99223 1ST HOSP IP/OBS HIGH 75: CPT

## 2024-05-20 RX ORDER — SPIRONOLACTONE 25 MG/1
25 TABLET, FILM COATED ORAL DAILY
Refills: 0 | Status: DISCONTINUED | OUTPATIENT
Start: 2024-05-20 | End: 2024-05-26

## 2024-05-20 RX ORDER — DEXTROSE 50 % IN WATER 50 %
15 SYRINGE (ML) INTRAVENOUS ONCE
Refills: 0 | Status: DISCONTINUED | OUTPATIENT
Start: 2024-05-20 | End: 2024-05-20

## 2024-05-20 RX ORDER — SODIUM CHLORIDE 9 MG/ML
3 INJECTION INTRAMUSCULAR; INTRAVENOUS; SUBCUTANEOUS EVERY 8 HOURS
Refills: 0 | Status: DISCONTINUED | OUTPATIENT
Start: 2024-05-20 | End: 2024-05-26

## 2024-05-20 RX ORDER — PANTOPRAZOLE SODIUM 20 MG/1
40 TABLET, DELAYED RELEASE ORAL
Refills: 0 | Status: DISCONTINUED | OUTPATIENT
Start: 2024-05-20 | End: 2024-05-26

## 2024-05-20 RX ORDER — GABAPENTIN 400 MG/1
100 CAPSULE ORAL EVERY 8 HOURS
Refills: 0 | Status: DISCONTINUED | OUTPATIENT
Start: 2024-05-20 | End: 2024-05-26

## 2024-05-20 RX ORDER — SODIUM CHLORIDE 9 MG/ML
1000 INJECTION, SOLUTION INTRAVENOUS
Refills: 0 | Status: DISCONTINUED | OUTPATIENT
Start: 2024-05-20 | End: 2024-05-20

## 2024-05-20 RX ORDER — FERROUS SULFATE 325(65) MG
325 TABLET ORAL THREE TIMES A DAY
Refills: 0 | Status: DISCONTINUED | OUTPATIENT
Start: 2024-05-20 | End: 2024-05-22

## 2024-05-20 RX ORDER — ACETAMINOPHEN 500 MG
650 TABLET ORAL EVERY 6 HOURS
Refills: 0 | Status: DISCONTINUED | OUTPATIENT
Start: 2024-05-20 | End: 2024-05-26

## 2024-05-20 RX ORDER — FUROSEMIDE 40 MG
40 TABLET ORAL ONCE
Refills: 0 | Status: COMPLETED | OUTPATIENT
Start: 2024-05-20 | End: 2024-05-20

## 2024-05-20 RX ORDER — ATORVASTATIN CALCIUM 80 MG/1
40 TABLET, FILM COATED ORAL AT BEDTIME
Refills: 0 | Status: DISCONTINUED | OUTPATIENT
Start: 2024-05-20 | End: 2024-05-26

## 2024-05-20 RX ORDER — GLUCAGON INJECTION, SOLUTION 0.5 MG/.1ML
1 INJECTION, SOLUTION SUBCUTANEOUS ONCE
Refills: 0 | Status: DISCONTINUED | OUTPATIENT
Start: 2024-05-20 | End: 2024-05-26

## 2024-05-20 RX ORDER — DEXTROSE 50 % IN WATER 50 %
25 SYRINGE (ML) INTRAVENOUS ONCE
Refills: 0 | Status: DISCONTINUED | OUTPATIENT
Start: 2024-05-20 | End: 2024-05-26

## 2024-05-20 RX ORDER — INSULIN GLARGINE 100 [IU]/ML
18 INJECTION, SOLUTION SUBCUTANEOUS AT BEDTIME
Refills: 0 | Status: DISCONTINUED | OUTPATIENT
Start: 2024-05-20 | End: 2024-05-26

## 2024-05-20 RX ORDER — ASPIRIN/CALCIUM CARB/MAGNESIUM 324 MG
81 TABLET ORAL DAILY
Refills: 0 | Status: DISCONTINUED | OUTPATIENT
Start: 2024-05-20 | End: 2024-05-26

## 2024-05-20 RX ORDER — AMIODARONE HYDROCHLORIDE 400 MG/1
200 TABLET ORAL DAILY
Refills: 0 | Status: DISCONTINUED | OUTPATIENT
Start: 2024-05-20 | End: 2024-05-25

## 2024-05-20 RX ORDER — FOLIC ACID 0.8 MG
1 TABLET ORAL DAILY
Refills: 0 | Status: DISCONTINUED | OUTPATIENT
Start: 2024-05-20 | End: 2024-05-26

## 2024-05-20 RX ORDER — ONDANSETRON 8 MG/1
4 TABLET, FILM COATED ORAL EVERY 8 HOURS
Refills: 0 | Status: DISCONTINUED | OUTPATIENT
Start: 2024-05-20 | End: 2024-05-26

## 2024-05-20 RX ORDER — POLYETHYLENE GLYCOL 3350 17 G/17G
17 POWDER, FOR SOLUTION ORAL DAILY
Refills: 0 | Status: DISCONTINUED | OUTPATIENT
Start: 2024-05-20 | End: 2024-05-26

## 2024-05-20 RX ORDER — DEXTROSE 50 % IN WATER 50 %
12.5 SYRINGE (ML) INTRAVENOUS ONCE
Refills: 0 | Status: DISCONTINUED | OUTPATIENT
Start: 2024-05-20 | End: 2024-05-20

## 2024-05-20 RX ORDER — OXYCODONE HYDROCHLORIDE 5 MG/1
5 TABLET ORAL EVERY 6 HOURS
Refills: 0 | Status: DISCONTINUED | OUTPATIENT
Start: 2024-05-20 | End: 2024-05-26

## 2024-05-20 RX ORDER — INSULIN LISPRO 100/ML
VIAL (ML) SUBCUTANEOUS AT BEDTIME
Refills: 0 | Status: DISCONTINUED | OUTPATIENT
Start: 2024-05-20 | End: 2024-05-26

## 2024-05-20 RX ORDER — AMLODIPINE BESYLATE 2.5 MG/1
10 TABLET ORAL DAILY
Refills: 0 | Status: DISCONTINUED | OUTPATIENT
Start: 2024-05-20 | End: 2024-05-26

## 2024-05-20 RX ORDER — INSULIN LISPRO 100/ML
VIAL (ML) SUBCUTANEOUS
Refills: 0 | Status: DISCONTINUED | OUTPATIENT
Start: 2024-05-20 | End: 2024-05-26

## 2024-05-20 RX ORDER — SIMETHICONE 80 MG/1
80 TABLET, CHEWABLE ORAL
Refills: 0 | Status: DISCONTINUED | OUTPATIENT
Start: 2024-05-20 | End: 2024-05-26

## 2024-05-20 RX ORDER — SENNA PLUS 8.6 MG/1
2 TABLET ORAL AT BEDTIME
Refills: 0 | Status: DISCONTINUED | OUTPATIENT
Start: 2024-05-20 | End: 2024-05-26

## 2024-05-20 RX ORDER — INSULIN LISPRO 100/ML
6 VIAL (ML) SUBCUTANEOUS
Refills: 0 | Status: DISCONTINUED | OUTPATIENT
Start: 2024-05-20 | End: 2024-05-26

## 2024-05-20 RX ORDER — FUROSEMIDE 40 MG
40 TABLET ORAL EVERY 12 HOURS
Refills: 0 | Status: DISCONTINUED | OUTPATIENT
Start: 2024-05-20 | End: 2024-05-20

## 2024-05-20 RX ORDER — DEXTROSE 10 % IN WATER 10 %
125 INTRAVENOUS SOLUTION INTRAVENOUS ONCE
Refills: 0 | Status: DISCONTINUED | OUTPATIENT
Start: 2024-05-20 | End: 2024-05-20

## 2024-05-20 RX ORDER — METOPROLOL TARTRATE 50 MG
50 TABLET ORAL DAILY
Refills: 0 | Status: DISCONTINUED | OUTPATIENT
Start: 2024-05-20 | End: 2024-05-22

## 2024-05-20 RX ORDER — LANOLIN ALCOHOL/MO/W.PET/CERES
3 CREAM (GRAM) TOPICAL AT BEDTIME
Refills: 0 | Status: DISCONTINUED | OUTPATIENT
Start: 2024-05-20 | End: 2024-05-26

## 2024-05-20 RX ORDER — ASCORBIC ACID 60 MG
500 TABLET,CHEWABLE ORAL DAILY
Refills: 0 | Status: DISCONTINUED | OUTPATIENT
Start: 2024-05-20 | End: 2024-05-26

## 2024-05-20 RX ORDER — FUROSEMIDE 40 MG
40 TABLET ORAL EVERY 12 HOURS
Refills: 0 | Status: DISCONTINUED | OUTPATIENT
Start: 2024-05-20 | End: 2024-05-26

## 2024-05-20 RX ADMIN — SODIUM CHLORIDE 3 MILLILITER(S): 9 INJECTION INTRAMUSCULAR; INTRAVENOUS; SUBCUTANEOUS at 12:31

## 2024-05-20 RX ADMIN — Medication 6 UNIT(S): at 16:55

## 2024-05-20 RX ADMIN — INSULIN GLARGINE 18 UNIT(S): 100 INJECTION, SOLUTION SUBCUTANEOUS at 21:34

## 2024-05-20 RX ADMIN — Medication 50 MILLIGRAM(S): at 09:00

## 2024-05-20 RX ADMIN — Medication 3 MILLIGRAM(S): at 21:35

## 2024-05-20 RX ADMIN — GABAPENTIN 100 MILLIGRAM(S): 400 CAPSULE ORAL at 21:35

## 2024-05-20 RX ADMIN — Medication 1: at 16:55

## 2024-05-20 RX ADMIN — Medication 1 MILLIGRAM(S): at 09:00

## 2024-05-20 RX ADMIN — Medication 81 MILLIGRAM(S): at 08:59

## 2024-05-20 RX ADMIN — Medication 6 UNIT(S): at 09:32

## 2024-05-20 RX ADMIN — ATORVASTATIN CALCIUM 40 MILLIGRAM(S): 80 TABLET, FILM COATED ORAL at 21:35

## 2024-05-20 RX ADMIN — SENNA PLUS 2 TABLET(S): 8.6 TABLET ORAL at 21:35

## 2024-05-20 RX ADMIN — Medication 325 MILLIGRAM(S): at 13:04

## 2024-05-20 RX ADMIN — GABAPENTIN 100 MILLIGRAM(S): 400 CAPSULE ORAL at 13:04

## 2024-05-20 RX ADMIN — SPIRONOLACTONE 25 MILLIGRAM(S): 25 TABLET, FILM COATED ORAL at 09:04

## 2024-05-20 RX ADMIN — Medication 40 MILLIGRAM(S): at 08:59

## 2024-05-20 RX ADMIN — SODIUM CHLORIDE 3 MILLILITER(S): 9 INJECTION INTRAMUSCULAR; INTRAVENOUS; SUBCUTANEOUS at 21:33

## 2024-05-20 RX ADMIN — AMLODIPINE BESYLATE 10 MILLIGRAM(S): 2.5 TABLET ORAL at 09:00

## 2024-05-20 RX ADMIN — POLYETHYLENE GLYCOL 3350 17 GRAM(S): 17 POWDER, FOR SOLUTION ORAL at 08:59

## 2024-05-20 RX ADMIN — Medication 325 MILLIGRAM(S): at 21:35

## 2024-05-20 RX ADMIN — PANTOPRAZOLE SODIUM 40 MILLIGRAM(S): 20 TABLET, DELAYED RELEASE ORAL at 08:59

## 2024-05-20 RX ADMIN — Medication 500 MILLIGRAM(S): at 09:00

## 2024-05-20 RX ADMIN — Medication 40 MILLIGRAM(S): at 18:34

## 2024-05-20 RX ADMIN — Medication 6 UNIT(S): at 12:20

## 2024-05-20 RX ADMIN — AMIODARONE HYDROCHLORIDE 200 MILLIGRAM(S): 400 TABLET ORAL at 09:00

## 2024-05-20 NOTE — ED CLERICAL - NS ED CLERK NOTE PRE-ARRIVAL INFORMATION; ADDITIONAL PRE-ARRIVAL INFORMATION
This patient is enrolled in the Follow Your Heart program and has undergone a cardiac surgery procedure within the last 30 days and has active care navigation.   This patient can be followed up by the care navigation team within 24 hours. To arrange close follow-up or to obtain additional clinical information about this patient, please call the contact number above.   Please call the cardiac surgery team once patient is registered at [phone] for consultation PRIOR to disposition decision.  The patient recently underwent a cardiac surgery procedure and the team can assist in acute medical management.

## 2024-05-20 NOTE — PHYSICAL THERAPY INITIAL EVALUATION ADULT - PERTINENT HX OF CURRENT PROBLEM, REHAB EVAL
68 year-old male, with past history significant for Type-2 DM, HLD, HTN and Hyperkalemia, presented to the ED secondary to L-sided chest pain, shortness of breath, sp cath found to have TVD. Now s/p C4L, ANIYA on 5/3.  Post -op course significant for acute blood loss anemia requiring 3 U PRBC, respiratory insufficiency requiring high flow,  hyperglycemia requiring insulin gtt, and new onset Afib/RVR received modified amio load due to bradycardia. R arm with ecchymosis and edema s/p RUE duplex.  Ultimately remained in PAF and discharged on eliquis 2.5 BID and toprol 50 QD related to prolonged QTC.  Presents to ER this morning, accompanied by his spouse, related to persistent cough, chills,  and dyspnea on exertion.  Found to have large right pleural effusion and elevated troponin which is downtrending. Hospital Course: TTE: Trace pericardial effusion with no visualized sonographic signs of   tamponade. Bilateral pleural effusions.; CT Angio:No obvious pulmonary embolus. Moderate to large right and small left pleural effusion with compressive atelectasis. Recommend clinical correlation to assess underlying infection. Small pericardial effusion measuring simple to increased attenuation,   which may contain blood products/debris but is difficult to assess due to artifact. Stranding at the midline anterior chest wall, reflecting recent postsurgical changes. Recommend clinical correlation to assess infection.

## 2024-05-20 NOTE — PHYSICAL THERAPY INITIAL EVALUATION ADULT - PLANNED THERAPY INTERVENTIONS, PT EVAL
GOAL: Pt will negotiate up/down 1 flight of steps with  handrail ascending  independently in 2 weeks/gait training/strengthening

## 2024-05-20 NOTE — ED PROVIDER NOTE - PROGRESS NOTE DETAILS
Marques, PGY3 - Spoke with CT surgery Cecelia, will come see patient. CT negative for PE, notable for B/L pleural effusions and possible trace pericardial effusion. Spoke with CTS will admit to their service. -Tracy Sadler MD (Attending)

## 2024-05-20 NOTE — PHYSICAL THERAPY INITIAL EVALUATION ADULT - ADDITIONAL COMMENTS
Prior to admission patient reports living in a 2nd floor walk up apartment with wife. Patient independent in ambulation without use of assistive device, however has RW at home.

## 2024-05-20 NOTE — ED PROVIDER NOTE - NS ED ROS FT
GENERAL: No fever, no chills  EYES: No change in vision  HEENT: No trouble swallowing or speaking  CARDIAC: No chest pain  PULMONARY: +cough, +SOB  GI: No abdominal pain, no nausea, no vomiting, no diarrhea, no constipation  : No changes in urination  SKIN: No rashes  NEURO: No headache, no numbness  MSK: No joint pain  Otherwise as HPI or negative.

## 2024-05-20 NOTE — H&P ADULT - ASSESSMENT
68 year-old male, with past history significant for Type-2 DM, HLD, HTN and Hyperkalemia, presented to the ED secondary to L-sided chest pain, shortness of breath, sp cath found to have TVD. Now s/p C4L, ANIYA on 5/3.  Post -op course significant for acute blood loss anemia requiring 3 U PRBC, respiratory insufficiency requiring high flow,  hyperglycemia requiring insulin gtt, and new onset Afib/RVR received modified amio load due to bradycardia. R arm with ecchymosis and edema s/p RUE duplex.  Ultimately remained in PAF and discharged on eliquis 2.5 BID and toprol 50 QD related to prolonged QTC.  Presents to ER this morning, accompanied by his spouse, related to persistent cough, chills,  and dyspnea on exertion.  Found to have large right pleural effusion   68 year-old male, with past history significant for Type-2 DM, HLD, HTN and Hyperkalemia, presented to the ED secondary to L-sided chest pain, shortness of breath, sp cath found to have TVD. Now s/p C4L, ANIYA on 5/3.  Post -op course significant for acute blood loss anemia requiring 3 U PRBC, respiratory insufficiency requiring high flow,  hyperglycemia requiring insulin gtt, and new onset Afib/RVR received modified amio load due to bradycardia. R arm with ecchymosis and edema s/p RUE duplex.  Ultimately remained in PAF and discharged on eliquis 2.5 BID and toprol 50 QD related to prolonged QTC.  Presents to ER this morning, accompanied by his spouse, related to persistent cough, chills,  and dyspnea on exertion.  Found to have large right pleural effusion and elevated troponin which is downtrending

## 2024-05-20 NOTE — H&P ADULT - HISTORY OF PRESENT ILLNESS
68 year-old male, with past history significant for Type-2 DM, HLD, HTN and Hyperkalemia, presented to the ED secondary to L-sided chest pain, shortness of breath, sp cath found to have TVD. Now s/p C4L, ANIYA on 5/3.  Post -op course significant for acute blood loss anemia requiring 3 U PRBC, respiratory insufficiency requiring high flow,  hyperglycemia requiring insulin gtt, and new onset Afib/RVR received modified amio load due to bradycardia. R arm with ecchymosis and edema s/p RUE duplex.  Ultimately remained in PAF and discharged on eliquis 2.5 BID and toprol 50 QD related to prolonged QTC.  Presents to ER this morning, accompanied by his spouse, related to persistent cough, chills,  and shortness of breath.  He is short of breath while walking a short distance to the bathroom.  Denies fever, sick contacts, headache, chest pain, hemoptysis, nausea, vomiting, diarrhea, wound drainage, nor dysuria.

## 2024-05-20 NOTE — H&P ADULT - NSHPLABSRESULTS_GEN_ALL_CORE
8.3    8.43  )-----------( 509      ( 20 May 2024 03:27 )             25.6     05-20    131<L>  |  96  |  20  ----------------------------<  130<H>  4.5   |  23  |  1.03    Ca    8.8      20 May 2024 03:27    TPro  6.7  /  Alb  3.1<L>  /  TBili  0.5  /  DBili  x   /  AST  13  /  ALT  20  /  AlkPhos  99  05-20    Trop 167-->153    RVP: negative    12 lead EKG: pending    CT ANGIO    PROCEDURE: CT angiography of the chest was performed with intravenous   contrast utilizing dedicated PE protocol. Coronal and sagittal   reconstruction images were obtained. Axial MIP images were obtained from   a separate workstation.    CONTRAST/COMPLICATIONS:  IV Contrast: Omnipaque 350  54 cc administered   46 cc discarded  Oral Contrast: NONE  Complications: None reported at time of study completion    COMPARISON: None.    FINDINGS: Patient's respiratory motion degrades images.    LUNGS AND AIRWAYS: Patent central airways. Compressive atelectasis of the   right > left lung.  PLEURA: Moderate to large right and small left pleural effusion.  HEART: Normal size. Small pericardial effusion measuring simple to   increased attenuation, which may contain blood products/debris but is   difficult to assess due to artifact. Coronary artery calcification/stent.   Left atrial appendage clip.  VESSELS: No obvious pulmonary embolus. Atherosclerosis. Normal caliber of   the thoracic aorta. CABG.  MEDIASTINUM AND ROMIE: Subcentimeter lymph nodes.  CHEST WALL AND LOWER NECK: Well-apposed median sternotomy. Stranding at   the midline anterior chest wall. Anasarca. Bilateral gynecomastia.  UPPER ABDOMEN: Nonspecific mild diffuse bilateral adrenal thickening,   which may be due to hyperplasia.  BONES: Degenerative changes of the spine.    IMPRESSION:    No obvious pulmonary embolus.    Moderate to large right and small left pleural effusion with compressive   atelectasis. Recommend clinical correlation to assess underlying   infection.    Small pericardial effusion measuring simple to increased attenuation,   which may contain blood products/debris but is difficult to assess due to   artifact. Stranding at the midline anterior chest wall, reflecting recent   postsurgical changes. Recommend clinical correlation to assess infection.

## 2024-05-20 NOTE — ED PROVIDER NOTE - CLINICAL SUMMARY MEDICAL DECISION MAKING FREE TEXT BOX
Marques, PGY3 -  68-year-old man with PMH CABG a couple of weeks ago presenting due to cough and shortness of breath, lungs appear to be clear on exam, concern for PE considering recent surgical procedure with bilateral leg swelling, hypoxia on exam, versus possible CABG complications.  Labs, CTA, reassess.  Will need to speak to CT surgery for further evaluation of patient.   Patient to be admitted for further management and monitoring of his hypoxia as well as other findings.

## 2024-05-20 NOTE — ED ADULT NURSE REASSESSMENT NOTE - NS ED NURSE REASSESS COMMENT FT1
0739 Pt repositioned in the bed pt has bed assignment transport cancelled no chart for the pt and bedside echo being done at this time Michelle

## 2024-05-20 NOTE — CONSULT NOTE ADULT - ASSESSMENT
{\rtf1\fpkrgd63878\ansi\lnrcdgb0861\ftnbj\uc1\deff0  {\fonttbl{\f0 \fnil Segoe UI;}{\f1 \fnil \fcharset0 Segoe UI;}{\f2 \fnil Times New Yobani;}}  {\colortbl ;\yso670\kvvff662\zbya862 ;\red0\green0\blue0 ;\red0\green0\svdq680 ;\red0\green0\blue0 ;}  {\stylesheet{\f0\fs20 Normal;}{\cs1 Default Paragraph Font;}{\cs2\f0\fs16 Line Number;}{\cs3\f2\fs24\ul\cf3 Hyperlink;}}  {\*\revtbl{Unknown;}}  \frbsls78049\bqsxwq40254\epxst5459\dmanl2294\kwtxc4964\nnmvk1072\awoitkt286\daqmrgt907\nogrowautofit\qngrof922\formshade\nofeaturethrottle1\dntblnsbdb\fet4\aendnotes\aftnnrlc\pgbrdrhead\pgbrdrfoot  \sectd\ipfioe11193\zbcnqw74746\guttersxn0\onlbdaft9116\aaqspypj9234\fcubqqxj7893\iceplwfg4228\lwptjbl025\lmmngzt538\sbkpage\pgncont\pgndec  \plain\plain\f0\fs24\ql\plain\f0\fs24\plain\f0\fs20\kpjb5179\hich\f0\dbch\f0\loch\f0\fs20 a/p\par  68 year-old male, with past history significant for Type-2 DM, HLD, HTN and Hyperkalemia, presented to the ED secondary to L-sided chest pain, shortness of breath, sp cath found to have TVD. Now s/p C4L, ANIYA on 5/3.  Post -op course significant for acute   blood loss anemia requiring 3 U PRBC, respiratory insufficiency requiring high flow,  hyperglycemia requiring insulin gtt, and new onset Afib/RVR received modified amio load due to bradycardia. R arm with ecchymosis and edema s/p RUE duplex.  Ultimately   remained in PAF and discharged on eliquis 2.5 BID and toprol 50 QD related to prolonged QTC.  Presents to ER this morning, accompanied by his spouse, related to persistent cough, chills,  and dyspnea on exertion.  Found to have large right pleural effusion   and elevated troponin which is downtrending\par  \par  \par  \plain\f1\fs20\flvg2693\hich\f1\dbch\f1\loch\f1\cf2\fs20\strike\plain\f1\fs20\nwqj9038\hich\f1\dbch\f1\loch\f1\cf2\fs20\plain\f0\fs20\vxaq3313\hich\f0\dbch\f0\loch\f0\fs20 #{\*\bkmkstart [id number]}{\*\bkmkend na96993551475}{\*\bkmkstart xc27495827938}{\*\bkmkend   [id number]}Dyspnea post CABG, large effusion\par  -continue iv diuresis \par  -management per cts, await likely drain after eliquis washout \par  {\*\bkmkstart lf85782941502}{\*\bkmkend ei83245615837}{\*\bkmkstart mz10563575171}{\*\bkmkend yr31691374903}\plain\f1\fs20\vvya1254\hich\f1\dbch\f1\loch\f1\cf2\fs20\strike{\*\bkmkstart le12642555296}{\*\bkmkend yo71249224971}{\*\bkmkstart tv70148154719}{\*\bkmkend   cp13739321564}{\*\bkmkstart kt63633993503}{\*\bkmkend wq55350945102}\plain\f0\fs20\uhbh4128\hich\f0\dbch\f0\loch\f0\fs20\par  #{\*\bkmkstart uz56798485608}{\*\bkmkend zm51363377856}{\*\bkmkstart vy32439714479}{\*\bkmkend si85860399924}{\*\bkmkstart wz03588571538}{\*\bkmkend kf12379764842}diabetes mellitus. \par  -tx per cts\par  {\*\bkmkstart gb01319583915}{\*\bkmkend df44645307182}\plain\f1\fs20\bxrj5241\hich\f1\dbch\f1\loch\f1\cf2\fs20\strike\plain\f1\fs20\tkmu1229\hich\f1\dbch\f1\loch\f1\cf2\fs20\b{\field{\*\fldinst HYPERLINK [id number] }{\fldrslt\par  \plain\f0\fs20\hqkf3399\hich\f0\dbch\f0\loch\f0\fs20 #CAD, s/p CABG x 4\plain\f1\fs20\xywb1330\hich\f1\dbch\f1\loch\f1\cf2\fs20\b\par  -\plain\f0\fs20\dwtl5614\hich\f0\dbch\f0\loch\f0\fs20 stable, cont current tx \par  -cont asa\par  \par  #}}\plain\f1\fs20\kqsg7861\hich\f1\dbch\f1\loch\f1\cf2\fs20\strike{\*\bkmkstart zc40093383369}{\*\bkmkend mp37190758457}{\*\bkmkstart ei29445805587}{\*\bkmkend hb77226549871}{\*\bkmkstart lk03837031082}{\*\bkmkend bw18482411230}{\*\bkmkstart fk15189544398}{\*\bkmkend   dn20300158539}\plain\f0\fs20\dbze3779\hich\f0\dbch\f0\loch\f0\fs20{\*\bkmkstart js67725575024}{\*\bkmkend tm43450488763}{\*\bkmkstart xz79468251187}{\*\bkmkend qi37443371259}Paroxysmal atrial fibrillation.\plain\f1\fs20\oxdu1614\hich\f1\dbch\f1\loch\f1\cf2\fs20\strike\plain\f0\fs20\tdof6418\hich\f0\dbch\f0\loch\f0\fs20\ql\par  -cont amio, toprol \par  {\*\bkmkstart kn67684982891}{\*\bkmkend nx62238102359}\plain\f1\fs20\chjv7365\hich\f1\dbch\f1\loch\f1\cf2\fs20\strike{\*\bkmkstart ir19995108689}{\*\bkmkend ba11674730421}{\*\bkmkstart tp75374387370}{\*\bkmkend nu06258419498}{\*\bkmkstart oe23442107927}{\*\bkmkend   de15202086251}\plain\f0\fs20\lrtv7595\hich\f0\dbch\f0\loch\f0\fs20 -eliquis on hold \par  \par  dvt ppx\par  d/w cts acp\par  \par  \par  78 minutes spent on total encounter; more than 50% of the visit was spent counseling and/or coordinating care by the attending physician.\par  {\*\bkmkstart em00723873793}{\*\bkmkend [id number]}\par  }  
68y Male presents with Shortness of breath, chills, cough after discharge on 5/10/24 s/p C4 c/b acute blood loss anemia and atrial fibrillation requiring anticoagulation with eliquis.  Lab work without WBC elevation, patient afebrile, UA negative. Significant for thrombocytosis with platelets 509.    On physical exam,  appears comfortable on supplemental oxygen, 3L NC but volume overloaded with peripheral edema and decreased breath sounds in bilateral bases.      Plan:  1) Obtain 12 lead EKG  2) [facility]  3) c/w supplemental oxygen  4) can consider 40mg IVP lasix x1  5) TTE  6) RVP  Disposition pending results of imaging    To be discussed with attending, Dr. Jimenez    CTS [phone]
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
## 2024-05-20 NOTE — ED PROVIDER NOTE - OBJECTIVE STATEMENT
68-year-old man with PMH HTN, HLD, diabetes, CAD status post CABG on May 3rd with Dr. Jimenez, only on baby aspirin, presenting due to shortness of breath and cough for 1 day.  Patient noted to be tachypneic and hypoxic in room.  Denies fevers, chills, nausea, vomiting, diarrhea, abdominal pain.  Is set to follow-up with Dr. Jimenez today.  received a full dose of aspirin by EMS.

## 2024-05-20 NOTE — ED ADULT NURSE NOTE - NS ED NURSE PATIENT LEFT UNIT TIME
Patient was able to rest after ED visit but still had not made recommended follow up appointment with PCP to help manage chronic pain better.  
08:40
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
## 2024-05-20 NOTE — ED PROVIDER NOTE - PHYSICAL EXAMINATION
Gen: mild distress, AOx3, able to make needs known, non-toxic  Head: NCAT  HEENT: EOMI, normal conjunctiva  Lung: CTAB, no respiratory distress, no wheezes/rhonchi/rales B/L, speaking in moderate sentences, 91% on RA, 97% on 2L NC   CV: RRR, +murmur, pulses bilaterally. Bilateral lower extremity edema, no calf tenderness. Surgical scars over the sternum and left forearm appear to be healing appropriately.  Abd: soft, NTND, no guarding   MSK: no visible bony deformities  Neuro: No focal sensory or motor deficits  Skin: Warm, well perfused, no rash  Psych: uncomfortable affect

## 2024-05-20 NOTE — ED PROVIDER NOTE - ATTENDING CONTRIBUTION TO CARE
I was the supervising attending. I have independently seen face-to-face and examined the patient. I have reviewed the history and physical and discussed the MDM with the resident, fellow, SONIYA and/or student. I agree with the assessment and plan as presented unless otherwise documented as follows:    68M hx CABG 5/3/24 d/c'ed from [facility] on 5/10/24, DM, HTN, HLD, presenting w/ SOB/cough over last few days. Endorses improvement of NURY since surgery, denies CP, fevers/chills, nausea/vomiting/diarrhea. States cough is dry. Hypoxic to 91% on RA, mildly dyspneic, improved on 2LNC. Lungs overall CTABL, surgery sites at chest and LLE appear to be healing well, legs B/L with 1+ pitting edema. DDx post-op complication vs. HF exacerbation vs. PE. Will obtain labs, XR, CT. -Tracy Sadler MD (Attending)

## 2024-05-20 NOTE — H&P ADULT - NSICDXPASTMEDICALHX_GEN_ALL_CORE_FT
PAST MEDICAL HISTORY:  Anticoagulated     CAD (coronary artery disease)     DM (Diabetes Mellitus)     High Cholesterol     HTN - Hypertension     Paroxysmal atrial fibrillation

## 2024-05-20 NOTE — ED ADULT NURSE NOTE - OBJECTIVE STATEMENT
Patient a/o x3, c/o SOB · HPI Objective Statement: 68-year-old man with PMH HTN, HLD, diabetes, CAD status post CABG on May 3rd with Dr. Jimenez, only on baby aspirin, presenting due to shortness of breath and cough for 1 day.  Patient noted to be tachypneic and hypoxic in room.  Denies fevers, chills, nausea, vomiting, diarrhea, abdominal pain.  Is set to follow-up with Dr. Jimenez today.  received a full dose of aspirin by EMS.

## 2024-05-20 NOTE — H&P ADULT - PROBLEM SELECTOR PLAN 1
Admit to 2C tele Admit to 2C tele  c/w supplemental oxygen  -IV diuresis  -intake and output  -trend and replete electrolytes  -trend troponin

## 2024-05-20 NOTE — H&P ADULT - NSHPPHYSICALEXAM_GEN_ALL_CORE
General: WN/WD NAD  Neurology: A&Ox3, nonfocal, APONTE x 4  Psych: appropriate, pleasant  Head:  Normocephalic, atraumatic  ENT:  Mucosa moist, no ulcerations  Neck:  Supple, no sinuses or palpable masses  Lymphatic:  No palpable cervical, supraclavicular, axillary or inguinal adenopathy  Respiratory:decreased R base  CV: RRR, S1S2, no murmur  Abdominal: Soft, NT, ND no palpable mass  MSK: No edema, + peripheral pulses, FROM all 4 extremity  Skin:: intact, no erythema or drainage

## 2024-05-21 LAB
ANION GAP SERPL CALC-SCNC: 13 MMOL/L — SIGNIFICANT CHANGE UP (ref 5–17)
BUN SERPL-MCNC: 16 MG/DL — SIGNIFICANT CHANGE UP (ref 7–23)
CALCIUM SERPL-MCNC: 9.3 MG/DL — SIGNIFICANT CHANGE UP (ref 8.4–10.5)
CHLORIDE SERPL-SCNC: 100 MMOL/L — SIGNIFICANT CHANGE UP (ref 96–108)
CO2 SERPL-SCNC: 23 MMOL/L — SIGNIFICANT CHANGE UP (ref 22–31)
CREAT SERPL-MCNC: 0.94 MG/DL — SIGNIFICANT CHANGE UP (ref 0.5–1.3)
EGFR: 89 ML/MIN/1.73M2 — SIGNIFICANT CHANGE UP
GLUCOSE BLDC GLUCOMTR-MCNC: 157 MG/DL — HIGH (ref 70–99)
GLUCOSE BLDC GLUCOMTR-MCNC: 187 MG/DL — HIGH (ref 70–99)
GLUCOSE BLDC GLUCOMTR-MCNC: 198 MG/DL — HIGH (ref 70–99)
GLUCOSE BLDC GLUCOMTR-MCNC: 221 MG/DL — HIGH (ref 70–99)
GLUCOSE SERPL-MCNC: 165 MG/DL — HIGH (ref 70–99)
HCT VFR BLD CALC: 26.8 % — LOW (ref 39–50)
HGB BLD-MCNC: 8.6 G/DL — LOW (ref 13–17)
MCHC RBC-ENTMCNC: 27 PG — SIGNIFICANT CHANGE UP (ref 27–34)
MCHC RBC-ENTMCNC: 32.1 GM/DL — SIGNIFICANT CHANGE UP (ref 32–36)
MCV RBC AUTO: 84.3 FL — SIGNIFICANT CHANGE UP (ref 80–100)
NRBC # BLD: 0 /100 WBCS — SIGNIFICANT CHANGE UP (ref 0–0)
PLATELET # BLD AUTO: 514 K/UL — HIGH (ref 150–400)
POTASSIUM SERPL-MCNC: 4.6 MMOL/L — SIGNIFICANT CHANGE UP (ref 3.5–5.3)
POTASSIUM SERPL-SCNC: 4.6 MMOL/L — SIGNIFICANT CHANGE UP (ref 3.5–5.3)
RBC # BLD: 3.18 M/UL — LOW (ref 4.2–5.8)
RBC # FLD: 14.1 % — SIGNIFICANT CHANGE UP (ref 10.3–14.5)
SODIUM SERPL-SCNC: 136 MMOL/L — SIGNIFICANT CHANGE UP (ref 135–145)
WBC # BLD: 7.83 K/UL — SIGNIFICANT CHANGE UP (ref 3.8–10.5)
WBC # FLD AUTO: 7.83 K/UL — SIGNIFICANT CHANGE UP (ref 3.8–10.5)

## 2024-05-21 PROCEDURE — 93010 ELECTROCARDIOGRAM REPORT: CPT

## 2024-05-21 PROCEDURE — 99232 SBSQ HOSP IP/OBS MODERATE 35: CPT

## 2024-05-21 RX ADMIN — Medication 6 UNIT(S): at 11:59

## 2024-05-21 RX ADMIN — Medication 50 MILLIGRAM(S): at 06:21

## 2024-05-21 RX ADMIN — GABAPENTIN 100 MILLIGRAM(S): 400 CAPSULE ORAL at 13:00

## 2024-05-21 RX ADMIN — ATORVASTATIN CALCIUM 40 MILLIGRAM(S): 80 TABLET, FILM COATED ORAL at 21:51

## 2024-05-21 RX ADMIN — Medication 6 UNIT(S): at 16:54

## 2024-05-21 RX ADMIN — SODIUM CHLORIDE 3 MILLILITER(S): 9 INJECTION INTRAMUSCULAR; INTRAVENOUS; SUBCUTANEOUS at 06:27

## 2024-05-21 RX ADMIN — AMIODARONE HYDROCHLORIDE 200 MILLIGRAM(S): 400 TABLET ORAL at 06:21

## 2024-05-21 RX ADMIN — Medication 325 MILLIGRAM(S): at 13:01

## 2024-05-21 RX ADMIN — GABAPENTIN 100 MILLIGRAM(S): 400 CAPSULE ORAL at 06:20

## 2024-05-21 RX ADMIN — Medication 40 MILLIGRAM(S): at 06:20

## 2024-05-21 RX ADMIN — PANTOPRAZOLE SODIUM 40 MILLIGRAM(S): 20 TABLET, DELAYED RELEASE ORAL at 06:21

## 2024-05-21 RX ADMIN — Medication 6 UNIT(S): at 07:52

## 2024-05-21 RX ADMIN — SENNA PLUS 2 TABLET(S): 8.6 TABLET ORAL at 21:51

## 2024-05-21 RX ADMIN — Medication 81 MILLIGRAM(S): at 07:58

## 2024-05-21 RX ADMIN — Medication 1: at 07:52

## 2024-05-21 RX ADMIN — Medication 1: at 11:59

## 2024-05-21 RX ADMIN — Medication 500 MILLIGRAM(S): at 07:54

## 2024-05-21 RX ADMIN — POLYETHYLENE GLYCOL 3350 17 GRAM(S): 17 POWDER, FOR SOLUTION ORAL at 07:53

## 2024-05-21 RX ADMIN — SPIRONOLACTONE 25 MILLIGRAM(S): 25 TABLET, FILM COATED ORAL at 06:20

## 2024-05-21 RX ADMIN — Medication 40 MILLIGRAM(S): at 17:00

## 2024-05-21 RX ADMIN — Medication 1: at 16:54

## 2024-05-21 RX ADMIN — AMLODIPINE BESYLATE 10 MILLIGRAM(S): 2.5 TABLET ORAL at 06:20

## 2024-05-21 RX ADMIN — Medication 325 MILLIGRAM(S): at 06:20

## 2024-05-21 RX ADMIN — GABAPENTIN 100 MILLIGRAM(S): 400 CAPSULE ORAL at 21:59

## 2024-05-21 RX ADMIN — INSULIN GLARGINE 18 UNIT(S): 100 INJECTION, SOLUTION SUBCUTANEOUS at 21:59

## 2024-05-21 RX ADMIN — Medication 1 MILLIGRAM(S): at 07:54

## 2024-05-21 RX ADMIN — SODIUM CHLORIDE 3 MILLILITER(S): 9 INJECTION INTRAMUSCULAR; INTRAVENOUS; SUBCUTANEOUS at 21:56

## 2024-05-21 RX ADMIN — Medication 325 MILLIGRAM(S): at 21:51

## 2024-05-21 RX ADMIN — SODIUM CHLORIDE 3 MILLILITER(S): 9 INJECTION INTRAMUSCULAR; INTRAVENOUS; SUBCUTANEOUS at 11:33

## 2024-05-21 NOTE — PROGRESS NOTE ADULT - SUBJECTIVE AND OBJECTIVE BOX
CARDIOLOGY FOLLOW UP - Dr. Brewer  DATE OF SERVICE: 5/21/24    CC  No cv complaints - feeling better today     REVIEW OF SYSTEMS:  CONSTITUTIONAL: No fever, weight loss, or fatigue  RESPIRATORY: No cough, wheezing, chills or hemoptysis; No Shortness of Breath  CARDIOVASCULAR: No chest pain, palpitations, passing out, dizziness, or leg swelling  GASTROINTESTINAL: No abdominal or epigastric pain. No nausea, vomiting, or hematemesis; No diarrhea or constipation. No melena or hematochezia.  VASCULAR: No edema     PHYSICAL EXAM:  T(C): 36.8 (05-21-24 @ 04:05), Max: 37 (05-20-24 @ 19:44)  HR: 69 (05-21-24 @ 04:05) (64 - 70)  BP: 157/71 (05-21-24 @ 04:05) (134/56 - 158/74)  RR: 18 (05-21-24 @ 04:05) (18 - 18)  SpO2: 93% (05-21-24 @ 04:05) (93% - 97%)  Wt(kg): --  I&O's Summary    20 May 2024 07:01  -  21 May 2024 07:00  --------------------------------------------------------  IN: 240 mL / OUT: 3500 mL / NET: -3260 mL    21 May 2024 07:01  -  21 May 2024 08:40  --------------------------------------------------------  IN: 200 mL / OUT: 0 mL / NET: 200 mL        Appearance: Normal	  Cardiovascular: Normal S1 S2,RRR, No JVD, No murmurs  Respiratory: Decreased breath sounds R>L  Gastrointestinal:  Soft, Non-tender, + BS	  Extremities: Normal range of motion, No clubbing, cyanosis. +b/l le edema       Home Medications:  insulin glargine 100 units/mL subcutaneous solution: 18 unit(s) subcutaneous once a day (at bedtime) (10 May 2024 13:31)  insulin lispro 100 units/mL injectable solution: 6 unit(s) subcutaneous 3 times a day take before meals (10 May 2024 13:31)  polyethylene glycol 3350 oral powder for reconstitution: 17 gram(s) orally once a day (10 May 2024 13:31)  senna leaf extract oral tablet: 2 tab(s) orally once a day (at bedtime) (10 May 2024 13:31)  simethicone 80 mg oral tablet, chewable: 1 tab(s) orally 4 times a day As needed Gas (10 May 2024 13:31)      MEDICATIONS  (STANDING):  aMIOdarone    Tablet 200 milliGRAM(s) Oral daily  amLODIPine   Tablet 10 milliGRAM(s) Oral daily  ascorbic acid 500 milliGRAM(s) Oral daily  aspirin enteric coated 81 milliGRAM(s) Oral daily  atorvastatin 40 milliGRAM(s) Oral at bedtime  dextrose 50% Injectable 25 Gram(s) IV Push once  ferrous    sulfate 325 milliGRAM(s) Oral three times a day  folic acid 1 milliGRAM(s) Oral daily  furosemide   Injectable 40 milliGRAM(s) IV Push every 12 hours  gabapentin 100 milliGRAM(s) Oral every 8 hours  glucagon  Injectable 1 milliGRAM(s) IntraMuscular once  insulin glargine Injectable (LANTUS) 18 Unit(s) SubCutaneous at bedtime  insulin lispro (ADMELOG) corrective regimen sliding scale   SubCutaneous three times a day before meals  insulin lispro (ADMELOG) corrective regimen sliding scale   SubCutaneous at bedtime  insulin lispro Injectable (ADMELOG) 6 Unit(s) SubCutaneous three times a day before meals  metoprolol succinate ER 50 milliGRAM(s) Oral daily  pantoprazole    Tablet 40 milliGRAM(s) Oral before breakfast  polyethylene glycol 3350 17 Gram(s) Oral daily  senna 2 Tablet(s) Oral at bedtime  sodium chloride 0.9% lock flush 3 milliLiter(s) IV Push every 8 hours  spironolactone 25 milliGRAM(s) Oral daily      TELEMETRY: NSR 	    ECG:  	  RADIOLOGY:   DIAGNOSTIC TESTING:  [ ] Echocardiogram:  [ ]  Catheterization:  [ ] Stress Test:    OTHER: 	    LABS:	 	    Troponin T, High Sensitivity Result: 157 ng/L [0 - 51] (05-20 @ 06:40)  Troponin T, High Sensitivity Result: 167 ng/L [0 - 51] (05-20 @ 03:27)                          8.6    7.83  )-----------( 514      ( 21 May 2024 04:55 )             26.8     05-21    136  |  100  |  16  ----------------------------<  165<H>  4.6   |  23  |  0.94    Ca    9.3      21 May 2024 04:55    TPro  6.7  /  Alb  3.1<L>  /  TBili  0.5  /  DBili  x   /  AST  13  /  ALT  20  /  AlkPhos  99  05-20    PT/INR - ( 20 May 2024 03:27 )   PT: 15.3 sec;   INR: 1.40 ratio         PTT - ( 20 May 2024 03:27 )  PTT:33.0 sec

## 2024-05-21 NOTE — PROGRESS NOTE ADULT - ASSESSMENT
This is a 68 year-old male, with past history significant for Type-2 DM, HLD, HTN and Hyperkalemia, presented to the ED secondary to L-sided chest pain, shortness of breath, sp cath found to have TVD. Now s/p C4L, ANIYA on 5/3.  Post -op course significant for acute blood loss anemia requiring 3 U PRBC, respiratory insufficiency requiring high flow,  hyperglycemia requiring insulin gtt, and new onset Afib/RVR received modified amio load due to bradycardia. R arm with ecchymosis and edema s/p RUE duplex.  Ultimately remained in PAF and discharged on eliquis 2.5 BID and toprol 50 QD related to prolonged QTC.  Presents to ER this morning, accompanied by his spouse, related to persistent cough, chills,  and dyspnea on exertion.  Found to have large right pleural effusion and elevated troponin which is downtrending.  5/21 VSS CP free eliquis on hold  for pigtail

## 2024-05-21 NOTE — PROGRESS NOTE ADULT - SUBJECTIVE AND OBJECTIVE BOX
Subjective    " hello "  VITAL SIGNS    Telemetry:   NSR 60-90    Vital Signs Last 24 Hrs  T(C): 36.8 (24 @ 04:05), Max: 37 (24 @ 19:44)  T(F): 98.3 (24 @ 04:05), Max: 98.6 (24 @ 19:44)  HR: 69 (24 @ 04:05) (64 - 70)  BP: 157/71 (24 @ 04:05) (134/56 - 158/74)  RR: 18 (24 @ 04:05) (18 - 18)  SpO2: 93% (24 @ 04:05) (93% - 97%)              @ 07:01  -   @ 07:00  --------------------------------------------------------  IN: 240 mL / OUT: 3500 mL / NET: -3260 mL     @ 07:01  -   @ 08:45  --------------------------------------------------------  IN: 200 mL / OUT: 0 mL / NET: 200 mL      Daily Weight in k.7 (21 May 2024 07:00)        CAPILLARY BLOOD GLUCOSE      POCT Blood Glucose.: 157 mg/dL (21 May 2024 07:28)  POCT Blood Glucose.: 159 mg/dL (20 May 2024 21:29)  POCT Blood Glucose.: 175 mg/dL (20 May 2024 16:38)  POCT Blood Glucose.: 147 mg/dL (20 May 2024 11:39)  POCT Blood Glucose.: 92 mg/dL (20 May 2024 09:27)    < from: TTE W or WO Ultrasound Enhancing Agent (24 @ 07:40) >      1. Moderate pericardial effusion noted adjacent to the right ventricle with no evidence of hemodynamic compromise (or echocardiographic evidence of cardiac tamponade).   2. Right pleural effusion noted.   3. Compared to the transthoracic echocardiogram performed on 2024, there is a moderate pericardial effusion anterior to the right ventricle.    < end of copied text >    < from: CT Angio Chest PE Protocol w/ IV Cont (24 @ 05:36) >    No obvious pulmonary embolus.    Moderate to large right and small left pleural effusion with compressive   atelectasis. Recommend clinical correlation to assess underlying   infection.    Small pericardial effusion measuring simple to increased attenuation,   which may contain blood products/debris but is difficult to assess due to   artifact. Stranding at the midline anterior chest wall, reflecting recent   postsurgical changes. Recommend clinical correlation to assess infection.    < end of copied text >          MEDICATIONS  (STANDING):  aMIOdarone    Tablet 200 milliGRAM(s) Oral daily  amLODIPine   Tablet 10 milliGRAM(s) Oral daily  ascorbic acid 500 milliGRAM(s) Oral daily  aspirin enteric coated 81 milliGRAM(s) Oral daily  atorvastatin 40 milliGRAM(s) Oral at bedtime  dextrose 50% Injectable 25 Gram(s) IV Push once  ferrous    sulfate 325 milliGRAM(s) Oral three times a day  folic acid 1 milliGRAM(s) Oral daily  furosemide   Injectable 40 milliGRAM(s) IV Push every 12 hours  gabapentin 100 milliGRAM(s) Oral every 8 hours  glucagon  Injectable 1 milliGRAM(s) IntraMuscular once  insulin glargine Injectable (LANTUS) 18 Unit(s) SubCutaneous at bedtime  insulin lispro (ADMELOG) corrective regimen sliding scale   SubCutaneous three times a day before meals  insulin lispro (ADMELOG) corrective regimen sliding scale   SubCutaneous at bedtime  insulin lispro Injectable (ADMELOG) 6 Unit(s) SubCutaneous three times a day before meals  metoprolol succinate ER 50 milliGRAM(s) Oral daily  pantoprazole    Tablet 40 milliGRAM(s) Oral before breakfast  polyethylene glycol 3350 17 Gram(s) Oral daily  senna 2 Tablet(s) Oral at bedtime  sodium chloride 0.9% lock flush 3 milliLiter(s) IV Push every 8 hours  spironolactone 25 milliGRAM(s) Oral daily    MEDICATIONS  (PRN):  acetaminophen     Tablet .. 650 milliGRAM(s) Oral every 6 hours PRN Temp greater or equal to 38C (100.4F), Mild Pain (1 - 3)  aluminum hydroxide/magnesium hydroxide/simethicone Suspension 30 milliLiter(s) Oral every 4 hours PRN Dyspepsia  melatonin 3 milliGRAM(s) Oral at bedtime PRN Insomnia  ondansetron Injectable 4 milliGRAM(s) IV Push every 8 hours PRN Nausea and/or Vomiting  oxyCODONE    IR 5 milliGRAM(s) Oral every 6 hours PRN for moderate pain  simethicone 80 milliGRAM(s) Chew four times a day PRN Gas                          PHYSICAL EXAM        Neurology: alert and oriented x 3, nonfocal, no gross deficits    CV : S1S2 RRR    Sternal Wound :   ACOSTA dermbond  tape  , Stable    Lungs: B/l breath sound son room air diminished on right     Abdomen: soft, nontender, nondistended, positive bowel sounds, last bowel movement     : voids     Extremities:      equal strength throughout   b/ll e+ Dp + edema no calf  tenderness                                      Physical Therapy Rec:   Home  [ x ]   Home w/ PT  [  ]  Rehab  [  ]    Discussed with Cardiothoracic Team at AM rounds.

## 2024-05-21 NOTE — PROGRESS NOTE ADULT - ASSESSMENT
a/p  68 year-old male, with past history significant for Type-2 DM, HLD, HTN and Hyperkalemia, presented to the ED secondary to L-sided chest pain, shortness of breath, sp cath found to have TVD. Now s/p C4L, ANIYA on 5/3.  Post -op course significant for acute blood loss anemia requiring 3 U PRBC, respiratory insufficiency requiring high flow,  hyperglycemia requiring insulin gtt, and new onset Afib/RVR received modified amio load due to bradycardia. R arm with ecchymosis and edema s/p RUE duplex.  Ultimately remained in PAF and discharged on eliquis 2.5 BID and toprol 50 QD related to prolonged QTC.  Presents to ER this morning, accompanied by his spouse, related to persistent cough, chills,  and dyspnea on exertion.  Found to have large right pleural effusion and elevated troponin which is downtrending      #Dyspnea post CABG, large R effusion  -continue iv diuresis   -management per cts, await likely drain after eliquis washout     #diabetes mellitus.   -tx per cts    #CAD, s/p CABG x 4  -stable, cont current tx   -cont asa    #Paroxysmal atrial fibrillation.  -cont amio, toprol   -eliquis on hold     dvt ppx

## 2024-05-22 LAB
ANION GAP SERPL CALC-SCNC: 13 MMOL/L — SIGNIFICANT CHANGE UP (ref 5–17)
APTT BLD: 29.5 SEC — SIGNIFICANT CHANGE UP (ref 24.5–35.6)
BLD GP AB SCN SERPL QL: NEGATIVE — SIGNIFICANT CHANGE UP
BUN SERPL-MCNC: 17 MG/DL — SIGNIFICANT CHANGE UP (ref 7–23)
CALCIUM SERPL-MCNC: 9.1 MG/DL — SIGNIFICANT CHANGE UP (ref 8.4–10.5)
CHLORIDE SERPL-SCNC: 100 MMOL/L — SIGNIFICANT CHANGE UP (ref 96–108)
CO2 SERPL-SCNC: 24 MMOL/L — SIGNIFICANT CHANGE UP (ref 22–31)
CREAT SERPL-MCNC: 0.93 MG/DL — SIGNIFICANT CHANGE UP (ref 0.5–1.3)
EGFR: 91 ML/MIN/1.73M2 — SIGNIFICANT CHANGE UP
GLUCOSE BLDC GLUCOMTR-MCNC: 145 MG/DL — HIGH (ref 70–99)
GLUCOSE BLDC GLUCOMTR-MCNC: 183 MG/DL — HIGH (ref 70–99)
GLUCOSE BLDC GLUCOMTR-MCNC: 200 MG/DL — HIGH (ref 70–99)
GLUCOSE BLDC GLUCOMTR-MCNC: 99 MG/DL — SIGNIFICANT CHANGE UP (ref 70–99)
GLUCOSE SERPL-MCNC: 145 MG/DL — HIGH (ref 70–99)
HCT VFR BLD CALC: 26.5 % — LOW (ref 39–50)
HGB BLD-MCNC: 8.4 G/DL — LOW (ref 13–17)
INR BLD: 1.23 RATIO — HIGH (ref 0.85–1.18)
MCHC RBC-ENTMCNC: 26.8 PG — LOW (ref 27–34)
MCHC RBC-ENTMCNC: 31.7 GM/DL — LOW (ref 32–36)
MCV RBC AUTO: 84.7 FL — SIGNIFICANT CHANGE UP (ref 80–100)
NRBC # BLD: 0 /100 WBCS — SIGNIFICANT CHANGE UP (ref 0–0)
PLATELET # BLD AUTO: 488 K/UL — HIGH (ref 150–400)
POTASSIUM SERPL-MCNC: 4.2 MMOL/L — SIGNIFICANT CHANGE UP (ref 3.5–5.3)
POTASSIUM SERPL-SCNC: 4.2 MMOL/L — SIGNIFICANT CHANGE UP (ref 3.5–5.3)
PROTHROM AB SERPL-ACNC: 12.8 SEC — SIGNIFICANT CHANGE UP (ref 9.5–13)
RBC # BLD: 3.13 M/UL — LOW (ref 4.2–5.8)
RBC # FLD: 14.3 % — SIGNIFICANT CHANGE UP (ref 10.3–14.5)
RH IG SCN BLD-IMP: POSITIVE — SIGNIFICANT CHANGE UP
SODIUM SERPL-SCNC: 137 MMOL/L — SIGNIFICANT CHANGE UP (ref 135–145)
WBC # BLD: 7.47 K/UL — SIGNIFICANT CHANGE UP (ref 3.8–10.5)
WBC # FLD AUTO: 7.47 K/UL — SIGNIFICANT CHANGE UP (ref 3.8–10.5)

## 2024-05-22 PROCEDURE — 99232 SBSQ HOSP IP/OBS MODERATE 35: CPT | Mod: 24

## 2024-05-22 PROCEDURE — 93010 ELECTROCARDIOGRAM REPORT: CPT | Mod: 77

## 2024-05-22 PROCEDURE — 71045 X-RAY EXAM CHEST 1 VIEW: CPT | Mod: 26,76

## 2024-05-22 PROCEDURE — 93010 ELECTROCARDIOGRAM REPORT: CPT

## 2024-05-22 RX ORDER — METOPROLOL TARTRATE 50 MG
25 TABLET ORAL ONCE
Refills: 0 | Status: COMPLETED | OUTPATIENT
Start: 2024-05-22 | End: 2024-05-22

## 2024-05-22 RX ORDER — IRON SUCROSE 20 MG/ML
100 INJECTION, SOLUTION INTRAVENOUS EVERY 24 HOURS
Refills: 0 | Status: COMPLETED | OUTPATIENT
Start: 2024-05-22 | End: 2024-05-26

## 2024-05-22 RX ORDER — METOPROLOL TARTRATE 50 MG
75 TABLET ORAL DAILY
Refills: 0 | Status: DISCONTINUED | OUTPATIENT
Start: 2024-05-23 | End: 2024-05-25

## 2024-05-22 RX ADMIN — Medication 25 MILLIGRAM(S): at 10:04

## 2024-05-22 RX ADMIN — Medication 6 UNIT(S): at 11:59

## 2024-05-22 RX ADMIN — SPIRONOLACTONE 25 MILLIGRAM(S): 25 TABLET, FILM COATED ORAL at 05:56

## 2024-05-22 RX ADMIN — SODIUM CHLORIDE 3 MILLILITER(S): 9 INJECTION INTRAMUSCULAR; INTRAVENOUS; SUBCUTANEOUS at 21:30

## 2024-05-22 RX ADMIN — Medication 1 MILLIGRAM(S): at 10:04

## 2024-05-22 RX ADMIN — GABAPENTIN 100 MILLIGRAM(S): 400 CAPSULE ORAL at 21:36

## 2024-05-22 RX ADMIN — OXYCODONE HYDROCHLORIDE 5 MILLIGRAM(S): 5 TABLET ORAL at 02:55

## 2024-05-22 RX ADMIN — Medication 6 UNIT(S): at 08:10

## 2024-05-22 RX ADMIN — GABAPENTIN 100 MILLIGRAM(S): 400 CAPSULE ORAL at 13:07

## 2024-05-22 RX ADMIN — SENNA PLUS 2 TABLET(S): 8.6 TABLET ORAL at 21:36

## 2024-05-22 RX ADMIN — SODIUM CHLORIDE 3 MILLILITER(S): 9 INJECTION INTRAMUSCULAR; INTRAVENOUS; SUBCUTANEOUS at 13:11

## 2024-05-22 RX ADMIN — Medication 325 MILLIGRAM(S): at 05:57

## 2024-05-22 RX ADMIN — AMIODARONE HYDROCHLORIDE 200 MILLIGRAM(S): 400 TABLET ORAL at 05:57

## 2024-05-22 RX ADMIN — INSULIN GLARGINE 18 UNIT(S): 100 INJECTION, SOLUTION SUBCUTANEOUS at 21:36

## 2024-05-22 RX ADMIN — Medication 1: at 12:00

## 2024-05-22 RX ADMIN — POLYETHYLENE GLYCOL 3350 17 GRAM(S): 17 POWDER, FOR SOLUTION ORAL at 10:03

## 2024-05-22 RX ADMIN — Medication 6 UNIT(S): at 16:51

## 2024-05-22 RX ADMIN — SODIUM CHLORIDE 3 MILLILITER(S): 9 INJECTION INTRAMUSCULAR; INTRAVENOUS; SUBCUTANEOUS at 06:18

## 2024-05-22 RX ADMIN — Medication 50 MILLIGRAM(S): at 05:56

## 2024-05-22 RX ADMIN — PANTOPRAZOLE SODIUM 40 MILLIGRAM(S): 20 TABLET, DELAYED RELEASE ORAL at 05:56

## 2024-05-22 RX ADMIN — ATORVASTATIN CALCIUM 40 MILLIGRAM(S): 80 TABLET, FILM COATED ORAL at 21:36

## 2024-05-22 RX ADMIN — Medication 40 MILLIGRAM(S): at 05:56

## 2024-05-22 RX ADMIN — Medication 40 MILLIGRAM(S): at 17:07

## 2024-05-22 RX ADMIN — Medication 81 MILLIGRAM(S): at 10:03

## 2024-05-22 RX ADMIN — Medication 500 MILLIGRAM(S): at 10:03

## 2024-05-22 RX ADMIN — IRON SUCROSE 210 MILLIGRAM(S): 20 INJECTION, SOLUTION INTRAVENOUS at 11:49

## 2024-05-22 RX ADMIN — GABAPENTIN 100 MILLIGRAM(S): 400 CAPSULE ORAL at 05:56

## 2024-05-22 RX ADMIN — AMLODIPINE BESYLATE 10 MILLIGRAM(S): 2.5 TABLET ORAL at 05:57

## 2024-05-22 NOTE — PROGRESS NOTE ADULT - SUBJECTIVE AND OBJECTIVE BOX
CARDIOLOGY FOLLOW UP - Dr. Brewer  DATE OF SERVICE: 5/22/24    CC  Endorsing cough & cp with coughing  Otherwise no cv complaints     REVIEW OF SYSTEMS:  CONSTITUTIONAL: No fever, weight loss, or fatigue  RESPIRATORY: +Cough, no wheezing, chills or hemoptysis; No Shortness of Breath  CARDIOVASCULAR: No chest pain, palpitations, passing out, dizziness, or leg swelling  GASTROINTESTINAL: No abdominal or epigastric pain. No nausea, vomiting, or hematemesis; No diarrhea or constipation. No melena or hematochezia.  VASCULAR: No edema     PHYSICAL EXAM:  T(C): 36.6 (05-22-24 @ 04:32), Max: 36.8 (05-21-24 @ 19:36)  HR: 100 (05-22-24 @ 09:58) (68 - 110)  BP: 130/87 (05-22-24 @ 09:58) (130/87 - 151/80)  RR: 18 (05-22-24 @ 06:28) (18 - 18)  SpO2: 94% (05-22-24 @ 06:28) (94% - 96%)  Wt(kg): --  I&O's Summary    21 May 2024 07:01  -  22 May 2024 07:00  --------------------------------------------------------  IN: 760 mL / OUT: 1800 mL / NET: -1040 mL    22 May 2024 07:01  -  22 May 2024 11:49  --------------------------------------------------------  IN: 360 mL / OUT: 0 mL / NET: 360 mL        Appearance: Normal	  Cardiovascular: Normal S1 S2,RRR, No JVD, No murmurs  Respiratory: Decreased R>L  Gastrointestinal:  Soft, Non-tender, + BS	  Extremities: Normal range of motion, No clubbing, cyanosis or edema      Home Medications:  insulin glargine 100 units/mL subcutaneous solution: 18 unit(s) subcutaneous once a day (at bedtime) (10 May 2024 13:31)  insulin lispro 100 units/mL injectable solution: 6 unit(s) subcutaneous 3 times a day take before meals (10 May 2024 13:31)  polyethylene glycol 3350 oral powder for reconstitution: 17 gram(s) orally once a day (10 May 2024 13:31)  senna leaf extract oral tablet: 2 tab(s) orally once a day (at bedtime) (10 May 2024 13:31)  simethicone 80 mg oral tablet, chewable: 1 tab(s) orally 4 times a day As needed Gas (10 May 2024 13:31)      MEDICATIONS  (STANDING):  aMIOdarone    Tablet 200 milliGRAM(s) Oral daily  amLODIPine   Tablet 10 milliGRAM(s) Oral daily  ascorbic acid 500 milliGRAM(s) Oral daily  aspirin enteric coated 81 milliGRAM(s) Oral daily  atorvastatin 40 milliGRAM(s) Oral at bedtime  dextrose 50% Injectable 25 Gram(s) IV Push once  folic acid 1 milliGRAM(s) Oral daily  furosemide   Injectable 40 milliGRAM(s) IV Push every 12 hours  gabapentin 100 milliGRAM(s) Oral every 8 hours  glucagon  Injectable 1 milliGRAM(s) IntraMuscular once  insulin glargine Injectable (LANTUS) 18 Unit(s) SubCutaneous at bedtime  insulin lispro (ADMELOG) corrective regimen sliding scale   SubCutaneous three times a day before meals  insulin lispro (ADMELOG) corrective regimen sliding scale   SubCutaneous at bedtime  insulin lispro Injectable (ADMELOG) 6 Unit(s) SubCutaneous three times a day before meals  iron sucrose IVPB 100 milliGRAM(s) IV Intermittent every 24 hours  pantoprazole    Tablet 40 milliGRAM(s) Oral before breakfast  polyethylene glycol 3350 17 Gram(s) Oral daily  senna 2 Tablet(s) Oral at bedtime  sodium chloride 0.9% lock flush 3 milliLiter(s) IV Push every 8 hours  spironolactone 25 milliGRAM(s) Oral daily      TELEMETRY: SR > Afib	    ECG:  	  RADIOLOGY:   DIAGNOSTIC TESTING:  [ ] Echocardiogram:  [ ]  Catheterization:  [ ] Stress Test:    OTHER: 	    LABS:	 	    Troponin T, High Sensitivity Result: 157 ng/L [0 - 51] (05-20 @ 06:40)  Troponin T, High Sensitivity Result: 167 ng/L [0 - 51] (05-20 @ 03:27)                          8.4    7.47  )-----------( 488      ( 22 May 2024 07:21 )             26.5     05-22    137  |  100  |  17  ----------------------------<  145<H>  4.2   |  24  |  0.93    Ca    9.1      22 May 2024 07:11      PT/INR - ( 22 May 2024 07:09 )   PT: 12.8 sec;   INR: 1.23 ratio         PTT - ( 22 May 2024 07:09 )  PTT:29.5 sec

## 2024-05-22 NOTE — PROGRESS NOTE ADULT - SUBJECTIVE AND OBJECTIVE BOX
VITAL SIGNS    Telemetry:  afib nsr    Vital Signs Last 24 Hrs  T(C): 36.9 (24 @ 12:13), Max: 36.9 (24 @ 12:13)  T(F): 98.4 (24 @ 12:13), Max: 98.4 (24 @ 12:13)  HR: 98 (24 @ 12:13) (68 - 110)  BP: 110/75 (24 @ 12:13) (110/75 - 151/80)  RR: 18 (24 @ 12:13) (18 - 18)  SpO2: 100% (24 @ 12:13) (94% - 100%)                    @ 07:01  -   @ 07:00  --------------------------------------------------------  IN: 760 mL / OUT: 1800 mL / NET: -1040 mL     @ 07:01  -   @ 14:54  --------------------------------------------------------  IN: 600 mL / OUT: 0 mL / NET: 600 mL          Daily     Daily Weight in k.1 (22 May 2024 07:00)            CAPILLARY BLOOD GLUCOSE      POCT Blood Glucose.: 200 mg/dL (22 May 2024 11:46)  POCT Blood Glucose.: 145 mg/dL (22 May 2024 07:57)  POCT Blood Glucose.: 221 mg/dL (21 May 2024 21:55)  POCT Blood Glucose.: 187 mg/dL (21 May 2024 16:34)              Coumadin    [ ] YES          [  ]      NO                                   PHYSICAL EXAM        Neurology: alert and oriented x 3, nonfocal, no gross deficits  CV : s1 s2 RRR  l radial inc healing  Sternal Wound :  CDI , Stable  Lungs: cta  Abdomen: soft, nontender, nondistended, positive bowel sounds                      chest tubes  :    voiding        Extremities:      edema   /  -   calve tenderness ,     R leg  incisions cdi          acetaminophen     Tablet .. 650 milliGRAM(s) Oral every 6 hours PRN  aluminum hydroxide/magnesium hydroxide/simethicone Suspension 30 milliLiter(s) Oral every 4 hours PRN  aMIOdarone    Tablet 200 milliGRAM(s) Oral daily  amLODIPine   Tablet 10 milliGRAM(s) Oral daily  ascorbic acid 500 milliGRAM(s) Oral daily  aspirin enteric coated 81 milliGRAM(s) Oral daily  atorvastatin 40 milliGRAM(s) Oral at bedtime  dextrose 50% Injectable 25 Gram(s) IV Push once  folic acid 1 milliGRAM(s) Oral daily  furosemide   Injectable 40 milliGRAM(s) IV Push every 12 hours  gabapentin 100 milliGRAM(s) Oral every 8 hours  glucagon  Injectable 1 milliGRAM(s) IntraMuscular once  insulin glargine Injectable (LANTUS) 18 Unit(s) SubCutaneous at bedtime  insulin lispro (ADMELOG) corrective regimen sliding scale   SubCutaneous three times a day before meals  insulin lispro (ADMELOG) corrective regimen sliding scale   SubCutaneous at bedtime  insulin lispro Injectable (ADMELOG) 6 Unit(s) SubCutaneous three times a day before meals  iron sucrose IVPB 100 milliGRAM(s) IV Intermittent every 24 hours  melatonin 3 milliGRAM(s) Oral at bedtime PRN  ondansetron Injectable 4 milliGRAM(s) IV Push every 8 hours PRN  oxyCODONE    IR 5 milliGRAM(s) Oral every 6 hours PRN  pantoprazole    Tablet 40 milliGRAM(s) Oral before breakfast  polyethylene glycol 3350 17 Gram(s) Oral daily  senna 2 Tablet(s) Oral at bedtime  simethicone 80 milliGRAM(s) Chew four times a day PRN  sodium chloride 0.9% lock flush 3 milliLiter(s) IV Push every 8 hours  spironolactone 25 milliGRAM(s) Oral daily                    Physical Therapy Rec:   Home  [  ]   Home w/ PT  [  ]  Rehab  [  ]  Discussed with Cardiothoracic Team at AM rounds.

## 2024-05-22 NOTE — PROGRESS NOTE ADULT - ASSESSMENT
This is a 68 year-old male, with past history significant for Type-2 DM, HLD, HTN and Hyperkalemia, presented to the ED secondary to L-sided chest pain, shortness of breath, sp cath found to have TVD. Now s/p C4L, ANIYA on 5/3.  Post -op course significant for acute blood loss anemia requiring 3 U PRBC, respiratory insufficiency requiring high flow,  hyperglycemia requiring insulin gtt, and new onset Afib/RVR received modified amio load due to bradycardia. R arm with ecchymosis and edema s/p RUE duplex.  Ultimately remained in PAF and discharged on eliquis 2.5 BID and toprol 50 QD related to prolonged QTC.  Presents to ER this morning, accompanied by his spouse, related to persistent cough, chills,  and dyspnea on exertion.  Found to have large right pleural effusion and elevated troponin which is downtrending.  5/21 VSS CP free eliquis on hold  for pigtail   5/22 AFIB, TOPROL INCREASED , conversion pause  Diuresis, pl effusion, thoracentisis thursday.

## 2024-05-22 NOTE — PROGRESS NOTE ADULT - ASSESSMENT
a/p  68 year-old male, with past history significant for Type-2 DM, HLD, HTN and Hyperkalemia, presented to the ED secondary to L-sided chest pain, shortness of breath, sp cath found to have TVD. Now s/p C4L, ANIYA on 5/3.  Post -op course significant for acute blood loss anemia requiring 3 U PRBC, respiratory insufficiency requiring high flow,  hyperglycemia requiring insulin gtt, and new onset Afib/RVR received modified amio load due to bradycardia. R arm with ecchymosis and edema s/p RUE duplex.  Ultimately remained in PAF and discharged on eliquis 2.5 BID and toprol 50 QD related to prolonged QTC.  Presents to ER this morning, accompanied by his spouse, related to persistent cough, chills,  and dyspnea on exertion.  Found to have large right pleural effusion and elevated troponin which is downtrending      #Dyspnea post CABG, large R effusion  -continue iv diuresis   -management per cts, await likely thoracentesis/drain after eliquis washout     #diabetes mellitus.   -tx per cts    #CAD, s/p CABG x 4  -stable, cont current tx   -cont asa    #Paroxysmal atrial fibrillation.  -cont amio  -Rates elevated on tele - suggest increase metoprolol  -eliquis on hold       dvt ppx

## 2024-05-23 LAB
ANION GAP SERPL CALC-SCNC: 12 MMOL/L — SIGNIFICANT CHANGE UP (ref 5–17)
ANION GAP SERPL CALC-SCNC: 12 MMOL/L — SIGNIFICANT CHANGE UP (ref 5–17)
BUN SERPL-MCNC: 17 MG/DL — SIGNIFICANT CHANGE UP (ref 7–23)
BUN SERPL-MCNC: 19 MG/DL — SIGNIFICANT CHANGE UP (ref 7–23)
CALCIUM SERPL-MCNC: 9 MG/DL — SIGNIFICANT CHANGE UP (ref 8.4–10.5)
CALCIUM SERPL-MCNC: 9.4 MG/DL — SIGNIFICANT CHANGE UP (ref 8.4–10.5)
CHLORIDE SERPL-SCNC: 99 MMOL/L — SIGNIFICANT CHANGE UP (ref 96–108)
CHLORIDE SERPL-SCNC: 99 MMOL/L — SIGNIFICANT CHANGE UP (ref 96–108)
CO2 SERPL-SCNC: 25 MMOL/L — SIGNIFICANT CHANGE UP (ref 22–31)
CO2 SERPL-SCNC: 26 MMOL/L — SIGNIFICANT CHANGE UP (ref 22–31)
CREAT SERPL-MCNC: 0.98 MG/DL — SIGNIFICANT CHANGE UP (ref 0.5–1.3)
CREAT SERPL-MCNC: 1.06 MG/DL — SIGNIFICANT CHANGE UP (ref 0.5–1.3)
EGFR: 77 ML/MIN/1.73M2 — SIGNIFICANT CHANGE UP
EGFR: 85 ML/MIN/1.73M2 — SIGNIFICANT CHANGE UP
GLUCOSE BLDC GLUCOMTR-MCNC: 117 MG/DL — HIGH (ref 70–99)
GLUCOSE BLDC GLUCOMTR-MCNC: 172 MG/DL — HIGH (ref 70–99)
GLUCOSE BLDC GLUCOMTR-MCNC: 173 MG/DL — HIGH (ref 70–99)
GLUCOSE BLDC GLUCOMTR-MCNC: 210 MG/DL — HIGH (ref 70–99)
GLUCOSE BLDC GLUCOMTR-MCNC: 80 MG/DL — SIGNIFICANT CHANGE UP (ref 70–99)
GLUCOSE SERPL-MCNC: 216 MG/DL — HIGH (ref 70–99)
GLUCOSE SERPL-MCNC: 81 MG/DL — SIGNIFICANT CHANGE UP (ref 70–99)
HCT VFR BLD CALC: 26.7 % — LOW (ref 39–50)
HCT VFR BLD CALC: 31.9 % — LOW (ref 39–50)
HGB BLD-MCNC: 10.2 G/DL — LOW (ref 13–17)
HGB BLD-MCNC: 8.5 G/DL — LOW (ref 13–17)
MCHC RBC-ENTMCNC: 26.7 PG — LOW (ref 27–34)
MCHC RBC-ENTMCNC: 26.7 PG — LOW (ref 27–34)
MCHC RBC-ENTMCNC: 31.8 GM/DL — LOW (ref 32–36)
MCHC RBC-ENTMCNC: 32 GM/DL — SIGNIFICANT CHANGE UP (ref 32–36)
MCV RBC AUTO: 83.5 FL — SIGNIFICANT CHANGE UP (ref 80–100)
MCV RBC AUTO: 84 FL — SIGNIFICANT CHANGE UP (ref 80–100)
NRBC # BLD: 0 /100 WBCS — SIGNIFICANT CHANGE UP (ref 0–0)
NRBC # BLD: 0 /100 WBCS — SIGNIFICANT CHANGE UP (ref 0–0)
PLATELET # BLD AUTO: 475 K/UL — HIGH (ref 150–400)
PLATELET # BLD AUTO: 532 K/UL — HIGH (ref 150–400)
POTASSIUM SERPL-MCNC: 4.5 MMOL/L — SIGNIFICANT CHANGE UP (ref 3.5–5.3)
POTASSIUM SERPL-MCNC: 4.6 MMOL/L — SIGNIFICANT CHANGE UP (ref 3.5–5.3)
POTASSIUM SERPL-SCNC: 4.5 MMOL/L — SIGNIFICANT CHANGE UP (ref 3.5–5.3)
POTASSIUM SERPL-SCNC: 4.6 MMOL/L — SIGNIFICANT CHANGE UP (ref 3.5–5.3)
RBC # BLD: 3.18 M/UL — LOW (ref 4.2–5.8)
RBC # BLD: 3.82 M/UL — LOW (ref 4.2–5.8)
RBC # FLD: 14.2 % — SIGNIFICANT CHANGE UP (ref 10.3–14.5)
RBC # FLD: 14.2 % — SIGNIFICANT CHANGE UP (ref 10.3–14.5)
SODIUM SERPL-SCNC: 136 MMOL/L — SIGNIFICANT CHANGE UP (ref 135–145)
SODIUM SERPL-SCNC: 137 MMOL/L — SIGNIFICANT CHANGE UP (ref 135–145)
WBC # BLD: 7.94 K/UL — SIGNIFICANT CHANGE UP (ref 3.8–10.5)
WBC # BLD: 8.71 K/UL — SIGNIFICANT CHANGE UP (ref 3.8–10.5)
WBC # FLD AUTO: 7.94 K/UL — SIGNIFICANT CHANGE UP (ref 3.8–10.5)
WBC # FLD AUTO: 8.71 K/UL — SIGNIFICANT CHANGE UP (ref 3.8–10.5)

## 2024-05-23 PROCEDURE — 99232 SBSQ HOSP IP/OBS MODERATE 35: CPT | Mod: 25

## 2024-05-23 PROCEDURE — 71045 X-RAY EXAM CHEST 1 VIEW: CPT | Mod: 26

## 2024-05-23 PROCEDURE — 32556 INSERT CATH PLEURA W/O IMAGE: CPT | Mod: RT

## 2024-05-23 RX ORDER — ALBUTEROL 90 UG/1
2 AEROSOL, METERED ORAL EVERY 6 HOURS
Refills: 0 | Status: DISCONTINUED | OUTPATIENT
Start: 2024-05-23 | End: 2024-05-26

## 2024-05-23 RX ORDER — IPRATROPIUM/ALBUTEROL SULFATE 18-103MCG
3 AEROSOL WITH ADAPTER (GRAM) INHALATION EVERY 6 HOURS
Refills: 0 | Status: DISCONTINUED | OUTPATIENT
Start: 2024-05-23 | End: 2024-05-26

## 2024-05-23 RX ORDER — ACETAMINOPHEN 500 MG
1000 TABLET ORAL ONCE
Refills: 0 | Status: DISCONTINUED | OUTPATIENT
Start: 2024-05-23 | End: 2024-05-26

## 2024-05-23 RX ORDER — HYDROMORPHONE HYDROCHLORIDE 2 MG/ML
0.2 INJECTION INTRAMUSCULAR; INTRAVENOUS; SUBCUTANEOUS ONCE
Refills: 0 | Status: DISCONTINUED | OUTPATIENT
Start: 2024-05-23 | End: 2024-05-23

## 2024-05-23 RX ADMIN — SENNA PLUS 2 TABLET(S): 8.6 TABLET ORAL at 20:59

## 2024-05-23 RX ADMIN — Medication 75 MILLIGRAM(S): at 05:28

## 2024-05-23 RX ADMIN — SPIRONOLACTONE 25 MILLIGRAM(S): 25 TABLET, FILM COATED ORAL at 05:28

## 2024-05-23 RX ADMIN — Medication 81 MILLIGRAM(S): at 11:34

## 2024-05-23 RX ADMIN — INSULIN GLARGINE 18 UNIT(S): 100 INJECTION, SOLUTION SUBCUTANEOUS at 21:36

## 2024-05-23 RX ADMIN — Medication 3 MILLILITER(S): at 23:03

## 2024-05-23 RX ADMIN — POLYETHYLENE GLYCOL 3350 17 GRAM(S): 17 POWDER, FOR SOLUTION ORAL at 11:35

## 2024-05-23 RX ADMIN — SODIUM CHLORIDE 3 MILLILITER(S): 9 INJECTION INTRAMUSCULAR; INTRAVENOUS; SUBCUTANEOUS at 12:58

## 2024-05-23 RX ADMIN — Medication 3 MILLIGRAM(S): at 20:59

## 2024-05-23 RX ADMIN — IRON SUCROSE 210 MILLIGRAM(S): 20 INJECTION, SOLUTION INTRAVENOUS at 08:11

## 2024-05-23 RX ADMIN — SODIUM CHLORIDE 3 MILLILITER(S): 9 INJECTION INTRAMUSCULAR; INTRAVENOUS; SUBCUTANEOUS at 05:13

## 2024-05-23 RX ADMIN — Medication 1: at 08:12

## 2024-05-23 RX ADMIN — AMLODIPINE BESYLATE 10 MILLIGRAM(S): 2.5 TABLET ORAL at 05:28

## 2024-05-23 RX ADMIN — Medication 40 MILLIGRAM(S): at 17:56

## 2024-05-23 RX ADMIN — Medication 6 UNIT(S): at 11:34

## 2024-05-23 RX ADMIN — Medication 6 UNIT(S): at 08:12

## 2024-05-23 RX ADMIN — OXYCODONE HYDROCHLORIDE 5 MILLIGRAM(S): 5 TABLET ORAL at 21:08

## 2024-05-23 RX ADMIN — OXYCODONE HYDROCHLORIDE 5 MILLIGRAM(S): 5 TABLET ORAL at 22:00

## 2024-05-23 RX ADMIN — OXYCODONE HYDROCHLORIDE 5 MILLIGRAM(S): 5 TABLET ORAL at 14:32

## 2024-05-23 RX ADMIN — Medication 40 MILLIGRAM(S): at 05:27

## 2024-05-23 RX ADMIN — Medication 1 MILLIGRAM(S): at 11:34

## 2024-05-23 RX ADMIN — OXYCODONE HYDROCHLORIDE 5 MILLIGRAM(S): 5 TABLET ORAL at 14:02

## 2024-05-23 RX ADMIN — GABAPENTIN 100 MILLIGRAM(S): 400 CAPSULE ORAL at 20:58

## 2024-05-23 RX ADMIN — Medication 1: at 17:56

## 2024-05-23 RX ADMIN — HYDROMORPHONE HYDROCHLORIDE 0.2 MILLIGRAM(S): 2 INJECTION INTRAMUSCULAR; INTRAVENOUS; SUBCUTANEOUS at 16:15

## 2024-05-23 RX ADMIN — HYDROMORPHONE HYDROCHLORIDE 0.2 MILLIGRAM(S): 2 INJECTION INTRAMUSCULAR; INTRAVENOUS; SUBCUTANEOUS at 16:45

## 2024-05-23 RX ADMIN — ATORVASTATIN CALCIUM 40 MILLIGRAM(S): 80 TABLET, FILM COATED ORAL at 20:58

## 2024-05-23 RX ADMIN — AMIODARONE HYDROCHLORIDE 200 MILLIGRAM(S): 400 TABLET ORAL at 05:29

## 2024-05-23 RX ADMIN — Medication 3 MILLILITER(S): at 16:18

## 2024-05-23 RX ADMIN — PANTOPRAZOLE SODIUM 40 MILLIGRAM(S): 20 TABLET, DELAYED RELEASE ORAL at 05:28

## 2024-05-23 RX ADMIN — GABAPENTIN 100 MILLIGRAM(S): 400 CAPSULE ORAL at 05:27

## 2024-05-23 RX ADMIN — Medication 500 MILLIGRAM(S): at 11:34

## 2024-05-23 RX ADMIN — SODIUM CHLORIDE 3 MILLILITER(S): 9 INJECTION INTRAMUSCULAR; INTRAVENOUS; SUBCUTANEOUS at 19:37

## 2024-05-23 NOTE — PROCEDURE NOTE - NSANESTHESIA_GEN_A_CORE
[facility]  Maza Leyden  [address] [facility]  [phone] fax    Plan of Care/ Statement of Necessity for Physical Therapy Services    Patient name: Linda Anderson Start of Care: 2020   Referral source: Jacobo Silva  : 2004    Medical Diagnosis: Low back pain [M54.5]  Strain of muscle, fascia and tendon of lower back, subsequent encounter [S39.724D]  Payor: [employer] / Plan: [place] / Product Type: Managed Care Medicaid /  Onset Date: a year ago    Treatment Diagnosis: LBP   Prior Hospitalization: see medical history Provider#: [id number]   Medications: Verified on Patient summary List    Comorbidities:  asthma   Prior Level of Function: school, dance, cheer, track     The Plan of Care and following information is based on the information from the initial evaluation. Assessment/ key information:  Pt. Is a 17year old female c/o back pain that started a year ago and has gradually worsened. She reports pain with bending to tie shoes, lifting, carrying her book bag, and prolonged sitting. Pain with back bends, back flips, and jumping during dance. She reports she did have 2 brief episodes of numbness to B ankles. Denies bowel or bladder symptoms. She has relief with \"cracking\" her back. She presents with decreased lumbar flexion AROM at 36cm from finger tips to floor. She also has pain with lumbar extension. Positive slump test and SLR test on left. She has decreased left hip abd/ext strength and left knee strength compared to right side. Positive prone instability test. Sitting posture has excessive posterior pelvic tilt and decreased lumbar lordosis. Skilled PT is medically necessary in order to improve strength and core stability for decreased pain and return to PLOF.      Evaluation Complexity History MEDIUM  Complexity : 1-2 comorbidities / personal factors will impact the outcome/ POC ; Examination MEDIUM Complexity : 3 Standardized tests and measures addressing body structure, function, activity limitation and / or participation in recreation  ;Presentation LOW Complexity : Stable, uncomplicated  ;Clinical Decision Making MEDIUM Complexity : FOTO score of 26-74  Overall Complexity Rating: LOW   Problem List: pain affecting function, decrease ROM, decrease strength, impaired gait/ balance, decrease ADL/ functional abilitiies, decrease activity tolerance, decrease flexibility/ joint mobility and decrease transfer abilities   Treatment Plan may include any combination of the following: Therapeutic exercise, Therapeutic activities, Neuromuscular re-education, Physical agent/modality, Gait/balance training, Manual therapy, Patient education, Self Care training, Functional mobility training and Home safety training  Patient / Family readiness to learn indicated by: asking questions  Persons(s) to be included in education: patient (P)  Barriers to Learning/Limitations: None  Patient Goal (s): to have less pain  Patient Self Reported Health Status: excellent  Rehabilitation Potential: good    Short Term Goals: To be accomplished in 1 weeks:  1. Patient will demonstrate compliance with HEP in order to improve hip strength for increased ease of dancing. Long Term Goals: To be accomplished in 6 weeks:  1. Patient will improve FOTO score by 6 points in order to demonstrate a significant improvement in function. 2. Patient will improve lumbar flexion AROM finger tip to floor to 15cm in order to increase ease of tieing her shoes. 3. Patient will improve B hip ext/abd strength to 4/5 in order to increase ease of dancing. 4. Patient will perform quadruped LE lift with good form and no increase in pain in order to demonstrate improving core stability for increased ease of ADLs. Frequency / Duration: Patient to be seen 2 times per week for 6 weeks.     Patient/ CarPatient/ Caregiver education and instruction: Diagnosis, prognosis, exercises   [x]  Plan of care has been reviewed with SAUNDRA Jade, PT 11/9/2020 5:57 PM    ________________________________________________________________________    I certify that the above Therapy Services are being furnished while the patient is under my care. I agree with the treatment plan and certify that this therapy is necessary.     Physician's Signature:____________Date:_________TIME:________    ** Signature, Date and Time must be completed for valid certification **    Please sign and return to [facility] [employer]  [address] [place] [address]  [phone] fax 1% lidocaine

## 2024-05-23 NOTE — PROGRESS NOTE ADULT - ASSESSMENT
This is a 68 year-old male, with past history significant for Type-2 DM, HLD, HTN and Hyperkalemia, presented to the ED secondary to L-sided chest pain, shortness of breath, sp cath found to have TVD. Now s/p C4L, ANIYA on 5/3.  Post -op course significant for acute blood loss anemia requiring 3 U PRBC, respiratory insufficiency requiring high flow,  hyperglycemia requiring insulin gtt, and new onset Afib/RVR received modified amio load due to bradycardia. R arm with ecchymosis and edema s/p RUE duplex.  Ultimately remained in PAF and discharged on eliquis 2.5 BID and toprol 50 QD related to prolonged QTC.  Presents to ER this morning, accompanied by his spouse, related to persistent cough, chills,  and dyspnea on exertion.  Found to have large right pleural effusion and elevated troponin which is downtrending.  5/21 VSS CP free eliquis on hold  for pigtail   5/22 AFIB, TOPROL INCREASED , conversion pause  Diuresis, pl effusion, thoracentisis thursday.  5/23  US and thoracentisis today

## 2024-05-23 NOTE — PROGRESS NOTE ADULT - SUBJECTIVE AND OBJECTIVE BOX
CARDIOLOGY FOLLOW UP - Dr. Brewer  DATE OF SERVICE: 5/23/24    CC  No cv complaints     REVIEW OF SYSTEMS:  CONSTITUTIONAL: No fever, weight loss, or fatigue  RESPIRATORY: No cough, wheezing, chills or hemoptysis; No Shortness of Breath  CARDIOVASCULAR: No chest pain, palpitations, passing out, dizziness, or leg swelling  GASTROINTESTINAL: No abdominal or epigastric pain. No nausea, vomiting, or hematemesis; No diarrhea or constipation. No melena or hematochezia.  VASCULAR: No edema     PHYSICAL EXAM:  T(C): 36.5 (05-23-24 @ 05:13), Max: 36.9 (05-22-24 @ 12:13)  HR: 67 (05-23-24 @ 08:19) (65 - 100)  BP: 140/66 (05-23-24 @ 08:19) (110/75 - 158/69)  RR: 17 (05-23-24 @ 08:19) (17 - 18)  SpO2: 94% (05-23-24 @ 08:19) (92% - 100%)  Wt(kg): --  I&O's Summary    22 May 2024 07:01  -  23 May 2024 07:00  --------------------------------------------------------  IN: 1000 mL / OUT: 2200 mL / NET: -1200 mL        Appearance: Normal	  Cardiovascular: Normal S1 S2,RRR, No JVD, No murmurs  Respiratory: diminished at bases b/l   Gastrointestinal:  Soft, Non-tender, + BS	  Extremities: Normal range of motion, No clubbing, cyanosis or edema      Home Medications:  insulin glargine 100 units/mL subcutaneous solution: 18 unit(s) subcutaneous once a day (at bedtime) (10 May 2024 13:31)  insulin lispro 100 units/mL injectable solution: 6 unit(s) subcutaneous 3 times a day take before meals (10 May 2024 13:31)  polyethylene glycol 3350 oral powder for reconstitution: 17 gram(s) orally once a day (10 May 2024 13:31)  senna leaf extract oral tablet: 2 tab(s) orally once a day (at bedtime) (10 May 2024 13:31)  simethicone 80 mg oral tablet, chewable: 1 tab(s) orally 4 times a day As needed Gas (10 May 2024 13:31)      MEDICATIONS  (STANDING):  aMIOdarone    Tablet 200 milliGRAM(s) Oral daily  amLODIPine   Tablet 10 milliGRAM(s) Oral daily  ascorbic acid 500 milliGRAM(s) Oral daily  aspirin enteric coated 81 milliGRAM(s) Oral daily  atorvastatin 40 milliGRAM(s) Oral at bedtime  dextrose 50% Injectable 25 Gram(s) IV Push once  folic acid 1 milliGRAM(s) Oral daily  furosemide   Injectable 40 milliGRAM(s) IV Push every 12 hours  gabapentin 100 milliGRAM(s) Oral every 8 hours  glucagon  Injectable 1 milliGRAM(s) IntraMuscular once  insulin glargine Injectable (LANTUS) 18 Unit(s) SubCutaneous at bedtime  insulin lispro (ADMELOG) corrective regimen sliding scale   SubCutaneous three times a day before meals  insulin lispro (ADMELOG) corrective regimen sliding scale   SubCutaneous at bedtime  insulin lispro Injectable (ADMELOG) 6 Unit(s) SubCutaneous three times a day before meals  iron sucrose IVPB 100 milliGRAM(s) IV Intermittent every 24 hours  metoprolol succinate ER 75 milliGRAM(s) Oral daily  pantoprazole    Tablet 40 milliGRAM(s) Oral before breakfast  polyethylene glycol 3350 17 Gram(s) Oral daily  senna 2 Tablet(s) Oral at bedtime  sodium chloride 0.9% lock flush 3 milliLiter(s) IV Push every 8 hours  spironolactone 25 milliGRAM(s) Oral daily      TELEMETRY: Afib > SR, 3 sec conversion pause 	    ECG:  	  RADIOLOGY:   DIAGNOSTIC TESTING:  [ ] Echocardiogram:  [ ]  Catheterization:  [ ] Stress Test:    OTHER: 	    LABS:	 	    Troponin T, High Sensitivity Result: 157 ng/L [0 - 51] (05-20 @ 06:40)  Troponin T, High Sensitivity Result: 167 ng/L [0 - 51] (05-20 @ 03:27)                          8.5    7.94  )-----------( 475      ( 23 May 2024 05:46 )             26.7     05-23    136  |  99  |  17  ----------------------------<  216<H>  4.5   |  25  |  0.98    Ca    9.0      23 May 2024 05:46      PT/INR - ( 22 May 2024 07:09 )   PT: 12.8 sec;   INR: 1.23 ratio         PTT - ( 22 May 2024 07:09 )  PTT:29.5 sec

## 2024-05-23 NOTE — PROGRESS NOTE ADULT - SUBJECTIVE AND OBJECTIVE BOX
VITAL SIGNS    Telemetry:  nsr 66    Vital Signs Last 24 Hrs  T(C): 36.5 (24 @ 05:13), Max: 36.9 (24 @ 12:13)  T(F): 97.7 (24 @ 05:13), Max: 98.4 (24 @ 12:13)  HR: 67 (24 @ 08:19) (65 - 98)  BP: 140/66 (24 @ 08:19) (110/75 - 158/69)  RR: 17 (24 @ 08:19) (17 - 18)  SpO2: 94% (24 @ 08:19) (92% - 100%)                    @ 07:01  -   @ 07:00  --------------------------------------------------------  IN: 1000 mL / OUT: 2200 mL / NET: -1200 mL     @ 07:01  -   @ 10:37  --------------------------------------------------------  IN: 300 mL / OUT: 0 mL / NET: 300 mL          Daily     Daily Weight in k.6 (23 May 2024 08:19)            CAPILLARY BLOOD GLUCOSE      POCT Blood Glucose.: 172 mg/dL (23 May 2024 07:49)  POCT Blood Glucose.: 183 mg/dL (22 May 2024 21:28)  POCT Blood Glucose.: 99 mg/dL (22 May 2024 16:38)  POCT Blood Glucose.: 200 mg/dL (22 May 2024 11:46)              Pacing Wires            Coumadin    [ ] YES          x[  ]      NO                                   PHYSICAL EXAM    Neurology: alert and oriented x 3, nonfocal, no gross deficits  CV : s1 s2 RRR  l radial inc healing  Sternal Wound :  CDI , Stable  Lungs: cta  Abdomen: soft, nontender, nondistended, positive bowel sounds                      chest tubes  :    voiding        Extremities:      edema   /  -   calve tenderness ,     R leg  incisions cdi          acetaminophen     Tablet .. 650 milliGRAM(s) Oral every 6 hours PRN  aluminum hydroxide/magnesium hydroxide/simethicone Suspension 30 milliLiter(s) Oral every 4 hours PRN  aMIOdarone    Tablet 200 milliGRAM(s) Oral daily  amLODIPine   Tablet 10 milliGRAM(s) Oral daily  ascorbic acid 500 milliGRAM(s) Oral daily  aspirin enteric coated 81 milliGRAM(s) Oral daily  atorvastatin 40 milliGRAM(s) Oral at bedtime  dextrose 50% Injectable 25 Gram(s) IV Push once  folic acid 1 milliGRAM(s) Oral daily  furosemide   Injectable 40 milliGRAM(s) IV Push every 12 hours  gabapentin 100 milliGRAM(s) Oral every 8 hours  glucagon  Injectable 1 milliGRAM(s) IntraMuscular once  insulin glargine Injectable (LANTUS) 18 Unit(s) SubCutaneous at bedtime  insulin lispro (ADMELOG) corrective regimen sliding scale   SubCutaneous three times a day before meals  insulin lispro (ADMELOG) corrective regimen sliding scale   SubCutaneous at bedtime  insulin lispro Injectable (ADMELOG) 6 Unit(s) SubCutaneous three times a day before meals  iron sucrose IVPB 100 milliGRAM(s) IV Intermittent every 24 hours  melatonin 3 milliGRAM(s) Oral at bedtime PRN  metoprolol succinate ER 75 milliGRAM(s) Oral daily  ondansetron Injectable 4 milliGRAM(s) IV Push every 8 hours PRN  oxyCODONE    IR 5 milliGRAM(s) Oral every 6 hours PRN  pantoprazole    Tablet 40 milliGRAM(s) Oral before breakfast  polyethylene glycol 3350 17 Gram(s) Oral daily  senna 2 Tablet(s) Oral at bedtime  simethicone 80 milliGRAM(s) Chew four times a day PRN  sodium chloride 0.9% lock flush 3 milliLiter(s) IV Push every 8 hours  spironolactone 25 milliGRAM(s) Oral daily                    Physical Therapy Rec:   Home  [  ]   Home w/ PT  [  ]  Rehab  [  ]  Discussed with Cardiothoracic Team at AM rounds.

## 2024-05-23 NOTE — PROGRESS NOTE ADULT - ASSESSMENT
a/p  68 year-old male, with past history significant for Type-2 DM, HLD, HTN and Hyperkalemia, presented to the ED secondary to L-sided chest pain, shortness of breath, sp cath found to have TVD. Now s/p C4L, ANIYA on 5/3.  Post -op course significant for acute blood loss anemia requiring 3 U PRBC, respiratory insufficiency requiring high flow,  hyperglycemia requiring insulin gtt, and new onset Afib/RVR received modified amio load due to bradycardia. R arm with ecchymosis and edema s/p RUE duplex.  Ultimately remained in PAF and discharged on eliquis 2.5 BID and toprol 50 QD related to prolonged QTC.  Presents to ER this morning, accompanied by his spouse, related to persistent cough, chills,  and dyspnea on exertion.  Found to have large right pleural effusion and elevated troponin which is downtrending      #Dyspnea post CABG, large R effusion  -continue iv diuresis   -CXR with improved effusions b/l   -management per cts - f/u need for poss thoracentesis     #diabetes mellitus.   -tx per cts    #CAD, s/p CABG x 4  -stable, cont current tx   -cont asa    #Paroxysmal atrial fibrillation.  -cont amio, bb   -eliquis on hold for poss thora      dvt ppx

## 2024-05-23 NOTE — PROCEDURE NOTE - NSDIAGNOSTIC_RESP_A_CORE
[FreeTextEntry1] : ___________________________________________________________________________________\par \par \par Dear Dr. 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 After discussing the options with his parents, they decided upon the following plan. He will followup at 5 months of age for reexamination and in-office circumcision has been deferred, and if performed would be performed in the operating room when the least 5 months of age due to the penile anomaly.\par Thank you for allowing me to take part in your patient's care. I will keep you abreast of the progress.\par \par Sincerely yours,\par \par Chan\par \par Chan Sullivan MD, FACS, FSPU\par Director, Genital Reconstruction\par [facility]\par Division of Pediatric Urology\par Tel: [phone]\par \par \par ___________________________________________________________________________________\par  Therapeutic

## 2024-05-24 ENCOUNTER — RESULT REVIEW (OUTPATIENT)
Age: 69
End: 2024-05-24

## 2024-05-24 LAB
ANION GAP SERPL CALC-SCNC: 11 MMOL/L — SIGNIFICANT CHANGE UP (ref 5–17)
BUN SERPL-MCNC: 29 MG/DL — HIGH (ref 7–23)
CALCIUM SERPL-MCNC: 8.8 MG/DL — SIGNIFICANT CHANGE UP (ref 8.4–10.5)
CHLORIDE SERPL-SCNC: 97 MMOL/L — SIGNIFICANT CHANGE UP (ref 96–108)
CO2 SERPL-SCNC: 26 MMOL/L — SIGNIFICANT CHANGE UP (ref 22–31)
CREAT SERPL-MCNC: 1.26 MG/DL — SIGNIFICANT CHANGE UP (ref 0.5–1.3)
EGFR: 63 ML/MIN/1.73M2 — SIGNIFICANT CHANGE UP
GLUCOSE BLDC GLUCOMTR-MCNC: 143 MG/DL — HIGH (ref 70–99)
GLUCOSE BLDC GLUCOMTR-MCNC: 175 MG/DL — HIGH (ref 70–99)
GLUCOSE BLDC GLUCOMTR-MCNC: 177 MG/DL — HIGH (ref 70–99)
GLUCOSE BLDC GLUCOMTR-MCNC: 180 MG/DL — HIGH (ref 70–99)
GLUCOSE BLDC GLUCOMTR-MCNC: 242 MG/DL — HIGH (ref 70–99)
GLUCOSE SERPL-MCNC: 152 MG/DL — HIGH (ref 70–99)
HCT VFR BLD CALC: 29.9 % — LOW (ref 39–50)
HGB BLD-MCNC: 9.4 G/DL — LOW (ref 13–17)
MAGNESIUM SERPL-MCNC: 1.9 MG/DL — SIGNIFICANT CHANGE UP (ref 1.6–2.6)
MCHC RBC-ENTMCNC: 26.6 PG — LOW (ref 27–34)
MCHC RBC-ENTMCNC: 31.4 GM/DL — LOW (ref 32–36)
MCV RBC AUTO: 84.5 FL — SIGNIFICANT CHANGE UP (ref 80–100)
NRBC # BLD: 0 /100 WBCS — SIGNIFICANT CHANGE UP (ref 0–0)
PHOSPHATE SERPL-MCNC: 5.1 MG/DL — HIGH (ref 2.5–4.5)
PLATELET # BLD AUTO: 438 K/UL — HIGH (ref 150–400)
POTASSIUM SERPL-MCNC: 5.1 MMOL/L — SIGNIFICANT CHANGE UP (ref 3.5–5.3)
POTASSIUM SERPL-SCNC: 5.1 MMOL/L — SIGNIFICANT CHANGE UP (ref 3.5–5.3)
RBC # BLD: 3.54 M/UL — LOW (ref 4.2–5.8)
RBC # FLD: 14.3 % — SIGNIFICANT CHANGE UP (ref 10.3–14.5)
SODIUM SERPL-SCNC: 134 MMOL/L — LOW (ref 135–145)
WBC # BLD: 11.99 K/UL — HIGH (ref 3.8–10.5)
WBC # FLD AUTO: 11.99 K/UL — HIGH (ref 3.8–10.5)

## 2024-05-24 PROCEDURE — 93306 TTE W/DOPPLER COMPLETE: CPT | Mod: 26

## 2024-05-24 PROCEDURE — 99232 SBSQ HOSP IP/OBS MODERATE 35: CPT

## 2024-05-24 RX ORDER — COLCHICINE 0.6 MG
0.6 TABLET ORAL
Refills: 0 | Status: DISCONTINUED | OUTPATIENT
Start: 2024-05-24 | End: 2024-05-26

## 2024-05-24 RX ADMIN — Medication 0.6 MILLIGRAM(S): at 17:18

## 2024-05-24 RX ADMIN — Medication 40 MILLIGRAM(S): at 17:08

## 2024-05-24 RX ADMIN — INSULIN GLARGINE 18 UNIT(S): 100 INJECTION, SOLUTION SUBCUTANEOUS at 21:09

## 2024-05-24 RX ADMIN — Medication 6 UNIT(S): at 17:08

## 2024-05-24 RX ADMIN — ATORVASTATIN CALCIUM 40 MILLIGRAM(S): 80 TABLET, FILM COATED ORAL at 21:09

## 2024-05-24 RX ADMIN — SODIUM CHLORIDE 3 MILLILITER(S): 9 INJECTION INTRAMUSCULAR; INTRAVENOUS; SUBCUTANEOUS at 04:32

## 2024-05-24 RX ADMIN — Medication 3 MILLILITER(S): at 17:08

## 2024-05-24 RX ADMIN — Medication 75 MILLIGRAM(S): at 05:22

## 2024-05-24 RX ADMIN — Medication 500 MILLIGRAM(S): at 11:59

## 2024-05-24 RX ADMIN — PANTOPRAZOLE SODIUM 40 MILLIGRAM(S): 20 TABLET, DELAYED RELEASE ORAL at 05:22

## 2024-05-24 RX ADMIN — Medication 0.6 MILLIGRAM(S): at 08:57

## 2024-05-24 RX ADMIN — OXYCODONE HYDROCHLORIDE 5 MILLIGRAM(S): 5 TABLET ORAL at 06:02

## 2024-05-24 RX ADMIN — SPIRONOLACTONE 25 MILLIGRAM(S): 25 TABLET, FILM COATED ORAL at 05:22

## 2024-05-24 RX ADMIN — IRON SUCROSE 210 MILLIGRAM(S): 20 INJECTION, SOLUTION INTRAVENOUS at 08:57

## 2024-05-24 RX ADMIN — SODIUM CHLORIDE 3 MILLILITER(S): 9 INJECTION INTRAMUSCULAR; INTRAVENOUS; SUBCUTANEOUS at 15:53

## 2024-05-24 RX ADMIN — Medication 100 MILLIGRAM(S): at 21:09

## 2024-05-24 RX ADMIN — Medication 650 MILLIGRAM(S): at 17:48

## 2024-05-24 RX ADMIN — Medication 3 MILLILITER(S): at 05:23

## 2024-05-24 RX ADMIN — GABAPENTIN 100 MILLIGRAM(S): 400 CAPSULE ORAL at 21:09

## 2024-05-24 RX ADMIN — Medication 6 UNIT(S): at 08:58

## 2024-05-24 RX ADMIN — Medication 40 MILLIGRAM(S): at 05:23

## 2024-05-24 RX ADMIN — SODIUM CHLORIDE 3 MILLILITER(S): 9 INJECTION INTRAMUSCULAR; INTRAVENOUS; SUBCUTANEOUS at 21:12

## 2024-05-24 RX ADMIN — Medication 1 MILLIGRAM(S): at 11:59

## 2024-05-24 RX ADMIN — POLYETHYLENE GLYCOL 3350 17 GRAM(S): 17 POWDER, FOR SOLUTION ORAL at 11:59

## 2024-05-24 RX ADMIN — GABAPENTIN 100 MILLIGRAM(S): 400 CAPSULE ORAL at 05:21

## 2024-05-24 RX ADMIN — Medication 1: at 12:00

## 2024-05-24 RX ADMIN — Medication 650 MILLIGRAM(S): at 17:18

## 2024-05-24 RX ADMIN — Medication 3 MILLILITER(S): at 12:01

## 2024-05-24 RX ADMIN — SENNA PLUS 2 TABLET(S): 8.6 TABLET ORAL at 21:10

## 2024-05-24 RX ADMIN — GABAPENTIN 100 MILLIGRAM(S): 400 CAPSULE ORAL at 17:12

## 2024-05-24 RX ADMIN — AMLODIPINE BESYLATE 10 MILLIGRAM(S): 2.5 TABLET ORAL at 05:22

## 2024-05-24 RX ADMIN — AMIODARONE HYDROCHLORIDE 200 MILLIGRAM(S): 400 TABLET ORAL at 05:22

## 2024-05-24 RX ADMIN — OXYCODONE HYDROCHLORIDE 5 MILLIGRAM(S): 5 TABLET ORAL at 06:52

## 2024-05-24 RX ADMIN — Medication 81 MILLIGRAM(S): at 11:59

## 2024-05-24 RX ADMIN — Medication 2: at 08:58

## 2024-05-24 RX ADMIN — Medication 6 UNIT(S): at 11:59

## 2024-05-24 NOTE — PROGRESS NOTE ADULT - SUBJECTIVE AND OBJECTIVE BOX
CARDIOLOGY FOLLOW UP - Dr. Brewer  DATE OF SERVICE: 5/24/24    CC sob improving  + discomfort at ct site         REVIEW OF SYSTEMS:  CONSTITUTIONAL: No fever, weight loss, or fatigue  RESPIRATORY: No cough, wheezing, chills or hemoptysis; No Shortness of Breath  CARDIOVASCULAR: No chest pain, palpitations, passing out, dizziness, or leg swelling  GASTROINTESTINAL: No abdominal or epigastric pain. No nausea, vomiting, or hematemesis; No diarrhea or constipation. No melena or hematochezia.  VASCULAR: No edema     PHYSICAL EXAM:  T(C): 36.8 (05-24-24 @ 11:32), Max: 38.2 (05-23-24 @ 17:44)  HR: 66 (05-24-24 @ 11:32) (66 - 90)  BP: 121/61 (05-24-24 @ 11:32) (115/67 - 172/79)  RR: 18 (05-24-24 @ 11:32) (17 - 25)  SpO2: 96% (05-24-24 @ 11:32) (89% - 100%)  Wt(kg): --  I&O's Summary    23 May 2024 07:01  -  24 May 2024 07:00  --------------------------------------------------------  IN: 980 mL / OUT: 4070 mL / NET: -3090 mL    24 May 2024 07:01  -  24 May 2024 13:13  --------------------------------------------------------  IN: 720 mL / OUT: 500 mL / NET: 220 mL        Appearance: Normal	  Cardiovascular: Normal S1 S2,RRR, No JVD, No murmurs  Respiratory:  diminished + chest tube   Gastrointestinal:  Soft, Non-tender, + BS	  Extremities: Normal range of motion, No clubbing, cyanosis or edema      Home Medications:  insulin glargine 100 units/mL subcutaneous solution: 18 unit(s) subcutaneous once a day (at bedtime) (10 May 2024 13:31)  insulin lispro 100 units/mL injectable solution: 6 unit(s) subcutaneous 3 times a day take before meals (10 May 2024 13:31)  polyethylene glycol 3350 oral powder for reconstitution: 17 gram(s) orally once a day (10 May 2024 13:31)  senna leaf extract oral tablet: 2 tab(s) orally once a day (at bedtime) (10 May 2024 13:31)  simethicone 80 mg oral tablet, chewable: 1 tab(s) orally 4 times a day As needed Gas (10 May 2024 13:31)      MEDICATIONS  (STANDING):  acetaminophen   IVPB .. 1000 milliGRAM(s) IV Intermittent once  albuterol    90 MICROgram(s) HFA Inhaler 2 Puff(s) Inhalation every 6 hours  albuterol/ipratropium for Nebulization 3 milliLiter(s) Nebulizer every 6 hours  aMIOdarone    Tablet 200 milliGRAM(s) Oral daily  amLODIPine   Tablet 10 milliGRAM(s) Oral daily  ascorbic acid 500 milliGRAM(s) Oral daily  aspirin enteric coated 81 milliGRAM(s) Oral daily  atorvastatin 40 milliGRAM(s) Oral at bedtime  colchicine 0.6 milliGRAM(s) Oral two times a day  dextrose 50% Injectable 25 Gram(s) IV Push once  folic acid 1 milliGRAM(s) Oral daily  furosemide   Injectable 40 milliGRAM(s) IV Push every 12 hours  gabapentin 100 milliGRAM(s) Oral every 8 hours  glucagon  Injectable 1 milliGRAM(s) IntraMuscular once  insulin glargine Injectable (LANTUS) 18 Unit(s) SubCutaneous at bedtime  insulin lispro (ADMELOG) corrective regimen sliding scale   SubCutaneous three times a day before meals  insulin lispro (ADMELOG) corrective regimen sliding scale   SubCutaneous at bedtime  insulin lispro Injectable (ADMELOG) 6 Unit(s) SubCutaneous three times a day before meals  iron sucrose IVPB 100 milliGRAM(s) IV Intermittent every 24 hours  metoprolol succinate ER 75 milliGRAM(s) Oral daily  pantoprazole    Tablet 40 milliGRAM(s) Oral before breakfast  polyethylene glycol 3350 17 Gram(s) Oral daily  senna 2 Tablet(s) Oral at bedtime  sodium chloride 0.9% lock flush 3 milliLiter(s) IV Push every 8 hours  spironolactone 25 milliGRAM(s) Oral daily      TELEMETRY: 	    ECG:  	  RADIOLOGY:   DIAGNOSTIC TESTING:  [ ] Echocardiogram:  [ ]  Catheterization:  [ ] Stress Test:    OTHER: 	    LABS:	 	    Troponin T, High Sensitivity Result: 157 ng/L [0 - 51] (05-20 @ 06:40)  Troponin T, High Sensitivity Result: 167 ng/L [0 - 51] (05-20 @ 03:27)                          9.4    11.99 )-----------( 438      ( 24 May 2024 06:04 )             29.9     05-24    134<L>  |  97  |  29<H>  ----------------------------<  152<H>  5.1   |  26  |  1.26    Ca    8.8      24 May 2024 06:05  Phos  5.1     05-24  Mg     1.9     05-24

## 2024-05-24 NOTE — PROGRESS NOTE ADULT - ASSESSMENT
a/p  68 year-old male, with past history significant for Type-2 DM, HLD, HTN and Hyperkalemia, presented to the ED secondary to L-sided chest pain, shortness of breath, sp cath found to have TVD. Now s/p C4L, ANIYA on 5/3.  Post -op course significant for acute blood loss anemia requiring 3 U PRBC, respiratory insufficiency requiring high flow,  hyperglycemia requiring insulin gtt, and new onset Afib/RVR received modified amio load due to bradycardia. R arm with ecchymosis and edema s/p RUE duplex.  Ultimately remained in PAF and discharged on eliquis 2.5 BID and toprol 50 QD related to prolonged QTC.  Presents to ER this morning, accompanied by his spouse, related to persistent cough, chills,  and dyspnea on exertion.  Found to have large right pleural effusion and elevated troponin which is downtrending      #Dyspnea post CABG, large R effusion  -continue iv diuresis   -CXR with improved effusions b/l   -management per cts   -5/23  US and thoracentisis , chest tube in place   -Colchicine     #diabetes mellitus.   -tx per cts    #CAD, s/p CABG x 4  -stable, cont current tx   -asa     #Paroxysmal atrial fibrillation.  -cont amio, bb   -eliquis on hold for poss thora      dvt ppx a/p  68 year-old male, with past history significant for Type-2 DM, HLD, HTN and Hyperkalemia, presented to the ED secondary to L-sided chest pain, shortness of breath, sp cath found to have TVD. Now s/p C4L, ANIYA on 5/3.  Post -op course significant for acute blood loss anemia requiring 3 U PRBC, respiratory insufficiency requiring high flow,  hyperglycemia requiring insulin gtt, and new onset Afib/RVR received modified amio load due to bradycardia. R arm with ecchymosis and edema s/p RUE duplex.  Ultimately remained in PAF and discharged on eliquis 2.5 BID and toprol 50 QD related to prolonged QTC.  Presents to ER this morning, accompanied by his spouse, related to persistent cough, chills,  and dyspnea on exertion.  Found to have large right pleural effusion and elevated troponin which is downtrending      #Dyspnea post CABG, large R effusion  -continue iv diuresis   -CXR with improved effusions b/l   -management per cts   -5/23  US and thoracentisis , chest tube in place   -Colchicine     #diabetes mellitus.   -tx per cts    #CAD, s/p CABG x 4  -stable, cont current tx   -asa     #Paroxysmal atrial fibrillation.  -cont amio, bb   -eliquis on hold     dvt ppx

## 2024-05-24 NOTE — PROGRESS NOTE ADULT - SUBJECTIVE AND OBJECTIVE BOX
VITAL SIGNS    Telemetry:  nsr 70    Vital Signs Last 24 Hrs  T(C): 36.7 (05-24-24 @ 09:04), Max: 38.2 (05-23-24 @ 17:44)  T(F): 98 (05-24-24 @ 09:04), Max: 100.8 (05-23-24 @ 17:44)  HR: 67 (05-24-24 @ 09:04) (62 - 90)  BP: 121/65 (05-24-24 @ 09:04) (115/67 - 172/79)  RR: 17 (05-24-24 @ 09:04) (17 - 25)  SpO2: 97% (05-24-24 @ 09:04) (89% - 100%)                   05-23 @ 07:01  -  05-24 @ 07:00  --------------------------------------------------------  IN: 980 mL / OUT: 4070 mL / NET: -3090 mL    05-24 @ 07:01  -  05-24 @ 09:09  --------------------------------------------------------  IN: 360 mL / OUT: 0 mL / NET: 360 mL          Daily     Daily             CAPILLARY BLOOD GLUCOSE      POCT Blood Glucose.: 242 mg/dL (24 May 2024 08:49)  POCT Blood Glucose.: 175 mg/dL (24 May 2024 07:45)  POCT Blood Glucose.: 210 mg/dL (23 May 2024 21:16)  POCT Blood Glucose.: 173 mg/dL (23 May 2024 17:03)  POCT Blood Glucose.: 80 mg/dL (23 May 2024 14:08)  POCT Blood Glucose.: 117 mg/dL (23 May 2024 11:28)            Drains:     MS         [  ] Drainage:                 L Pleural  [  ]  Drainage:                R Pleural  [x  ]  Drainage:    Pacing Wires           Coumadin    [ ] YES          [  x]      NO                                   PHYSICAL EXAM        Neurology: alert and oriented x 3, nonfocal, no gross deficits  CV : s1 s2 RRR  L rad site healing, scar/scab  Sternal Wound :  CDI , Stable  Lungs: cta  Abdomen: soft, nontender, nondistended, positive bowel sounds                      chest tubes  x 1  :    voiding        Extremities:  -    edema   /  -   calve tenderness ,    R leg  incisions cdi          acetaminophen     Tablet .. 650 milliGRAM(s) Oral every 6 hours PRN  acetaminophen   IVPB .. 1000 milliGRAM(s) IV Intermittent once  albuterol    90 MICROgram(s) HFA Inhaler 2 Puff(s) Inhalation every 6 hours  albuterol/ipratropium for Nebulization 3 milliLiter(s) Nebulizer every 6 hours  aluminum hydroxide/magnesium hydroxide/simethicone Suspension 30 milliLiter(s) Oral every 4 hours PRN  aMIOdarone    Tablet 200 milliGRAM(s) Oral daily  amLODIPine   Tablet 10 milliGRAM(s) Oral daily  ascorbic acid 500 milliGRAM(s) Oral daily  aspirin enteric coated 81 milliGRAM(s) Oral daily  atorvastatin 40 milliGRAM(s) Oral at bedtime  colchicine 0.6 milliGRAM(s) Oral two times a day  dextrose 50% Injectable 25 Gram(s) IV Push once  folic acid 1 milliGRAM(s) Oral daily  furosemide   Injectable 40 milliGRAM(s) IV Push every 12 hours  gabapentin 100 milliGRAM(s) Oral every 8 hours  glucagon  Injectable 1 milliGRAM(s) IntraMuscular once  insulin glargine Injectable (LANTUS) 18 Unit(s) SubCutaneous at bedtime  insulin lispro (ADMELOG) corrective regimen sliding scale   SubCutaneous three times a day before meals  insulin lispro (ADMELOG) corrective regimen sliding scale   SubCutaneous at bedtime  insulin lispro Injectable (ADMELOG) 6 Unit(s) SubCutaneous three times a day before meals  iron sucrose IVPB 100 milliGRAM(s) IV Intermittent every 24 hours  melatonin 3 milliGRAM(s) Oral at bedtime PRN  metoprolol succinate ER 75 milliGRAM(s) Oral daily  ondansetron Injectable 4 milliGRAM(s) IV Push every 8 hours PRN  oxyCODONE    IR 5 milliGRAM(s) Oral every 6 hours PRN  pantoprazole    Tablet 40 milliGRAM(s) Oral before breakfast  polyethylene glycol 3350 17 Gram(s) Oral daily  senna 2 Tablet(s) Oral at bedtime  simethicone 80 milliGRAM(s) Chew four times a day PRN  sodium chloride 0.9% lock flush 3 milliLiter(s) IV Push every 8 hours  spironolactone 25 milliGRAM(s) Oral daily                    Physical Therapy Rec:   Home  [  ]   Home w/ PT  [  ]  Rehab  [  ]  Discussed with Cardiothoracic Team at AM rounds.

## 2024-05-24 NOTE — PROGRESS NOTE ADULT - ASSESSMENT
This is a 68 year-old male, with past history significant for Type-2 DM, HLD, HTN and Hyperkalemia, presented to the ED secondary to L-sided chest pain, shortness of breath, sp cath found to have TVD. Now s/p C4L, ANIYA on 5/3.  Post -op course significant for acute blood loss anemia requiring 3 U PRBC, respiratory insufficiency requiring high flow,  hyperglycemia requiring insulin gtt, and new onset Afib/RVR received modified amio load due to bradycardia. R arm with ecchymosis and edema s/p RUE duplex.  Ultimately remained in PAF and discharged on eliquis 2.5 BID and toprol 50 QD related to prolonged QTC.  Presents to ER this morning, accompanied by his spouse, related to persistent cough, chills,  and dyspnea on exertion.  Found to have large right pleural effusion and elevated troponin which is downtrending.  5/21 VSS CP free eliquis on hold  for pigtail   5/22 AFIB, TOPROL INCREASED , conversion pause  Diuresis, pl effusion, thoracentisis thursday.  5/23  US and thoracentisis today    5/24 s/p ptc placement yesterday, post procedure reexpansion pulm edema , placed on bipapa and diuretics, desaturation requiring increased o2.  Stable this am on NC   Colchicine started

## 2024-05-25 LAB
ANION GAP SERPL CALC-SCNC: 14 MMOL/L — SIGNIFICANT CHANGE UP (ref 5–17)
BUN SERPL-MCNC: 25 MG/DL — HIGH (ref 7–23)
CALCIUM SERPL-MCNC: 9 MG/DL — SIGNIFICANT CHANGE UP (ref 8.4–10.5)
CHLORIDE SERPL-SCNC: 98 MMOL/L — SIGNIFICANT CHANGE UP (ref 96–108)
CO2 SERPL-SCNC: 23 MMOL/L — SIGNIFICANT CHANGE UP (ref 22–31)
CREAT SERPL-MCNC: 1.06 MG/DL — SIGNIFICANT CHANGE UP (ref 0.5–1.3)
EGFR: 77 ML/MIN/1.73M2 — SIGNIFICANT CHANGE UP
FLUAV AG NPH QL: SIGNIFICANT CHANGE UP
FLUBV AG NPH QL: SIGNIFICANT CHANGE UP
GLUCOSE BLDC GLUCOMTR-MCNC: 114 MG/DL — HIGH (ref 70–99)
GLUCOSE BLDC GLUCOMTR-MCNC: 130 MG/DL — HIGH (ref 70–99)
GLUCOSE BLDC GLUCOMTR-MCNC: 149 MG/DL — HIGH (ref 70–99)
GLUCOSE BLDC GLUCOMTR-MCNC: 151 MG/DL — HIGH (ref 70–99)
GLUCOSE SERPL-MCNC: 140 MG/DL — HIGH (ref 70–99)
HCT VFR BLD CALC: 29.1 % — LOW (ref 39–50)
HGB BLD-MCNC: 9.2 G/DL — LOW (ref 13–17)
MCHC RBC-ENTMCNC: 26.7 PG — LOW (ref 27–34)
MCHC RBC-ENTMCNC: 31.6 GM/DL — LOW (ref 32–36)
MCV RBC AUTO: 84.6 FL — SIGNIFICANT CHANGE UP (ref 80–100)
NRBC # BLD: 0 /100 WBCS — SIGNIFICANT CHANGE UP (ref 0–0)
PLATELET # BLD AUTO: 371 K/UL — SIGNIFICANT CHANGE UP (ref 150–400)
POTASSIUM SERPL-MCNC: 4.2 MMOL/L — SIGNIFICANT CHANGE UP (ref 3.5–5.3)
POTASSIUM SERPL-SCNC: 4.2 MMOL/L — SIGNIFICANT CHANGE UP (ref 3.5–5.3)
RBC # BLD: 3.44 M/UL — LOW (ref 4.2–5.8)
RBC # FLD: 14.4 % — SIGNIFICANT CHANGE UP (ref 10.3–14.5)
RSV RNA NPH QL NAA+NON-PROBE: SIGNIFICANT CHANGE UP
SARS-COV-2 RNA SPEC QL NAA+PROBE: SIGNIFICANT CHANGE UP
SODIUM SERPL-SCNC: 135 MMOL/L — SIGNIFICANT CHANGE UP (ref 135–145)
WBC # BLD: 9.41 K/UL — SIGNIFICANT CHANGE UP (ref 3.8–10.5)
WBC # FLD AUTO: 9.41 K/UL — SIGNIFICANT CHANGE UP (ref 3.8–10.5)

## 2024-05-25 PROCEDURE — 99232 SBSQ HOSP IP/OBS MODERATE 35: CPT | Mod: 24

## 2024-05-25 PROCEDURE — 71046 X-RAY EXAM CHEST 2 VIEWS: CPT | Mod: 26

## 2024-05-25 PROCEDURE — 71045 X-RAY EXAM CHEST 1 VIEW: CPT | Mod: 26,XE

## 2024-05-25 PROCEDURE — 93010 ELECTROCARDIOGRAM REPORT: CPT

## 2024-05-25 RX ORDER — MAGNESIUM SULFATE 500 MG/ML
2 VIAL (ML) INJECTION ONCE
Refills: 0 | Status: COMPLETED | OUTPATIENT
Start: 2024-05-25 | End: 2024-05-25

## 2024-05-25 RX ORDER — AMIODARONE HYDROCHLORIDE 400 MG/1
400 TABLET ORAL THREE TIMES A DAY
Refills: 0 | Status: DISCONTINUED | OUTPATIENT
Start: 2024-05-25 | End: 2024-05-25

## 2024-05-25 RX ORDER — METOPROLOL TARTRATE 50 MG
5 TABLET ORAL ONCE
Refills: 0 | Status: DISCONTINUED | OUTPATIENT
Start: 2024-05-25 | End: 2024-05-25

## 2024-05-25 RX ORDER — METOPROLOL TARTRATE 50 MG
100 TABLET ORAL DAILY
Refills: 0 | Status: DISCONTINUED | OUTPATIENT
Start: 2024-05-26 | End: 2024-05-26

## 2024-05-25 RX ORDER — METOPROLOL TARTRATE 50 MG
2.5 TABLET ORAL
Refills: 0 | Status: COMPLETED | OUTPATIENT
Start: 2024-05-25 | End: 2024-05-25

## 2024-05-25 RX ORDER — AMIODARONE HYDROCHLORIDE 400 MG/1
400 TABLET ORAL THREE TIMES A DAY
Refills: 0 | Status: DISCONTINUED | OUTPATIENT
Start: 2024-05-25 | End: 2024-05-26

## 2024-05-25 RX ORDER — METOPROLOL TARTRATE 50 MG
25 TABLET ORAL ONCE
Refills: 0 | Status: COMPLETED | OUTPATIENT
Start: 2024-05-25 | End: 2024-05-25

## 2024-05-25 RX ADMIN — Medication 6 UNIT(S): at 17:22

## 2024-05-25 RX ADMIN — GABAPENTIN 100 MILLIGRAM(S): 400 CAPSULE ORAL at 21:35

## 2024-05-25 RX ADMIN — GABAPENTIN 100 MILLIGRAM(S): 400 CAPSULE ORAL at 05:17

## 2024-05-25 RX ADMIN — Medication 81 MILLIGRAM(S): at 11:13

## 2024-05-25 RX ADMIN — ATORVASTATIN CALCIUM 40 MILLIGRAM(S): 80 TABLET, FILM COATED ORAL at 21:36

## 2024-05-25 RX ADMIN — AMIODARONE HYDROCHLORIDE 400 MILLIGRAM(S): 400 TABLET ORAL at 21:36

## 2024-05-25 RX ADMIN — AMIODARONE HYDROCHLORIDE 200 MILLIGRAM(S): 400 TABLET ORAL at 04:31

## 2024-05-25 RX ADMIN — SODIUM CHLORIDE 3 MILLILITER(S): 9 INJECTION INTRAMUSCULAR; INTRAVENOUS; SUBCUTANEOUS at 13:20

## 2024-05-25 RX ADMIN — IRON SUCROSE 210 MILLIGRAM(S): 20 INJECTION, SOLUTION INTRAVENOUS at 09:14

## 2024-05-25 RX ADMIN — Medication 25 MILLIGRAM(S): at 10:44

## 2024-05-25 RX ADMIN — Medication 3 MILLILITER(S): at 17:33

## 2024-05-25 RX ADMIN — PANTOPRAZOLE SODIUM 40 MILLIGRAM(S): 20 TABLET, DELAYED RELEASE ORAL at 04:35

## 2024-05-25 RX ADMIN — Medication 3 MILLILITER(S): at 11:14

## 2024-05-25 RX ADMIN — INSULIN GLARGINE 18 UNIT(S): 100 INJECTION, SOLUTION SUBCUTANEOUS at 21:35

## 2024-05-25 RX ADMIN — Medication 3 MILLILITER(S): at 23:23

## 2024-05-25 RX ADMIN — Medication 75 MILLIGRAM(S): at 04:35

## 2024-05-25 RX ADMIN — Medication 2.5 MILLIGRAM(S): at 11:28

## 2024-05-25 RX ADMIN — Medication 0.6 MILLIGRAM(S): at 04:31

## 2024-05-25 RX ADMIN — Medication 1 MILLIGRAM(S): at 11:13

## 2024-05-25 RX ADMIN — Medication 3 MILLILITER(S): at 05:17

## 2024-05-25 RX ADMIN — Medication 25 GRAM(S): at 04:47

## 2024-05-25 RX ADMIN — Medication 100 MILLIGRAM(S): at 11:17

## 2024-05-25 RX ADMIN — Medication 1: at 11:57

## 2024-05-25 RX ADMIN — Medication 500 MILLIGRAM(S): at 11:13

## 2024-05-25 RX ADMIN — Medication 6 UNIT(S): at 08:09

## 2024-05-25 RX ADMIN — Medication 6 UNIT(S): at 11:56

## 2024-05-25 RX ADMIN — SODIUM CHLORIDE 3 MILLILITER(S): 9 INJECTION INTRAMUSCULAR; INTRAVENOUS; SUBCUTANEOUS at 21:18

## 2024-05-25 RX ADMIN — Medication 0.6 MILLIGRAM(S): at 17:33

## 2024-05-25 RX ADMIN — AMLODIPINE BESYLATE 10 MILLIGRAM(S): 2.5 TABLET ORAL at 04:31

## 2024-05-25 RX ADMIN — Medication 2.5 MILLIGRAM(S): at 11:08

## 2024-05-25 RX ADMIN — Medication 40 MILLIGRAM(S): at 17:30

## 2024-05-25 RX ADMIN — Medication 40 MILLIGRAM(S): at 04:35

## 2024-05-25 RX ADMIN — SPIRONOLACTONE 25 MILLIGRAM(S): 25 TABLET, FILM COATED ORAL at 04:35

## 2024-05-25 RX ADMIN — AMIODARONE HYDROCHLORIDE 400 MILLIGRAM(S): 400 TABLET ORAL at 13:27

## 2024-05-25 RX ADMIN — GABAPENTIN 100 MILLIGRAM(S): 400 CAPSULE ORAL at 13:27

## 2024-05-25 RX ADMIN — SODIUM CHLORIDE 3 MILLILITER(S): 9 INJECTION INTRAMUSCULAR; INTRAVENOUS; SUBCUTANEOUS at 04:35

## 2024-05-25 NOTE — PROGRESS NOTE ADULT - PROBLEM SELECTOR PLAN 1
readmit s/p C4l/ANIYA on 5/3  telemetry  ASA, Lipitor, BB (increased to Toprol 100mg qD)  increased PO Amiodarone to 400mg TID  CXR PA/LA  on lasix 40 IV BID and aldactone 25mg qD  monitor I/Os, goal negative one liter daily,   daily weight   monitor electrolytes, replete to maintain K>4 and Mg >2  Plan of care per d/w CTS Attending Dr. Colby
Admit to 2C tele  readmit s/p C4l/ANIYA on 5/3  ASA 81  Toptol 50 qd Ator 40   AMIO 200 qd  telemetry  XRAY    c/w supplemental oxygen  -IV diuresis   kepi negative one liter daily - lasix 40 IV BID  -intake and output  -trend and replete electrolytes  Plan as per Dr Jimenez

## 2024-05-25 NOTE — PROVIDER CONTACT NOTE (OTHER) - ACTION/TREATMENT ORDERED:
ALIN Rowan made aware pt converted to afib at 4:15am. Toprol xl 25mg given. 2.5 lopressor IVpush x2 given.
ALIN Rowan stated pt can go off tele to xray
ALIN Rowan made aware. RVP ordered.

## 2024-05-25 NOTE — PROGRESS NOTE ADULT - PROBLEM SELECTOR PLAN 3
FS AC and HS  c/w basal bolus  goal glucose <160  controlled carb diet

## 2024-05-25 NOTE — PROGRESS NOTE ADULT - PROBLEM SELECTOR PLAN 2
Eliquis on hold  s/p RPTC, with minimal drainage overnight --> d/c'ed 5/25.   CXR PA/LA for evaluation  Per Dr. Jimenez, no need for eliquis on discharge. Eliquis on hold  s/p RPTC, with minimal drainage overnight --> d/c'ed 5/25.   CXR PA/LA for evaluation

## 2024-05-25 NOTE — PROGRESS NOTE ADULT - SUBJECTIVE AND OBJECTIVE BOX
SUBJECTIVE: "Hi." Denies acute chest pain, palpitations, or shortness of breath. But endorses feeling "sick with chills", with productive cough since admission per report.     TELEMETRY:  converted to afib since 4:15am    Vital Signs Last 24 Hrs  T(C): 37.1 (24 @ 04:16), Max: 37.1 (24 @ 04:16)  T(F): 98.8 (24 @ 04:16), Max: 98.8 (24 @ 04:16)  HR: 106 (24 @ 11:50) (70 - 112)  BP: 114/64 (24 @ 11:50) (102/62 - 139/58)  RR: 18 (24 @ 08:30) (17 - 118)  SpO2: 95% (24 @ 10:58) (94% - 99%)           INPUT/OUTPUT:  25 May 2024 07:01  -  25 May 2024 12:21  --------------------------------------------------------  IN:    Oral Fluid: 240 mL  Total IN: 240 mL  OUT:    Chest Tube (mL): 20 mL    Voided (mL): 600 mL  Total OUT: 620 mL  Total NET: -380 mL      LABS:    135  |  98  |  25<H>  ----------------------------<  140<H>  4.2   |  23  |  1.06  Ca    9.0      25 May 2024 07:10                     9.2    9.41  )-----------( 371      ( 25 May 2024 07:07 )             29.1          Daily Weight in k.2 (25 May 2024 08:48)      CAPILLARY BLOOD GLUCOSE  POCT Blood Glucose.: 151 mg/dL (25 May 2024 11:26)  POCT Blood Glucose.: 149 mg/dL (25 May 2024 07:33)  POCT Blood Glucose.: 177 mg/dL (24 May 2024 21:02)  POCT Blood Glucose.: 143 mg/dL (24 May 2024 16:40)          PHYSICAL EXAM:  NEURO: alert and oriented; no gross, focal neurological deficits appreciated at time of evaluation  HEART: s1, s2  STERNAL WOUND: MSI -->CDI, sternum stable    (+) RIGHT PTC, negative air leak; occlusive dressing c/d/i  LUNG: non-labored breathing with no use of accessory muscles  ABD: soft, (+) bowel sounds in all quadrants, +flatus, +BM            EXT: range of motion intact, peripherial pulses intact bilaterally, +b/l LE edema, +R SVG, c/d/i      ACTIVE MEDICATIONS:  acetaminophen     Tablet .. 650 milliGRAM(s) Oral every 6 hours PRN  acetaminophen   IVPB .. 1000 milliGRAM(s) IV Intermittent once  albuterol    90 MICROgram(s) HFA Inhaler 2 Puff(s) Inhalation every 6 hours  albuterol/ipratropium for Nebulization 3 milliLiter(s) Nebulizer every 6 hours  aluminum hydroxide/magnesium hydroxide/simethicone Suspension 30 milliLiter(s) Oral every 4 hours PRN  aMIOdarone    Tablet 400 milliGRAM(s) Oral three times a day  amLODIPine   Tablet 10 milliGRAM(s) Oral daily  ascorbic acid 500 milliGRAM(s) Oral daily  aspirin enteric coated 81 milliGRAM(s) Oral daily  atorvastatin 40 milliGRAM(s) Oral at bedtime  colchicine 0.6 milliGRAM(s) Oral two times a day  dextrose 50% Injectable 25 Gram(s) IV Push once  folic acid 1 milliGRAM(s) Oral daily  furosemide   Injectable 40 milliGRAM(s) IV Push every 12 hours  gabapentin 100 milliGRAM(s) Oral every 8 hours  glucagon  Injectable 1 milliGRAM(s) IntraMuscular once  guaiFENesin Oral Liquid (Sugar-Free) 100 milliGRAM(s) Oral every 6 hours PRN  insulin glargine Injectable (LANTUS) 18 Unit(s) SubCutaneous at bedtime  insulin lispro (ADMELOG) corrective regimen sliding scale   SubCutaneous three times a day before meals  insulin lispro (ADMELOG) corrective regimen sliding scale   SubCutaneous at bedtime  insulin lispro Injectable (ADMELOG) 6 Unit(s) SubCutaneous three times a day before meals  iron sucrose IVPB 100 milliGRAM(s) IV Intermittent every 24 hours  melatonin 3 milliGRAM(s) Oral at bedtime PRN  ondansetron Injectable 4 milliGRAM(s) IV Push every 8 hours PRN  oxyCODONE    IR 5 milliGRAM(s) Oral every 6 hours PRN  pantoprazole    Tablet 40 milliGRAM(s) Oral before breakfast  polyethylene glycol 3350 17 Gram(s) Oral daily  senna 2 Tablet(s) Oral at bedtime  simethicone 80 milliGRAM(s) Chew four times a day PRN  sodium chloride 0.9% lock flush 3 milliLiter(s) IV Push every 8 hours  spironolactone 25 milliGRAM(s) Oral daily      Case discussed in detail with Cardiothoracic Team and Attending Dr. Jimenez. Plan below as per discussion. SUBJECTIVE: "Hi." Denies acute chest pain, palpitations, or shortness of breath. But endorses feeling "sick with chills" this AM, and productive cough since admission per report.     TELEMETRY:  converted to afib since 4:15am    Vital Signs Last 24 Hrs  T(C): 37.1 (24 @ 04:16), Max: 37.1 (24 @ 04:16)  T(F): 98.8 (24 @ 04:16), Max: 98.8 (24 @ 04:16)  HR: 106 (24 @ 11:50) (70 - 112)  BP: 114/64 (24 @ 11:50) (102/62 - 139/58)  RR: 18 (24 @ 08:30) (17 - 118)  SpO2: 95% (24 @ 10:58) (94% - 99%)           INPUT/OUTPUT:  25 May 2024 07:01  -  25 May 2024 12:21  --------------------------------------------------------  IN:    Oral Fluid: 240 mL  Total IN: 240 mL  OUT:    Chest Tube (mL): 20 mL    Voided (mL): 600 mL  Total OUT: 620 mL  Total NET: -380 mL      LABS:    135  |  98  |  25<H>  ----------------------------<  140<H>  4.2   |  23  |  1.06  Ca    9.0      25 May 2024 07:10                     9.2    9.41  )-----------( 371      ( 25 May 2024 07:07 )             29.1          Daily Weight in k.2 (25 May 2024 08:48)      CAPILLARY BLOOD GLUCOSE  POCT Blood Glucose.: 151 mg/dL (25 May 2024 11:26)  POCT Blood Glucose.: 149 mg/dL (25 May 2024 07:33)  POCT Blood Glucose.: 177 mg/dL (24 May 2024 21:02)  POCT Blood Glucose.: 143 mg/dL (24 May 2024 16:40)          PHYSICAL EXAM:  NEURO: alert and oriented; no gross, focal neurological deficits appreciated at time of evaluation  HEART: s1, s2  STERNAL WOUND: MSI -->CDI, sternum stable    (+) RIGHT PTC, negative air leak; occlusive dressing c/d/i  LUNG: non-labored breathing with no use of accessory muscles  ABD: soft, (+) bowel sounds in all quadrants, +flatus, +BM            EXT: range of motion intact, peripherial pulses intact bilaterally, +b/l LE edema, +R SVG, c/d/i      ACTIVE MEDICATIONS:  acetaminophen     Tablet .. 650 milliGRAM(s) Oral every 6 hours PRN  acetaminophen   IVPB .. 1000 milliGRAM(s) IV Intermittent once  albuterol    90 MICROgram(s) HFA Inhaler 2 Puff(s) Inhalation every 6 hours  albuterol/ipratropium for Nebulization 3 milliLiter(s) Nebulizer every 6 hours  aluminum hydroxide/magnesium hydroxide/simethicone Suspension 30 milliLiter(s) Oral every 4 hours PRN  aMIOdarone    Tablet 400 milliGRAM(s) Oral three times a day  amLODIPine   Tablet 10 milliGRAM(s) Oral daily  ascorbic acid 500 milliGRAM(s) Oral daily  aspirin enteric coated 81 milliGRAM(s) Oral daily  atorvastatin 40 milliGRAM(s) Oral at bedtime  colchicine 0.6 milliGRAM(s) Oral two times a day  dextrose 50% Injectable 25 Gram(s) IV Push once  folic acid 1 milliGRAM(s) Oral daily  furosemide   Injectable 40 milliGRAM(s) IV Push every 12 hours  gabapentin 100 milliGRAM(s) Oral every 8 hours  glucagon  Injectable 1 milliGRAM(s) IntraMuscular once  guaiFENesin Oral Liquid (Sugar-Free) 100 milliGRAM(s) Oral every 6 hours PRN  insulin glargine Injectable (LANTUS) 18 Unit(s) SubCutaneous at bedtime  insulin lispro (ADMELOG) corrective regimen sliding scale   SubCutaneous three times a day before meals  insulin lispro (ADMELOG) corrective regimen sliding scale   SubCutaneous at bedtime  insulin lispro Injectable (ADMELOG) 6 Unit(s) SubCutaneous three times a day before meals  iron sucrose IVPB 100 milliGRAM(s) IV Intermittent every 24 hours  melatonin 3 milliGRAM(s) Oral at bedtime PRN  ondansetron Injectable 4 milliGRAM(s) IV Push every 8 hours PRN  oxyCODONE    IR 5 milliGRAM(s) Oral every 6 hours PRN  pantoprazole    Tablet 40 milliGRAM(s) Oral before breakfast  polyethylene glycol 3350 17 Gram(s) Oral daily  senna 2 Tablet(s) Oral at bedtime  simethicone 80 milliGRAM(s) Chew four times a day PRN  sodium chloride 0.9% lock flush 3 milliLiter(s) IV Push every 8 hours  spironolactone 25 milliGRAM(s) Oral daily      Case discussed in detail with Cardiothoracic Team and Attending Dr. Jimenez. Plan below as per discussion.

## 2024-05-25 NOTE — PROGRESS NOTE ADULT - PROBLEM SELECTOR PROBLEM 2
Pleural effusion, right

## 2024-05-25 NOTE — PROVIDER CONTACT NOTE (OTHER) - ASSESSMENT
Pt AFib on tele
Pt sitting in recliner. VSS. Pt denies CP or SOB.
Pt sitting in the recliner. Pt has a frequent productive cough. Pt temp 98.9.

## 2024-05-25 NOTE — PROGRESS NOTE ADULT - ASSESSMENT
a/p  68 year-old male, with past history significant for Type-2 DM, HLD, HTN and Hyperkalemia, presented to the ED secondary to L-sided chest pain, shortness of breath, sp cath found to have TVD. Now s/p C4L, ANIYA on 5/3.  Post -op course significant for acute blood loss anemia requiring 3 U PRBC, respiratory insufficiency requiring high flow,  hyperglycemia requiring insulin gtt, and new onset Afib/RVR received modified amio load due to bradycardia. R arm with ecchymosis and edema s/p RUE duplex.  Ultimately remained in PAF and discharged on eliquis 2.5 BID and toprol 50 QD related to prolonged QTC.  Presents to ER this morning, accompanied by his spouse, related to persistent cough, chills,  and dyspnea on exertion.  Found to have large right pleural effusion and elevated troponin which is downtrending      #Dyspnea post CABG, large R effusion  -continue iv diuresis   -CXR with improved effusions b/l   -management per cts   -5/23 sp US and thoracentesis, chest tube in place   -Cont Colchicine     #diabetes mellitus.   -tx per cts    #CAD, s/p CABG x 4  -stable, cont current tx   -asa     #Paroxysmal atrial fibrillation.  -cont amio, bb   -eliquis on hold       dvt ppx a/p  68 year-old male, with past history significant for Type-2 DM, HLD, HTN and Hyperkalemia, presented to the ED secondary to L-sided chest pain, shortness of breath, sp cath found to have TVD. Now s/p C4L, ANIYA on 5/3.  Post -op course significant for acute blood loss anemia requiring 3 U PRBC, respiratory insufficiency requiring high flow,  hyperglycemia requiring insulin gtt, and new onset Afib/RVR received modified amio load due to bradycardia. R arm with ecchymosis and edema s/p RUE duplex.  Ultimately remained in PAF and discharged on eliquis 2.5 BID and toprol 50 QD related to prolonged QTC.  Presents to ER this morning, accompanied by his spouse, related to persistent cough, chills,  and dyspnea on exertion.  Found to have large right pleural effusion and elevated troponin which is downtrending      #Dyspnea post CABG, large R effusion  -continue iv diuresis   -CXR with improved effusions b/l   -management per cts   -5/23 sp US and thoracentesis, chest tube in place   -cont iv diuresis   -Cont Colchicine     #diabetes mellitus.   -tx per cts    #CAD, s/p CABG x 4  -stable, cont current tx   -asa     #Paroxysmal atrial fibrillation.  -cont amio, bb   -eliquis on hold       dvt ppx

## 2024-05-26 ENCOUNTER — TRANSCRIPTION ENCOUNTER (OUTPATIENT)
Age: 69
End: 2024-05-26

## 2024-05-26 VITALS — DIASTOLIC BLOOD PRESSURE: 61 MMHG | SYSTOLIC BLOOD PRESSURE: 130 MMHG | HEART RATE: 63 BPM

## 2024-05-26 LAB
ALBUMIN SERPL ELPH-MCNC: 2.8 G/DL — LOW (ref 3.3–5)
ALP SERPL-CCNC: 92 U/L — SIGNIFICANT CHANGE UP (ref 40–120)
ALT FLD-CCNC: 14 U/L — SIGNIFICANT CHANGE UP (ref 10–45)
ANION GAP SERPL CALC-SCNC: 12 MMOL/L — SIGNIFICANT CHANGE UP (ref 5–17)
AST SERPL-CCNC: 18 U/L — SIGNIFICANT CHANGE UP (ref 10–40)
BASOPHILS # BLD AUTO: 0.03 K/UL — SIGNIFICANT CHANGE UP (ref 0–0.2)
BASOPHILS NFR BLD AUTO: 0.3 % — SIGNIFICANT CHANGE UP (ref 0–2)
BILIRUB SERPL-MCNC: 0.4 MG/DL — SIGNIFICANT CHANGE UP (ref 0.2–1.2)
BUN SERPL-MCNC: 22 MG/DL — SIGNIFICANT CHANGE UP (ref 7–23)
CALCIUM SERPL-MCNC: 8.6 MG/DL — SIGNIFICANT CHANGE UP (ref 8.4–10.5)
CHLORIDE SERPL-SCNC: 97 MMOL/L — SIGNIFICANT CHANGE UP (ref 96–108)
CO2 SERPL-SCNC: 24 MMOL/L — SIGNIFICANT CHANGE UP (ref 22–31)
CREAT SERPL-MCNC: 1.05 MG/DL — SIGNIFICANT CHANGE UP (ref 0.5–1.3)
EGFR: 78 ML/MIN/1.73M2 — SIGNIFICANT CHANGE UP
EOSINOPHIL # BLD AUTO: 0.37 K/UL — SIGNIFICANT CHANGE UP (ref 0–0.5)
EOSINOPHIL NFR BLD AUTO: 4.1 % — SIGNIFICANT CHANGE UP (ref 0–6)
GLUCOSE BLDC GLUCOMTR-MCNC: 180 MG/DL — HIGH (ref 70–99)
GLUCOSE BLDC GLUCOMTR-MCNC: 198 MG/DL — HIGH (ref 70–99)
GLUCOSE SERPL-MCNC: 136 MG/DL — HIGH (ref 70–99)
HCT VFR BLD CALC: 27.2 % — LOW (ref 39–50)
HGB BLD-MCNC: 8.7 G/DL — LOW (ref 13–17)
IMM GRANULOCYTES NFR BLD AUTO: 0.3 % — SIGNIFICANT CHANGE UP (ref 0–0.9)
LYMPHOCYTES # BLD AUTO: 1.11 K/UL — SIGNIFICANT CHANGE UP (ref 1–3.3)
LYMPHOCYTES # BLD AUTO: 12.4 % — LOW (ref 13–44)
MAGNESIUM SERPL-MCNC: 2.3 MG/DL — SIGNIFICANT CHANGE UP (ref 1.6–2.6)
MCHC RBC-ENTMCNC: 26.9 PG — LOW (ref 27–34)
MCHC RBC-ENTMCNC: 32 GM/DL — SIGNIFICANT CHANGE UP (ref 32–36)
MCV RBC AUTO: 84.2 FL — SIGNIFICANT CHANGE UP (ref 80–100)
MONOCYTES # BLD AUTO: 0.78 K/UL — SIGNIFICANT CHANGE UP (ref 0–0.9)
MONOCYTES NFR BLD AUTO: 8.7 % — SIGNIFICANT CHANGE UP (ref 2–14)
NEUTROPHILS # BLD AUTO: 6.66 K/UL — SIGNIFICANT CHANGE UP (ref 1.8–7.4)
NEUTROPHILS NFR BLD AUTO: 74.2 % — SIGNIFICANT CHANGE UP (ref 43–77)
NRBC # BLD: 0 /100 WBCS — SIGNIFICANT CHANGE UP (ref 0–0)
PHOSPHATE SERPL-MCNC: 3.3 MG/DL — SIGNIFICANT CHANGE UP (ref 2.5–4.5)
PLATELET # BLD AUTO: 354 K/UL — SIGNIFICANT CHANGE UP (ref 150–400)
POTASSIUM SERPL-MCNC: 4.2 MMOL/L — SIGNIFICANT CHANGE UP (ref 3.5–5.3)
POTASSIUM SERPL-SCNC: 4.2 MMOL/L — SIGNIFICANT CHANGE UP (ref 3.5–5.3)
PROT SERPL-MCNC: 6.5 G/DL — SIGNIFICANT CHANGE UP (ref 6–8.3)
RBC # BLD: 3.23 M/UL — LOW (ref 4.2–5.8)
RBC # FLD: 14.4 % — SIGNIFICANT CHANGE UP (ref 10.3–14.5)
SODIUM SERPL-SCNC: 133 MMOL/L — LOW (ref 135–145)
WBC # BLD: 8.98 K/UL — SIGNIFICANT CHANGE UP (ref 3.8–10.5)
WBC # FLD AUTO: 8.98 K/UL — SIGNIFICANT CHANGE UP (ref 3.8–10.5)

## 2024-05-26 PROCEDURE — 86850 RBC ANTIBODY SCREEN: CPT

## 2024-05-26 PROCEDURE — 80053 COMPREHEN METABOLIC PANEL: CPT

## 2024-05-26 PROCEDURE — 93306 TTE W/DOPPLER COMPLETE: CPT

## 2024-05-26 PROCEDURE — 99285 EMERGENCY DEPT VISIT HI MDM: CPT | Mod: 25

## 2024-05-26 PROCEDURE — 97110 THERAPEUTIC EXERCISES: CPT

## 2024-05-26 PROCEDURE — 71045 X-RAY EXAM CHEST 1 VIEW: CPT | Mod: 26

## 2024-05-26 PROCEDURE — 82330 ASSAY OF CALCIUM: CPT

## 2024-05-26 PROCEDURE — 93308 TTE F-UP OR LMTD: CPT

## 2024-05-26 PROCEDURE — 36415 COLL VENOUS BLD VENIPUNCTURE: CPT

## 2024-05-26 PROCEDURE — 87640 STAPH A DNA AMP PROBE: CPT

## 2024-05-26 PROCEDURE — 82947 ASSAY GLUCOSE BLOOD QUANT: CPT

## 2024-05-26 PROCEDURE — 86900 BLOOD TYPING SEROLOGIC ABO: CPT

## 2024-05-26 PROCEDURE — 71046 X-RAY EXAM CHEST 2 VIEWS: CPT

## 2024-05-26 PROCEDURE — 82962 GLUCOSE BLOOD TEST: CPT

## 2024-05-26 PROCEDURE — 71045 X-RAY EXAM CHEST 1 VIEW: CPT

## 2024-05-26 PROCEDURE — 85730 THROMBOPLASTIN TIME PARTIAL: CPT

## 2024-05-26 PROCEDURE — 96374 THER/PROPH/DIAG INJ IV PUSH: CPT

## 2024-05-26 PROCEDURE — 84484 ASSAY OF TROPONIN QUANT: CPT

## 2024-05-26 PROCEDURE — 87641 MR-STAPH DNA AMP PROBE: CPT

## 2024-05-26 PROCEDURE — 94640 AIRWAY INHALATION TREATMENT: CPT

## 2024-05-26 PROCEDURE — 82435 ASSAY OF BLOOD CHLORIDE: CPT

## 2024-05-26 PROCEDURE — 80048 BASIC METABOLIC PNL TOTAL CA: CPT

## 2024-05-26 PROCEDURE — 83880 ASSAY OF NATRIURETIC PEPTIDE: CPT

## 2024-05-26 PROCEDURE — 94660 CPAP INITIATION&MGMT: CPT

## 2024-05-26 PROCEDURE — 85018 HEMOGLOBIN: CPT

## 2024-05-26 PROCEDURE — 97161 PT EVAL LOW COMPLEX 20 MIN: CPT

## 2024-05-26 PROCEDURE — 86901 BLOOD TYPING SEROLOGIC RH(D): CPT

## 2024-05-26 PROCEDURE — 93005 ELECTROCARDIOGRAM TRACING: CPT

## 2024-05-26 PROCEDURE — 83735 ASSAY OF MAGNESIUM: CPT

## 2024-05-26 PROCEDURE — 81003 URINALYSIS AUTO W/O SCOPE: CPT

## 2024-05-26 PROCEDURE — 87637 SARSCOV2&INF A&B&RSV AMP PRB: CPT

## 2024-05-26 PROCEDURE — 82803 BLOOD GASES ANY COMBINATION: CPT

## 2024-05-26 PROCEDURE — 85027 COMPLETE CBC AUTOMATED: CPT

## 2024-05-26 PROCEDURE — 71275 CT ANGIOGRAPHY CHEST: CPT | Mod: MC

## 2024-05-26 PROCEDURE — 84100 ASSAY OF PHOSPHORUS: CPT

## 2024-05-26 PROCEDURE — 83605 ASSAY OF LACTIC ACID: CPT

## 2024-05-26 PROCEDURE — 84295 ASSAY OF SERUM SODIUM: CPT

## 2024-05-26 PROCEDURE — 85610 PROTHROMBIN TIME: CPT

## 2024-05-26 PROCEDURE — 97116 GAIT TRAINING THERAPY: CPT

## 2024-05-26 PROCEDURE — 85014 HEMATOCRIT: CPT

## 2024-05-26 PROCEDURE — 93010 ELECTROCARDIOGRAM REPORT: CPT

## 2024-05-26 PROCEDURE — 85025 COMPLETE CBC W/AUTO DIFF WBC: CPT

## 2024-05-26 RX ORDER — SPIRONOLACTONE 25 MG/1
1 TABLET, FILM COATED ORAL
Qty: 7 | Refills: 0
Start: 2024-05-26 | End: 2024-06-01

## 2024-05-26 RX ORDER — ASPIRIN/CALCIUM CARB/MAGNESIUM 324 MG
1 TABLET ORAL
Qty: 30 | Refills: 0
Start: 2024-05-26 | End: 2024-06-24

## 2024-05-26 RX ORDER — CLOPIDOGREL BISULFATE 75 MG/1
75 TABLET, FILM COATED ORAL DAILY
Refills: 0 | Status: DISCONTINUED | OUTPATIENT
Start: 2024-05-26 | End: 2024-05-26

## 2024-05-26 RX ORDER — METOPROLOL TARTRATE 50 MG
1 TABLET ORAL
Qty: 30 | Refills: 0
Start: 2024-05-26 | End: 2024-06-24

## 2024-05-26 RX ORDER — CLOPIDOGREL BISULFATE 75 MG/1
1 TABLET, FILM COATED ORAL
Qty: 30 | Refills: 0
Start: 2024-05-26 | End: 2024-06-24

## 2024-05-26 RX ORDER — ACETAMINOPHEN 500 MG
2 TABLET ORAL
Qty: 0 | Refills: 0 | DISCHARGE
Start: 2024-05-26

## 2024-05-26 RX ADMIN — AMIODARONE HYDROCHLORIDE 400 MILLIGRAM(S): 400 TABLET ORAL at 13:22

## 2024-05-26 RX ADMIN — Medication 1 MILLIGRAM(S): at 11:42

## 2024-05-26 RX ADMIN — SODIUM CHLORIDE 3 MILLILITER(S): 9 INJECTION INTRAMUSCULAR; INTRAVENOUS; SUBCUTANEOUS at 14:00

## 2024-05-26 RX ADMIN — Medication 40 MILLIGRAM(S): at 05:22

## 2024-05-26 RX ADMIN — GABAPENTIN 100 MILLIGRAM(S): 400 CAPSULE ORAL at 13:22

## 2024-05-26 RX ADMIN — Medication 1: at 11:43

## 2024-05-26 RX ADMIN — Medication 6 UNIT(S): at 11:42

## 2024-05-26 RX ADMIN — Medication 100 MILLIGRAM(S): at 05:20

## 2024-05-26 RX ADMIN — GABAPENTIN 100 MILLIGRAM(S): 400 CAPSULE ORAL at 05:22

## 2024-05-26 RX ADMIN — PANTOPRAZOLE SODIUM 40 MILLIGRAM(S): 20 TABLET, DELAYED RELEASE ORAL at 05:21

## 2024-05-26 RX ADMIN — SPIRONOLACTONE 25 MILLIGRAM(S): 25 TABLET, FILM COATED ORAL at 05:21

## 2024-05-26 RX ADMIN — Medication 10 MILLIGRAM(S): at 12:35

## 2024-05-26 RX ADMIN — Medication 650 MILLIGRAM(S): at 05:21

## 2024-05-26 RX ADMIN — Medication 6 UNIT(S): at 08:07

## 2024-05-26 RX ADMIN — Medication 1: at 08:07

## 2024-05-26 RX ADMIN — Medication 100 MILLIGRAM(S): at 00:54

## 2024-05-26 RX ADMIN — IRON SUCROSE 210 MILLIGRAM(S): 20 INJECTION, SOLUTION INTRAVENOUS at 09:40

## 2024-05-26 RX ADMIN — CLOPIDOGREL BISULFATE 75 MILLIGRAM(S): 75 TABLET, FILM COATED ORAL at 12:10

## 2024-05-26 RX ADMIN — AMLODIPINE BESYLATE 10 MILLIGRAM(S): 2.5 TABLET ORAL at 05:21

## 2024-05-26 RX ADMIN — Medication 3 MILLILITER(S): at 11:45

## 2024-05-26 RX ADMIN — Medication 500 MILLIGRAM(S): at 11:42

## 2024-05-26 RX ADMIN — Medication 3 MILLILITER(S): at 05:20

## 2024-05-26 RX ADMIN — Medication 0.6 MILLIGRAM(S): at 05:22

## 2024-05-26 RX ADMIN — Medication 650 MILLIGRAM(S): at 05:51

## 2024-05-26 RX ADMIN — AMIODARONE HYDROCHLORIDE 400 MILLIGRAM(S): 400 TABLET ORAL at 05:22

## 2024-05-26 RX ADMIN — Medication 81 MILLIGRAM(S): at 11:42

## 2024-05-26 RX ADMIN — SODIUM CHLORIDE 3 MILLILITER(S): 9 INJECTION INTRAMUSCULAR; INTRAVENOUS; SUBCUTANEOUS at 05:35

## 2024-05-26 NOTE — DISCHARGE NOTE PROVIDER - NSDCPNSUBOBJ_GEN_ALL_CORE
Vital Signs Last 24 Hrs  T(C): 37.2 (26 May 2024 04:00), Max: 37.4 (25 May 2024 19:28)  T(F): 99 (26 May 2024 04:00), Max: 99.3 (25 May 2024 19:28)  HR: 64 (26 May 2024 10:05) (64 - 104)  BP: 142/65 (26 May 2024 05:19) (128/68 - 147/68)  BP(mean): 93 (26 May 2024 05:19) (93 - 93)  RR: 18 (26 May 2024 04:00) (18 - 18)  SpO2: 98% (26 May 2024 10:05) (94% - 98%)  room air      PHYSICAL EXAM  Neurology: A&Ox3, NAD  CV : RRR+S1S2  Sternal Wound: MSI CDI [place]STA, Stable  Lungs: Respirations non-labored, B/L BS CTA  Abdomen: Soft, NT/ND, +BSx4Q, last BM 5/26   : Voiding without difficulty  Extremities: B/L LE +1 edema, negative calf tenderness, +PP       45 minutes face to face spent on total encounter, patient counseling and discharge instructions.

## 2024-05-26 NOTE — DISCHARGE NOTE PROVIDER - PROVIDER TOKENS
PROVIDER:[TOKEN:[[id number]],FOLLOWUP:[1 week]],PROVIDER:[TOKEN:[[id number]],FOLLOWUP:[2 weeks]],PROVIDER:[TOKEN:[[id number]],FOLLOWUP:[2 weeks]]

## 2024-05-26 NOTE — DISCHARGE NOTE PROVIDER - NSDCMRMEDTOKEN_GEN_ALL_CORE_FT
amiodarone 200 mg oral tablet: 1 tab(s) orally once a day  amLODIPine 10 mg oral tablet: 1 tab(s) orally once a day  apixaban 2.5 mg oral tablet: 1 tab(s) orally 2 times a day MDD: 2  ascorbic acid 500 mg oral tablet: 1 tab(s) orally once a day  aspirin 81 mg oral delayed release tablet: 1 tab(s) orally once a day  atorvastatin 40 mg oral tablet: 1 tab(s) orally once a day (at bedtime)  ferrous sulfate 325 mg (65 mg elemental iron) oral tablet: 1 tab(s) orally 3 times a day  folic acid 1 mg oral tablet: 1 tab(s) orally once a day  furosemide 40 mg oral tablet: 1 tab(s) orally once a day  gabapentin 100 mg oral capsule: 1 cap(s) orally every 8 hours MDD: 3  insulin glargine 100 units/mL subcutaneous solution: 18 unit(s) subcutaneous once a day (at bedtime)  insulin lispro 100 units/mL injectable solution: 6 unit(s) subcutaneous 3 times a day take before meals  oxyCODONE 5 mg oral tablet: 1 tab(s) orally every 6 hours as needed for  moderate pain MDD: 4  pantoprazole 40 mg oral delayed release tablet: 1 tab(s) orally once a day (before a meal)  polyethylene glycol 3350 oral powder for reconstitution: 17 gram(s) orally once a day  senna leaf extract oral tablet: 2 tab(s) orally once a day (at bedtime)  simethicone 80 mg oral tablet, chewable: 1 tab(s) orally 4 times a day As needed Gas  spironolactone 25 mg oral tablet: 1 tab(s) orally once a day  Toprol-XL 50 mg oral tablet, extended release: 1 tab(s) orally once a day start in am sat 5/11   acetaminophen 325 mg oral tablet: 2 tab(s) orally every 6 hours As needed Temp greater or equal to 38C (100.4F), Mild Pain (1 - 3)  amiodarone 200 mg oral tablet: 1 tab(s) orally once a day  amLODIPine 10 mg oral tablet: 1 tab(s) orally once a day  ascorbic acid 500 mg oral tablet: 1 tab(s) orally once a day  aspirin 81 mg oral delayed release tablet: 1 tab(s) orally once a day  atorvastatin 40 mg oral tablet: 1 tab(s) orally once a day (at bedtime)  clopidogrel 75 mg oral tablet: 1 tab(s) orally once a day  ferrous sulfate 325 mg (65 mg elemental iron) oral tablet: 1 tab(s) orally 3 times a day  folic acid 1 mg oral tablet: 1 tab(s) orally once a day  gabapentin 100 mg oral capsule: 1 cap(s) orally every 8 hours MDD: 3  guaiFENesin 100 mg/5 mL oral liquid: 5 milliliter(s) orally every 6 hours As needed Cough  insulin glargine 100 units/mL subcutaneous solution: 18 unit(s) subcutaneous once a day (at bedtime)  insulin lispro 100 units/mL injectable solution: 6 unit(s) subcutaneous 3 times a day take before meals  metoprolol succinate 100 mg oral tablet, extended release: 1 tab(s) orally once a day  pantoprazole 40 mg oral delayed release tablet: 1 tab(s) orally once a day (before a meal)  polyethylene glycol 3350 oral powder for reconstitution: 17 gram(s) orally once a day  senna leaf extract oral tablet: 2 tab(s) orally once a day (at bedtime)  spironolactone 25 mg oral tablet: 1 tab(s) orally once a day  torsemide 10 mg oral tablet: 1 tab(s) orally once a day

## 2024-05-26 NOTE — PROGRESS NOTE ADULT - TIME BILLING
Agree with above ACP note.  cv stable, improved  cont iv diuresis  care per cts
agree with above  continue iv diuresis   management per cts, await likely drain after eliquis washout   cont amio, toprol
Agree with above ACP note.  cv stable, improved  cont iv diuresis  care per cts
Agree with above ACP note.  cv stable and improved'  cont diuresis  care per cts
Agree with above ACP note.  cv stable, improved  cont iv diuresis  care per cts
agree with above  continue iv diuresis   management per cts, await likely drain after eliquis washout   cont amio, toprol

## 2024-05-26 NOTE — DISCHARGE NOTE PROVIDER - NSDCFUADDAPPT_GEN_ALL_CORE_FT
Follow up with Dr. Jimenez in 1 week at CTS office at [facility], call [phone] to schedule an appointment.  Follow up with your PCP, Dr. Patel in 2 weeks, call the office to schedule an appointment.   Follow up with your Cardiologist, Dr. Brewer in 2 weeks, please call the office to schedule an appointment.

## 2024-05-26 NOTE — DISCHARGE NOTE PROVIDER - CARE PROVIDERS DIRECT ADDRESSES
[email or web address]

## 2024-05-26 NOTE — DISCHARGE NOTE NURSING/CASE MANAGEMENT/SOCIAL WORK - PATIENT PORTAL LINK FT
You can access the FollowMyHealth Patient Portal offered by [facility] by registering at the following website: [email or web address]. By joining [facility]’s FollowMyHealth portal, you will also be able to view your health information using other applications (apps) compatible with our system.

## 2024-05-26 NOTE — PROGRESS NOTE ADULT - PROVIDER SPECIALTY LIST ADULT
Cardiology
CT Surgery
CT Surgery
Cardiology
Cardiology
CT Surgery

## 2024-05-26 NOTE — PROGRESS NOTE ADULT - ASSESSMENT
a/p  68 year-old male, with past history significant for Type-2 DM, HLD, HTN and Hyperkalemia, presented to the ED secondary to L-sided chest pain, shortness of breath, sp cath found to have TVD. Now s/p C4L, ANIYA on 5/3.  Post -op course significant for acute blood loss anemia requiring 3 U PRBC, respiratory insufficiency requiring high flow,  hyperglycemia requiring insulin gtt, and new onset Afib/RVR received modified amio load due to bradycardia. R arm with ecchymosis and edema s/p RUE duplex.  Ultimately remained in PAF and discharged on eliquis 2.5 BID and toprol 50 QD related to prolonged QTC.  Presents to ER this morning, accompanied by his spouse, related to persistent cough, chills,  and dyspnea on exertion.  Found to have large right pleural effusion and elevated troponin which is downtrending      #Dyspnea post CABG, large R effusion  -continue iv diuresis   -CXR with improved effusions b/l   -management per cts   -5/23 sp US and thoracentesis, chest tube in place   -cont iv diuresis   -Cont Colchicine     #diabetes mellitus.   -tx per cts    #CAD, s/p CABG x 4  -stable, cont current tx   -asa     #Paroxysmal atrial fibrillation.  -cont amio, bb   -eliquis on hold       dvt ppx

## 2024-05-26 NOTE — DISCHARGE NOTE NURSING/CASE MANAGEMENT/SOCIAL WORK - NSDCFUADDAPPT_GEN_ALL_CORE_FT
Follow up with Dr. Jimenez in 1 week at CTS office at [facility], call [phone] to schedule an appointment.  Follow up with your PCP, Dr. Patel in 2 weeks, call the office to schedule an appointment.   Follow up with your Cardiologist, Dr. Brewer in 2 weeks, please call the office to schedule an appointment.

## 2024-05-26 NOTE — PROGRESS NOTE ADULT - SUBJECTIVE AND OBJECTIVE BOX
CARDIOLOGY FOLLOW UP - Dr. Brewer  DATE OF SERVICE: 5/26/24    CC  +Cough  no sob    REVIEW OF SYSTEMS:  CONSTITUTIONAL: No fever, weight loss, or fatigue  RESPIRATORY: No cough, wheezing, chills or hemoptysis; No Shortness of Breath  CARDIOVASCULAR: No chest pain, palpitations, passing out, dizziness, or leg swelling  GASTROINTESTINAL: No abdominal or epigastric pain. No nausea, vomiting, or hematemesis; No diarrhea or constipation. No melena or hematochezia.  VASCULAR: No edema     PHYSICAL EXAM:  T(C): 37.2 (05-26-24 @ 04:00), Max: 37.4 (05-25-24 @ 19:28)  HR: 64 (05-26-24 @ 10:05) (64 - 107)  BP: 142/65 (05-26-24 @ 05:19) (102/62 - 147/68)  RR: 18 (05-26-24 @ 04:00) (18 - 18)  SpO2: 98% (05-26-24 @ 10:05) (94% - 98%)  Wt(kg): --  I&O's Summary    25 May 2024 07:01  -  26 May 2024 07:00  --------------------------------------------------------  IN: 910 mL / OUT: 1570 mL / NET: -660 mL    26 May 2024 07:01  -  26 May 2024 11:16  --------------------------------------------------------  IN: 240 mL / OUT: 0 mL / NET: 240 mL        Appearance: Normal	  Cardiovascular: Normal S1 S2,RRR, No JVD, No murmurs  Respiratory: Lungs clear to auscultation b/l, R pigtail  Gastrointestinal:  Soft, Non-tender, + BS	  Extremities: Normal range of motion, No clubbing, cyanosis or edema      Home Medications:  insulin glargine 100 units/mL subcutaneous solution: 18 unit(s) subcutaneous once a day (at bedtime) (10 May 2024 13:31)  insulin lispro 100 units/mL injectable solution: 6 unit(s) subcutaneous 3 times a day take before meals (10 May 2024 13:31)  polyethylene glycol 3350 oral powder for reconstitution: 17 gram(s) orally once a day (10 May 2024 13:31)  senna leaf extract oral tablet: 2 tab(s) orally once a day (at bedtime) (10 May 2024 13:31)  simethicone 80 mg oral tablet, chewable: 1 tab(s) orally 4 times a day As needed Gas (10 May 2024 13:31)      MEDICATIONS  (STANDING):  acetaminophen   IVPB .. 1000 milliGRAM(s) IV Intermittent once  albuterol    90 MICROgram(s) HFA Inhaler 2 Puff(s) Inhalation every 6 hours  albuterol/ipratropium for Nebulization 3 milliLiter(s) Nebulizer every 6 hours  aMIOdarone    Tablet 400 milliGRAM(s) Oral three times a day  amLODIPine   Tablet 10 milliGRAM(s) Oral daily  ascorbic acid 500 milliGRAM(s) Oral daily  aspirin enteric coated 81 milliGRAM(s) Oral daily  atorvastatin 40 milliGRAM(s) Oral at bedtime  colchicine 0.6 milliGRAM(s) Oral two times a day  dextrose 50% Injectable 25 Gram(s) IV Push once  folic acid 1 milliGRAM(s) Oral daily  furosemide   Injectable 40 milliGRAM(s) IV Push every 12 hours  gabapentin 100 milliGRAM(s) Oral every 8 hours  glucagon  Injectable 1 milliGRAM(s) IntraMuscular once  insulin glargine Injectable (LANTUS) 18 Unit(s) SubCutaneous at bedtime  insulin lispro (ADMELOG) corrective regimen sliding scale   SubCutaneous three times a day before meals  insulin lispro (ADMELOG) corrective regimen sliding scale   SubCutaneous at bedtime  insulin lispro Injectable (ADMELOG) 6 Unit(s) SubCutaneous three times a day before meals  metoprolol succinate  milliGRAM(s) Oral daily  pantoprazole    Tablet 40 milliGRAM(s) Oral before breakfast  polyethylene glycol 3350 17 Gram(s) Oral daily  senna 2 Tablet(s) Oral at bedtime  sodium chloride 0.9% lock flush 3 milliLiter(s) IV Push every 8 hours  spironolactone 25 milliGRAM(s) Oral daily      TELEMETRY: SR > Afib > SR 	    ECG:  	  RADIOLOGY:   DIAGNOSTIC TESTING:  [ ] Echocardiogram:  [ ]  Catheterization:  [ ] Stress Test:    OTHER: 	    LABS:	 	    Troponin T, High Sensitivity Result: 157 ng/L [0 - 51] (05-20 @ 06:40)  Troponin T, High Sensitivity Result: 167 ng/L [0 - 51] (05-20 @ 03:27)                          8.7    8.98  )-----------( 354      ( 26 May 2024 04:41 )             27.2     05-26    133<L>  |  97  |  22  ----------------------------<  136<H>  4.2   |  24  |  1.05    Ca    8.6      26 May 2024 04:41  Phos  3.3     05-26  Mg     2.3     05-26    TPro  6.5  /  Alb  2.8<L>  /  TBili  0.4  /  DBili  x   /  AST  18  /  ALT  14  /  AlkPhos  92  05-26

## 2024-05-26 NOTE — DISCHARGE NOTE PROVIDER - HOSPITAL COURSE
This is a 68 year-old male, with past history significant for Type-2 DM, HLD, HTN and Hyperkalemia, presented to the ED secondary to L-sided chest pain, shortness of breath, sp cath found to have TVD. Now s/p C4L, ANIYA on 5/3.  Post -op course significant for acute blood loss anemia requiring 3 U PRBC, respiratory insufficiency requiring high flow,  hyperglycemia requiring insulin gtt, and new onset Afib/RVR received modified amio load due to bradycardia. R arm with ecchymosis and edema s/p RUE duplex.  Ultimately remained in PAF and discharged on eliquis 2.5 BID and toprol 50 QD related to prolonged QTC.  Presents to ER this morning, accompanied by his spouse, related to persistent cough, chills,  and dyspnea on exertion.  Found to have large right pleural effusion and elevated troponin which is downtrending.  5/21 VSS CP free eliquis on hold  for pigtail   5/22 AFIB, TOPROL INCREASED , conversion pause  Diuresis, pl effusion, thoracentisis thursday.  5/23  US and thoracentisis today  5/24 s/p ptc placement yesterday, post procedure reexpansion pulm edema , placed on bipapa and diuretics, desaturation requiring increased o2.  Stable this am on NC   Colchicine started   5/25 afebrile, WBC 9.41 this AM. Respiratory panel obtained --> resulted negative for Flu, COVID, RSV.  Converted to Afib since 4:15am, per report. Afib with HR 110s on AM EKG. As per d/w Dr. Jimenez, Amiodarone increased to 400mg TID, increased Toprol from 75mg to 100mg qD, ToprolXL 25mg x1 (already received 75mg this AM), s/p IV Lopressor 2.5mg x2 with holding parameters ---> now afib with HR controlled. R PTC with minimal drainage overnight, R PTC d/c'ed per Dr. Jimenez. CXR PA/LA.   5/26 VSS, PA-L CXR clear lungs, no PTX. Pt currently in SR 60s since 5:30pm yesterday. QTC 426ms. Continue on amiodarone 200 daily and Toprol 100 for discharge, no Eliquis per Dr. Vatsia. Continue on asa and Plavix for discharge.

## 2024-05-26 NOTE — DISCHARGE NOTE PROVIDER - CARE PROVIDER_API CALL
Velma Jimenez  Thoracic and Cardiac Surgery  [address] [place] [address]  Phone: [phone]  Fax: [phone]  Follow Up Time: 1 week    Zuleyma Patel  Internal Medicine  [address] [place] [address]  Phone: [phone]  Fax: [phone]  Follow Up Time: 2 weeks    Bala Brewer  Cardiovascular Disease  [address] [facility], [facility] [address]  [place] [address]  Phone: [phone]  Fax: [phone]  Follow Up Time: 2 weeks

## 2024-05-26 NOTE — DISCHARGE NOTE PROVIDER - NSDCCPCAREPLAN_GEN_ALL_CORE_FT
PRINCIPAL DISCHARGE DIAGNOSIS  Diagnosis: S/P CABG x 4  Assessment and Plan of Treatment: 1. Daily Shower  2. Weight yourself daily.  3. Diabetic diet - low fat, low cholesterol, no added salt.  4. Cleanse Midsternal incision and leg incision daily while showering with warm water and mild soap, pat dry and maintain open to air.   5. Follow Cardiac Surgery Do's and Don'ts discharge instructions.   6. Increase Activity as tolerated.

## 2024-05-27 ENCOUNTER — TRANSCRIPTION ENCOUNTER (OUTPATIENT)
Age: 69
End: 2024-05-27

## 2024-05-29 PROBLEM — Z95.1 S/P CABG X 4: Status: ACTIVE | Noted: 2024-05-16

## 2024-05-29 PROBLEM — Z98.890 S/P LEFT ATRIAL APPENDAGE LIGATION: Status: ACTIVE | Noted: 2024-05-16

## 2024-06-03 ENCOUNTER — APPOINTMENT (OUTPATIENT)
Dept: CARDIOTHORACIC SURGERY | Facility: CLINIC | Age: 69
End: 2024-06-03
Payer: MEDICARE

## 2024-06-03 VITALS
TEMPERATURE: 98.1 F | RESPIRATION RATE: 14 BRPM | HEART RATE: 64 BPM | DIASTOLIC BLOOD PRESSURE: 69 MMHG | WEIGHT: 172 LBS | BODY MASS INDEX: 24.62 KG/M2 | HEIGHT: 70 IN | SYSTOLIC BLOOD PRESSURE: 130 MMHG | OXYGEN SATURATION: 100 %

## 2024-06-03 DIAGNOSIS — Z98.890 OTHER SPECIFIED POSTPROCEDURAL STATES: ICD-10-CM

## 2024-06-03 DIAGNOSIS — Z95.1 PRESENCE OF AORTOCORONARY BYPASS GRAFT: ICD-10-CM

## 2024-06-03 PROBLEM — I25.10 ATHEROSCLEROTIC HEART DISEASE OF NATIVE CORONARY ARTERY WITHOUT ANGINA PECTORIS: Chronic | Status: ACTIVE | Noted: 2024-05-20

## 2024-06-03 PROBLEM — Z79.01 LONG TERM (CURRENT) USE OF ANTICOAGULANTS: Chronic | Status: ACTIVE | Noted: 2024-05-20

## 2024-06-03 PROBLEM — I48.0 PAROXYSMAL ATRIAL FIBRILLATION: Chronic | Status: ACTIVE | Noted: 2024-05-20

## 2024-06-03 PROCEDURE — 99024 POSTOP FOLLOW-UP VISIT: CPT

## 2024-06-03 RX ORDER — TORSEMIDE 10 MG/1
10 TABLET ORAL
Qty: 30 | Refills: 0 | Status: ACTIVE | COMMUNITY
Start: 2024-06-03 | End: 1900-01-01

## 2024-06-03 RX ORDER — AMIODARONE HYDROCHLORIDE 200 MG/1
200 TABLET ORAL DAILY
Qty: 30 | Refills: 0 | Status: ACTIVE | COMMUNITY
Start: 2024-05-13 | End: 1900-01-01

## 2024-06-03 RX ORDER — METOPROLOL SUCCINATE 100 MG/1
100 TABLET, EXTENDED RELEASE ORAL DAILY
Qty: 90 | Refills: 0 | Status: ACTIVE | COMMUNITY
Start: 2024-05-13

## 2024-06-03 RX ORDER — FUROSEMIDE 40 MG/1
40 TABLET ORAL DAILY
Refills: 0 | Status: COMPLETED | COMMUNITY
Start: 2024-05-13 | End: 2024-06-03

## 2024-06-03 RX ORDER — SPIRONOLACTONE 25 MG/1
25 TABLET ORAL DAILY
Qty: 30 | Refills: 0 | Status: ACTIVE | COMMUNITY
Start: 2024-05-13 | End: 1900-01-01

## 2024-06-03 RX ORDER — LANCETS 33 GAUGE
EACH MISCELLANEOUS
Qty: 1 | Refills: 0 | Status: ACTIVE | COMMUNITY
Start: 2024-06-03 | End: 1900-01-01

## 2024-06-03 RX ORDER — AMLODIPINE BESYLATE 10 MG/1
10 TABLET ORAL
Qty: 90 | Refills: 0 | Status: ACTIVE | COMMUNITY
Start: 2024-05-13 | End: 1900-01-01

## 2024-06-03 RX ORDER — CLOPIDOGREL BISULFATE 75 MG/1
75 TABLET, FILM COATED ORAL DAILY
Qty: 90 | Refills: 0 | Status: ACTIVE | COMMUNITY
Start: 2024-06-03 | End: 1900-01-01

## 2024-06-03 NOTE — ASSESSMENT
[FreeTextEntry1] : 68 year-old male, with past history significant for Type-2 DM, HLD, HTN and Hyperkalemia, presented to the ED secondary to L-sided chest pain, shortness of breath, sp cath found to have TVD. Now s/p C4L, ANIYA on 5/3.  He is S/P Quadruple coronary artery bypass grafting utilizing the left internal mammary artery to left anterior descending, left radial graft to the posterior  descending branch of the right and separate reversed saphenous vein grafts to the diagonal and ramus coronary arteries,  Exclusion of left atrial appendage with an AtriClip on 5/3/24. Extubated to HFNC. Post Op requiring 3 units of PRBC and insulin gtt. Developed new onset Afib/RVR received modified amio load due to bradycardia. Continued on 200mg QD of Amio and Lopressor 50 BID. 5/7 Right Radial A -line, Trejo, CT d/c'd, Remains in Afib with HR in the 90s --> tx to 2 sellers. Received pt with ecchymosis to right arm, edema +1 with pain on palpation. Pending Duplex to right extremity. Passed TOV. No BM since prior to surgery --> Sorbitol and dulcolax ordered. Wean Supplemental O2 as tolerated. Increase Ambulation. Increase use of Incentive Spirometry. 5/8 VSS SR since 4:30am  on BB & amio  coumadin daily, 5/9 SR x 24 hrs - converted to afib @ 4:30am 100's on lopress bid - will change to toprol  a/c  d/c plan home, 5/10  pafib noted- start eliquis 2.5 bid for AC,  QTC on 12 lead ekg is 552- decrease toprol 50 qd as per Dr. Jimenez.   Today he presents and reports that he is doing well. His pedal edema is getting better. Denies any chest pain, palpitations, shortness of breath or PND.  Today on exam patient's lungs clear bilaterally, normal sinus rhythm, sternum stable, incision clean, dry and intact. LT radial and LT SVG site is clean, dry and intact.  +1 peripheral edema noted. Sutures removed today.   Instructed patient on importance of optimal glycemic control, daily showering, daily weights, any signs of fever (temperature greater than 101F, chills,  incentive spirometer use, and increase ambulation as tolerated. Instructed to call office with any signs or symptoms of infection or weight gain of 2 or more pounds in 1 day or 3 or more pounds in 1 week.    Discussed intake of plant based foods, including vegetables, fruits, and whole grain foods: legumes, nuts and seeds, fish or seafood, lean meats, and non-fat or low-fat diary foods. Plant based oils (non-tropical) in place of solid fats. Instructed patient to limit intake of high fat meats and processed meats, high-fat diary foods, dietary cholesterol and sodium, foods and beverages with added sugars.   Plan: 1) Continue current medication regimen 2) Follow up with cardiologist ( Dr. Brewer) and PCP  3) May return on as needed basis  4) Continue diuretics  5) Ambulate as tolerated  6) Continue to increase activity and walk daily as tolerated. Continue to use incentive spirometer.  7) Keep legs elevated above heart when resting/sitting/sleeping.  8) Call MD if you experience fever, fatigue, dizziness, confusion, syncope, shortness of breath, chest pain not relieved with analgesics, increased redness/drainage from the surgical  incision site 9) Virtual Cardiac Rehab referral

## 2024-06-03 NOTE — CONSULT LETTER
[Dear  ___] : Dear  [unfilled], [Courtesy Letter:] : I had the pleasure of seeing your patient, [unfilled], in my office today. [Consult Closing:] : Thank you very much for allowing me to participate in the care of this patient.  If you have any questions, please do not hesitate to contact me. [Please see my note below.] : Please see my note below. [Sincerely,] : Sincerely, [FreeTextEntry2] : DR DEENA WISDOM [FreeTextEntry3] : Velma Jimenez MD Attending Surgeon  [facility]   Cardiovascular & Thoracic Surgery [facility]

## 2024-06-03 NOTE — DISCUSSION/SUMMARY
[Doing Well] : is doing well [Excellent Pain Control] : has excellent pain control [No Sign of Infection] : is showing no signs of infection [0] : 0 [Coumadin] : the patient is not on Coumadin [Remove Sutures/Staples] : removed sutures/staples

## 2024-06-03 NOTE — END OF VISIT
[FreeTextEntry3] :  I, ОЛЕГ Church , personally performed the evaluation and management (E/M) services for this established  patient. That E/M includes conducting the initial examination, assessing all conditions, and establishing the plan of care. Today, URMILA GRAHAM  was here to observe my evaluation and management services for this patient.

## 2024-06-03 NOTE — PHYSICAL EXAM
[] : no respiratory distress [Respiration, Rhythm And Depth] : normal respiratory rhythm and effort [Auscultation Breath Sounds / Voice Sounds] : lungs were clear to auscultation bilaterally [Apical Impulse] : the apical impulse was normal [Heart Rate And Rhythm] : heart rate was normal and rhythm regular [Heart Sounds] : normal S1 and S2 [Murmurs] : no murmurs [FreeTextEntry5] : LT G site  [Clean] : clean [Dry] : dry [Healing Well] : healing well [FreeTextEntry1] : LT radial  [Pitting Edema] : pitting edema present [___ +] : bilateral ankle [unfilled]U+ pitting edema

## 2024-06-03 NOTE — REASON FOR VISIT
[de-identified] : Quadruple coronary artery bypass grafting utilizing the left internal mammary artery to left anterior descending, left radial graft to the posterior  descending branch of the right and separate reversed saphenous vein grafts to the diagonal and ramus coronary arteries.  Exclusion of left atrial appendage with an AtriClip. [de-identified] : 5/3/24 [Family Member] : family member

## 2024-06-25 ENCOUNTER — APPOINTMENT (OUTPATIENT)
Dept: CARDIOLOGY | Facility: CLINIC | Age: 69
End: 2024-06-25

## 2024-06-25 ENCOUNTER — NON-APPOINTMENT (OUTPATIENT)
Age: 69
End: 2024-06-25

## 2024-07-02 ENCOUNTER — APPOINTMENT (OUTPATIENT)
Dept: CARDIOLOGY | Facility: CLINIC | Age: 69
End: 2024-07-02

## 2024-07-27 NOTE — ED ADULT TRIAGE NOTE - CHIEF COMPLAINT QUOTE
Pt says his BP is blood sugar was more than 400 Yesterday. c/o head pressure for 10 days. since Yesterday he has chest pressure and left arm pain. PMh of HTN, DM, denies pain/discomfort (Rating = 0) Pt says his BP is blood sugar was more than 400 Yesterday. c/o head pressure for 10 days. since Yesterday he has chest pressure and left arm pain. PMh of HTN, DM, fs 179 in triage

## 2024-12-11 ENCOUNTER — APPOINTMENT (OUTPATIENT)
Dept: CARDIOLOGY | Facility: CLINIC | Age: 69
End: 2024-12-11

## 2024-12-11 DIAGNOSIS — Z98.890 OTHER SPECIFIED POSTPROCEDURAL STATES: ICD-10-CM

## 2024-12-11 DIAGNOSIS — Z95.1 PRESENCE OF AORTOCORONARY BYPASS GRAFT: ICD-10-CM

## 2024-12-12 ENCOUNTER — APPOINTMENT (OUTPATIENT)
Dept: CARDIOLOGY | Facility: CLINIC | Age: 69
End: 2024-12-12

## 2024-12-16 ENCOUNTER — APPOINTMENT (OUTPATIENT)
Dept: CARDIOLOGY | Facility: CLINIC | Age: 69
End: 2024-12-16

## 2024-12-18 ENCOUNTER — APPOINTMENT (OUTPATIENT)
Dept: CARDIOLOGY | Facility: CLINIC | Age: 69
End: 2024-12-18

## 2024-12-19 ENCOUNTER — APPOINTMENT (OUTPATIENT)
Dept: CARDIOLOGY | Facility: CLINIC | Age: 69
End: 2024-12-19

## 2024-12-23 ENCOUNTER — APPOINTMENT (OUTPATIENT)
Dept: CARDIOLOGY | Facility: CLINIC | Age: 69
End: 2024-12-23

## 2024-12-26 ENCOUNTER — APPOINTMENT (OUTPATIENT)
Dept: CARDIOLOGY | Facility: CLINIC | Age: 69
End: 2024-12-26

## 2024-12-30 ENCOUNTER — APPOINTMENT (OUTPATIENT)
Dept: CARDIOLOGY | Facility: CLINIC | Age: 69
End: 2024-12-30

## 2025-01-02 ENCOUNTER — APPOINTMENT (OUTPATIENT)
Dept: CARDIOLOGY | Facility: CLINIC | Age: 70
End: 2025-01-02

## 2025-01-06 ENCOUNTER — APPOINTMENT (OUTPATIENT)
Dept: CARDIOLOGY | Facility: CLINIC | Age: 70
End: 2025-01-06

## 2025-01-08 ENCOUNTER — APPOINTMENT (OUTPATIENT)
Dept: CARDIOLOGY | Facility: CLINIC | Age: 70
End: 2025-01-08

## 2025-01-09 ENCOUNTER — APPOINTMENT (OUTPATIENT)
Dept: CARDIOLOGY | Facility: CLINIC | Age: 70
End: 2025-01-09

## 2025-01-13 ENCOUNTER — APPOINTMENT (OUTPATIENT)
Dept: CARDIOLOGY | Facility: CLINIC | Age: 70
End: 2025-01-13

## 2025-01-15 ENCOUNTER — APPOINTMENT (OUTPATIENT)
Dept: CARDIOLOGY | Facility: CLINIC | Age: 70
End: 2025-01-15

## 2025-01-16 ENCOUNTER — APPOINTMENT (OUTPATIENT)
Dept: CARDIOLOGY | Facility: CLINIC | Age: 70
End: 2025-01-16

## 2025-01-22 ENCOUNTER — APPOINTMENT (OUTPATIENT)
Dept: CARDIOLOGY | Facility: CLINIC | Age: 70
End: 2025-01-22

## 2025-01-23 ENCOUNTER — APPOINTMENT (OUTPATIENT)
Dept: CARDIOLOGY | Facility: CLINIC | Age: 70
End: 2025-01-23

## 2025-01-27 ENCOUNTER — APPOINTMENT (OUTPATIENT)
Dept: CARDIOLOGY | Facility: CLINIC | Age: 70
End: 2025-01-27

## 2025-01-29 ENCOUNTER — APPOINTMENT (OUTPATIENT)
Dept: CARDIOLOGY | Facility: CLINIC | Age: 70
End: 2025-01-29

## 2025-01-30 ENCOUNTER — APPOINTMENT (OUTPATIENT)
Dept: CARDIOLOGY | Facility: CLINIC | Age: 70
End: 2025-01-30

## 2025-02-03 ENCOUNTER — APPOINTMENT (OUTPATIENT)
Dept: CARDIOLOGY | Facility: CLINIC | Age: 70
End: 2025-02-03

## 2025-02-05 ENCOUNTER — APPOINTMENT (OUTPATIENT)
Dept: CARDIOLOGY | Facility: CLINIC | Age: 70
End: 2025-02-05

## 2025-02-06 ENCOUNTER — APPOINTMENT (OUTPATIENT)
Dept: CARDIOLOGY | Facility: CLINIC | Age: 70
End: 2025-02-06

## 2025-02-10 ENCOUNTER — APPOINTMENT (OUTPATIENT)
Dept: CARDIOLOGY | Facility: CLINIC | Age: 70
End: 2025-02-10

## 2025-02-12 ENCOUNTER — APPOINTMENT (OUTPATIENT)
Dept: CARDIOLOGY | Facility: CLINIC | Age: 70
End: 2025-02-12

## 2025-02-13 ENCOUNTER — APPOINTMENT (OUTPATIENT)
Dept: CARDIOLOGY | Facility: CLINIC | Age: 70
End: 2025-02-13

## 2025-02-19 ENCOUNTER — APPOINTMENT (OUTPATIENT)
Dept: CARDIOLOGY | Facility: CLINIC | Age: 70
End: 2025-02-19

## 2025-02-20 ENCOUNTER — APPOINTMENT (OUTPATIENT)
Dept: CARDIOLOGY | Facility: CLINIC | Age: 70
End: 2025-02-20

## 2025-02-24 ENCOUNTER — APPOINTMENT (OUTPATIENT)
Dept: CARDIOLOGY | Facility: CLINIC | Age: 70
End: 2025-02-24

## 2025-02-26 ENCOUNTER — APPOINTMENT (OUTPATIENT)
Dept: CARDIOLOGY | Facility: CLINIC | Age: 70
End: 2025-02-26

## 2025-02-27 ENCOUNTER — APPOINTMENT (OUTPATIENT)
Dept: CARDIOLOGY | Facility: CLINIC | Age: 70
End: 2025-02-27

## 2025-03-03 ENCOUNTER — APPOINTMENT (OUTPATIENT)
Dept: CARDIOLOGY | Facility: CLINIC | Age: 70
End: 2025-03-03

## 2025-03-05 ENCOUNTER — APPOINTMENT (OUTPATIENT)
Dept: CARDIOLOGY | Facility: CLINIC | Age: 70
End: 2025-03-05

## 2025-03-06 ENCOUNTER — APPOINTMENT (OUTPATIENT)
Dept: CARDIOLOGY | Facility: CLINIC | Age: 70
End: 2025-03-06

## 2025-03-10 ENCOUNTER — APPOINTMENT (OUTPATIENT)
Dept: CARDIOLOGY | Facility: CLINIC | Age: 70
End: 2025-03-10

## 2025-03-12 ENCOUNTER — APPOINTMENT (OUTPATIENT)
Dept: CARDIOLOGY | Facility: CLINIC | Age: 70
End: 2025-03-12

## (undated) DEVICE — TOURNIQUET SET 12FR (1 RED, 1 BLUE, 1 SNARE) 7"

## (undated) DEVICE — SENSOR MYOCARDIAL TEMP 15MM

## (undated) DEVICE — SUT BOOT STANDARD (ASSORTED) 5 PAIR

## (undated) DEVICE — GLV 7.5 PROTEXIS (WHITE)

## (undated) DEVICE — BLADE SCALPEL SAFETYLOCK #15

## (undated) DEVICE — SUT SOFSILK 0 30" V-20

## (undated) DEVICE — SUT PROLENE 7-0 24" BV175-8

## (undated) DEVICE — DRAPE IOBAN 33" X 23"

## (undated) DEVICE — DRAPE 1/2 SHEET 40X57"

## (undated) DEVICE — SUT BIOSYN 4-0 18" P-12

## (undated) DEVICE — WOUND IRR SURGIPHOR

## (undated) DEVICE — SUT SILK 0 30" TIES

## (undated) DEVICE — SUT SOFSILK 0 18" TIES

## (undated) DEVICE — SUT PLEDGET PRE PUNCH 4.8 X 9.5 X 1.5 MM

## (undated) DEVICE — PACK UNIVERSAL CARDIAC

## (undated) DEVICE — PACK UNIVERSAL CARDIAC SUPPLEMNTAL B

## (undated) DEVICE — SAW BLADE MICROAIRE STERNUM 1X34X9.4MM

## (undated) DEVICE — SUCTION YANKAUER NO CONTROL VENT

## (undated) DEVICE — POSITIONER CARDIAC BUMP

## (undated) DEVICE — SUT POLYSORB 3-0 30" V-20 UNDYED

## (undated) DEVICE — SUT PROLENE 4-0 36" SH

## (undated) DEVICE — MEDTRONIC CLEARVIEW BLOWER MISTER KIT W TUBING SET

## (undated) DEVICE — SUCTION TUBE CARDIAC SOFT TIP 6FR SHAFT 10FR TIP 6"

## (undated) DEVICE — SUT PROLENE 8-0 24" BV175-6

## (undated) DEVICE — PACING CABLE A/V TEMP SCREW DOWN 6FT

## (undated) DEVICE — SWITCH ARISS TABLE MOUNT UNEQUAL LEGS 13"

## (undated) DEVICE — APPLICATOR Q TIP 6" WOOD STEM

## (undated) DEVICE — ADAPTOR DLP "Y" FEMALE ON SINGLE LEG 3.5" X 10"

## (undated) DEVICE — STAPLER SKIN VISI-STAT 35 WIDE

## (undated) DEVICE — VESSEL LOOP MAXI-BLUE 0.120" X 16"

## (undated) DEVICE — DRAIN CHANNEL 32FR ROUND HUBLESS FULL FLUTED

## (undated) DEVICE — SUT SOFSILK 4-0 24" CV-15

## (undated) DEVICE — ELCTR BOVIE TIP BLADE VALLEYLAB 6.5"

## (undated) DEVICE — ELCTR BOVIE TIP CLEANER SCRATCH PAD

## (undated) DEVICE — STABILIZER HAND ASSISTANT ATTACHMENT W STABLESOFT 2S

## (undated) DEVICE — DRSG TAPE UMBILICAL COTTON 2" X 30 X 1/8"

## (undated) DEVICE — SUT PROLENE 6-0 4-30" C-1

## (undated) DEVICE — SUT SOFSILK 0 30" TIES

## (undated) DEVICE — CHEST DRAIN PLEUR-EVAC WET/WET ADULT-PEDS SINGLE (QUICK)

## (undated) DEVICE — SET PERF Y TYPE 13.5IN STRL

## (undated) DEVICE — SUT PROLENE 7-0 24" BV175-6

## (undated) DEVICE — SAW BLADE STRYKER STERNUM 31MM X 6.27 X .79

## (undated) DEVICE — DRAPE SLUSH MACHINE 48" X 48"

## (undated) DEVICE — DRSG TEGADERM 6"X8"

## (undated) DEVICE — STABILIZER HAND ASSISTANT ATTACHMENT W STABLESOFT 2L

## (undated) DEVICE — DRAPE MAYO STAND 30"

## (undated) DEVICE — SUT POLYSORB 0 36" GS-25 UNDYED

## (undated) DEVICE — SUMP PERICARDIAL 20FR 1/4" ADULT

## (undated) DEVICE — SUT DOUBLE 6 WIRE STERNAL

## (undated) DEVICE — SPONGE PEANUT AUTO COUNT

## (undated) DEVICE — VESSEL LOOP MAXI-RED  0.120" X 16"

## (undated) DEVICE — DRSG OPSITE 2.5 X 2"

## (undated) DEVICE — PACING CABLE BI-V TEMP ALLIGATOR CLIP 8FR

## (undated) DEVICE — ADAPTER LUER STUB 15G

## (undated) DEVICE — FOLEY TRAY 16FR 5CC LF LUBRISIL ADVANCE TEMP CLOSED

## (undated) DEVICE — STRYKER INTERPULSE HANDPIECE W IRR SUCTION TUBE

## (undated) DEVICE — SUT PROLENE 5-0 36" RB-1

## (undated) DEVICE — DRSG DERMABOND PRINEO 60CM

## (undated) DEVICE — CHEST DRAIN OASIS DRY SUCTION WATER SEAL

## (undated) DEVICE — AORTIC PUNCH 5MM STANDARD HANDLE

## (undated) DEVICE — SUT TICRON 4-0 36" CV-331 DA

## (undated) DEVICE — PREP DURAPREP 26CC

## (undated) DEVICE — SUT PROLENE 6-0 30" C-1

## (undated) DEVICE — BLOWER MISTER AXIUS WITH IV SET

## (undated) DEVICE — BLOWER MISTER VIPER II

## (undated) DEVICE — SUT POLYSORB 2-0 30" GS-21 UNDYED

## (undated) DEVICE — BULLDOG SPRING CLIP LATIS/LATIS 6MM 1/2 FORCE (BLUE)

## (undated) DEVICE — SUT BOOT STANDARD (YELLOW) 5 PAIR

## (undated) DEVICE — DRAPE SLUSH / WARMER 44 X 66"

## (undated) DEVICE — PACK CARDIAC YELLOW

## (undated) DEVICE — DRAPE TOWEL BLUE 17" X 24"

## (undated) DEVICE — DRSG OPSITE 13.75 X 4"

## (undated) DEVICE — SYR ASEPTO

## (undated) DEVICE — SYNOVIS VASCULAR PROBE 1.5MM 15CM

## (undated) DEVICE — BULLDOG SPRING CLIP 6MM SOFT/SOFT

## (undated) DEVICE — SYR TB 1CC 25G X 5/8 (BLUE)

## (undated) DEVICE — GLV 8 PROTEXIS (WHITE)

## (undated) DEVICE — GOWN LG

## (undated) DEVICE — SPECIMEN CONTAINER 100ML

## (undated) DEVICE — GETINGE VASOVIEW 7 ENDOSCOPIC VESSEL HARVESTING SYSTEM

## (undated) DEVICE — STOPCOCK 4-WAY EXT LF